# Patient Record
Sex: MALE | Race: WHITE | Employment: FULL TIME | URBAN - NONMETROPOLITAN AREA
[De-identification: names, ages, dates, MRNs, and addresses within clinical notes are randomized per-mention and may not be internally consistent; named-entity substitution may affect disease eponyms.]

---

## 2018-03-07 ENCOUNTER — HOSPITAL ENCOUNTER (EMERGENCY)
Age: 48
Discharge: OTHER INSTITUTION | End: 2018-03-07
Payer: COMMERCIAL

## 2018-03-07 VITALS
RESPIRATION RATE: 16 BRPM | WEIGHT: 160 LBS | SYSTOLIC BLOOD PRESSURE: 143 MMHG | BODY MASS INDEX: 21.67 KG/M2 | DIASTOLIC BLOOD PRESSURE: 92 MMHG | HEIGHT: 72 IN | HEART RATE: 70 BPM | TEMPERATURE: 98.2 F | OXYGEN SATURATION: 97 %

## 2018-03-07 DIAGNOSIS — S60.453A FOREIGN BODY OF LEFT MIDDLE FINGER: Primary | ICD-10-CM

## 2018-03-07 PROCEDURE — 99213 OFFICE O/P EST LOW 20 MIN: CPT | Performed by: NURSE PRACTITIONER

## 2018-03-07 PROCEDURE — 99204 OFFICE O/P NEW MOD 45 MIN: CPT

## 2018-03-07 ASSESSMENT — PAIN DESCRIPTION - PAIN TYPE: TYPE: ACUTE PAIN

## 2018-03-07 ASSESSMENT — PAIN DESCRIPTION - LOCATION: LOCATION: FINGER (COMMENT WHICH ONE)

## 2018-03-07 ASSESSMENT — PAIN SCALES - GENERAL: PAINLEVEL_OUTOF10: 1

## 2018-03-07 ASSESSMENT — PAIN DESCRIPTION - ORIENTATION: ORIENTATION: LEFT

## 2018-03-07 ASSESSMENT — ENCOUNTER SYMPTOMS: ROS SKIN COMMENTS: SPLINTER

## 2021-07-02 ENCOUNTER — TELEPHONE (OUTPATIENT)
Dept: FAMILY MEDICINE CLINIC | Age: 51
End: 2021-07-02

## 2021-07-02 NOTE — TELEPHONE ENCOUNTER
Spoke with patient will come for appointment 7/23/21 at 8am for new patient. Please schedule patient as I am unable to.

## 2021-07-02 NOTE — TELEPHONE ENCOUNTER
----- Message from Mac Essex sent at 2021 11:28 AM EDT -----  Subject: Appointment Request    Reason for Call: New Patient Request Appointment    QUESTIONS  Type of Appointment? New Patient/New to Provider  Reason for appointment request? Requested Provider unavailable - Laith Jain  Additional Information for Provider? New Patient appointment to get   established would like to only see Shaina Rubio, any day or time will   work   ---------------------------------------------------------------------------  --------------  Deannie Sandhoff INFO  What is the best way for the office to contact you? OK to leave message on   voicemail  Preferred Call Back Phone Number? [phone]  ---------------------------------------------------------------------------  --------------  SCRIPT ANSWERS  Relationship to Patient? Self  Appointment reason? Establish Care/Find a provider  Is this the first appointment to establish care for a ? No  Have you been diagnosed with, awaiting test results for, or told that you   are suspected of having COVID-19 (Coronavirus)? (If patient has tested   negative or was tested as a requirement for work, school, or travel and   not based on symptoms, answer no)? Yes  Did your symptoms begin within the past 14 days or was your positive test   result within the past 14 days? No  Do you currently have flu-like symptoms including fever or chills, cough,   shortness of breath, difficulty breathing, or new loss of taste or smell? No  Have you had close contact with someone with COVID-19 in the last 14 days? No  (Service Expert  click yes below to proceed with [employer] As Usual   Scheduling)?  Yes

## 2021-07-23 ENCOUNTER — OFFICE VISIT (OUTPATIENT)
Dept: FAMILY MEDICINE CLINIC | Age: 51
End: 2021-07-23
Payer: COMMERCIAL

## 2021-07-23 VITALS
DIASTOLIC BLOOD PRESSURE: 60 MMHG | RESPIRATION RATE: 16 BRPM | BODY MASS INDEX: 20.32 KG/M2 | WEIGHT: 150 LBS | SYSTOLIC BLOOD PRESSURE: 118 MMHG | HEIGHT: 72 IN | OXYGEN SATURATION: 98 % | HEART RATE: 46 BPM

## 2021-07-23 DIAGNOSIS — I83.93 VARICOSE VEINS OF BOTH LOWER EXTREMITIES, UNSPECIFIED WHETHER COMPLICATED: ICD-10-CM

## 2021-07-23 DIAGNOSIS — Z23 NEED FOR 23-POLYVALENT PNEUMOCOCCAL POLYSACCHARIDE VACCINE: ICD-10-CM

## 2021-07-23 DIAGNOSIS — K62.5 RECTAL BLEEDING: ICD-10-CM

## 2021-07-23 DIAGNOSIS — R00.1 BRADYCARDIA: ICD-10-CM

## 2021-07-23 DIAGNOSIS — R63.4 WEIGHT LOSS: ICD-10-CM

## 2021-07-23 DIAGNOSIS — Z87.891 PERSONAL HISTORY OF TOBACCO USE: ICD-10-CM

## 2021-07-23 DIAGNOSIS — Z12.11 ENCOUNTER FOR SCREENING COLONOSCOPY: Primary | ICD-10-CM

## 2021-07-23 DIAGNOSIS — Z80.9 FAMILY HISTORY OF CANCER: ICD-10-CM

## 2021-07-23 DIAGNOSIS — Z23 NEED FOR TDAP VACCINATION: ICD-10-CM

## 2021-07-23 DIAGNOSIS — Z00.00 VISIT FOR WELL MAN HEALTH CHECK: ICD-10-CM

## 2021-07-23 DIAGNOSIS — F10.21 HISTORY OF ALCOHOLISM (HCC): ICD-10-CM

## 2021-07-23 DIAGNOSIS — R12 HEARTBURN: ICD-10-CM

## 2021-07-23 LAB
APTT: 30.4 SECONDS (ref 22–38)
INR BLD: 0.93 (ref 0.85–1.13)

## 2021-07-23 PROCEDURE — 99204 OFFICE O/P NEW MOD 45 MIN: CPT | Performed by: FAMILY MEDICINE

## 2021-07-23 PROCEDURE — G0296 VISIT TO DETERM LDCT ELIG: HCPCS | Performed by: FAMILY MEDICINE

## 2021-07-23 PROCEDURE — 93000 ELECTROCARDIOGRAM COMPLETE: CPT | Performed by: FAMILY MEDICINE

## 2021-07-23 PROCEDURE — 90472 IMMUNIZATION ADMIN EACH ADD: CPT | Performed by: FAMILY MEDICINE

## 2021-07-23 PROCEDURE — 90732 PPSV23 VACC 2 YRS+ SUBQ/IM: CPT | Performed by: FAMILY MEDICINE

## 2021-07-23 PROCEDURE — 99386 PREV VISIT NEW AGE 40-64: CPT | Performed by: FAMILY MEDICINE

## 2021-07-23 PROCEDURE — 90715 TDAP VACCINE 7 YRS/> IM: CPT | Performed by: FAMILY MEDICINE

## 2021-07-23 PROCEDURE — 90471 IMMUNIZATION ADMIN: CPT | Performed by: FAMILY MEDICINE

## 2021-07-23 PROCEDURE — 36415 COLL VENOUS BLD VENIPUNCTURE: CPT | Performed by: FAMILY MEDICINE

## 2021-07-23 RX ORDER — GINKGO BILOBA 60 MG
TABLET ORAL
COMMUNITY
End: 2021-09-08 | Stop reason: ALTCHOICE

## 2021-07-23 RX ORDER — B-COMPLEX WITH VITAMIN C
TABLET ORAL DAILY
COMMUNITY
End: 2021-09-08 | Stop reason: ALTCHOICE

## 2021-07-23 RX ORDER — VIT C/B6/B5/MAGNESIUM/HERB 173 50-5-6-5MG
CAPSULE ORAL PRN
COMMUNITY
End: 2021-09-08 | Stop reason: ALTCHOICE

## 2021-07-23 ASSESSMENT — PATIENT HEALTH QUESTIONNAIRE - PHQ9
SUM OF ALL RESPONSES TO PHQ QUESTIONS 1-9: 0
SUM OF ALL RESPONSES TO PHQ QUESTIONS 1-9: 0
2. FEELING DOWN, DEPRESSED OR HOPELESS: 0
SUM OF ALL RESPONSES TO PHQ QUESTIONS 1-9: 0
1. LITTLE INTEREST OR PLEASURE IN DOING THINGS: 0
SUM OF ALL RESPONSES TO PHQ9 QUESTIONS 1 & 2: 0

## 2021-07-23 ASSESSMENT — ENCOUNTER SYMPTOMS
SORE THROAT: 0
CONSTIPATION: 0
EYE DISCHARGE: 0
SHORTNESS OF BREATH: 0
VOMITING: 1
DIARRHEA: 0
NAUSEA: 1
EYE REDNESS: 0
BACK PAIN: 0
COUGH: 0
BLOOD IN STOOL: 1
ABDOMINAL PAIN: 0

## 2021-07-23 NOTE — PROGRESS NOTES
[address] [address]  Dept: [phone]  Dept Fax: [phone]  Loc: [phone]      Soren Dove is a 45 y.o. male who presents todayfor his medical conditions/complaints as noted below. Soren Dove is c/o of Establish Care      :     HPI     Used to feel \"invincible\". Has had \"a massive weight loss\" recently. Is an alcoholic in [place] [address] program.  Quit drinking and lost weight after this. Used to be in mid 160's. Used to weigh 3 times a day for training. Trying to get to 170 weight class but could not. Has not been 150 since was a lianet in high school. Very physical job. 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  Struggling to get weight back on. Used to binge drink. Not regular drinker. Drank all drink types; 20 drinks or so a day. Maybe 15-18 days per month; functional alcoholic. Quit Feb 22nd 2020. Never liked money or respected it. Finally lost job and respected money when he missed a training and came in late; drinking got in the way of job so quit. 700-800 meetings since. 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  Also attends meetings in [address] [place] [address]. Also in [place]. Quit smoking 30 days after sobriety date. Did have severe heartburn to the point of vomiting. Better now on herbal preparations. Used to take a lot of these. Starts additional supplements intermittently. Only those on list now. Used to use magnesium also. Sometimes used natural probiotics like yogurt. Last incident was 3 months ago. Thinks spaghetti on empty stomach was precipitant. No consistent exertional symptoms. There is no problem list on file for this patient. Goals    None       The patient has No Known Allergies.     Medical History  Siva Watt has no past medical history on file. Past SurgicalHistory  The patient  has a past surgical history that includes Inguinal hernia repair. Family History  This patient's family history includes COPD in his mother; Cancer in his father, maternal grandfather, paternal grandfather, paternal grandmother, and paternal uncle; Diabetes in his paternal grandmother; Heart Attack in his paternal aunt and paternal grandfather; High Blood Pressure in his maternal grandfather, maternal grandmother, paternal grandfather, and paternal grandmother; Kidney Disease in his paternal grandmother; Other in his mother; Stroke in his father and mother. Social History  Siva Watt  reports that he quit smoking about 17 months ago. His smoking use included cigarettes. He has a 35.00 pack-year smoking history. He has never used smokeless tobacco. He reports previous alcohol use. He reports that he does not use drugs. Medications    Current Outpatient Medications:     Ginkgo 60 MG TABS, Take by mouth, Disp: , Rfl:     B Complex Vitamins (VITAMIN B COMPLEX) TABS, Take by mouth daily, Disp: , Rfl:     Turmeric 500 MG CAPS, Take by mouth, Disp: , Rfl:     Subjective:      Review of Systems     Review of Systems   Constitutional: Positive for unexpected weight change. Negative for chills, fatigue and fever. HENT: Negative for congestion, ear discharge, ear pain and sore throat. Eyes: Positive for visual disturbance. Negative for discharge and redness. Respiratory: Negative for cough and shortness of breath. Cardiovascular: Negative for chest pain and palpitations. Gastrointestinal: Positive for blood in stool (a bit over the past 5 years; maybe 3 times; none recently), nausea (last 3 months ago) and vomiting (last 3 months ago). Negative for abdominal pain, constipation and diarrhea. Thinks hemorrhoids   Genitourinary: Negative for difficulty urinating and dysuria.    Musculoskeletal: Negative for arthralgias, back pain, gait problem and neck pain. Varicocele pain in the last 5-6 years ago. Right leg was fixed. Left leg sometimes painful. Skin: Negative for rash. Allergic/Immunologic: Negative for environmental allergies. Neurological: Negative for headaches. Psychiatric/Behavioral: Negative for dysphoric mood and sleep disturbance. The patient is not nervous/anxious. Objective:     Vitals:    07/23/21 0824 07/23/21 0851   BP: 136/82 118/60   Site: Left Upper Arm    Position: Sitting    Pulse: (!) 46    Resp: 16    SpO2: 98%    Weight: 150 lb (68 kg)    Height: 6' (1.829 m)        Physical Exam  Vitals reviewed. Constitutional:       General: He is not in acute distress. Appearance: He is well-developed and normal weight. He is not ill-appearing, toxic-appearing or diaphoretic. Comments: Slender. HENT:      Head: Normocephalic and atraumatic. Eyes:      Conjunctiva/sclera: Conjunctivae normal.      Pupils: Pupils are equal, round, and reactive to light. Neck:      Thyroid: No thyromegaly. Cardiovascular:      Rate and Rhythm: Regular rhythm. Bradycardia present. Heart sounds: Normal heart sounds. Pulmonary:      Effort: Pulmonary effort is normal. No respiratory distress. Breath sounds: Normal breath sounds. Abdominal:      General: Abdomen is flat. Bowel sounds are normal. There is no distension. Palpations: Abdomen is soft. There is no mass. Tenderness: There is no abdominal tenderness. There is no guarding or rebound. Hernia: No hernia is present. Musculoskeletal:      Cervical back: Neck supple. Comments: A few varicose veins noted left leg without swelling or any evidence of infection. Lymphadenopathy:      Cervical: No cervical adenopathy. Skin:     General: Skin is warm and dry. Findings: No rash. Neurological:      Mental Status: He is alert. Comments: No obvious focal deficit.    Psychiatric:         Mood and Affect: Mood normal.         Behavior: Behavior normal.         Thought Content: Thought content normal.         Judgment: Judgment normal.         No results found for: WBC, HGB, HCT, PLT, CHOL, TRIG, HDL, LDLDIRECT, ALT, AST, NA, K, CL, CREATININE, BUN, CO2, TSH, PSA, INR, GLUF, LABA1C, LABMICR    /Plan:     1. Weight loss  10 -15 pound unintentional. Strong family history of cancer. Trying to eat to re-gain but cannot. Will check labs, screen for colon cancer, screen for lung cancer.    - AFL - Dallas Bob MD, Gastroenterology, [facility] [employer] [place]  - CBC Auto Differential; Future  - Comprehensive Metabolic Panel; Future  - TSH With Reflex Ft4; Future    2. Rectal bleeding  None recently; small amount in the past several years maybe 3 times. Possibly related to hemorrhoid; in any case due for colonoscopy. Indications for prompt return and/or emergent presentation if worsening reviewed in detail.    - Mia Mcdonald MD, Gastroenterology, [facility] [place] [facility]  - CBC Auto Differential; Future  - Comprehensive Metabolic Panel; Future  - TSH With Reflex Ft4; Future    3. Bradycardia  Regular sinus. Suspect normal in athletic man. - EKG 12 Lead; Future    4. Heartburn  Improved with dietary and supplement changes and D/C drinking. Will defer to GI on whether concurrent upper scope is indicated with lower scope given symptoms and weight loss. 5. Varicose veins of both lower extremities, unspecified whether complicated  Intermittently symptomatic but not today; intervention in contralateral leg resolved symptoms in that area. Will consider referral post other work-up. No follow-ups on file. Orders Placed   Orders Placed This Encounter   Procedures    CT Lung Screen (Annual)     Age: Patient is 45 y.o.     Smoking History: Social History    Tobacco Use      Smoking status: Former Smoker        Packs/day: 1.00        Years: 35.00        Pack years: 35        Types: Cigarettes        Quit date: 1/23/2020        Years since quittin.4      Smokeless tobacco: Never Used    Alcohol use: Not Currently    Drug use: No   Pack years: 35    Date of last lung cancer screening: No previous lung cancer screening exam     Standing Status:   Future     Standing Expiration Date:   2023     Order Specific Question:   Is there documentation of shared decision making? Answer:   Yes     Order Specific Question:   Is this a low dose CT or a routine CT? Answer:   Low Dose CT [1]     Order Specific Question:   Is this the first (baseline) CT or an annual exam?     Answer:   Baseline [1]     Order Specific Question:   Does the patient show any signs or symptoms of lung cancer? Answer:   Yes     Comments:   10 pound weight loss     Order Specific Question:   Smoking Status? Answer: Former Smoker [4]     Order Specific Question:   Date quit smoking? (must be within 15 years)     Answer:   2020     Order Specific Question:   Smoking packs per day? Answer:   1     Order Specific Question:   Years smoking? Answer:   28    PNEUMOVAX 23 subcutaneous/IM (Pneumococcal polysaccharide vaccine 23-valent >= 1yo)    Tdap (age 6y and older) IM (BOOSTRIX)    CBC Auto Differential     Standing Status:   Future     Standing Expiration Date:   2022    Comprehensive Metabolic Panel     Standing Status:   Future     Standing Expiration Date:   2022    TSH With Reflex Ft4     Standing Status:   Future     Standing Expiration Date:   2022    Lipid Panel     Standing Status:   Future     Standing Expiration Date:   2022     Order Specific Question:   Is Patient Fasting?/# of Hours     Answer:   no    Vitamin B12 & Folate     Standing Status:   Future     Standing Expiration Date:   2022    Protime-INR     Standing Status:   Future     Standing Expiration Date:   2022     Order Specific Question:   Daily Coumadin Dose?      Answer:   none    APTT     Standing Status:   Future     Standing Expiration Date:   7/23/2022     Order Specific Question:   Daily Heparin Dose? Answer:   Marisa Daniels MD, Gastroenterology, [facility]     Referral Priority:   Routine     Referral Type:   Eval and Treat     Referral Reason:   Specialty Services Required     Referred to Provider:   Jon Humphries MD     Requested Specialty:   Gastroenterology     Number of Visits Requested:   1    EKG 12 Lead     Standing Status:   Future     Standing Expiration Date:   7/23/2022     Order Specific Question:   Reason for Exam?     Answer:   Bradycardia    NH VISIT TO DISCUSS LUNG CA SCREEN W LDCT       Prescriptions given/sent  No orders of the defined types were placed in this encounter. Patient given educational materials - see patient instructions. Discussed use, benefit, and side effects of prescribed medications. All patientquestions answered. Pt voiced understanding. Reviewed health maintenance.           Electronically signed by Amita Alcocer MD on 7/23/2021 at 9:08 AM

## 2021-07-23 NOTE — PROGRESS NOTES
After obtaining consent, and per orders of Dr. Jamar Keita, injection of Tdap LD and Pneumovax 23 RD given by Surekha Doe MA. Patient instructed to remain in clinic for 20 minutes afterwards, and to report any adverse reaction to me immediately. Immunizations Administered     Name Date Dose Route    Pneumococcal Polysaccharide ([id number]) 7/23/2021 0.5 mL Intramuscular    Site: Deltoid- Right    Lot: [id number]    NDC: 8133-2136-28    Tdap (Boostrix, Adacel) 7/23/2021 0.5 mL Intramuscular    Site: Deltoid- Left    Lot: [id number]    NDC: 00208-853-35        VIS sheets given to patient. Vaccine checklist completed.  Patient tolerated the injections well

## 2021-07-23 NOTE — PROGRESS NOTES
[address] [address]  Dept: [phone]  Dept Fax: [phone]  Loc: [phone]      Jing Jain is a 45 y.o. male who presents todayfor his medical conditions/complaints as noted below. Jing Jain is c/o of [facility]      :     HPI     Here for new patient visit to [facility]. Has been without a doctor for 6 years. Used to doctor in [place]. Had regular care there. Significant family history of cancer. Used to get chest x-rays. Usually donates blood every 4 months. There is no problem list on file for this patient. Goals    None       The patient has No Known Allergies. Medical History  David Dahl has no past medical history on file. Past SurgicalHistory  The patient  has a past surgical history that includes Inguinal hernia repair. Family History  This patient's family history includes COPD in his mother; Cancer in his father, maternal grandfather, paternal grandfather, paternal grandmother, and paternal uncle; Diabetes in his paternal grandmother; Heart Attack in his paternal aunt and paternal grandfather; High Blood Pressure in his maternal grandfather, maternal grandmother, paternal grandfather, and paternal grandmother; Kidney Disease in his paternal grandmother; Other in his mother; Stroke in his father and mother. Social History  David Dahl  reports that he quit smoking about 17 months ago. His smoking use included cigarettes. He has a 35.00 pack-year smoking history. He has never used smokeless tobacco. He reports previous alcohol use. He reports that he does not use drugs.     Medications    Current Outpatient Medications:     Ginkgo 60 MG TABS, Take by mouth, Disp: , Rfl:     B Complex Vitamins (VITAMIN B COMPLEX) TABS, Take by mouth daily, Disp: , Rfl:     Turmeric 500 MG CAPS, Take by mouth, Disp: , Rfl:     Subjective:      Review of Systems Constitutional: Positive for unexpected weight change. Negative for chills, fatigue and fever. HENT: Negative for congestion, ear discharge, ear pain and sore throat. Eyes: Positive for visual disturbance. Negative for discharge and redness. Respiratory: Negative for cough and shortness of breath. Cardiovascular: Negative for chest pain and palpitations. Gastrointestinal: Positive for blood in stool (a bit over the past 5 years; maybe 3 times; none recently), nausea (last 3 months ago) and vomiting (last 3 months ago). Negative for abdominal pain, constipation and diarrhea. Thinks hemorrhoids   Genitourinary: Negative for difficulty urinating and dysuria. Musculoskeletal: Negative for arthralgias, back pain, gait problem and neck pain. Varicocele pain in the last 5-6 years ago. Right leg was fixed. Left leg sometimes painful. Skin: Negative for rash. Allergic/Immunologic: Negative for environmental allergies. Neurological: Negative for headaches. Psychiatric/Behavioral: Negative for dysphoric mood and sleep disturbance. The patient is not nervous/anxious. Objective:     Vitals:    07/23/21 0824 07/23/21 0851   BP: 136/82 118/60   Site: Left Upper Arm    Position: Sitting    Pulse: (!) 46    Resp: 16    SpO2: 98%    Weight: 150 lb (68 kg)    Height: 6' (1.829 m)        Physical Exam     Physical Exam  Vitals reviewed. Constitutional:       General: He is not in acute distress. Appearance: He is well-developed and normal weight. He is not ill-appearing, toxic-appearing or diaphoretic. Comments: Slender. HENT:      Head: Normocephalic and atraumatic. Eyes:      Conjunctiva/sclera: Conjunctivae normal.      Pupils: Pupils are equal, round, and reactive to light. Neck:      Thyroid: No thyromegaly. Cardiovascular:      Rate and Rhythm: Regular rhythm. Bradycardia present. Heart sounds: Normal heart sounds.    Pulmonary:      Effort: Pulmonary effort is normal. No respiratory distress. Breath sounds: Normal breath sounds. Abdominal:      General: Abdomen is flat. Bowel sounds are normal. There is no distension. Palpations: Abdomen is soft. There is no mass. Tenderness: There is no abdominal tenderness. There is no guarding or rebound. Hernia: No hernia is present. Musculoskeletal:      Cervical back: Neck supple. Comments: A few varicose veins noted left leg without swelling or any evidence of infection. Lymphadenopathy:      Cervical: No cervical adenopathy. Skin:     General: Skin is warm and dry. Findings: No rash. Neurological:      Mental Status: He is alert. Comments: No obvious focal deficit. Psychiatric:         Mood and Affect: Mood normal.         Behavior: Behavior normal.         Thought Content: Thought content normal.         Judgment: Judgment normal.     No results found for: WBC, HGB, HCT, PLT, CHOL, TRIG, HDL, LDLDIRECT, ALT, AST, NA, K, CL, CREATININE, BUN, CO2, TSH, PSA, INR, GLUF, LABA1C, LABMICR    /Plan:   1. Visit for [facility] health check  Well check today. - Lipid Panel; Future    2. Encounter for screening colonoscopy  Referral placed. - Fco Dye MD, [facility]astroenter[facility], [facility]  - CBC Auto Differential; Future  - Comprehensive Metabolic Panel; Future  - TSH With Reflex Ft4; Future  - Lipid Panel; Future    3. Need for Tdap vaccination  Given.    - Tdap (age 6y and older) IM ([address])    4. Need for 23-polyvalent pneumococcal polysaccharide vaccine  Given.    - PNEUMOVAX 23 subcutaneous/IM (Pneumococcal polysaccharide vaccine 23-valent >= 3yo)    5. Personal history of tobacco use  Screen ordered. - [place] VISIT TO DISCUSS LUNG CA SCREEN W LDCT  - CT Lung Screen (Annual); Future    6. History of alcoholism ([facility] [address]  Checking labs. - Vitamin B12 & Folate; Future  - Protime-INR; Future  - APTT; Future    12. Family history of cancer  Screens placed.               Return in about 3 weeks (around 2021) for Results review; re-check weight loss; other issues. Orders Placed   Orders Placed This Encounter   Procedures    CT Lung Screen (Annual)     Age: Patient is 45 y.o. Smoking History: Social History    Tobacco Use      Smoking status: Former Smoker        Packs/day: 1.00        Years: 35.00        Pack years: 35        Types: Cigarettes        Quit date: 2020        Years since quittin.4      Smokeless tobacco: Never Used    Alcohol use: Not Currently    Drug use: No   Pack years: 28    Date of last lung cancer screening: No previous lung cancer screening exam     Standing Status:   Future     Standing Expiration Date:   2023     Order Specific Question:   Is there documentation of shared decision making? Answer:   Yes     Order Specific Question:   Is this a low dose CT or a routine CT? Answer:   Low Dose CT [1]     Order Specific Question:   Is this the first (baseline) CT or an annual exam?     Answer:   Baseline [1]     Order Specific Question:   Does the patient show any signs or symptoms of lung cancer? Answer:   Yes     Comments:   10 pound weight loss     Order Specific Question:   Smoking Status? Answer: Former Smoker [4]     Order Specific Question:   Date quit smoking? (must be within 15 years)     Answer:   2020     Order Specific Question:   Smoking packs per day? Answer:   1     Order Specific Question:   Years smoking?      Answer:   28    PNEUMOVAX 23 subcutaneous/IM (Pneumococcal polysaccharide vaccine 23-valent >= 1yo)    Tdap (age 6y and older) IM (BOOSTRIX)    CBC Auto Differential     Standing Status:   Future     Standing Expiration Date:   2022    Comprehensive Metabolic Panel     Standing Status:   Future     Standing Expiration Date:   2022    TSH With Reflex Ft4     Standing Status:   Future     Standing Expiration Date:   2022    Lipid Panel     Standing Status:   Future     Standing Expiration Date:   7/23/2022     Order Specific Question:   Is Patient Fasting?/# of Hours     Answer:   no    Vitamin B12 & Folate     Standing Status:   Future     Standing Expiration Date:   7/23/2022    Protime-INR     Standing Status:   Future     Standing Expiration Date:   7/23/2022     Order Specific Question:   Daily Coumadin Dose? Answer:   none    APTT     Standing Status:   Future     Standing Expiration Date:   7/23/2022     Order Specific Question:   Daily Heparin Dose? Answer:   Rosa Maria Torres MD, Gastroenterology, [facility]     Referral Priority:   Routine     Referral Type:   Eval and Treat     Referral Reason:   Specialty Services Required     Referred to Provider:   Debbie Jones MD     Requested Specialty:   Gastroenterology     Number of Visits Requested:   1    EKG 12 Lead     Standing Status:   Future     Number of Occurrences:   1     Standing Expiration Date:   7/23/2022     Order Specific Question:   Reason for Exam?     Answer:   Bradycardia    [place] VISIT TO DISCUSS LUNG CA SCREEN W LDCT       Prescriptions given/sent  No orders of the defined types were placed in this encounter. Patient given educational materials - see patient instructions. Discussed use, benefit, and side effects of prescribed medications. All patientquestions answered. Pt voiced understanding. Reviewed health maintenance. Electronically signed by Nikita Castellanos MD on 7/23/2021 at 9:17 AM               Low Dose CT (LDCT) Lung Screening criteria met   Age 50-69   Pack year smoking >30   Still smoking or less than 15 year since quit   No sign or symptoms of lung cancer   > 11 months since last LDCT     Risks and benefits of lung cancer screening with LDCT scans discussed:    Significance of positive screen - False-positive LDCT results often occur. 95% of all positive results do not lead to a diagnosis of cancer.  Usually further imaging can resolve most false-positive results;

## 2021-07-24 LAB
ALBUMIN SERPL-MCNC: 4.8 G/DL (ref 3.5–5.1)
ALP BLD-CCNC: 78 U/L (ref 38–126)
ALT SERPL-CCNC: 22 U/L (ref 11–66)
ANION GAP SERPL CALCULATED.3IONS-SCNC: 11 MEQ/L (ref 8–16)
AST SERPL-CCNC: 21 U/L (ref 5–40)
BASOPHILS # BLD: 0.9 %
BASOPHILS ABSOLUTE: 0.1 THOU/MM3 (ref 0–0.1)
BILIRUB SERPL-MCNC: 0.5 MG/DL (ref 0.3–1.2)
BUN BLDV-MCNC: 13 MG/DL (ref 7–22)
CALCIUM SERPL-MCNC: 9.7 MG/DL (ref 8.5–10.5)
CHLORIDE BLD-SCNC: 105 MEQ/L (ref 98–111)
CHOLESTEROL, TOTAL: 141 MG/DL (ref 100–199)
CO2: 26 MEQ/L (ref 23–33)
CREAT SERPL-MCNC: 0.9 MG/DL (ref 0.4–1.2)
EOSINOPHIL # BLD: 2.3 %
EOSINOPHILS ABSOLUTE: 0.2 THOU/MM3 (ref 0–0.4)
ERYTHROCYTE [DISTWIDTH] IN BLOOD BY AUTOMATED COUNT: 15.4 % (ref 11.5–14.5)
ERYTHROCYTE [DISTWIDTH] IN BLOOD BY AUTOMATED COUNT: 46.3 FL (ref 35–45)
FOLATE: 15.2 NG/ML (ref 4.8–24.2)
GFR SERPL CREATININE-BSD FRML MDRD: 89 ML/MIN/1.73M2
GLUCOSE BLD-MCNC: 96 MG/DL (ref 70–108)
HCT VFR BLD CALC: 41.1 % (ref 42–52)
HDLC SERPL-MCNC: 75 MG/DL
HEMOGLOBIN: 12.2 GM/DL (ref 14–18)
IMMATURE GRANS (ABS): 0.01 THOU/MM3 (ref 0–0.07)
IMMATURE GRANULOCYTES: 0.1 %
LDL CHOLESTEROL CALCULATED: 57 MG/DL
LYMPHOCYTES # BLD: 22.3 %
LYMPHOCYTES ABSOLUTE: 1.8 THOU/MM3 (ref 1–4.8)
MCH RBC QN AUTO: 24.4 PG (ref 26–33)
MCHC RBC AUTO-ENTMCNC: 29.7 GM/DL (ref 32.2–35.5)
MCV RBC AUTO: 82.4 FL (ref 80–94)
MONOCYTES # BLD: 9.1 %
MONOCYTES ABSOLUTE: 0.7 THOU/MM3 (ref 0.4–1.3)
NUCLEATED RED BLOOD CELLS: 0 /100 WBC
PLATELET # BLD: 223 THOU/MM3 (ref 130–400)
PMV BLD AUTO: 12 FL (ref 9.4–12.4)
POTASSIUM SERPL-SCNC: 4.6 MEQ/L (ref 3.5–5.2)
RBC # BLD: 4.99 MILL/MM3 (ref 4.7–6.1)
SEG NEUTROPHILS: 65.3 %
SEGMENTED NEUTROPHILS ABSOLUTE COUNT: 5.2 THOU/MM3 (ref 1.8–7.7)
SODIUM BLD-SCNC: 142 MEQ/L (ref 135–145)
TOTAL PROTEIN: 6.9 G/DL (ref 6.1–8)
TRIGL SERPL-MCNC: 43 MG/DL (ref 0–199)
TSH SERPL DL<=0.05 MIU/L-ACNC: 2.1 UIU/ML (ref 0.4–4.2)
VITAMIN B-12: 1181 PG/ML (ref 211–911)
WBC # BLD: 7.9 THOU/MM3 (ref 4.8–10.8)

## 2021-08-07 ENCOUNTER — HOSPITAL ENCOUNTER (OUTPATIENT)
Dept: CT IMAGING | Age: 51
Discharge: HOME | End: 2021-08-07
Payer: COMMERCIAL

## 2021-08-07 DIAGNOSIS — Z00.00 VISIT FOR WELL MAN HEALTH CHECK: ICD-10-CM

## 2021-08-07 DIAGNOSIS — Z87.891 PERSONAL HISTORY OF TOBACCO USE: ICD-10-CM

## 2021-08-07 PROCEDURE — 71271 CT THORAX LUNG CANCER SCR C-: CPT

## 2021-08-12 ENCOUNTER — OFFICE VISIT (OUTPATIENT)
Dept: FAMILY MEDICINE CLINIC | Age: 51
End: 2021-08-12
Payer: COMMERCIAL

## 2021-08-12 VITALS
RESPIRATION RATE: 16 BRPM | OXYGEN SATURATION: 98 % | WEIGHT: 150 LBS | SYSTOLIC BLOOD PRESSURE: 118 MMHG | HEART RATE: 60 BPM | HEIGHT: 72 IN | BODY MASS INDEX: 20.32 KG/M2 | DIASTOLIC BLOOD PRESSURE: 80 MMHG

## 2021-08-12 DIAGNOSIS — D64.9 ANEMIA, UNSPECIFIED TYPE: ICD-10-CM

## 2021-08-12 DIAGNOSIS — R12 HEARTBURN: Primary | ICD-10-CM

## 2021-08-12 DIAGNOSIS — R63.4 WEIGHT LOSS: ICD-10-CM

## 2021-08-12 DIAGNOSIS — F10.21 HISTORY OF ALCOHOLISM (HCC): ICD-10-CM

## 2021-08-12 DIAGNOSIS — Z87.891 PERSONAL HISTORY OF TOBACCO USE: ICD-10-CM

## 2021-08-12 PROCEDURE — 99214 OFFICE O/P EST MOD 30 MIN: CPT | Performed by: FAMILY MEDICINE

## 2021-08-12 RX ORDER — OMEPRAZOLE 20 MG/1
20 TABLET, DELAYED RELEASE ORAL DAILY
COMMUNITY
End: 2022-06-01 | Stop reason: ALTCHOICE

## 2021-08-12 ASSESSMENT — ENCOUNTER SYMPTOMS
SHORTNESS OF BREATH: 0
COUGH: 0
CONSTIPATION: 0
NAUSEA: 1
EYE REDNESS: 0
BLOOD IN STOOL: 1
EYE DISCHARGE: 0
VOMITING: 1
SORE THROAT: 0
ABDOMINAL PAIN: 0
DIARRHEA: 0
BACK PAIN: 0

## 2021-08-12 NOTE — PATIENT INSTRUCTIONS
Either get h. Pylori testing or schedule upper endoscopy. Go for colonoscopy. Keep food diary. Re-check in 1 month.

## 2021-08-12 NOTE — PROGRESS NOTES
[address] [address]  Dept: [phone]  Dept Fax: [phone]  Loc: [phone]      Vince Mortimer is a 45 y.o. male who presents todayfor his medical conditions/complaints as noted below. Vince Mortimer is c/o of Follow-up, Results (CT Lung scan ), and Heartburn      :     HPI       Here for follow-up. Colonoscopy scheduled for September 1st.      Discussed labs and anemia. Heartburn is still quite severe. Does not have enough appetite to eat the food that he needs to to gain weight. Weight is stable since last visit. History:    Is an alcoholic in AA program.  Quit drinking and lost weight after this. Used to be in mid 160's. Used to weigh 3 times a day for training. Trying to get to 170 weight class but could not. Has not been 150 since was a lianet in high school. Very physical job. 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  Struggling to get weight back on.       Used to binge drink. Not regular drinker. Drank all drink types; 20 drinks or so a day. Maybe 15-18 days per month; functional alcoholic. Quit Feb 22nd 2020. Never liked money or respected it. Finally lost job and respected money when he missed a training and came in late; drinking got in the way of job so quit. 700-800 meetings since. 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  Also attends meetings in [address] [place] [address]. Also in [place].      Quit smoking 30 days after sobriety date.       Did have severe heartburn to the point of vomiting. Better now on herbal preparations. Used to take a lot of these. Starts additional supplements intermittently. Only those on list now. Used to use magnesium also. Sometimes used natural probiotics like yogurt.   Last incident was 3 months ago. Thinks spaghetti on empty stomach was precipitant.       No consistent exertional symptoms. Patient Active Problem List   Diagnosis    Personal history of tobacco use    History of alcoholism ([address]    Weight loss    Rectal bleeding    Bradycardia    Heartburn    Varicose veins of both lower extremities    Family history of cancer     Goals    None       The patient has No Known Allergies. Medical History  Elaina Dominguez has no past medical history on file. Past SurgicalHistory  The patient  has a past surgical history that includes Inguinal hernia repair. Family History  This patient's family history includes COPD in his mother; Cancer in his father, maternal grandfather, paternal grandfather, paternal grandmother, and paternal uncle; Diabetes in his paternal grandmother; Heart Attack in his paternal aunt and paternal grandfather; High Blood Pressure in his maternal grandfather, maternal grandmother, paternal grandfather, and paternal grandmother; Kidney Disease in his paternal grandmother; Other in his mother; Stroke in his father and mother. Social History  Elaina Dominguez  reports that he quit smoking about 18 months ago. His smoking use included cigarettes. He has a 35.00 pack-year smoking history. He has never used smokeless tobacco. He reports previous alcohol use. He reports that he does not use drugs. Medications    Current Outpatient Medications:     omeprazole (PRILOSEC OTC) 20 MG tablet, Take 20 mg by mouth daily, Disp: , Rfl:     Ginkgo 60 MG TABS, Take by mouth, Disp: , Rfl:     B Complex Vitamins (VITAMIN B COMPLEX) TABS, Take by mouth daily, Disp: , Rfl:     Turmeric 500 MG CAPS, Take by mouth as needed , Disp: , Rfl:     Subjective:      Review of Systems   Constitutional: Positive for unexpected weight change. Negative for chills, fatigue and fever. HENT: Negative for congestion, ear discharge, ear pain and sore throat. Eyes: Positive for visual disturbance.  Negative for discharge and redness. Respiratory: Negative for cough and shortness of breath. Cardiovascular: Negative for chest pain and palpitations. Gastrointestinal: Positive for blood in stool (a bit over the past 5 years; maybe 3 times; none recently), nausea (last 3 months ago) and vomiting (last 3 months ago). Negative for abdominal pain, constipation and diarrhea. Thinks hemorrhoids   Genitourinary: Negative for difficulty urinating and dysuria. Musculoskeletal: Negative for arthralgias, back pain, gait problem and neck pain. Varicocele pain in the last 5-6 years ago. Right leg was fixed. Left leg sometimes painful. Skin: Negative for rash. Allergic/Immunologic: Negative for environmental allergies. Neurological: Negative for headaches. Psychiatric/Behavioral: Negative for dysphoric mood and sleep disturbance. The patient is not nervous/anxious. No change in symptoms reported. Objective:     Vitals:    08/12/21 0900   BP: 118/80   Site: Left Upper Arm   Position: Sitting   Pulse: 60   Resp: 16   SpO2: 98%   Weight: 150 lb (68 kg)   Height: 6' (1.829 m)       Physical Exam  Vitals reviewed. Constitutional:       Appearance: He is well-developed. HENT:      Head: Normocephalic and atraumatic. Eyes:      Conjunctiva/sclera: Conjunctivae normal.      Pupils: Pupils are equal, round, and reactive to light. Neck:      Thyroid: No thyromegaly. Cardiovascular:      Rate and Rhythm: Normal rate and regular rhythm. Heart sounds: Normal heart sounds. Pulmonary:      Effort: Pulmonary effort is normal. No respiratory distress. Breath sounds: Normal breath sounds. Abdominal:      Palpations: Abdomen is soft. Tenderness: There is no abdominal tenderness. Musculoskeletal:      Cervical back: Neck supple. Lymphadenopathy:      Cervical: No cervical adenopathy. Skin:     General: Skin is warm and dry. Findings: No rash.    Neurological:      Mental Status: He is alert. Comments: No obvious focal deficit. Psychiatric:         Behavior: Behavior normal.         Lab Results   Component Value Date    WBC 7.9 07/23/2021    HGB 12.2 (L) 07/23/2021    HCT 41.1 (L) 07/23/2021     07/23/2021    CHOL 141 07/23/2021    TRIG 43 07/23/2021    HDL 75 07/23/2021    ALT 22 07/23/2021    AST 21 07/23/2021     07/23/2021    K 4.6 07/23/2021     07/23/2021    CREATININE 0.9 07/23/2021    BUN 13 07/23/2021    CO2 26 07/23/2021    TSH 2.100 07/23/2021    INR 0.93 07/23/2021       /Plan:   1. Heartburn  Given symptoms at minimum needs h. Pylori testing. Could also consider upper scope when doing lower scope. Patient will decide if he prefers stool test with upper scope only if negative and symptoms persist or to go straight for upper scope.    - H. Pylori Antigen, Stool; Future    2. Weight loss  Work-up for anemia with colonoscopy first. Keep food log. If related to diet and activity may need to increase calories. 3. Anemia, unspecified type  Iron supplement okay. Go for colonoscopy. Work-up post scope if indicated. 4. History of alcoholism ([address]  In remission. Keep up the good work! 5. Personal history of tobacco use  In remission. CT lung screen normal.  Keep up the good work! Return in about 4 weeks (around 9/9/2021) for Weight check post colonoscopy. Orders Placed   Orders Placed This Encounter   Procedures    H. Pylori Antigen, Stool     Standing Status:   Future     Standing Expiration Date:   8/12/2022       Prescriptions given/sent  No orders of the defined types were placed in this encounter. Patient given educational materials - see patient instructions. Discussed use, benefit, and side effects of prescribed medications. All patientquestions answered. Pt voiced understanding. Reviewed health maintenance.           Electronically signed by Sheridan Rosales MD on 8/12/2021 at 9:36 AM      Electronically signed by

## 2021-08-27 ENCOUNTER — TELEPHONE (OUTPATIENT)
Dept: FAMILY MEDICINE CLINIC | Age: 51
End: 2021-08-27

## 2021-08-27 DIAGNOSIS — D64.9 ANEMIA, UNSPECIFIED TYPE: Primary | ICD-10-CM

## 2021-08-27 NOTE — TELEPHONE ENCOUNTER
GI requested patient labs. Okay to forward a copy. Also let patient know that I have ordered a repeat blood count and iron studies a requested by GI.

## 2021-08-27 NOTE — TELEPHONE ENCOUNTER
Lab results sent to [facility] for Geovanny Rob- called patient- no answer- left message to return call

## 2021-08-30 ENCOUNTER — NURSE ONLY (OUTPATIENT)
Dept: FAMILY MEDICINE CLINIC | Age: 51
End: 2021-08-30
Payer: COMMERCIAL

## 2021-08-30 DIAGNOSIS — D64.9 ANEMIA, UNSPECIFIED TYPE: ICD-10-CM

## 2021-08-30 PROCEDURE — 36415 COLL VENOUS BLD VENIPUNCTURE: CPT | Performed by: FAMILY MEDICINE

## 2021-08-30 NOTE — TELEPHONE ENCOUNTER
----- Message from [facility] sent at 8/27/2021  1:35 PM EDT -----  Subject: Message to Provider    QUESTIONS  Information for Provider? pt tried to contact office. Office was closed. ---------------------------------------------------------------------------  --------------  Kate LIRIANO  What is the best way for the office to contact you? OK to leave message on   voicemail  Preferred Call Back Phone Number? [phone]  ---------------------------------------------------------------------------  --------------  SCRIPT ANSWERS  Relationship to Patient?  Self

## 2021-08-31 LAB
ABSOLUTE RETIC #: 43 THOU/MM3 (ref 20–115)
BASOPHILS # BLD: 0.8 %
BASOPHILS ABSOLUTE: 0.1 THOU/MM3 (ref 0–0.1)
EOSINOPHIL # BLD: 0.6 %
EOSINOPHILS ABSOLUTE: 0.1 THOU/MM3 (ref 0–0.4)
ERYTHROCYTE [DISTWIDTH] IN BLOOD BY AUTOMATED COUNT: 16.1 % (ref 11.5–14.5)
ERYTHROCYTE [DISTWIDTH] IN BLOOD BY AUTOMATED COUNT: 48.2 FL (ref 35–45)
FERRITIN: 9 NG/ML (ref 22–322)
HCT VFR BLD CALC: 38.7 % (ref 42–52)
HEMOGLOBIN: 11.7 GM/DL (ref 14–18)
IMMATURE GRANS (ABS): 0.03 THOU/MM3 (ref 0–0.07)
IMMATURE GRANULOCYTES: 0.3 %
IMMATURE RETIC FRACT: 8.9 % (ref 2.3–13.4)
IRON SATURATION: 11 % (ref 20–50)
IRON: 43 UG/DL (ref 65–195)
LYMPHOCYTES # BLD: 16.8 %
LYMPHOCYTES ABSOLUTE: 1.4 THOU/MM3 (ref 1–4.8)
MCH RBC QN AUTO: 24.8 PG (ref 26–33)
MCHC RBC AUTO-ENTMCNC: 30.2 GM/DL (ref 32.2–35.5)
MCV RBC AUTO: 82.2 FL (ref 80–94)
MONOCYTES # BLD: 7.2 %
MONOCYTES ABSOLUTE: 0.6 THOU/MM3 (ref 0.4–1.3)
NUCLEATED RED BLOOD CELLS: 0 /100 WBC
PLATELET # BLD: 235 THOU/MM3 (ref 130–400)
PMV BLD AUTO: 11.5 FL (ref 9.4–12.4)
RBC # BLD: 4.71 MILL/MM3 (ref 4.7–6.1)
RETIC HEMOGLOBIN: 27.9 PG (ref 28.2–35.7)
RETICULOCYTE ABSOLUTE COUNT: 0.9 % (ref 0.5–2)
SEG NEUTROPHILS: 74.3 %
SEGMENTED NEUTROPHILS ABSOLUTE COUNT: 6.4 THOU/MM3 (ref 1.8–7.7)
TOTAL IRON BINDING CAPACITY: 406 UG/DL (ref 171–450)
TRANSFERRIN: 341 MG/DL (ref 200–360)
WBC # BLD: 8.6 THOU/MM3 (ref 4.8–10.8)

## 2021-09-02 ENCOUNTER — TELEPHONE (OUTPATIENT)
Dept: FAMILY MEDICINE CLINIC | Age: 51
End: 2021-09-02

## 2021-09-02 ENCOUNTER — HOSPITAL ENCOUNTER (OUTPATIENT)
Dept: CT IMAGING | Age: 51
Discharge: HOME | End: 2021-09-02
Payer: COMMERCIAL

## 2021-09-02 ENCOUNTER — NURSE ONLY (OUTPATIENT)
Dept: LAB | Age: 51
End: 2021-09-02

## 2021-09-02 DIAGNOSIS — C18.9 MALIGNANT NEOPLASM OF COLON, UNSPECIFIED PART OF COLON (HCC): ICD-10-CM

## 2021-09-02 PROCEDURE — 6360000004 HC RX CONTRAST MEDICATION: Performed by: INTERNAL MEDICINE

## 2021-09-02 PROCEDURE — 71260 CT THORAX DX C+: CPT

## 2021-09-02 PROCEDURE — 74177 CT ABD & PELVIS W/CONTRAST: CPT

## 2021-09-02 RX ADMIN — IOHEXOL 50 ML: 240 INJECTION, SOLUTION INTRATHECAL; INTRAVASCULAR; INTRAVENOUS; ORAL at 15:38

## 2021-09-02 RX ADMIN — IOPAMIDOL 80 ML: 755 INJECTION, SOLUTION INTRAVENOUS at 15:37

## 2021-09-02 NOTE — TELEPHONE ENCOUNTER
spoke with office- they are just wanting number that we possibly used to PA CT Lung screen- number given that we have on file- will call back with any questions

## 2021-09-02 NOTE — TELEPHONE ENCOUNTER
----- Message from Karrie Boogie sent at 9/1/2021  4:59 PM EDT -----  Subject: Message to Provider    QUESTIONS  Information for Provider? Christiano called from [address] [facility], needs info on Pa,   askes for office to call her, found cancer on colon for patient Palmer Shah, sees provider Karina Umanzor, asks for office to call her to help her   she has questions so she can get some CT CHEST AND CT AB DONE, has   questions about PA from a lung screen , please call her at [phone]   extension [phone]  ---------------------------------------------------------------------------  --------------  CALL BACK INFO  What is the best way for the office to contact you? OK to leave message on   voicemail  Preferred Call Back Phone Number? [phone]  ---------------------------------------------------------------------------  --------------  SCRIPT ANSWERS  Relationship to Patient? Third Party  Representative Name?  Siva Blackman

## 2021-09-08 ENCOUNTER — OFFICE VISIT (OUTPATIENT)
Dept: SURGERY | Age: 51
End: 2021-09-08
Payer: COMMERCIAL

## 2021-09-08 VITALS
BODY MASS INDEX: 20.48 KG/M2 | DIASTOLIC BLOOD PRESSURE: 78 MMHG | HEART RATE: 88 BPM | HEIGHT: 72 IN | SYSTOLIC BLOOD PRESSURE: 120 MMHG | OXYGEN SATURATION: 91 % | WEIGHT: 151.2 LBS | TEMPERATURE: 97.8 F | RESPIRATION RATE: 18 BRPM

## 2021-09-08 DIAGNOSIS — C18.4 CANCER OF TRANSVERSE COLON (HCC): Primary | ICD-10-CM

## 2021-09-08 DIAGNOSIS — C18.9 ADENOCARCINOMA, COLON (HCC): ICD-10-CM

## 2021-09-08 DIAGNOSIS — Z01.818 PRE-OP TESTING: ICD-10-CM

## 2021-09-08 DIAGNOSIS — C18.9 ADENOCARCINOMA OF COLON (HCC): ICD-10-CM

## 2021-09-08 PROCEDURE — 99204 OFFICE O/P NEW MOD 45 MIN: CPT | Performed by: SURGERY

## 2021-09-08 RX ORDER — METRONIDAZOLE 500 MG/1
TABLET ORAL
Qty: 3 TABLET | Refills: 0 | Status: SHIPPED | OUTPATIENT
Start: 2021-09-08 | End: 2021-09-09

## 2021-09-09 ENCOUNTER — OFFICE VISIT (OUTPATIENT)
Dept: FAMILY MEDICINE CLINIC | Age: 51
End: 2021-09-09
Payer: COMMERCIAL

## 2021-09-09 VITALS
SYSTOLIC BLOOD PRESSURE: 114 MMHG | TEMPERATURE: 98.1 F | DIASTOLIC BLOOD PRESSURE: 62 MMHG | BODY MASS INDEX: 20.45 KG/M2 | WEIGHT: 151 LBS | OXYGEN SATURATION: 98 % | HEIGHT: 72 IN | HEART RATE: 65 BPM | RESPIRATION RATE: 16 BRPM

## 2021-09-09 DIAGNOSIS — C18.4 CANCER OF TRANSVERSE COLON (HCC): Primary | ICD-10-CM

## 2021-09-09 DIAGNOSIS — R12 HEARTBURN: ICD-10-CM

## 2021-09-09 DIAGNOSIS — Z01.818 PRE-OP TESTING: ICD-10-CM

## 2021-09-09 PROBLEM — C18.9 ADENOCARCINOMA, COLON (HCC): Status: ACTIVE | Noted: 2021-09-09

## 2021-09-09 PROCEDURE — 99214 OFFICE O/P EST MOD 30 MIN: CPT | Performed by: FAMILY MEDICINE

## 2021-09-09 ASSESSMENT — ENCOUNTER SYMPTOMS
CONSTIPATION: 0
RECTAL PAIN: 0
COUGH: 0
CHOKING: 0
ALLERGIC/IMMUNOLOGIC NEGATIVE: 1
ABDOMINAL DISTENTION: 0
RHINORRHEA: 0
EYE DISCHARGE: 0
SORE THROAT: 0
PHOTOPHOBIA: 0
COUGH: 0
BLOOD IN STOOL: 1
NAUSEA: 0
VOMITING: 0
BACK PAIN: 0
ABDOMINAL PAIN: 0
TROUBLE SWALLOWING: 0
SHORTNESS OF BREATH: 0
FACIAL SWELLING: 0
EYE PAIN: 0
SINUS PRESSURE: 0
WHEEZING: 0
STRIDOR: 0
ANAL BLEEDING: 0
DIARRHEA: 0
EYE ITCHING: 0
VOICE CHANGE: 0
COLOR CHANGE: 0
CHEST TIGHTNESS: 0
APNEA: 0
BLOOD IN STOOL: 0
SHORTNESS OF BREATH: 0
EYE REDNESS: 0

## 2021-09-09 NOTE — PROGRESS NOTES
[address] [address]  Dept: [phone]  Dept Fax: [phone]  Loc: [phone]      Miguel Angel Castle is a 45 y.o. male who presents todayfor his medical conditions/complaints as noted below. Miguel Angel Castle is c/o of 1 Month Follow-Up (recent colon cancer dx )      :     HPI     Patient recently diagnosed with colon cancer through colonoscopy, staging is pending. Saw Dr. Roshan Allison on 9/8 for surgery consult, with partial colectomy planned. Will see Dr. Maxwell Sandoval heme/onc on 10/4/21     EGD for gerd and n/v symptoms on 9/30/21. Still has episodes of severe heartburn. Greg Grullon seems to be mentally resilient and with good social support. He reports that he has not lost weight in the past couple of months. History:    Is an alcoholic in AA program.  Quit drinking and lost weight after this. Used to be in mid 160's. Used to weigh 3 times a day for training. Trying to get to 170 weight class but could not. Has not been 150 since was a lianet in high school. Very physical job. 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  Struggling to get weight back on.       Used to binge drink. Not regular drinker. Drank all drink types; 20 drinks or so a day. Maybe 15-18 days per month; functional alcoholic. Quit Feb 22nd 2020. Never liked money or respected it. Finally lost job and respected money when he missed a training and came in late; drinking got in the way of job so quit. 700-800 meetings since. 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 Also attends meetings in [employer]. Also in [employer].      Quit smoking 30 days after sobriety date.          No consistent exertional symptoms.    Patient Active Problem List   Diagnosis    Personal history of tobacco use    History of alcoholism ([address]    Weight loss    Bradycardia    Heartburn    Varicose veins of both lower extremities    Family history of cancer    Adenocarcinoma, colon (HCC)     Goals    None       The patient has No Known Allergies. Medical History  Calli Welch has a past medical history of Anemia and Colon cancer ([address] Past SurgicalHistory  The patient  has a past surgical history that includes Inguinal hernia repair (Left, 2020) and Colonoscopy (09/01/2021). Family History  This patient's family history includes COPD in his mother; Cancer in his father, maternal aunt, maternal grandfather, maternal grandmother, maternal uncle, paternal aunt, paternal grandfather, paternal grandmother, and paternal uncle; Diabetes in his paternal grandmother; Heart Attack in his paternal aunt and paternal grandfather; High Blood Pressure in his maternal grandfather, maternal grandmother, paternal grandfather, and paternal grandmother; Kidney Disease in his paternal grandmother; Other in his mother; Stroke in his father and mother. Social History  Calli Welch  reports that he quit smoking about 19 months ago. His smoking use included cigarettes. He has a 35.00 pack-year smoking history. He has never used smokeless tobacco. He reports previous alcohol use. He reports that he does not use drugs. Medications    Current Outpatient Medications:     omeprazole (PRILOSEC OTC) 20 MG tablet, Take 20 mg by mouth daily, Disp: , Rfl:     Subjective:      Review of Systems   Constitutional: Positive for fatigue. Negative for unexpected weight change. Respiratory: Negative for cough and shortness of breath. Cardiovascular: Negative for chest pain and leg swelling. Gastrointestinal: Positive for blood in stool. Neurological: Negative for dizziness, weakness, light-headedness and headaches. Psychiatric/Behavioral: Negative for dysphoric mood. The patient is not nervous/anxious. No change in symptoms reported. Objective:     Vitals:    09/09/21 1007   BP: 114/62   Site: Left Upper Arm   Position: Sitting   Pulse: 65   Resp: 16   Temp: 98.1 °F (36.7 °C)   TempSrc: Temporal   SpO2: 98%   Weight: 151 lb (68.5 kg)   Height: 6' (1.829 m)       Physical Exam  Vitals reviewed. Constitutional:       Appearance: He is well-developed. HENT:      Head: Normocephalic and atraumatic. Eyes:      Conjunctiva/sclera: Conjunctivae normal.      Pupils: Pupils are equal, round, and reactive to light. Neck:      Thyroid: No thyromegaly. Cardiovascular:      Rate and Rhythm: Normal rate and regular rhythm. Heart sounds: Normal heart sounds. Pulmonary:      Effort: Pulmonary effort is normal. No respiratory distress. Breath sounds: Normal breath sounds. Abdominal:      Palpations: Abdomen is soft. Tenderness: There is no abdominal tenderness. Musculoskeletal:      Cervical back: Neck supple. Lymphadenopathy:      Cervical: No cervical adenopathy. Skin:     General: Skin is warm and dry. Findings: No rash. Neurological:      General: No focal deficit present. Mental Status: He is alert. Psychiatric:         Behavior: Behavior normal.         Lab Results   Component Value Date    WBC 8.6 08/30/2021    HGB 11.7 (L) 08/30/2021    HCT 38.7 (L) 08/30/2021     08/30/2021    CHOL 141 07/23/2021    TRIG 43 07/23/2021    HDL 75 07/23/2021    ALT 22 07/23/2021    AST 21 07/23/2021     07/23/2021    K 4.6 07/23/2021     07/23/2021    CREATININE 0.9 07/23/2021    BUN 13 07/23/2021    CO2 26 07/23/2021    TSH 2.100 07/23/2021    INR 0.93 07/23/2021       /Plan:     Colon Cancer  Invasive moderately differentiated adenocarcinoma on biopsy of proximal transverse colon  - Saw Dr. Sasha Alfaro on 9/8 for surgery consult, with partial colectomy planned. - Will see Dr. Sammy Shields heme/onc on 10/4/21   - CEA today    Heartburn  Still has episodes of severe heartburn.   - EGD for gerd and n/v symptoms on 9/30/21.  - h.pylori stool testing in future  - continue PPI    History of alcoholism ([facility]  In remission. Personal history of tobacco use  In remission. Patient to follow up after post op. Orders Placed   No orders of the defined types were placed in this encounter. Prescriptions given/sent  No orders of the defined types were placed in this encounter. Patient given educational materials - see patient instructions. Discussed use, benefit, and side effects of prescribed medications. All patient questions answered. Pt voiced understanding. Reviewed health maintenance.         Electronically signed by Tanner Youssef MD on 9/9/2021 at 8:00 PM

## 2021-09-09 NOTE — PROGRESS NOTES
Kate Little (:  1970)     ASSESSMENT:  1. Proximal transverse colon adenocarcinoma    PLAN:  1. Schedule [facility] for Robotic possible open extended right colectomy. 2. He will undergo pre-operative clearance per anesthesia guidelines with risk factors listed under the past medical history diagnosis & problem list.  3. The risks, benefits and alternatives were discussed with Yaz Delaney including non-operative management. The pros and cons of robotic, laparoscopic and open techniques were discussed. All questions answered. He understands and wishes to proceed with surgical intervention. 4. Restrictions discussed with Yaz Delaney and he expresses understanding. 5. He is advised to call back directly if there are further questions/concerns, or if his symptoms worsen prior to surgery. 6.  CEA level prior to surgery  7. Obtain colonoscopy report/transcription prior to surgical intervention. Ensure [place] ink placed. SUBJECTIVE/OBJECTIVE:    Chief Complaint   Patient presents with    Surgical Consult     New patient-referred by Dr Adilia Jean-Baptiste moderately differentiated adenocarcinoma of the colon       HPI  Yaz Delaney is a 57-year-old male who presents for initial evaluation secondary to a newly diagnosed adenocarcinoma of the proximal transverse colon. [facility] was [facility] and Dr. Diamante uHtchinson due to some unexplained weight loss and rectal bleeding. Colonoscopy demonstrated multiple polyps but a larger mass at the proximal transverse colon demonstrated the adenocarcinoma. He states he is still having regular diet without difficulty. No signs of obstruction. Normal formation of stool. No abdominal pain. No significant nausea or vomiting. Some previous episodes of nausea, GERD and epigastric discomfort. This seems to be unrelated to the newly diagnosed mass. He has never underwent an upper endoscopy at this point but is planning to do so as well with GI.   Patient states the area was tattooed with [place] ink. Long history of cancer in his family including colorectal disease. Weight loss of about 10 pounds. He is fairly active and eats a healthy diet. Denies any new urinary complaints. No chest pain or shortness of breath. Previous CT chest abdomen pelvis okay. Only major previous surgery per patient was a recent left inguinal hernia repair last year. Review of Systems   Constitutional: Positive for fatigue and unexpected weight change. Negative for activity change, appetite change, chills, diaphoresis and fever. HENT: Negative for congestion, dental problem, drooling, ear discharge, ear pain, facial swelling, hearing loss, mouth sores, nosebleeds, postnasal drip, rhinorrhea, sinus pressure, sneezing, sore throat, tinnitus, trouble swallowing and voice change. Eyes: Negative for photophobia, pain, discharge, redness, itching and visual disturbance. Respiratory: Negative for apnea, cough, choking, chest tightness, shortness of breath, wheezing and stridor. Cardiovascular: Negative for chest pain, palpitations and leg swelling. Gastrointestinal: Negative for abdominal distention, abdominal pain, anal bleeding, blood in stool, constipation, diarrhea, nausea, rectal pain and vomiting. Endocrine: Negative. Genitourinary: Negative for decreased urine volume, difficulty urinating, discharge, dysuria, enuresis, flank pain, frequency, genital sores, hematuria, penile pain, penile swelling, scrotal swelling, testicular pain and urgency. Musculoskeletal: Negative for arthralgias, back pain, gait problem, joint swelling, myalgias, neck pain and neck stiffness. Skin: Negative for color change, pallor, rash and wound. Allergic/Immunologic: Negative. Neurological: Negative for dizziness, tremors, seizures, syncope, facial asymmetry, speech difficulty, weakness, light-headedness, numbness and headaches. Hematological: Negative for adenopathy.  Does not bruise/bleed easily. Psychiatric/Behavioral: Negative for agitation, behavioral problems, confusion, decreased concentration, dysphoric mood, hallucinations, self-injury, sleep disturbance and suicidal ideas. The patient is not nervous/anxious and is not hyperactive. Past Medical History:   Diagnosis Date    Anemia     Colon cancer ([address] 09/2021       Past Surgical History:   Procedure Laterality Date    COLONOSCOPY  09/01/2021    Dr. Fidel Mckeon with mesh inguinal       Current Outpatient Medications   Medication Sig Dispense Refill    metroNIDAZOLE (FLAGYL) 500 MG tablet Take one tablet at 4pm one tablet at 5pm and one tablet at 11pm the day prior to surgery. 3 tablet 0    omeprazole (PRILOSEC OTC) 20 MG tablet Take 20 mg by mouth daily       No current facility-administered medications for this visit.        No Known Allergies    Family History   Problem Relation Age of Onset   Ruel Corbett Other Mother         Brain aneurysm    COPD Mother    Lumpkin Aric Stroke Mother    [place] Aric Cancer Father         lung mets brain and bone    Stroke Father     Cancer Maternal Grandmother         colon    High Blood Pressure Maternal Grandmother     Cancer Maternal Grandfather         colon     High Blood Pressure Maternal Grandfather     Kidney Disease Paternal Grandmother     Cancer Paternal Grandmother         breast     Diabetes Paternal Grandmother     High Blood Pressure Paternal Grandmother     Cancer Paternal Grandfather         lung-52    Heart Attack Paternal Grandfather     High Blood Pressure Paternal Grandfather     Cancer Paternal Aunt         colon    Heart Attack Paternal Aunt     Cancer Paternal Uncle         lung    Cancer Maternal Aunt         colon    Cancer Maternal Uncle         colon       Social History     Socioeconomic History    Marital status: Single     Spouse name: Not on file    Number of children: Not on file    Years of education: Not on file    Highest education level: Not on file   Occupational History    Not on file   Tobacco Use    Smoking status: Former Smoker     Packs/day: 1.00     Years: 35.00     Pack years: 35.00     Types: Cigarettes     Quit date: 2020     Years since quittin.6    Smokeless tobacco: Never Used   Substance and Sexual Activity    Alcohol use: Not Currently    Drug use: No    Sexual activity: Not on file   Other Topics Concern    Not on file   Social History Narrative    Not on file     Social Determinants of Health     Financial Resource Strain:     Difficulty of Paying Living Expenses:    Food Insecurity:     Worried About Running Out of Food in the Last Year:     [address] in the Last Year:    Transportation Needs:     Lack of Transportation (Medical):  Lack of Transportation (Non-Medical):    Physical Activity:     Days of Exercise per Week:     Minutes of Exercise per Session:    Stress:     Feeling of Stress :    Social Connections:     Frequency of Communication with Friends and Family:     Frequency of Social Gatherings with Friends and Family:     Attends Mandae[employer] Services:     Active Member of Clubs or Organizations:     Attends Club or Organization Meetings:     Marital Status:    Intimate Partner Violence:     Fear of Current or Ex-Partner:     Emotionally Abused:     Physically Abused:     Sexually Abused:      Vitals:    21 1221   BP: 120/78   Site: Right Upper Arm   Position: Sitting   Cuff Size: Medium Adult   Pulse: 88   Resp: 18   Temp: 97.8 °F (36.6 °C)   TempSrc: Temporal   SpO2: 91%   Weight: 151 lb 3.2 oz (68.6 kg)   Height: 6' (1.829 m)     Body mass index is 20.51 kg/m². Wt Readings from Last 3 Encounters:   21 151 lb 3.2 oz (68.6 kg)   21 150 lb (68 kg)   21 150 lb (68 kg)     Physical Exam  Vitals reviewed. Constitutional:       General: He is not in acute distress. Appearance: He is well-developed.  He is not diaphoretic. HENT:      Head: Normocephalic and atraumatic. Right Ear: External ear normal.      Left Ear: External ear normal.      Nose: Nose normal.   Eyes:      General: No scleral icterus. Right eye: No discharge. Left eye: No discharge. Conjunctiva/sclera: Conjunctivae normal.   Cardiovascular:      Rate and Rhythm: Normal rate and regular rhythm. Heart sounds: Normal heart sounds. Pulmonary:      Effort: Pulmonary effort is normal. No respiratory distress. Breath sounds: Normal breath sounds. No wheezing or rales. Chest:      Chest wall: No tenderness. Abdominal:      General: Bowel sounds are normal. There is no distension. Palpations: Abdomen is soft. There is no mass. Tenderness: There is no abdominal tenderness. There is no guarding or rebound. Musculoskeletal:         General: No tenderness. Normal range of motion. Cervical back: Normal range of motion and neck supple. Skin:     General: Skin is warm and dry. Coloration: Skin is not pale. Findings: No erythema or rash. Neurological:      Mental Status: He is alert and oriented to person, place, and time. Cranial Nerves: No cranial nerve deficit. Psychiatric:         Behavior: Behavior normal.         Thought Content:  Thought content normal.         Judgment: Judgment normal.       Lab Results   Component Value Date    WBC 8.6 08/30/2021    HGB 11.7 (L) 08/30/2021    HCT 38.7 (L) 08/30/2021    MCV 82.2 08/30/2021     08/30/2021     Lab Results   Component Value Date     07/23/2021    K 4.6 07/23/2021     07/23/2021    CO2 26 07/23/2021     Lab Results   Component Value Date    CREATININE 0.9 07/23/2021     Lab Results   Component Value Date    ALT 22 07/23/2021    AST 21 07/23/2021    ALKPHOS 78 07/23/2021    BILITOT 0.5 07/23/2021     No results found for: LIPASE    No results found for: CEA    Patient Active Problem List   Diagnosis    Personal history of tobacco use    History of alcoholism ([address]    Weight loss    Rectal bleeding    Bradycardia    Heartburn    Varicose veins of both lower extremities    Family history of cancer     Narrative   PROCEDURE: CT CHEST W CONTRAST       CLINICAL INFORMATION: Malignant neoplasm of colon, unspecified part of colon ([address]       COMPARISON: 8/7/2021       TECHNIQUE:    5 mm axial imaging through the chest with IV contrast. Coronal and sagittal reconstruction were performed.        All CT scans at this facility use dose modulation, iterative reconstruction, and/or weight based dosing when appropriate to reduce the radiation dose to as low as reasonably achievable.       CONTRAST: Isovue 370, 76 mL           FINDINGS:   POSTOPERATIVE CHANGES: None.       MECHANICAL/LIFE SUPPORT DEVICES: None.       HEART/MEDIASTINUM:    1. The heart is normal size. 2. There is no pericardial fluid and thickening.       PULMONARY ARTERIES: Normal.       THORACIC AORTA: There is no aneurysm or dissection.       LUNG PATE - INFILTRATE/PLEURAL EFFUSION/PULMONARY VASCULAR CONGESTION: Normal.       LUNGS - MASS/NODULES: None.       LYMPHADENOPATHY:    1. No pathologically enlarged lymphadenopathy.       PNEUMOTHORAX: None.       THYROID GLAND: Unremarkable.       TRACHEA/ESOPHAGUS: Unremarkable.       MUSCULOSKELETAL: Unremarkable.       UPPER ABDOMEN: Unremarkable.               Impression   1. No suspicious pulmonary nodules. 2. No evidence of metastatic disease. 3. No acute infiltrate or pleural effusion. 4. Lung RADS assessment 1: Negative               **This report has been created using voice recognition software.  It may contain minor errors which are inherent in voice recognition technology. **       Final report electronically signed by Dr. Toni Macdonald on 9/2/2021 4:53 PM     Narrative   PROCEDURE: CT ABDOMEN PELVIS W IV CONTRAST       CLINICAL INFORMATION: Malignant neoplasm of colon, unspecified part of colon ([address]     COMPARISON: No prior study.       TECHNIQUE:    5 mm axial imaging through the abdomen and pelvis with IV contrast.  Coronal and sagittal reconstructions were performed.        All CT scans at this facility use dose modulation, iterative reconstruction, and/or weight based dosing when appropriate to reduce the radiation dose to as low as reasonably achievable.       CONTRAST: Isovue 370, 76 mL, oral contrast also given           FINDINGS:    LUNG BASES: Unremarkable.       LIVER/GALLBLADDER/BILIARY TREE: Unremarkable.       PANCREAS/SPLEEN: Unremarkable.       ADRENAL GLANDS/KIDNEYS: Unremarkable.       BOWEL:    1.  Nonobstructive. 2. Oral contrast is not into the large bowel. 3. No asymmetric bowel wall thickening identified.       FREE AIR/FREE FLUID/INFLAMMATION: None.       LYMPHADENOPATHY:    1. No pathologically enlarged lymph nodes.       ABDOMINAL AORTA: Unremarkable.       PELVIS:   1. Unremarkable.       ABDOMINAL WALL: Unremarkable.       MUSCULOSKELETAL: Unremarkable.       OTHER: None.               Impression   1. Unremarkable CT abdomen and pelvis with IV contrast.   2. No obstruction. 3. Evaluation of the large bowel is limited due to lack of oral contrast. No bowel wall thickening is identified.               **This report has been created using voice recognition software.  It may contain minor errors which are inherent in voice recognition technology. **       Final report electronically signed by Dr. Ember Stone on 9/2/2021 5:10 PM     PATHOLOGY REPORT                       ATTN: Gideon Tran MD                       REQ: Gideon Tran MD     Copies To: Melany Felty     Clinical Information: WEIGHT LOSS, ANEMIA, HEMATOCHEZIA     FINAL DIAGNOSIS:   A.  Proximal transverse colon mass, biopsies:            Invasive moderately differentiated adenocarcinoma. B.  Sigmoid colon polyp, biopsy:             Tubular adenoma.      C.  Rectal polyps x2, biopsies:            Tubular adenoma and hyperplastic polyp. D.  Distal rectal polyp, biopsy:             Tubular adenoma. Specimen:   A) BIOPSY OF TRANSVERSE COLON, PROXIMAL, MASS, INK   B) BIOPSY OF SIGMOID COLON, POLYP X 1   C) RECTAL BIOPSY, POLYP X 2   D) RECTAL BIOPSY, DISTAL, POLYP X 1     Gross Examination:   A - The container is labeled Pilar Weinberg, proximal transverse   mass biopsies.  Received in formalin are multiple bits of tan soft   tissue aggregating to 1.5 x 1.2 x 0.2 cm.  1 ns. B - The container is labeled Pilar Weinberg, sigmoid polyp x1. Received in formalin is a single polypoid bit of tan soft tissue and   bits of mucosal debris.  The tissue fragment measures about 0.7 cm in   greatest dimension.  1 ns. C - The container is labeled Pilar Weinberg, rectal polyps x2. Received in formalin are four bits of tan soft tissue varying in size   from less than 1 mm up to about 5 mm.  1 ns. D - The container is labeled Pilar Weinberg, distal rectal polyp x1.    Received in formalin is a single 8 mm polypoid bit of tan soft tissue.    The polyp is bisected.  1 ns.  EKM/DKR:[id number]     Microscopic Examination:   A.  Sections show infiltrate of glands with stromal desmoplasia,   consistent with an invasive adenocarcinoma. B, [place] show tubular adenomas. Mesham[place] Pastrana is no high-grade   dysplasia. [place] show a tubular adenoma and hyperplastic polyp. Mesha Pastrana is   no evidence of high-grade dysplasia.                                            Patt Carrillo M.D., F.C. A. P       An electronic signature was used to authenticate this note.     --Manuel Cisneros MD

## 2021-09-10 ENCOUNTER — NURSE ONLY (OUTPATIENT)
Dept: FAMILY MEDICINE CLINIC | Age: 51
End: 2021-09-10

## 2021-09-10 DIAGNOSIS — R12 HEARTBURN: ICD-10-CM

## 2021-09-10 LAB — CEA: 2.5 NG/ML (ref 0–5)

## 2021-09-11 NOTE — H&P
Tristan Leger (:  1970)      ASSESSMENT:  1. Proximal transverse colon adenocarcinoma     PLAN:  1. Schedule [facility] for Robotic possible open extended right colectomy. 2. He will undergo pre-operative clearance per anesthesia guidelines with risk factors listed under the past medical history diagnosis & problem list.  3. The risks, benefits and alternatives were discussed with Olen Schwab including non-operative management. The pros and cons of robotic, laparoscopic and open techniques were discussed. All questions answered. He understands and wishes to proceed with surgical intervention. 4. Restrictions discussed with Olen Schwab and he expresses understanding. 5. He is advised to call back directly if there are further questions/concerns, or if his symptoms worsen prior to surgery. 6.  CEA level prior to surgery  7. Obtain colonoscopy report/transcription prior to surgical intervention. Ensure [facility] ink placed.        SUBJECTIVE/OBJECTIVE:          Chief Complaint   Patient presents with    Surgical Consult       New patient-referred by Dr Adarsh Macedo moderately differentiated adenocarcinoma of the colon        HPI  Olen Schwab is a 24-year-old male who presents for initial evaluation secondary to a newly diagnosed adenocarcinoma of the proximal transverse colon. [facility] was [facility] and Dr. Karolyn Lacy due to some unexplained weight loss and rectal bleeding. Colonoscopy demonstrated multiple polyps but a larger mass at the proximal transverse colon demonstrated the adenocarcinoma. He states he is still having regular diet without difficulty. No signs of obstruction. Normal formation of stool. No abdominal pain. No significant nausea or vomiting. Some previous episodes of nausea, GERD and epigastric discomfort. This seems to be unrelated to the newly diagnosed mass.   He has never underwent an upper endoscopy at this point but is planning to do so as well with GI. Patient states the area was tattooed with [place] ink. Long history of cancer in his family including colorectal disease. Weight loss of about 10 pounds. He is fairly active and eats a healthy diet. Denies any new urinary complaints. No chest pain or shortness of breath. Previous CT chest abdomen pelvis okay. Only major previous surgery per patient was a recent left inguinal hernia repair last year.     Review of Systems   Constitutional: Positive for fatigue and unexpected weight change. Negative for activity change, appetite change, chills, diaphoresis and fever. HENT: Negative for congestion, dental problem, drooling, ear discharge, ear pain, facial swelling, hearing loss, mouth sores, nosebleeds, postnasal drip, rhinorrhea, sinus pressure, sneezing, sore throat, tinnitus, trouble swallowing and voice change. Eyes: Negative for photophobia, pain, discharge, redness, itching and visual disturbance. Respiratory: Negative for apnea, cough, choking, chest tightness, shortness of breath, wheezing and stridor. Cardiovascular: Negative for chest pain, palpitations and leg swelling. Gastrointestinal: Negative for abdominal distention, abdominal pain, anal bleeding, blood in stool, constipation, diarrhea, nausea, rectal pain and vomiting. Endocrine: Negative. Genitourinary: Negative for decreased urine volume, difficulty urinating, discharge, dysuria, enuresis, flank pain, frequency, genital sores, hematuria, penile pain, penile swelling, scrotal swelling, testicular pain and urgency. Musculoskeletal: Negative for arthralgias, back pain, gait problem, joint swelling, myalgias, neck pain and neck stiffness. Skin: Negative for color change, pallor, rash and wound. Allergic/Immunologic: Negative. Neurological: Negative for dizziness, tremors, seizures, syncope, facial asymmetry, speech difficulty, weakness, light-headedness, numbness and headaches. Hematological: Negative for adenopathy.  Does not bruise/bleed easily. Psychiatric/Behavioral: Negative for agitation, behavioral problems, confusion, decreased concentration, dysphoric mood, hallucinations, self-injury, sleep disturbance and suicidal ideas. The patient is not nervous/anxious and is not hyperactive.          Past Medical History        Past Medical History:   Diagnosis Date    Anemia      Colon cancer ([facility] [address] 09/2021            Past Surgical History   Past Surgical History:   Procedure Laterality Date    COLONOSCOPY   09/01/2021     Dr. Vesna Saldana with mesh inguinal            Current Facility-Administered Medications          Current Outpatient Medications   Medication Sig Dispense Refill    metroNIDAZOLE (FLAGYL) 500 MG tablet Take one tablet at 4pm one tablet at 5pm and one tablet at 11pm the day prior to surgery.  3 tablet 0    omeprazole (PRILOSEC OTC) 20 MG tablet Take 20 mg by mouth daily          No current facility-administered medications for this visit.            No Known Allergies     Family History         Family History   Problem Relation Age of Onset    Other Mother           Brain aneurysm    COPD Mother      Stroke Mother      Cancer Father           lung mets brain and bone    Stroke Father      Cancer Maternal Grandmother           colon    High Blood Pressure Maternal Grandmother      Cancer Maternal Grandfather           colon     High Blood Pressure Maternal Grandfather      Kidney Disease Paternal Grandmother      Cancer Paternal Grandmother           breast     Diabetes Paternal Grandmother      High Blood Pressure Paternal Grandmother      Cancer Paternal Grandfather           lung-52    Heart Attack Paternal Grandfather      High Blood Pressure Paternal Grandfather      Cancer Paternal Aunt           colon    Heart Attack Paternal Aunt      Cancer Paternal Uncle           lung    Cancer Maternal Aunt           colon    Cancer Maternal Uncle           colon            Social History               Socioeconomic History    Marital status: Single       Spouse name: Not on file    Number of children: Not on file    Years of education: Not on file    Highest education level: Not on file   Occupational History    Not on file   Tobacco Use    Smoking status: Former Smoker       Packs/day: 1.00       Years: 35.00       Pack years: 35.00       Types: Cigarettes       Quit date: 2020       Years since quittin.6    Smokeless tobacco: Never Used   Substance and Sexual Activity    Alcohol use: Not Currently    Drug use: No    Sexual activity: Not on file   Other Topics Concern    Not on file   Social History Narrative    Not on file      Social Determinants of Health          Financial Resource Strain:     Difficulty of Paying Living Expenses:    Food Insecurity:     Worried About Running Out of Food in the Last Year:     Ran Out of Food in the Last Year:    Transportation Needs:     Lack of Transportation (Medical):      Lack of Transportation (Non-Medical):    Physical Activity:     Days of Exercise per Week:     Minutes of Exercise per Session:    Stress:     Feeling of Stress :    Social Connections:     Frequency of Communication with Friends and Family:     Frequency of Social Gatherings with Friends and Family:     Attends [employer] Services:     Active Member of Clubs or Organizations:     Attends Club or Organization Meetings:     Marital Status:    Intimate Partner Violence:     Fear of Current or Ex-Partner:     Emotionally Abused:     Physically Abused:     Sexually Abused:          Vitals       Vitals:     21 1221   BP: 120/78   Site: Right Upper Arm   Position: Sitting   Cuff Size: Medium Adult   Pulse: 88   Resp: 18   Temp: 97.8 °F (36.6 °C)   TempSrc: Temporal   SpO2: 91%   Weight: 151 lb 3.2 oz (68.6 kg)   Height: 6' (1.829 m)         Body mass index is 20.51 kg/m².         Wt Readings from Last 3 Encounters:   09/08/21 151 lb 3.2 oz (68.6 kg)   08/12/21 150 lb (68 kg)   07/23/21 150 lb (68 kg)      Physical Exam  Vitals reviewed. Constitutional:       General: He is not in acute distress. Appearance: He is well-developed. He is not diaphoretic. HENT:      Head: Normocephalic and atraumatic. Right Ear: External ear normal.      Left Ear: External ear normal.      Nose: Nose normal.   Eyes:      General: No scleral icterus. Right eye: No discharge. Left eye: No discharge. Conjunctiva/sclera: Conjunctivae normal.   Cardiovascular:      Rate and Rhythm: Normal rate and regular rhythm. Heart sounds: Normal heart sounds. Pulmonary:      Effort: Pulmonary effort is normal. No respiratory distress. Breath sounds: Normal breath sounds. No wheezing or rales. Chest:      Chest wall: No tenderness. Abdominal:      General: Bowel sounds are normal. There is no distension. Palpations: Abdomen is soft. There is no mass. Tenderness: There is no abdominal tenderness. There is no guarding or rebound. Musculoskeletal:         General: No tenderness. Normal range of motion. Cervical back: Normal range of motion and neck supple. Skin:     General: Skin is warm and dry. Coloration: Skin is not pale. Findings: No erythema or rash. Neurological:      Mental Status: He is alert and oriented to person, place, and time. Cranial Nerves: No cranial nerve deficit. Psychiatric:         Behavior: Behavior normal.         Thought Content:  Thought content normal.         Judgment: Judgment normal.               Lab Results   Component Value Date     WBC 8.6 08/30/2021     HGB 11.7 (L) 08/30/2021     HCT 38.7 (L) 08/30/2021     MCV 82.2 08/30/2021      08/30/2021            Lab Results   Component Value Date      07/23/2021     K 4.6 07/23/2021      07/23/2021     CO2 26 07/23/2021            Lab Results   Component Value Date     CREATININE 0.9 07/23/2021            Lab Results   Component Value Date     ALT 22 07/23/2021     AST 21 07/23/2021     ALKPHOS 78 07/23/2021     BILITOT 0.5 07/23/2021      No results found for: LIPASE     No results found for: CEA         Patient Active Problem List   Diagnosis    Personal history of tobacco use    History of [facility]ism ([facility] [address]    Weight loss    Rectal bleeding    Bradycardia    Heartburn    Varicose veins of both lower extremities    Family history of cancer      Narrative   PROCEDURE: CT CHEST W CONTRAST       CLINICAL INFORMATION: Malignant neoplasm of colon, unspecified part of colon ([facility] [address]       COMPARISON: 8/7/2021       TECHNIQUE:    5 mm axial imaging through the chest with IV contrast. Coronal and sagittal reconstruction were performed.        All CT scans at this facility use dose modulation, iterative reconstruction, and/or weight based dosing when appropriate to reduce the radiation dose to as low as reasonably achievable.       CONTRAST: Isovue 370, 76 mL           FINDINGS:   POSTOPERATIVE CHANGES: None.       MECHANICAL/LIFE SUPPORT DEVICES: None.       HEART/MEDIASTINUM:    1. The heart is normal size. 2. There is no pericardial fluid and thickening.       PULMONARY ARTERIES: Normal.       THORACIC AORTA: There is no aneurysm or dissection.       LUNG PATE - INFILTRATE/PLEURAL EFFUSION/PULMONARY VASCULAR CONGESTION: Normal.       LUNGS - MASS/NODULES: None.       LYMPHADENOPATHY:    1. No pathologically enlarged lymphadenopathy.       PNEUMOTHORAX: None.       THYROID GLAND: Unremarkable.       TRACHEA/ESOPHAGUS: Unremarkable.       MUSCULOSKELETAL: Unremarkable.       UPPER ABDOMEN: Unremarkable.               Impression   1. No suspicious pulmonary nodules. 2. No evidence of metastatic disease. 3. No acute infiltrate or pleural effusion. 4. Lung RADS assessment 1: Negative               **This report has been created using voice recognition software.  It may contain minor errors which are inherent in voice recognition technology. **       Final report electronically signed by Dr. Huma Gibbons on 9/2/2021 4:53 PM      Narrative   PROCEDURE: CT ABDOMEN PELVIS W IV CONTRAST       CLINICAL INFORMATION: Malignant neoplasm of colon, unspecified part of colon ([address]       COMPARISON: No prior study.       TECHNIQUE:    5 mm axial imaging through the abdomen and pelvis with IV contrast.  Coronal and sagittal reconstructions were performed.        All CT scans at this facility use dose modulation, iterative reconstruction, and/or weight based dosing when appropriate to reduce the radiation dose to as low as reasonably achievable.       CONTRAST: Isovue 370, 76 mL, oral contrast also given           FINDINGS:    LUNG BASES: Unremarkable.       LIVER/GALLBLADDER/BILIARY TREE: Unremarkable.       PANCREAS/SPLEEN: Unremarkable.       ADRENAL GLANDS/KIDNEYS: Unremarkable.       BOWEL:    1.  Nonobstructive. 2. Oral contrast is not into the large bowel. 3. No asymmetric bowel wall thickening identified.       FREE AIR/FREE FLUID/INFLAMMATION: None.       LYMPHADENOPATHY:    1. No pathologically enlarged lymph nodes.       ABDOMINAL AORTA: Unremarkable.       PELVIS:   1. Unremarkable.       ABDOMINAL WALL: Unremarkable.       MUSCULOSKELETAL: Unremarkable.       OTHER: None.               Impression   1. Unremarkable CT abdomen and pelvis with IV contrast.   2. No obstruction. 3. Evaluation of the large bowel is limited due to lack of oral contrast. No bowel wall thickening is identified.               **This report has been created using voice recognition software.  It may contain minor errors which are inherent in voice recognition technology. **       Final report electronically signed by Dr. Huma Gibbons on 9/2/2021 5:10 PM      PATHOLOGY REPORT                       ATTN: Radha Segura MD                       REQ: Radha Segura MD     Copies To: Carmela Bynum

## 2021-09-12 LAB — HELICOBACTER PYLORI ANTIGEN, STOOL: NORMAL

## 2021-09-16 ENCOUNTER — ANESTHESIA (OUTPATIENT)
Dept: OPERATING ROOM | Age: 51
DRG: 330 | End: 2021-09-16
Payer: COMMERCIAL

## 2021-09-16 ENCOUNTER — HOSPITAL ENCOUNTER (INPATIENT)
Age: 51
LOS: 3 days | Discharge: HOME | DRG: 330 | End: 2021-09-20
Attending: SURGERY | Admitting: SURGERY
Payer: COMMERCIAL

## 2021-09-16 ENCOUNTER — ANESTHESIA EVENT (OUTPATIENT)
Dept: OPERATING ROOM | Age: 51
DRG: 330 | End: 2021-09-16
Payer: COMMERCIAL

## 2021-09-16 VITALS — DIASTOLIC BLOOD PRESSURE: 64 MMHG | SYSTOLIC BLOOD PRESSURE: 117 MMHG | OXYGEN SATURATION: 100 % | TEMPERATURE: 95 F

## 2021-09-16 DIAGNOSIS — Z90.49 S/P RIGHT COLECTOMY: Primary | ICD-10-CM

## 2021-09-16 PROBLEM — C18.4 CANCER OF TRANSVERSE COLON (HCC): Status: ACTIVE | Noted: 2021-09-16

## 2021-09-16 LAB
ABO: NORMAL
ANTIBODY SCREEN: NORMAL
RH FACTOR: NORMAL

## 2021-09-16 PROCEDURE — S2900 ROBOTIC SURGICAL SYSTEM: HCPCS | Performed by: SURGERY

## 2021-09-16 PROCEDURE — 7100000001 HC PACU RECOVERY - ADDTL 15 MIN: Performed by: SURGERY

## 2021-09-16 PROCEDURE — 44204 LAPARO PARTIAL COLECTOMY: CPT | Performed by: SURGERY

## 2021-09-16 PROCEDURE — 6370000000 HC RX 637 (ALT 250 FOR IP)

## 2021-09-16 PROCEDURE — 6370000000 HC RX 637 (ALT 250 FOR IP): Performed by: SURGERY

## 2021-09-16 PROCEDURE — 88341 IMHCHEM/IMCYTCHM EA ADD ANTB: CPT

## 2021-09-16 PROCEDURE — 6360000002 HC RX W HCPCS: Performed by: ANESTHESIOLOGY

## 2021-09-16 PROCEDURE — 2580000003 HC RX 258: Performed by: SURGERY

## 2021-09-16 PROCEDURE — 88342 IMHCHEM/IMCYTCHM 1ST ANTB: CPT

## 2021-09-16 PROCEDURE — 88309 TISSUE EXAM BY PATHOLOGIST: CPT

## 2021-09-16 PROCEDURE — 36415 COLL VENOUS BLD VENIPUNCTURE: CPT

## 2021-09-16 PROCEDURE — 3700000000 HC ANESTHESIA ATTENDED CARE: Performed by: SURGERY

## 2021-09-16 PROCEDURE — 3600000019 HC SURGERY ROBOT ADDTL 15MIN: Performed by: SURGERY

## 2021-09-16 PROCEDURE — 88305 TISSUE EXAM BY PATHOLOGIST: CPT

## 2021-09-16 PROCEDURE — 2500000003 HC RX 250 WO HCPCS: Performed by: SURGERY

## 2021-09-16 PROCEDURE — 86900 BLOOD TYPING SEROLOGIC ABO: CPT

## 2021-09-16 PROCEDURE — 3600000009 HC SURGERY ROBOT BASE: Performed by: SURGERY

## 2021-09-16 PROCEDURE — 3700000001 HC ADD 15 MINUTES (ANESTHESIA): Performed by: SURGERY

## 2021-09-16 PROCEDURE — 2709999900 HC NON-CHARGEABLE SUPPLY: Performed by: SURGERY

## 2021-09-16 PROCEDURE — 2500000003 HC RX 250 WO HCPCS: Performed by: NURSE ANESTHETIST, CERTIFIED REGISTERED

## 2021-09-16 PROCEDURE — 2720000010 HC SURG SUPPLY STERILE: Performed by: SURGERY

## 2021-09-16 PROCEDURE — 86850 RBC ANTIBODY SCREEN: CPT

## 2021-09-16 PROCEDURE — 6360000002 HC RX W HCPCS: Performed by: NURSE PRACTITIONER

## 2021-09-16 PROCEDURE — 6360000002 HC RX W HCPCS: Performed by: NURSE ANESTHETIST, CERTIFIED REGISTERED

## 2021-09-16 PROCEDURE — 6360000002 HC RX W HCPCS

## 2021-09-16 PROCEDURE — 6360000002 HC RX W HCPCS: Performed by: SURGERY

## 2021-09-16 PROCEDURE — 7100000000 HC PACU RECOVERY - FIRST 15 MIN: Performed by: SURGERY

## 2021-09-16 PROCEDURE — 0DBL0ZZ EXCISION OF TRANSVERSE COLON, OPEN APPROACH: ICD-10-PCS | Performed by: SURGERY

## 2021-09-16 PROCEDURE — 2580000003 HC RX 258

## 2021-09-16 PROCEDURE — 86901 BLOOD TYPING SEROLOGIC RH(D): CPT

## 2021-09-16 RX ORDER — BUPIVACAINE HYDROCHLORIDE 5 MG/ML
INJECTION, SOLUTION EPIDURAL; INTRACAUDAL PRN
Status: DISCONTINUED | OUTPATIENT
Start: 2021-09-16 | End: 2021-09-16 | Stop reason: ALTCHOICE

## 2021-09-16 RX ORDER — MORPHINE SULFATE 2 MG/ML
2 INJECTION, SOLUTION INTRAMUSCULAR; INTRAVENOUS
Status: DISCONTINUED | OUTPATIENT
Start: 2021-09-16 | End: 2021-09-16

## 2021-09-16 RX ORDER — ONDANSETRON 2 MG/ML
4 INJECTION INTRAMUSCULAR; INTRAVENOUS EVERY 6 HOURS PRN
Status: DISCONTINUED | OUTPATIENT
Start: 2021-09-16 | End: 2021-09-20 | Stop reason: HOSPADM

## 2021-09-16 RX ORDER — SODIUM CHLORIDE 9 MG/ML
25 INJECTION, SOLUTION INTRAVENOUS PRN
Status: DISCONTINUED | OUTPATIENT
Start: 2021-09-16 | End: 2021-09-20 | Stop reason: HOSPADM

## 2021-09-16 RX ORDER — HYDROCODONE BITARTRATE AND ACETAMINOPHEN 5; 325 MG/1; MG/1
1 TABLET ORAL EVERY 4 HOURS PRN
Status: DISCONTINUED | OUTPATIENT
Start: 2021-09-16 | End: 2021-09-17

## 2021-09-16 RX ORDER — LABETALOL 20 MG/4 ML (5 MG/ML) INTRAVENOUS SYRINGE
10 EVERY 10 MIN PRN
Status: DISCONTINUED | OUTPATIENT
Start: 2021-09-16 | End: 2021-09-16 | Stop reason: HOSPADM

## 2021-09-16 RX ORDER — DEXAMETHASONE SODIUM PHOSPHATE 10 MG/ML
INJECTION, EMULSION INTRAMUSCULAR; INTRAVENOUS PRN
Status: DISCONTINUED | OUTPATIENT
Start: 2021-09-16 | End: 2021-09-16 | Stop reason: SDUPTHER

## 2021-09-16 RX ORDER — FENTANYL CITRATE 50 UG/ML
50 INJECTION, SOLUTION INTRAMUSCULAR; INTRAVENOUS EVERY 5 MIN PRN
Status: COMPLETED | OUTPATIENT
Start: 2021-09-16 | End: 2021-09-16

## 2021-09-16 RX ORDER — EPHEDRINE SULFATE/0.9% NACL/PF 50 MG/5 ML
SYRINGE (ML) INTRAVENOUS PRN
Status: DISCONTINUED | OUTPATIENT
Start: 2021-09-16 | End: 2021-09-16 | Stop reason: SDUPTHER

## 2021-09-16 RX ORDER — FAMOTIDINE 20 MG/1
20 TABLET, FILM COATED ORAL 2 TIMES DAILY
Status: DISCONTINUED | OUTPATIENT
Start: 2021-09-16 | End: 2021-09-17

## 2021-09-16 RX ORDER — HYOSCYAMINE SULFATE 0.125 MG
125 TABLET,DISINTEGRATING ORAL EVERY 4 HOURS PRN
Status: DISCONTINUED | OUTPATIENT
Start: 2021-09-16 | End: 2021-09-20 | Stop reason: HOSPADM

## 2021-09-16 RX ORDER — SODIUM CHLORIDE 0.9 % (FLUSH) 0.9 %
5-40 SYRINGE (ML) INJECTION PRN
Status: DISCONTINUED | OUTPATIENT
Start: 2021-09-16 | End: 2021-09-20 | Stop reason: HOSPADM

## 2021-09-16 RX ORDER — ALVIMOPAN 12 MG/1
12 CAPSULE ORAL ONCE
Status: DISCONTINUED | OUTPATIENT
Start: 2021-09-16 | End: 2021-09-16

## 2021-09-16 RX ORDER — HYDROCODONE BITARTRATE AND ACETAMINOPHEN 5; 325 MG/1; MG/1
2 TABLET ORAL EVERY 4 HOURS PRN
Status: DISCONTINUED | OUTPATIENT
Start: 2021-09-16 | End: 2021-09-17

## 2021-09-16 RX ORDER — SODIUM CHLORIDE 9 MG/ML
INJECTION, SOLUTION INTRAVENOUS CONTINUOUS
Status: DISCONTINUED | OUTPATIENT
Start: 2021-09-16 | End: 2021-09-19

## 2021-09-16 RX ORDER — LIDOCAINE HCL/PF 100 MG/5ML
SYRINGE (ML) INJECTION PRN
Status: DISCONTINUED | OUTPATIENT
Start: 2021-09-16 | End: 2021-09-16 | Stop reason: SDUPTHER

## 2021-09-16 RX ORDER — MORPHINE SULFATE 4 MG/ML
4 INJECTION, SOLUTION INTRAMUSCULAR; INTRAVENOUS
Status: DISCONTINUED | OUTPATIENT
Start: 2021-09-16 | End: 2021-09-16

## 2021-09-16 RX ORDER — HYDROCODONE BITARTRATE AND ACETAMINOPHEN 5; 325 MG/1; MG/1
TABLET ORAL
Status: COMPLETED
Start: 2021-09-16 | End: 2021-09-16

## 2021-09-16 RX ORDER — ROCURONIUM BROMIDE 10 MG/ML
INJECTION, SOLUTION INTRAVENOUS PRN
Status: DISCONTINUED | OUTPATIENT
Start: 2021-09-16 | End: 2021-09-16 | Stop reason: SDUPTHER

## 2021-09-16 RX ORDER — FENTANYL CITRATE 50 UG/ML
INJECTION, SOLUTION INTRAMUSCULAR; INTRAVENOUS PRN
Status: DISCONTINUED | OUTPATIENT
Start: 2021-09-16 | End: 2021-09-16 | Stop reason: SDUPTHER

## 2021-09-16 RX ORDER — SODIUM CHLORIDE 0.9 % (FLUSH) 0.9 %
5-40 SYRINGE (ML) INJECTION EVERY 12 HOURS SCHEDULED
Status: DISCONTINUED | OUTPATIENT
Start: 2021-09-16 | End: 2021-09-20 | Stop reason: HOSPADM

## 2021-09-16 RX ORDER — SODIUM CHLORIDE 9 MG/ML
INJECTION, SOLUTION INTRAVENOUS CONTINUOUS
Status: DISCONTINUED | OUTPATIENT
Start: 2021-09-16 | End: 2021-09-16

## 2021-09-16 RX ORDER — MORPHINE SULFATE 2 MG/ML
2 INJECTION, SOLUTION INTRAMUSCULAR; INTRAVENOUS EVERY 5 MIN PRN
Status: DISCONTINUED | OUTPATIENT
Start: 2021-09-16 | End: 2021-09-16 | Stop reason: HOSPADM

## 2021-09-16 RX ORDER — PROPOFOL 10 MG/ML
INJECTION, EMULSION INTRAVENOUS PRN
Status: DISCONTINUED | OUTPATIENT
Start: 2021-09-16 | End: 2021-09-16 | Stop reason: SDUPTHER

## 2021-09-16 RX ORDER — ONDANSETRON 4 MG/1
4 TABLET, ORALLY DISINTEGRATING ORAL EVERY 8 HOURS PRN
Status: DISCONTINUED | OUTPATIENT
Start: 2021-09-16 | End: 2021-09-20 | Stop reason: HOSPADM

## 2021-09-16 RX ORDER — MIDAZOLAM HYDROCHLORIDE 1 MG/ML
INJECTION INTRAMUSCULAR; INTRAVENOUS PRN
Status: DISCONTINUED | OUTPATIENT
Start: 2021-09-16 | End: 2021-09-16 | Stop reason: SDUPTHER

## 2021-09-16 RX ADMIN — ONDANSETRON 4 MG: 2 INJECTION INTRAMUSCULAR; INTRAVENOUS at 19:41

## 2021-09-16 RX ADMIN — FAMOTIDINE 20 MG: 20 TABLET, FILM COATED ORAL at 21:29

## 2021-09-16 RX ADMIN — NALOXEGOL OXALATE 25 MG: 25 TABLET, FILM COATED ORAL at 11:05

## 2021-09-16 RX ADMIN — FENTANYL CITRATE 100 MCG: 50 INJECTION INTRAMUSCULAR; INTRAVENOUS at 13:55

## 2021-09-16 RX ADMIN — ROCURONIUM BROMIDE 50 MG: 10 INJECTION INTRAVENOUS at 13:57

## 2021-09-16 RX ADMIN — SODIUM CHLORIDE: 9 INJECTION, SOLUTION INTRAVENOUS at 10:54

## 2021-09-16 RX ADMIN — MORPHINE SULFATE 2 MG: 2 INJECTION, SOLUTION INTRAMUSCULAR; INTRAVENOUS at 17:10

## 2021-09-16 RX ADMIN — FENTANYL CITRATE 50 MCG: 50 INJECTION INTRAMUSCULAR; INTRAVENOUS at 16:09

## 2021-09-16 RX ADMIN — FENTANYL CITRATE 50 MCG: 50 INJECTION INTRAMUSCULAR; INTRAVENOUS at 16:14

## 2021-09-16 RX ADMIN — FENTANYL CITRATE 50 MCG: 50 INJECTION INTRAMUSCULAR; INTRAVENOUS at 16:17

## 2021-09-16 RX ADMIN — FENTANYL CITRATE 50 MCG: 0.05 INJECTION, SOLUTION INTRAMUSCULAR; INTRAVENOUS at 16:57

## 2021-09-16 RX ADMIN — Medication 10 MG: at 15:51

## 2021-09-16 RX ADMIN — HYDROMORPHONE HYDROCHLORIDE 1 MG: 1 INJECTION, SOLUTION INTRAMUSCULAR; INTRAVENOUS; SUBCUTANEOUS at 23:44

## 2021-09-16 RX ADMIN — SODIUM CHLORIDE: 9 INJECTION, SOLUTION INTRAVENOUS at 18:36

## 2021-09-16 RX ADMIN — ROCURONIUM BROMIDE 25 MG: 10 INJECTION INTRAVENOUS at 15:30

## 2021-09-16 RX ADMIN — PROPOFOL 200 MG: 10 INJECTION, EMULSION INTRAVENOUS at 13:57

## 2021-09-16 RX ADMIN — DEXAMETHASONE SODIUM PHOSPHATE 10 MG: 10 INJECTION, EMULSION INTRAMUSCULAR; INTRAVENOUS at 14:30

## 2021-09-16 RX ADMIN — HYDROCODONE BITARTRATE AND ACETAMINOPHEN 2 TABLET: 5; 325 TABLET ORAL at 21:29

## 2021-09-16 RX ADMIN — CEFOXITIN 2000 MG: 2 INJECTION, POWDER, FOR SOLUTION INTRAVENOUS at 14:02

## 2021-09-16 RX ADMIN — SODIUM CHLORIDE: 9 INJECTION, SOLUTION INTRAVENOUS at 14:48

## 2021-09-16 RX ADMIN — MIDAZOLAM 2 MG: 1 INJECTION INTRAMUSCULAR; INTRAVENOUS at 13:55

## 2021-09-16 RX ADMIN — HYDROMORPHONE HYDROCHLORIDE 1 MG: 1 INJECTION, SOLUTION INTRAMUSCULAR; INTRAVENOUS; SUBCUTANEOUS at 19:33

## 2021-09-16 RX ADMIN — Medication 100 MG: at 13:57

## 2021-09-16 RX ADMIN — MORPHINE SULFATE 4 MG: 4 INJECTION, SOLUTION INTRAMUSCULAR; INTRAVENOUS at 18:33

## 2021-09-16 RX ADMIN — HYDROCODONE BITARTRATE AND ACETAMINOPHEN 2 TABLET: 5; 325 TABLET ORAL at 17:12

## 2021-09-16 RX ADMIN — FENTANYL CITRATE 50 MCG: 0.05 INJECTION, SOLUTION INTRAMUSCULAR; INTRAVENOUS at 17:23

## 2021-09-16 RX ADMIN — FENTANYL CITRATE 50 MCG: 0.05 INJECTION, SOLUTION INTRAMUSCULAR; INTRAVENOUS at 17:31

## 2021-09-16 RX ADMIN — FENTANYL CITRATE 50 MCG: 0.05 INJECTION, SOLUTION INTRAMUSCULAR; INTRAVENOUS at 16:50

## 2021-09-16 RX ADMIN — Medication 20 MG: at 14:25

## 2021-09-16 RX ADMIN — Medication 20 MG: at 14:28

## 2021-09-16 ASSESSMENT — PULMONARY FUNCTION TESTS
PIF_VALUE: 18
PIF_VALUE: 0
PIF_VALUE: 19
PIF_VALUE: 21
PIF_VALUE: 18
PIF_VALUE: 21
PIF_VALUE: 20
PIF_VALUE: 15
PIF_VALUE: 1
PIF_VALUE: 21
PIF_VALUE: 0
PIF_VALUE: 19
PIF_VALUE: 19
PIF_VALUE: 16
PIF_VALUE: 15
PIF_VALUE: 16
PIF_VALUE: 15
PIF_VALUE: 15
PIF_VALUE: 19
PIF_VALUE: 15
PIF_VALUE: 2
PIF_VALUE: 20
PIF_VALUE: 19
PIF_VALUE: 21
PIF_VALUE: 2
PIF_VALUE: 19
PIF_VALUE: 20
PIF_VALUE: 21
PIF_VALUE: 21
PIF_VALUE: 19
PIF_VALUE: 21
PIF_VALUE: 19
PIF_VALUE: 19
PIF_VALUE: 2
PIF_VALUE: 19
PIF_VALUE: 2
PIF_VALUE: 13
PIF_VALUE: 21
PIF_VALUE: 21
PIF_VALUE: 19
PIF_VALUE: 1
PIF_VALUE: 20
PIF_VALUE: 19
PIF_VALUE: 19
PIF_VALUE: 15
PIF_VALUE: 15
PIF_VALUE: 18
PIF_VALUE: 16
PIF_VALUE: 20
PIF_VALUE: 10
PIF_VALUE: 21
PIF_VALUE: 10
PIF_VALUE: 19
PIF_VALUE: 21
PIF_VALUE: 17
PIF_VALUE: 15
PIF_VALUE: 18
PIF_VALUE: 15
PIF_VALUE: 21
PIF_VALUE: 16
PIF_VALUE: 14
PIF_VALUE: 21
PIF_VALUE: 19
PIF_VALUE: 19
PIF_VALUE: 20
PIF_VALUE: 20
PIF_VALUE: 21
PIF_VALUE: 14
PIF_VALUE: 21
PIF_VALUE: 21
PIF_VALUE: 19
PIF_VALUE: 15
PIF_VALUE: 2
PIF_VALUE: 20
PIF_VALUE: 21
PIF_VALUE: 1
PIF_VALUE: 16
PIF_VALUE: 1
PIF_VALUE: 15
PIF_VALUE: 21
PIF_VALUE: 20
PIF_VALUE: 20
PIF_VALUE: 17
PIF_VALUE: 11
PIF_VALUE: 10
PIF_VALUE: 11
PIF_VALUE: 20
PIF_VALUE: 3
PIF_VALUE: 21
PIF_VALUE: 19
PIF_VALUE: 20
PIF_VALUE: 21
PIF_VALUE: 19
PIF_VALUE: 1
PIF_VALUE: 18
PIF_VALUE: 23
PIF_VALUE: 24
PIF_VALUE: 19
PIF_VALUE: 11
PIF_VALUE: 19
PIF_VALUE: 1
PIF_VALUE: 20
PIF_VALUE: 19
PIF_VALUE: 19
PIF_VALUE: 18
PIF_VALUE: 20
PIF_VALUE: 9
PIF_VALUE: 18
PIF_VALUE: 11
PIF_VALUE: 3
PIF_VALUE: 15
PIF_VALUE: 21
PIF_VALUE: 16
PIF_VALUE: 16
PIF_VALUE: 2
PIF_VALUE: 1
PIF_VALUE: 11
PIF_VALUE: 16
PIF_VALUE: 16
PIF_VALUE: 20
PIF_VALUE: 19
PIF_VALUE: 21
PIF_VALUE: 25
PIF_VALUE: 19
PIF_VALUE: 19
PIF_VALUE: 15
PIF_VALUE: 19
PIF_VALUE: 19
PIF_VALUE: 1
PIF_VALUE: 19
PIF_VALUE: 15
PIF_VALUE: 16
PIF_VALUE: 19
PIF_VALUE: 21
PIF_VALUE: 21
PIF_VALUE: 17
PIF_VALUE: 19
PIF_VALUE: 5
PIF_VALUE: 28
PIF_VALUE: 21
PIF_VALUE: 19
PIF_VALUE: 15

## 2021-09-16 ASSESSMENT — PAIN SCALES - GENERAL
PAINLEVEL_OUTOF10: 9
PAINLEVEL_OUTOF10: 7
PAINLEVEL_OUTOF10: 8
PAINLEVEL_OUTOF10: 7
PAINLEVEL_OUTOF10: 8
PAINLEVEL_OUTOF10: 7
PAINLEVEL_OUTOF10: 8
PAINLEVEL_OUTOF10: 7
PAINLEVEL_OUTOF10: 7
PAINLEVEL_OUTOF10: 10
PAINLEVEL_OUTOF10: 7

## 2021-09-16 ASSESSMENT — PAIN DESCRIPTION - FREQUENCY: FREQUENCY: CONTINUOUS

## 2021-09-16 ASSESSMENT — PAIN DESCRIPTION - PROGRESSION: CLINICAL_PROGRESSION: NOT CHANGED

## 2021-09-16 ASSESSMENT — PAIN DESCRIPTION - LOCATION
LOCATION: ABDOMEN
LOCATION: ABDOMEN

## 2021-09-16 ASSESSMENT — PAIN DESCRIPTION - PAIN TYPE
TYPE: SURGICAL PAIN
TYPE: SURGICAL PAIN

## 2021-09-16 ASSESSMENT — PAIN DESCRIPTION - ONSET: ONSET: ON-GOING

## 2021-09-16 ASSESSMENT — PAIN DESCRIPTION - DESCRIPTORS: DESCRIPTORS: STABBING

## 2021-09-16 ASSESSMENT — PAIN - FUNCTIONAL ASSESSMENT
PAIN_FUNCTIONAL_ASSESSMENT: ACTIVITIES ARE NOT PREVENTED
PAIN_FUNCTIONAL_ASSESSMENT: 0-10

## 2021-09-16 ASSESSMENT — PAIN DESCRIPTION - ORIENTATION: ORIENTATION: PROXIMAL

## 2021-09-16 NOTE — PROGRESS NOTES
Oriented to sds    15         Family updated to stay in room. Informed family to take belonging with them if they leave. Keep nothing of value in room unattended. pt was asked and agreed to first name and last initial being put on white boards. fall risks applied. SCD Applied to patient. Warming blanket applied to patient. Pt denies any abuse or thoughts of suicide.

## 2021-09-16 NOTE — ANESTHESIA POSTPROCEDURE EVALUATION
Department of Anesthesiology  Postprocedure Note    Patient: Vince Mortimer  MRN: [id number]  YOB: 1970  Date of evaluation: 9/16/2021  Time:  5:29 PM     Procedure Summary     Date: 09/16/21 Room / Location: [address] [facility]    Anesthesia Start: 1353 Anesthesia Stop: 1626    Procedure: ROBOT EXTENDED RIGHT COLECTOMY (Right Abdomen) Diagnosis: (TRANVERSE COLON CANCER)    Surgeons: Jyotsna Murrieta MD Responsible Provider: Suzie Alvarenga MD    Anesthesia Type: general ASA Status: 2          Anesthesia Type: general    Maurice Phase I: Maurice Score: 9    Maurice Phase II:      Last vitals: Reviewed and per EMR flowsheets.        Anesthesia Post Evaluation    Patient location during evaluation: PACU  Patient participation: complete - patient participated  Level of consciousness: awake  Airway patency: patent  Nausea & Vomiting: no nausea  Complications: no  Cardiovascular status: hemodynamically stable  Respiratory status: acceptable  Hydration status: stable

## 2021-09-16 NOTE — ACP (ADVANCE CARE PLANNING)
Advance Care Planning     Advance Care Planning Inpatient Note  [facility] Department    Today's Date: 9/16/2021  Unit: [facility] OR    Received request from IDT Member. Upon review of chart and communication with care team, patient's decision making abilities are not in question. . Patient, Child/Children and Friends was/were present in the room during visit. Goals of ACP Conversation:  Discuss advance care planning documents    Health Care Decision Makers:       Primary Decision Maker: Jessenia Tran - [facility] - [phone]    Secondary Decision Maker: Roger Antony - Child - [phone]    Summary:  Verified Documents    Advance Care Planning Documents (Patient Wishes):  Healthcare Power of /Advance Directive Appointment of Health Care Agent  Living Will/Advance Directive     Assessment:  Advanced Directives Consult: It was completed prior to patient's going in for surgery He was encouraged with prayers and I believed he felt better emotionally.        Interventions:  Assisted in the completion of documents according to patient's wishes at this time    Care Preferences Communicated:   No    Outcomes/Plan:  ACP Discussion: Completed    Electronically signed by Shruthi Rangel, [place] Gianni on 9/16/2021 at 12:05 PM

## 2021-09-16 NOTE — BRIEF OP NOTE
Brief Postoperative Note      Patient: Soren Dove  YOB: 1970  MRN: [id number]    Date of Procedure: 9/16/2021    Pre-Op Diagnosis: TRANVERSE COLON CANCER    Post-Op Diagnosis: HEPATIC FLEXURE COLON CA       Procedure(s):  ROBOT EXTENDED RIGHT COLECTOMY, POSS OPEN    Surgeon(s):  Marleny Lebron MD    Assistant:  First Assistant: Robina Aguilar RN    Anesthesia: General    Estimated Blood Loss (mL): 20 ml    Complications: None    Specimens:   ID Type Source Tests Collected by Time Destination   A : RIGHT COLON Tissue Colon SURGICAL PATHOLOGY Marleny Lebron MD 9/16/2021 4290    B : ILEOCOLIC BASE LYMPH NODE Tissue Ileum SURGICAL PATHOLOGY Marleny Lebron MD 9/16/2021 1506        Implants:  * No implants in log *      Drains:   Urethral Catheter Non-latex 16 fr (Active)       Findings: as above - see op note for details    Electronically signed by Marleny Lebron MD on 9/16/2021 at 4:05 PM

## 2021-09-16 NOTE — PROGRESS NOTES
1621- pt received to pacu, thrashing in bed, woke, reoriented. Patient calms easily. 1632-pt laying in bed eyes closed, resp easy, vss. Denies pain when asked. 1640- pt wakes, reports pain, medicated per orders. 1651-pt tolerating ice chips, denies nausea. Falls back to sleep quickly. 1705- pt wakes reporting no relief in pain. Medicated per orders. 1710- pt tolerating sips of water, denies nausea, reports no change in pain level    1716- pt laying in bed, eyes open, resp easy, unlabored. 1722-pt in bed eyes open, reports no change in pain level. Medicated per orders. 1740- pt laying in bed eyes closed, vss, bp appears to be responding to pain control. Woke patient to assess pain level, patient reports he is still having pain 8-9/10, patient falls back to sleep shortly after. resp easy, unlabored, VSS, no signs of acute distress noted. 1745- pt meets criteria for discharge from pacu, transport requested. Patient belongings brought to pacu by [facility] staff, placed on bed for transport to IP unit. .   18- report called to [facility] on [address]     1800- transport arrived to take pt to room.

## 2021-09-16 NOTE — ANESTHESIA PRE PROCEDURE
Department of Anesthesiology  Preprocedure Note       Name:  Rik Gutiérrez   Age:  45 y.o.  :  1970                                          MRN:  [id number]         Date:  2021      Surgeon: Genaro Darling):  Rodolfo Pink MD    Procedure: Procedure(s):  ROBOT EXTENDED RIGHT COLECTOMY, POSS OPEN    Medications prior to admission:   Prior to Admission medications    Medication Sig Start Date End Date Taking?  Authorizing Provider   omeprazole (PRILOSEC OTC) 20 MG tablet Take 20 mg by mouth daily    Historical Provider, MD       Current medications:    Current Facility-Administered Medications   Medication Dose Route Frequency Provider Last Rate Last Admin    0.9 % sodium chloride infusion   IntraVENous Continuous Crystal Camper, LPN        cefOXitin (MEFOXIN) 2000 mg in dextrose 5% 50 mL (mini-bag)  2,000 mg IntraVENous Once [employer], LPN        naloxegol (MOVANTIK) tablet 25 mg  25 mg Oral Once Rodolfo Pink MD           Allergies:  No Known Allergies    Problem List:    Patient Active Problem List   Diagnosis Code    Personal history of tobacco use Z87.891    History of alcoholism ([facility] F10.21    Weight loss R63.4    Bradycardia R00.1    Heartburn R12    Varicose veins of both lower extremities I83.93    Family history of cancer Z80.9    Adenocarcinoma[facility] colon ([facility] C18.9       Past Medical History:        Diagnosis Date    Anemia     Colon cancer ([facility] 2021       Past Surgical History:        Procedure Laterality Date    COLONOSCOPY  2021    Dr. Karley Yost with mesh inguinal       Social History:    Social History     Tobacco Use    Smoking status: Former Smoker     Packs/day: 1.00     Years: 35.00     Pack years: 35.00     Types: Cigarettes     Quit date: 2020     Years since quittin.6    Smokeless tobacco: Never Used   Substance Use Topics    Alcohol use: Not Currently Pulmonary: breath sounds clear to auscultation                             Cardiovascular:            Rhythm: regular                      Neuro/Psych:               GI/Hepatic/Renal:             Endo/Other:                     Abdominal:             Vascular: Other Findings:             Anesthesia Plan      general     ASA 2       Induction: intravenous. MIPS: Postoperative opioids intended and Prophylactic antiemetics administered. Anesthetic plan and risks discussed with patient. Plan discussed with CRNA.                   Shin Chahal MD   9/16/2021

## 2021-09-17 PROBLEM — Z90.49 S/P RIGHT COLECTOMY: Status: ACTIVE | Noted: 2021-09-17

## 2021-09-17 LAB
ANION GAP SERPL CALCULATED.3IONS-SCNC: 10 MEQ/L (ref 8–16)
BUN BLDV-MCNC: 13 MG/DL (ref 7–22)
CALCIUM SERPL-MCNC: 8.7 MG/DL (ref 8.5–10.5)
CHLORIDE BLD-SCNC: 103 MEQ/L (ref 98–111)
CO2: 23 MEQ/L (ref 23–33)
CREAT SERPL-MCNC: 0.8 MG/DL (ref 0.4–1.2)
GFR SERPL CREATININE-BSD FRML MDRD: > 90 ML/MIN/1.73M2
GLUCOSE BLD-MCNC: 134 MG/DL (ref 70–108)
HCT VFR BLD CALC: 37.9 % (ref 42–52)
HEMOGLOBIN: 10.9 GM/DL (ref 14–18)
POTASSIUM REFLEX MAGNESIUM: 4.6 MEQ/L (ref 3.5–5.2)
SODIUM BLD-SCNC: 136 MEQ/L (ref 135–145)

## 2021-09-17 PROCEDURE — APPSS45 APP SPLIT SHARED TIME 31-45 MINUTES: Performed by: NURSE PRACTITIONER

## 2021-09-17 PROCEDURE — 80048 BASIC METABOLIC PNL TOTAL CA: CPT

## 2021-09-17 PROCEDURE — 99024 POSTOP FOLLOW-UP VISIT: CPT | Performed by: NURSE PRACTITIONER

## 2021-09-17 PROCEDURE — 1200000000 HC SEMI PRIVATE

## 2021-09-17 PROCEDURE — 6370000000 HC RX 637 (ALT 250 FOR IP): Performed by: NURSE PRACTITIONER

## 2021-09-17 PROCEDURE — 85014 HEMATOCRIT: CPT

## 2021-09-17 PROCEDURE — 2580000003 HC RX 258: Performed by: SURGERY

## 2021-09-17 PROCEDURE — 36415 COLL VENOUS BLD VENIPUNCTURE: CPT

## 2021-09-17 PROCEDURE — 6360000002 HC RX W HCPCS: Performed by: NURSE PRACTITIONER

## 2021-09-17 PROCEDURE — 6370000000 HC RX 637 (ALT 250 FOR IP): Performed by: SURGERY

## 2021-09-17 PROCEDURE — 85018 HEMOGLOBIN: CPT

## 2021-09-17 PROCEDURE — 6360000002 HC RX W HCPCS: Performed by: SURGERY

## 2021-09-17 RX ORDER — OXYCODONE HYDROCHLORIDE AND ACETAMINOPHEN 5; 325 MG/1; MG/1
2 TABLET ORAL EVERY 4 HOURS PRN
Status: DISCONTINUED | OUTPATIENT
Start: 2021-09-17 | End: 2021-09-20 | Stop reason: HOSPADM

## 2021-09-17 RX ORDER — OMEPRAZOLE 40 MG/1
40 CAPSULE, DELAYED RELEASE ORAL
Status: DISCONTINUED | OUTPATIENT
Start: 2021-09-17 | End: 2021-09-20 | Stop reason: HOSPADM

## 2021-09-17 RX ORDER — OXYCODONE HYDROCHLORIDE AND ACETAMINOPHEN 5; 325 MG/1; MG/1
1 TABLET ORAL EVERY 4 HOURS PRN
Status: DISCONTINUED | OUTPATIENT
Start: 2021-09-17 | End: 2021-09-20 | Stop reason: HOSPADM

## 2021-09-17 RX ORDER — DOCUSATE SODIUM 100 MG/1
100 CAPSULE, LIQUID FILLED ORAL 2 TIMES DAILY PRN
Status: DISCONTINUED | OUTPATIENT
Start: 2021-09-17 | End: 2021-09-20 | Stop reason: HOSPADM

## 2021-09-17 RX ADMIN — ONDANSETRON 4 MG: 2 INJECTION INTRAMUSCULAR; INTRAVENOUS at 18:18

## 2021-09-17 RX ADMIN — OXYCODONE HYDROCHLORIDE AND ACETAMINOPHEN 2 TABLET: 5; 325 TABLET ORAL at 18:19

## 2021-09-17 RX ADMIN — OXYCODONE HYDROCHLORIDE AND ACETAMINOPHEN 1 TABLET: 5; 325 TABLET ORAL at 13:52

## 2021-09-17 RX ADMIN — ENOXAPARIN SODIUM 40 MG: 40 INJECTION SUBCUTANEOUS at 09:36

## 2021-09-17 RX ADMIN — SODIUM CHLORIDE: 9 INJECTION, SOLUTION INTRAVENOUS at 08:12

## 2021-09-17 RX ADMIN — HYDROMORPHONE HYDROCHLORIDE 1 MG: 1 INJECTION, SOLUTION INTRAMUSCULAR; INTRAVENOUS; SUBCUTANEOUS at 15:45

## 2021-09-17 RX ADMIN — HYDROMORPHONE HYDROCHLORIDE 1 MG: 1 INJECTION, SOLUTION INTRAMUSCULAR; INTRAVENOUS; SUBCUTANEOUS at 22:53

## 2021-09-17 RX ADMIN — HYDROCODONE BITARTRATE AND ACETAMINOPHEN 2 TABLET: 5; 325 TABLET ORAL at 09:38

## 2021-09-17 RX ADMIN — HYDROMORPHONE HYDROCHLORIDE 1 MG: 1 INJECTION, SOLUTION INTRAMUSCULAR; INTRAVENOUS; SUBCUTANEOUS at 05:26

## 2021-09-17 RX ADMIN — SODIUM CHLORIDE, PRESERVATIVE FREE 10 ML: 5 INJECTION INTRAVENOUS at 08:14

## 2021-09-17 RX ADMIN — HYDROMORPHONE HYDROCHLORIDE 1 MG: 1 INJECTION, SOLUTION INTRAMUSCULAR; INTRAVENOUS; SUBCUTANEOUS at 11:14

## 2021-09-17 RX ADMIN — HYDROMORPHONE HYDROCHLORIDE 1 MG: 1 INJECTION, SOLUTION INTRAMUSCULAR; INTRAVENOUS; SUBCUTANEOUS at 08:12

## 2021-09-17 RX ADMIN — HYDROMORPHONE HYDROCHLORIDE 1 MG: 1 INJECTION, SOLUTION INTRAMUSCULAR; INTRAVENOUS; SUBCUTANEOUS at 19:45

## 2021-09-17 RX ADMIN — HYDROCODONE BITARTRATE AND ACETAMINOPHEN 2 TABLET: 5; 325 TABLET ORAL at 01:35

## 2021-09-17 RX ADMIN — NALOXEGOL OXALATE 12.5 MG: 12.5 TABLET, FILM COATED ORAL at 11:14

## 2021-09-17 ASSESSMENT — PAIN DESCRIPTION - DESCRIPTORS
DESCRIPTORS: STABBING
DESCRIPTORS: THROBBING

## 2021-09-17 ASSESSMENT — PAIN DESCRIPTION - PAIN TYPE
TYPE: SURGICAL PAIN

## 2021-09-17 ASSESSMENT — PAIN SCALES - GENERAL
PAINLEVEL_OUTOF10: 7
PAINLEVEL_OUTOF10: 6
PAINLEVEL_OUTOF10: 7
PAINLEVEL_OUTOF10: 8
PAINLEVEL_OUTOF10: 7
PAINLEVEL_OUTOF10: 3
PAINLEVEL_OUTOF10: 9
PAINLEVEL_OUTOF10: 7
PAINLEVEL_OUTOF10: 6
PAINLEVEL_OUTOF10: 9
PAINLEVEL_OUTOF10: 8
PAINLEVEL_OUTOF10: 7
PAINLEVEL_OUTOF10: 8
PAINLEVEL_OUTOF10: 7

## 2021-09-17 ASSESSMENT — PAIN - FUNCTIONAL ASSESSMENT
PAIN_FUNCTIONAL_ASSESSMENT: ACTIVITIES ARE NOT PREVENTED
PAIN_FUNCTIONAL_ASSESSMENT: PREVENTS OR INTERFERES SOME ACTIVE ACTIVITIES AND ADLS
PAIN_FUNCTIONAL_ASSESSMENT: ACTIVITIES ARE NOT PREVENTED

## 2021-09-17 ASSESSMENT — PAIN DESCRIPTION - FREQUENCY
FREQUENCY: CONTINUOUS

## 2021-09-17 ASSESSMENT — PAIN DESCRIPTION - ONSET
ONSET: ON-GOING

## 2021-09-17 ASSESSMENT — PAIN DESCRIPTION - PROGRESSION
CLINICAL_PROGRESSION: NOT CHANGED
CLINICAL_PROGRESSION: NOT CHANGED

## 2021-09-17 ASSESSMENT — PAIN DESCRIPTION - LOCATION
LOCATION: ABDOMEN

## 2021-09-17 ASSESSMENT — PAIN DESCRIPTION - ORIENTATION: ORIENTATION: PROXIMAL

## 2021-09-17 NOTE — PROGRESS NOTES
[facility] - Dr. Leonardo Banks  Postoperative Progress Note    Pt Name: Francis Blanchard  Medical Record Number: [id number]  Date of Birth 1970   Today's Date: 9/17/2021    ASSESSMENT   1. POD # 1 Status post robotic extended right colectomy secondary to hepatic flexure/proximal transverse colon adenocarcinoma   has a past medical history of Anemia and Colon cancer ([facility] [address] PLAN   1. On clears. Okay for fulls. 2. Await bowel function. Started on Movantik. 3. Pain & nausea control  4. IV fluids  5. Remove del valle catheter  6. Up as tolerated today  7. Prilosec from home resumed  8. Lovenox for DVT prophylaxis  9. Labs reviewed. Repeat in am.  10. Awaiting surgical pathology  11. Clinically, looks good today. Questions answered. SUBJECTIVE   Patient was stable overnight. Chart reviewed. Updated by nursing staff. VS stable. No fevers. Denies chest discomfort or dyspnea. No N/V; (+) belching, but no flatus or BM. Tolerating ADULT DIET; Clear Liquid diet. Pain somewhat controlled with analgesia. May want to try Percocet but he is going to try the [facility] again this morning. Del Valle in place. Has not been OOB. Incisions are all clean, dry and intact with steri-strips. CURRENT MEDICATIONS   Scheduled Meds:   sodium chloride flush  5-40 mL IntraVENous 2 times per day    famotidine  20 mg Oral BID    enoxaparin  40 mg SubCUTAneous Daily     Continuous Infusions:   sodium chloride      sodium chloride 75 mL/hr at 09/16/21 1836     PRN Meds:.hyoscyamine, HYDROcodone 5 mg - acetaminophen **OR** HYDROcodone 5 mg - acetaminophen, sodium chloride flush, sodium chloride, ondansetron **OR** ondansetron, HYDROmorphone **OR** HYDROmorphone  OBJECTIVE   CURRENT VITALS:  height is 6' (1.829 m) and weight is 143 lb 6.4 oz (65 kg). His oral temperature is 98.2 °F (36.8 °C). His blood pressure is 117/56 (abnormal) and his pulse is 63. His respiration is 16 and oxygen saturation is 96%. Temperature Range (24h):Temp: 98.2 °F (36.8 °C) Temp  Av.2 °F (35.1 °C)  Min: 93.6 °F (34.2 °C)  Max: 98.2 °F (36.8 °C)  BP Range (10Q): Systolic (60DRT), RSR:180 , Min:66 , ULV:864     Diastolic (95ZQO), JBO:44, Min:45, Max:97    Pulse Range (24h): Pulse  Av  Min: 58  Max: 89  Respiration Range (24h): Resp  Av.5  Min: 0  Max: 23  Current Pulse Ox (24h):  SpO2: 96 %  Pulse Ox Range (24h):  SpO2  Av.4 %  Min: 94 %  Max: 100 %  Oxygen Amount and Delivery:    Incentive Spirometry Tx:            GENERAL: alert, no distress  LUNGS: clear to ausculation, without wheezes, rales or rhonci  HEART: normal rate and regular rhythm  ABDOMEN: non-distended, soft, incisional tenderness, bowel sounds hypoactive  INCISION: healing well, no significant drainage, no significant erythema  EXTERMITY: no cyanosis, clubbing or edema  In: 500 [I.V.:500]  Out: 1350 [Urine:1300]    LABS     Recent Labs     21  0502   HGB 10.9*   HCT 37.9*      K 4.6      CO2 23   BUN 13   CREATININE 0.8   CALCIUM 8.7      Surgical pathology pending   RADIOLOGY   No new imaging    Electronically signed by LUCY Mendosa CNP on 2021 at 7:56 AM     Above discussed and I agree with Gal Melendez CNP afternoon 21. See my additional comments below for updated orders and plan. Labs, cultures, and radiographs where available were reviewed. I discussed patient concerns with Livan Costa and instructions were given. Please see our orders for the updated patient care plan.     Electronically signed by Sylvie Martel MD on 21 at 7:00 AM EDT

## 2021-09-17 NOTE — OP NOTE
[address] [place] [address]                                OPERATIVE REPORT    PATIENT NAME: Alexei Goyal                :        1970  MED REC NO:   [id number]                           ROOM:       0023  ACCOUNT NO:   [de-identified]                           ADMIT DATE: 2021  PROVIDER:     Vicky Rodriguez M.D.    Simone Licea OF PROCEDURE:  2021    PREOPERATIVE DIAGNOSIS:  Hepatic flexure/proximal transverse colon  adenocarcinoma. POSTOPERATIVE DIAGNOSIS:  Hepatic flexure/proximal transverse colon  adenocarcinoma. OPERATION PERFORMED:  Robotic extended right colectomy. SURGEON:  Vicky Rodriguez MD    ASSISTANT:  Zachary Alberto. ARTHUR Loya    ANESTHESIA:  General/local.    ESTIMATED BLOOD LOSS:  20 mL. DRAINS:  None. COMPLICATIONS:  None. DISPOSITION:  Stable to the recovery room. INDICATIONS:  The patient is a 50-year-old gentleman who I had seen in  the office secondary to a hepatic flexure/proximal transverse colon  mass. Found to be adenocarcinoma. Was in need of resection. Both  operative and nonoperative intervention plans were discussed. Risks of  surgery were further discussed. Some of the risks included, but were  not limited to bleeding, infection, the need for reoperation, severe  chronic postoperative pain or numbness, major vascular or nerve injury,  cardiopulmonary complications, anesthetic complications, seroma/hematoma  formation, wound breakdown, trocar site herniation, anastomotic leak,  anastomotic bleed, anastomotic stricture, anastomotic breakdown, and  death. After all of the questions were answered in their entirety and  the patient was completely aware of the current situation, he wished to  proceed with surgery.     DESCRIPTION OF PROCEDURE:  After informed consent was signed and placed  on the chart, the patient was taken back to the operating room and  placed supine on the operating room table. General anesthesia was  induced. He tolerated this throughout the case. All pressure points  were padded. He was on preoperative antibiotics. Bilateral lower  extremity sequential compression devices were placed prior to incision. His abdomen and pelvis were then prepped and draped in the usual sterile  standard fashion. A timeout occurred prior to the operation which not  only identified the patient, but also the planned procedure to be  performed. At the end of the timeout, there were no questions or  concerns. I began the operation first by making a small skin nick at Leon's  point. Veress needle inserted. Intraabdominal cavity was insufflated  to a pressure of approximately 15 mmHg with carbon dioxide gas. The  patient tolerated the insufflation well. 8-mm trocar placed in the left  side of the abdomen. Laparoscope inserted. Upon initial evaluation,  there was no hollow viscus, solid organ, or major vascular injury with  the Veress needle insertion or the first trocar placement. Three other  8-mm trocars were placed in their standard location under direct vision. One went through the Leon's point area after it was opened up slightly  more. 12-mm assistant was placed further lateral under direct vision on  the left side there. The patient was placed with the right side  elevated. He was already slightly flexed. Robot brought in and docked. Instruments were placed under direct vision. Once everything was  aligned and in order, I then unscrubbed and went back to the console. I began the operation first by mobilizing the terminal ileum. This took  a fair amount of work as his ileum was tethered pretty far down into the  pelvis. Eventually, this was all completely mobilized with no injury. Care was taken to avoid testicular vessels as well as his ureter which  all were identified and protected because he was very thin. The cecum  was then mobilized.   I then continued to mobilize superior and lateral  there in the avascular plane. Duodenum identified and protected  throughout the rest of the operation. The ileocolic vessel was then  mobilized and then doubly clipped and then taken with a vessel sealer  there at its base. There were actually then two, kind of base of the  ileocolic and periaortic nodes that because he was so thin were somewhat  prominent, so I elected to take these separately and then sent each of  these as lymph nodes. Hepatic flexure was freed up. We then mobilized  the middle colic which was taken again with a vessel sealer. Transverse  colon as well as terminal ileum were then transected with [place] BRIGITTE 60  blue loads. Omentum divided. Some of the more superior attachments by  the stomach were taken and then, at that point, the specimen had been  completely freed. This was left in its natural position. Ileum was  then brought over to the midline as well as the remaining of the  transverse colon. These were able to be brought next to one another  with no undue tension. 4 mL of Firefly was given to ensure good blood  flow which there was at the transverse colon and the ileum. The  antimesenteric border along with the tenia was then reapproximated with  a Vicryl suture. Enterotomy and colotomy made. BRIGITTE [place] 60-mm blue  load was brought through and closed, fired, and the anastomosis formed. The enterotomy was then closed with a running 3-0 V-Loc x2. This was  reinforced with Vicryl suture. Anastomosis appeared to be widely  patent. Good hemostasis. Adequate blood flow which was confirmed with  Firefly. No undue twisting of the mesentery. No other abnormalities  were identified at that time. 5-mm trocar was then placed there between  the pubic symphysis and that of the umbilicus. Terminal ileum was  grasped there at the staple line. Instruments were removed. Robot  undocked and I scrubbed back into the table.     A small vertical incision was then opened up around the 5-mm trocar and  then, a wound protector placed. Specimen removed and sent to Pathology  for permanent. Wound protector twisted upon itself and clamped with  Sunrise and the abdomen reinsufflated to ensure no other abnormalities in  which everything else otherwise looked good. It should be noted that  just prior to undocking the robot, I did place some VISTASEAL around the  anastomosis to ensure hemostasis and also give some added support. Wound protector was removed. The patient tolerated desufflation well. All trocars removed. Hemostasis adequate. Large trocar site was closed at the fascia with Vicryl suture. Wound  protector site was closed at the fascia with multiple interrupted 0  Ethibond sutures. Subcutaneous tissues were all irrigated. Hemostasis  was adequate. Skin reapproximated at all the incisional sites with 4-0  Vicryl in a subcuticular fashion. Closed incisions were then clean,  dry, and Steri-Strips were applied. Dry sterile dressings applied. Sponge, needle, and instrumentation count was correct at the end of the  procedure. The patient tolerated the procedure well with no apparent  complications and less than 20 mL of blood loss. Easily brought out of  general anesthesia and transferred to the postanesthesia care unit in  stable condition.         Arnaud Stoner M.D.    D: 09/16/2021 16:22:30       T: 09/16/2021 18:56:58     [email or web address]  Job#: [id number]     Doc#: [id number]    CC:

## 2021-09-17 NOTE — CARE COORDINATION
9/17/21, 12:39 PM EDT  DISCHARGE PLANNING EVALUATION:    Khalif Alfaro       Admitted: 9/16/2021/ [address] day: 0   Location: 523/023-A Reason for admit: Cancer of transverse colon ([facility] [C18.4]  S/P right colectomy [Z90.49]   PMH:  has a past medical history of Anemia and Colon cancer ([facility] Procedure: 9-16-21 Robotic extended right colectomy. Barriers to Discharge:  POD #1. Currently clear liquids, may advance to fulls. Movantik initiated, await bowel fx return. IVF at 75/hr. Dilaudid and Norco for pain. Lovenox. Abdominal binder. Resp exercise. Ambulate. PCP: Amita Alcocer MD  Readmission Risk Score: 10%    Patient Goals/Plan/Treatment Preferences: Met with pt and fiance today. From home where he is typically independent. No services or DME. He has a PCP, transportation and he denies issues getting meds. He denies need for discharge services. Transportation/Food Security/Housekeeping Addressed:  No issues identified. 9/17/21, 2:36 PM EDT    Patient goals/plan/ treatment preferences discussed by  and . Patient goals/plan/ treatment preferences reviewed with patient/ family. Patient/ family verbalize understanding of discharge plan and are in agreement with goal/plan/treatment preferences. Understanding was demonstrated using the teach back method. AVS provided by RN at time of discharge, which includes all necessary medical information pertaining to the patients current course of illness, treatment, post-discharge goals of care, and treatment preferences. Potential discharge home in next 48 hours. Pt declines [facility] services.

## 2021-09-17 NOTE — PROGRESS NOTES
Patient given incentive spirometer and was educated on importance and how to use properly. Patient was able to demonstrate proper use.

## 2021-09-17 NOTE — FLOWSHEET NOTE
Pt was sitting at the time of the visit. He was with his significant other. He was dealing with colon cancer and had surgery yesterday. We talked about the issues she was having and his jannette. He wanted prayer to cope and heal. He was anointed. 09/17/21 8805   Encounter Summary   Services provided to: Patient and family together   Referral/Consult From: [address] [place] [address] Significant other   Place of [facility]   Continue Visiting Yes  (9/17)   Complexity of Encounter Low   Length of Encounter 15 minutes   Spiritual/Yazdanism   Type Spiritual support   Assessment Approachable;Calm   Intervention Prayer;Nurtured hope; Active listening; Anointing;Empowerment;Sustaining presence/ Ministry of presence   Outcome Connection/belonging;Expressed gratitude;Encouraged; Hopeful;Receptive   Sacraments   Sacrament of Sick-Anointing Anointed

## 2021-09-18 LAB
ANION GAP SERPL CALCULATED.3IONS-SCNC: 9 MEQ/L (ref 8–16)
BUN BLDV-MCNC: 10 MG/DL (ref 7–22)
CALCIUM SERPL-MCNC: 8.4 MG/DL (ref 8.5–10.5)
CHLORIDE BLD-SCNC: 101 MEQ/L (ref 98–111)
CO2: 26 MEQ/L (ref 23–33)
CREAT SERPL-MCNC: 0.9 MG/DL (ref 0.4–1.2)
GFR SERPL CREATININE-BSD FRML MDRD: 89 ML/MIN/1.73M2
GLUCOSE BLD-MCNC: 94 MG/DL (ref 70–108)
HCT VFR BLD CALC: 33.1 % (ref 42–52)
HEMOGLOBIN: 10.1 GM/DL (ref 14–18)
POTASSIUM SERPL-SCNC: 3.8 MEQ/L (ref 3.5–5.2)
SODIUM BLD-SCNC: 136 MEQ/L (ref 135–145)

## 2021-09-18 PROCEDURE — 80048 BASIC METABOLIC PNL TOTAL CA: CPT

## 2021-09-18 PROCEDURE — 6360000002 HC RX W HCPCS: Performed by: SURGERY

## 2021-09-18 PROCEDURE — 2580000003 HC RX 258: Performed by: SURGERY

## 2021-09-18 PROCEDURE — 6360000002 HC RX W HCPCS: Performed by: NURSE PRACTITIONER

## 2021-09-18 PROCEDURE — 85018 HEMOGLOBIN: CPT

## 2021-09-18 PROCEDURE — 99024 POSTOP FOLLOW-UP VISIT: CPT | Performed by: SURGERY

## 2021-09-18 PROCEDURE — 36415 COLL VENOUS BLD VENIPUNCTURE: CPT

## 2021-09-18 PROCEDURE — 6370000000 HC RX 637 (ALT 250 FOR IP): Performed by: NURSE PRACTITIONER

## 2021-09-18 PROCEDURE — 1200000000 HC SEMI PRIVATE

## 2021-09-18 PROCEDURE — 85014 HEMATOCRIT: CPT

## 2021-09-18 RX ADMIN — HYDROMORPHONE HYDROCHLORIDE 1 MG: 1 INJECTION, SOLUTION INTRAMUSCULAR; INTRAVENOUS; SUBCUTANEOUS at 05:07

## 2021-09-18 RX ADMIN — HYDROMORPHONE HYDROCHLORIDE 1 MG: 1 INJECTION, SOLUTION INTRAMUSCULAR; INTRAVENOUS; SUBCUTANEOUS at 09:05

## 2021-09-18 RX ADMIN — HYDROMORPHONE HYDROCHLORIDE 1 MG: 1 INJECTION, SOLUTION INTRAMUSCULAR; INTRAVENOUS; SUBCUTANEOUS at 18:36

## 2021-09-18 RX ADMIN — SODIUM CHLORIDE, PRESERVATIVE FREE 10 ML: 5 INJECTION INTRAVENOUS at 21:23

## 2021-09-18 RX ADMIN — OXYCODONE HYDROCHLORIDE AND ACETAMINOPHEN 1 TABLET: 5; 325 TABLET ORAL at 11:35

## 2021-09-18 RX ADMIN — NALOXEGOL OXALATE 12.5 MG: 12.5 TABLET, FILM COATED ORAL at 09:07

## 2021-09-18 RX ADMIN — OXYCODONE HYDROCHLORIDE AND ACETAMINOPHEN 2 TABLET: 5; 325 TABLET ORAL at 07:06

## 2021-09-18 RX ADMIN — HYDROMORPHONE HYDROCHLORIDE 1 MG: 1 INJECTION, SOLUTION INTRAMUSCULAR; INTRAVENOUS; SUBCUTANEOUS at 01:56

## 2021-09-18 RX ADMIN — HYDROMORPHONE HYDROCHLORIDE 1 MG: 1 INJECTION, SOLUTION INTRAMUSCULAR; INTRAVENOUS; SUBCUTANEOUS at 21:20

## 2021-09-18 RX ADMIN — HYDROMORPHONE HYDROCHLORIDE 1 MG: 1 INJECTION, SOLUTION INTRAMUSCULAR; INTRAVENOUS; SUBCUTANEOUS at 15:49

## 2021-09-18 RX ADMIN — OXYCODONE HYDROCHLORIDE AND ACETAMINOPHEN 2 TABLET: 5; 325 TABLET ORAL at 22:50

## 2021-09-18 RX ADMIN — ONDANSETRON 4 MG: 2 INJECTION INTRAMUSCULAR; INTRAVENOUS at 07:06

## 2021-09-18 RX ADMIN — ENOXAPARIN SODIUM 40 MG: 40 INJECTION SUBCUTANEOUS at 09:07

## 2021-09-18 RX ADMIN — HYDROMORPHONE HYDROCHLORIDE 1 MG: 1 INJECTION, SOLUTION INTRAMUSCULAR; INTRAVENOUS; SUBCUTANEOUS at 13:10

## 2021-09-18 ASSESSMENT — PAIN DESCRIPTION - ORIENTATION
ORIENTATION: MID
ORIENTATION: MID

## 2021-09-18 ASSESSMENT — PAIN DESCRIPTION - PROGRESSION
CLINICAL_PROGRESSION: NOT CHANGED
CLINICAL_PROGRESSION: NOT CHANGED

## 2021-09-18 ASSESSMENT — PAIN DESCRIPTION - DESCRIPTORS
DESCRIPTORS: PRESSURE
DESCRIPTORS: PRESSURE

## 2021-09-18 ASSESSMENT — PAIN DESCRIPTION - PAIN TYPE
TYPE: SURGICAL PAIN
TYPE: SURGICAL PAIN

## 2021-09-18 ASSESSMENT — PAIN DESCRIPTION - LOCATION
LOCATION: ABDOMEN
LOCATION: ABDOMEN

## 2021-09-18 ASSESSMENT — PAIN SCALES - GENERAL
PAINLEVEL_OUTOF10: 8
PAINLEVEL_OUTOF10: 8
PAINLEVEL_OUTOF10: 0
PAINLEVEL_OUTOF10: 5
PAINLEVEL_OUTOF10: 0
PAINLEVEL_OUTOF10: 9
PAINLEVEL_OUTOF10: 8
PAINLEVEL_OUTOF10: 0
PAINLEVEL_OUTOF10: 8
PAINLEVEL_OUTOF10: 8
PAINLEVEL_OUTOF10: 7
PAINLEVEL_OUTOF10: 4
PAINLEVEL_OUTOF10: 5
PAINLEVEL_OUTOF10: 5
PAINLEVEL_OUTOF10: 9
PAINLEVEL_OUTOF10: 0
PAINLEVEL_OUTOF10: 8
PAINLEVEL_OUTOF10: 3
PAINLEVEL_OUTOF10: 0
PAINLEVEL_OUTOF10: 8

## 2021-09-18 ASSESSMENT — PAIN DESCRIPTION - FREQUENCY
FREQUENCY: CONTINUOUS
FREQUENCY: CONTINUOUS

## 2021-09-18 ASSESSMENT — PAIN - FUNCTIONAL ASSESSMENT
PAIN_FUNCTIONAL_ASSESSMENT: PREVENTS OR INTERFERES SOME ACTIVE ACTIVITIES AND ADLS
PAIN_FUNCTIONAL_ASSESSMENT: PREVENTS OR INTERFERES SOME ACTIVE ACTIVITIES AND ADLS

## 2021-09-18 ASSESSMENT — PAIN DESCRIPTION - ONSET
ONSET: ON-GOING
ONSET: AWAKENED FROM SLEEP

## 2021-09-18 NOTE — PROGRESS NOTES
Nini Ramos 442, DO  Covering for Dr. Phan Shows  Postoperative Progress Note    Pt Name: Hadley Peraza Record Number: [id number]  Date of Birth 1970   Today's Date: 2021    ASSESSMENT   1. POD # 2 Status post robotic extended right colectomy secondary to hepatic flexure/proximal transverse colon adenocarcinoma   has a past medical history of Anemia and Colon cancer ([address] PLAN   1. On full liquids. 2. Await bowel function. Started on Movantik. 3. Pain & nausea control  4. IV fluids  5. Up as tolerated today  6. Prilosec from home resumed  7. Lovenox for DVT prophylaxis  8. Labs reviewed. Repeat in am.  9. Awaiting surgical pathology  10. Clinically, looks good today. Questions answered. SUBJECTIVE   Patient was stable overnight. Chart reviewed. Updated by nursing staff. VS stable. No fevers. Denies chest discomfort or dyspnea. No N/V; (+) belching, but no flatus or BM. Tolerating ADULT DIET; Full Liquid diet. Pain somewhat controlled with analgesia. Has been OOB. Incisions are all clean, dry and intact with steri-strips. CURRENT MEDICATIONS   Scheduled Meds:   omeprazole  40 mg Oral QAM AC    naloxegol  12.5 mg Oral QAM    sodium chloride flush  5-40 mL IntraVENous 2 times per day    enoxaparin  40 mg SubCUTAneous Daily     Continuous Infusions:   sodium chloride      sodium chloride 75 mL/hr at 21 0812     PRN Meds:.docusate sodium, oxyCODONE-acetaminophen **OR** oxyCODONE-acetaminophen, hyoscyamine, sodium chloride flush, sodium chloride, ondansetron **OR** ondansetron, HYDROmorphone **OR** HYDROmorphone  OBJECTIVE   CURRENT VITALS:  height is 6' (1.829 m) and weight is 143 lb 6.4 oz (65 kg). His oral temperature is 99.1 °F (37.3 °C). His blood pressure is 141/84 (abnormal) and his pulse is 74. His respiration is 16 and oxygen saturation is 93%.    Temperature Range (24h):Temp: 99.1 °F (37.3 °C) Temp  Av.7 °F (37.1 °C) Min: 98 °F (36.7 °C)  Max: 99.1 °F (37.3 °C)  BP Range (06D): Systolic (17QGQ), CZA:883 , Min:105 , OGX:958     Diastolic (57QWR), OXJ:63, Min:63, Max:84    Pulse Range (24h): Pulse  Av  Min: 56  Max: 74  Respiration Range (24h): Resp  Av  Min: 16  Max: 16  Current Pulse Ox (24h):  SpO2: 93 %  Pulse Ox Range (24h):  SpO2  Av.3 %  Min: 93 %  Max: 99 %  Oxygen Amount and Delivery:    Incentive Spirometry Tx:            GENERAL: alert, no distress  LUNGS: clear to ausculation, without wheezes, rales or rhonci  HEART: normal rate and regular rhythm  ABDOMEN: non-distended, soft, incisional tenderness, bowel sounds hypoactive  INCISION: healing well, no significant drainage, no significant erythema  EXTERMITY: no cyanosis, clubbing or edema  In: 3349 [P.O.:500; I.V.:2849]  Out: -   Date 21 0000 - 21 2359   Shift 9474-9638 8561-5668 9610-8654 24 Hour Total   INTAKE   P.O.(mL/kg/hr) 300(0.6)   300   I. V.(mL/kg) 697(10.7)   697(10.7)   Shift Total(mL/kg) 545(87.2)   170(50.2)   OUTPUT   Shift Total(mL/kg)       Weight (kg) 65 65 65 65     LABS     Recent Labs     21  0502 21  0551   HGB 10.9* 10.1*   HCT 37.9* 33.1*    136   K 4.6 3.8    101   CO2 23 26   BUN 13 10   CREATININE 0.8 0.9   CALCIUM 8.7 8.4*      Surgical pathology pending   RADIOLOGY   No new imaging    Electronically signed by Pradeep Elizabeth DO on 2021 at 12:46 PM

## 2021-09-19 PROCEDURE — 6360000002 HC RX W HCPCS: Performed by: SURGERY

## 2021-09-19 PROCEDURE — 99024 POSTOP FOLLOW-UP VISIT: CPT | Performed by: SURGERY

## 2021-09-19 PROCEDURE — 2580000003 HC RX 258: Performed by: SURGERY

## 2021-09-19 PROCEDURE — 93005 ELECTROCARDIOGRAM TRACING: CPT | Performed by: SURGERY

## 2021-09-19 PROCEDURE — 6360000002 HC RX W HCPCS: Performed by: NURSE PRACTITIONER

## 2021-09-19 PROCEDURE — 1200000000 HC SEMI PRIVATE

## 2021-09-19 PROCEDURE — 6370000000 HC RX 637 (ALT 250 FOR IP): Performed by: NURSE PRACTITIONER

## 2021-09-19 RX ADMIN — OXYCODONE HYDROCHLORIDE AND ACETAMINOPHEN 2 TABLET: 5; 325 TABLET ORAL at 21:28

## 2021-09-19 RX ADMIN — OXYCODONE HYDROCHLORIDE AND ACETAMINOPHEN 2 TABLET: 5; 325 TABLET ORAL at 12:03

## 2021-09-19 RX ADMIN — DOCUSATE SODIUM 100 MG: 100 CAPSULE ORAL at 21:34

## 2021-09-19 RX ADMIN — OXYCODONE HYDROCHLORIDE AND ACETAMINOPHEN 1 TABLET: 5; 325 TABLET ORAL at 16:01

## 2021-09-19 RX ADMIN — NALOXEGOL OXALATE 12.5 MG: 12.5 TABLET, FILM COATED ORAL at 08:11

## 2021-09-19 RX ADMIN — ENOXAPARIN SODIUM 40 MG: 40 INJECTION SUBCUTANEOUS at 08:11

## 2021-09-19 RX ADMIN — OXYCODONE HYDROCHLORIDE AND ACETAMINOPHEN 1 TABLET: 5; 325 TABLET ORAL at 03:15

## 2021-09-19 RX ADMIN — SODIUM CHLORIDE, PRESERVATIVE FREE 10 ML: 5 INJECTION INTRAVENOUS at 21:27

## 2021-09-19 RX ADMIN — HYDROMORPHONE HYDROCHLORIDE 1 MG: 1 INJECTION, SOLUTION INTRAMUSCULAR; INTRAVENOUS; SUBCUTANEOUS at 06:15

## 2021-09-19 ASSESSMENT — PAIN DESCRIPTION - LOCATION
LOCATION: ABDOMEN
LOCATION: ABDOMEN

## 2021-09-19 ASSESSMENT — PAIN SCALES - GENERAL
PAINLEVEL_OUTOF10: 2
PAINLEVEL_OUTOF10: 3
PAINLEVEL_OUTOF10: 6
PAINLEVEL_OUTOF10: 8
PAINLEVEL_OUTOF10: 5
PAINLEVEL_OUTOF10: 10
PAINLEVEL_OUTOF10: 10
PAINLEVEL_OUTOF10: 7

## 2021-09-19 ASSESSMENT — PAIN DESCRIPTION - PROGRESSION
CLINICAL_PROGRESSION: NOT CHANGED

## 2021-09-19 ASSESSMENT — PAIN DESCRIPTION - PAIN TYPE
TYPE: SURGICAL PAIN
TYPE: SURGICAL PAIN

## 2021-09-19 ASSESSMENT — PAIN DESCRIPTION - FREQUENCY: FREQUENCY: CONTINUOUS

## 2021-09-19 ASSESSMENT — PAIN DESCRIPTION - ORIENTATION
ORIENTATION: MID
ORIENTATION: MID

## 2021-09-19 ASSESSMENT — PAIN - FUNCTIONAL ASSESSMENT: PAIN_FUNCTIONAL_ASSESSMENT: PREVENTS OR INTERFERES SOME ACTIVE ACTIVITIES AND ADLS

## 2021-09-19 ASSESSMENT — PAIN DESCRIPTION - ONSET: ONSET: ON-GOING

## 2021-09-19 ASSESSMENT — PAIN DESCRIPTION - DESCRIPTORS: DESCRIPTORS: PRESSURE

## 2021-09-19 NOTE — PROGRESS NOTES
Av , Min:122 , RXL:839     Diastolic (31CSW), UDZ:63, Min:75, Max:90    Pulse Range (24h): Pulse  Av  Min: 5  Max: 75  Respiration Range (24h): Resp  Av.4  Min: 16  Max: 18  Current Pulse Ox (24h):  SpO2: 97 %  Pulse Ox Range (24h):  SpO2  Av %  Min: 96 %  Max: 98 %  Oxygen Amount and Delivery:    Incentive Spirometry Tx:            GENERAL: alert, no distress  LUNGS: clear to ausculation, without wheezes, rales or rhonci  HEART: normal rate and regular rhythm  ABDOMEN: non-distended, soft, incisional tenderness, bowel sounds active  INCISION: healing well, no significant drainage, no significant erythema  EXTERMITY: no cyanosis, clubbing or edema  In: 1600.5 [P.O.:440; I.V.:1160.5]  Out: -   Date 21 0000 - 21 2359   Shift [id number] 8320-9854 1087-7224 24 Hour Total   INTAKE   P.O.(mL/kg/hr) 240(0.5) 200  440   I. V.(mL/kg) 802. 5(12.3) 358(5.5)  1160.5(17.8)   Shift Total(mL/kg) 1042.5(16) 558(8.6)  1600. 5(24.6)   OUTPUT   Shift Total(mL/kg)       Weight (kg) 65 65 65 65     LABS     Recent Labs     21  0502 21  0551   HGB 10.9* 10.1*   HCT 37.9* 33.1*    136   K 4.6 3.8    101   CO2 23 26   BUN 13 10   CREATININE 0.8 0.9   CALCIUM 8.7 8.4*      Surgical pathology pending   RADIOLOGY   No new imaging    Electronically signed by Alessia Denis MD on 2021 at 9:07 AM

## 2021-09-20 VITALS
OXYGEN SATURATION: 96 % | WEIGHT: 143.4 LBS | RESPIRATION RATE: 16 BRPM | BODY MASS INDEX: 19.42 KG/M2 | SYSTOLIC BLOOD PRESSURE: 152 MMHG | HEART RATE: 57 BPM | HEIGHT: 72 IN | DIASTOLIC BLOOD PRESSURE: 81 MMHG | TEMPERATURE: 98 F

## 2021-09-20 PROCEDURE — 6370000000 HC RX 637 (ALT 250 FOR IP): Performed by: NURSE PRACTITIONER

## 2021-09-20 PROCEDURE — 99024 POSTOP FOLLOW-UP VISIT: CPT | Performed by: NURSE PRACTITIONER

## 2021-09-20 PROCEDURE — 6360000002 HC RX W HCPCS: Performed by: SURGERY

## 2021-09-20 PROCEDURE — 2580000003 HC RX 258: Performed by: SURGERY

## 2021-09-20 RX ORDER — BISACODYL 10 MG
10 SUPPOSITORY, RECTAL RECTAL ONCE
Status: COMPLETED | OUTPATIENT
Start: 2021-09-20 | End: 2021-09-20

## 2021-09-20 RX ORDER — ONDANSETRON 4 MG/1
4 TABLET, ORALLY DISINTEGRATING ORAL EVERY 8 HOURS PRN
Qty: 20 TABLET | Refills: 0 | Status: SHIPPED | OUTPATIENT
Start: 2021-09-20 | End: 2021-09-27 | Stop reason: ALTCHOICE

## 2021-09-20 RX ORDER — OXYCODONE HYDROCHLORIDE AND ACETAMINOPHEN 5; 325 MG/1; MG/1
1-2 TABLET ORAL EVERY 4 HOURS PRN
Qty: 30 TABLET | Refills: 0 | Status: SHIPPED | OUTPATIENT
Start: 2021-09-20 | End: 2021-09-27 | Stop reason: ALTCHOICE

## 2021-09-20 RX ADMIN — OXYCODONE HYDROCHLORIDE AND ACETAMINOPHEN 1 TABLET: 5; 325 TABLET ORAL at 02:56

## 2021-09-20 RX ADMIN — BISACODYL 10 MG: 10 SUPPOSITORY RECTAL at 10:35

## 2021-09-20 RX ADMIN — NALOXEGOL OXALATE 12.5 MG: 12.5 TABLET, FILM COATED ORAL at 10:36

## 2021-09-20 RX ADMIN — OXYCODONE HYDROCHLORIDE AND ACETAMINOPHEN 1 TABLET: 5; 325 TABLET ORAL at 13:18

## 2021-09-20 RX ADMIN — OMEPRAZOLE 40 MG: 40 CAPSULE, DELAYED RELEASE ORAL at 05:15

## 2021-09-20 RX ADMIN — SODIUM CHLORIDE, PRESERVATIVE FREE 10 ML: 5 INJECTION INTRAVENOUS at 10:37

## 2021-09-20 RX ADMIN — DOCUSATE SODIUM 100 MG: 100 CAPSULE ORAL at 10:35

## 2021-09-20 RX ADMIN — ENOXAPARIN SODIUM 40 MG: 40 INJECTION SUBCUTANEOUS at 10:35

## 2021-09-20 RX ADMIN — OXYCODONE HYDROCHLORIDE AND ACETAMINOPHEN 1 TABLET: 5; 325 TABLET ORAL at 07:36

## 2021-09-20 ASSESSMENT — PAIN DESCRIPTION - FREQUENCY: FREQUENCY: CONTINUOUS

## 2021-09-20 ASSESSMENT — PAIN SCALES - GENERAL
PAINLEVEL_OUTOF10: 5
PAINLEVEL_OUTOF10: 1
PAINLEVEL_OUTOF10: 7
PAINLEVEL_OUTOF10: 5
PAINLEVEL_OUTOF10: 5

## 2021-09-20 ASSESSMENT — PAIN DESCRIPTION - ORIENTATION: ORIENTATION: MID

## 2021-09-20 ASSESSMENT — PAIN DESCRIPTION - ONSET: ONSET: ON-GOING

## 2021-09-20 ASSESSMENT — PAIN DESCRIPTION - PAIN TYPE: TYPE: SURGICAL PAIN

## 2021-09-20 ASSESSMENT — PAIN DESCRIPTION - PROGRESSION
CLINICAL_PROGRESSION: NOT CHANGED

## 2021-09-20 ASSESSMENT — PAIN DESCRIPTION - LOCATION: LOCATION: ABDOMEN

## 2021-09-20 ASSESSMENT — PAIN DESCRIPTION - DESCRIPTORS: DESCRIPTORS: PRESSURE

## 2021-09-20 ASSESSMENT — PAIN - FUNCTIONAL ASSESSMENT: PAIN_FUNCTIONAL_ASSESSMENT: ACTIVITIES ARE NOT PREVENTED

## 2021-09-20 NOTE — PROGRESS NOTES
[facility]  Dr. Mackenzie Riley  Postoperative Progress Note    Pt Name: Jing Jain  Medical Record Number: [id number]  Date of Birth 1970   Today's Date: 2021    ASSESSMENT   1. POD #4 Status post robotic extended right colectomy secondary to hepatic flexure/proximal transverse colon adenocarcinoma   has a past medical history of Anemia and Colon cancer ([address] PLAN   1. General diet  2. Bowel function returning. Supp this morning. 3. Pain & nausea control  4. IV to INT  5. Up as tolerated   6. Lovenox for DVT prophylaxis  7. Awaiting surgical pathology. Called pathology and won't be back until tomorrow. 8. Looks good. Would like to go home today. Follow up in 7-10 days. Discharge instructions reviewed. SUBJECTIVE   Patient was stable overnight. Chart reviewed. Updated by nursing staff. VS stable. No fevers. Denies chest discomfort or dyspnea. No N/V; (+) belching, + flatus and very small BM. Tolerating regular diet. No bloating. Pain controlled with analgesia as long as he stays on top of pain. Has been OOB. Incisions are all clean, dry and intact with steri-strips. No Urinary complaints. CURRENT MEDICATIONS   Scheduled Meds:   omeprazole  40 mg Oral QAM AC    naloxegol  12.5 mg Oral QAM    sodium chloride flush  5-40 mL IntraVENous 2 times per day    enoxaparin  40 mg SubCUTAneous Daily     Continuous Infusions:   sodium chloride       PRN Meds:.docusate sodium, oxyCODONE-acetaminophen **OR** oxyCODONE-acetaminophen, hyoscyamine, sodium chloride flush, sodium chloride, ondansetron **OR** ondansetron, HYDROmorphone **OR** HYDROmorphone  OBJECTIVE   CURRENT VITALS:  height is 6' (1.829 m) and weight is 143 lb 6.4 oz (65 kg). His oral temperature is 98 °F (36.7 °C). His blood pressure is 146/79 (abnormal) and his pulse is 57. His respiration is 16 and oxygen saturation is 98%.    Temperature Range (24h):Temp: 98 °F (36.7 °C) Temp  Av.1 °F (36.7 °C)  Min: 97.7 °F (36.5 °C)  Max: 98.3 °F (36.8 °C)  BP Range (27U): Systolic (17HAH), XDL:239 , Min:122 , XHQ:505     Diastolic (29DHA), XIP:07, Min:65, Max:91    Pulse Range (24h): Pulse  Av  Min: 62  Max: 75  Respiration Range (24h): Resp  Av  Min: 16  Max: 16  Current Pulse Ox (24h):  SpO2: 98 %  Pulse Ox Range (24h):  SpO2  Av %  Min: 94 %  Max: 99 %  Oxygen Amount and Delivery:    Incentive Spirometry Tx:            GENERAL: alert, no distress  LUNGS: clear to ausculation, without wheezes, rales or rhonci  HEART: normal rate and regular rhythm  ABDOMEN: non-distended, soft, incisional tenderness, bowel sounds active  INCISION: healing well, no significant drainage, no significant erythema  EXTERMITY: no cyanosis, clubbing or edema  In: 10 [I.V.:10]  Out: -     LABS     Recent Labs     21  0551   HGB 10.1*   HCT 33.1*      K 3.8      CO2 26   BUN 10   CREATININE 0.9   CALCIUM 8.4*      Surgical pathology pending   RADIOLOGY   No new imaging    Electronically signed by LUCY Hector CNP on 2021 at 7:47 AM

## 2021-09-20 NOTE — PROGRESS NOTES
Discussed pathology with patient. Patient already reviewed results in [facility] before arrival and had all questions prepared. Discussed staging and that chemotherapy would be recommended. Already has appointment with oncology on the 4th of October. Patient also asking questions regarding restrictions. 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 29th and he is riding it there. Discussed his restrictions of no lifting, pulling or tugging over 20-25 lbs at that time and did not recommend this. Patient states there is no real way around it and he will be going regardless. Follow up in 1 week and we will discuss this further. Discharge ordered placed.

## 2021-09-20 NOTE — DISCHARGE SUMMARY
Silvana Goltz Dr[address] Rachell [facility] [address] [facility]       Pt Name: Tristan Leger  MRN: [id number]  YOB: 1970  Primary Care Physician: Fawad Casiano MD    Admit date:  2021 10:02 AM      Discharge date:  2021  2:12 PM    Admitting Diagnosis:   1. Hepatic flexure/proximal transverse colon adenocarcinoma    Discharge Diagnosis:   1. Status post robotic extended right colectomy  2.  Invasive moderately differentiated colonic adenocarcinoma    Admitting Service: General Surgery, Carol Ann Lua MD.    Consultants:  None    History and Physical:  Kellen Juarez (:  1970)      ASSESSMENT:  1.  Proximal transverse colon adenocarcinoma     PLAN:  1. Schedule Agapito for Robotic possible open extended right colectomy. 2. He will undergo pre-operative clearance per anesthesia guidelines with risk factors listed under the past medical history diagnosis & problem list.  3. The risks, benefits and alternatives were discussed with Agapito including non-operative management.  The pros and cons of robotic, laparoscopic and open techniques were discussed.  All questions answered. He understands and wishes to proceed with surgical intervention. 4. Restrictions discussed with Agapito and he expresses understanding. 5. He is advised to call back directly if there are further questions/concerns, or if his symptoms worsen prior to surgery.   6.  CEA level prior to surgery  7.  Obtain colonoscopy report/transcription prior to surgical intervention.  Ensure [facility] ink placed.        SUBJECTIVE/OBJECTIVE:             Chief Complaint   Patient presents with    Surgical Consult       New patient-referred by Dr Adarsh Macedo moderately differentiated adenocarcinoma of the colon        HPI  Olen Schwab is a 42-year-old male who presents for initial evaluation secondary to a newly diagnosed adenocarcinoma of the proximal transverse colon. Maddy Barr recently was seen [facility] and Dr. Danuta Regalado to some unexplained weight loss and rectal bleeding.  Colonoscopy demonstrated multiple polyps but a larger mass at the proximal transverse colon demonstrated the adenocarcinoma. Ochoa Pozo states he is still having regular diet without difficulty.  No signs of obstruction.  Normal formation of stool.  No abdominal pain.  No significant nausea or vomiting.  Some previous episodes of nausea, GERD and epigastric discomfort.  This seems to be unrelated to the newly diagnosed mass.  He has never underwent an upper endoscopy at this point but is planning to do so as well with GI.  Patient states the area was tattooed with [place] ink.  Long history of cancer in his family including colorectal disease.  Weight loss of about 10 pounds. Ochoa Pozo is fairly active and eats a healthy diet.  Denies any new urinary complaints.  No chest pain or shortness of breath.  Previous CT chest abdomen pelvis okay.  Only major previous surgery per patient was a recent left inguinal hernia repair last year.     Review of Systems   Constitutional: Positive for fatigue and unexpected weight change. Negative for activity change, appetite change, chills, diaphoresis and fever. HENT: Negative for congestion, dental problem, drooling, ear discharge, ear pain, facial swelling, hearing loss, mouth sores, nosebleeds, postnasal drip, rhinorrhea, sinus pressure, sneezing, sore throat, tinnitus, trouble swallowing and voice change.    Eyes: Negative for photophobia, pain, discharge, redness, itching and visual disturbance. Respiratory: Negative for apnea, cough, choking, chest tightness, shortness of breath, wheezing and stridor.    Cardiovascular: Negative for chest pain, palpitations and leg swelling. Gastrointestinal: Negative for abdominal distention, abdominal pain, anal bleeding, blood in stool, constipation, diarrhea, nausea, rectal pain and vomiting.    Endocrine: Negative.    Genitourinary: Negative for decreased urine volume, difficulty urinating, discharge, dysuria, enuresis, flank pain, frequency, genital sores, hematuria, penile pain, penile swelling, scrotal swelling, testicular pain and urgency. Musculoskeletal: Negative for arthralgias, back pain, gait problem, joint swelling, myalgias, neck pain and neck stiffness. Skin: Negative for color change, pallor, rash and wound. Allergic/Immunologic: Negative.    Neurological: Negative for dizziness, tremors, seizures, syncope, facial asymmetry, speech difficulty, weakness, light-headedness, numbness and headaches. Hematological: Negative for adenopathy. Does not bruise/bleed easily. Psychiatric/Behavioral: Negative for agitation, behavioral problems, confusion, decreased concentration, dysphoric mood, hallucinations, self-injury, sleep disturbance and suicidal ideas. The patient is not nervous/anxious and is not hyperactive.          Past Medical History           Past Medical History:   Diagnosis Date    Anemia      Colon cancer ([facility] [address] 09/2021            Past Surgical History         Past Surgical History:   Procedure Laterality Date    COLONOSCOPY   09/01/2021     Dr. Bronwyn Gardner with mesh inguinal            Current Facility-Administered Medications               Current Outpatient Medications   Medication Sig Dispense Refill    metroNIDAZOLE (FLAGYL) 500 MG tablet Take one tablet at 4pm one tablet at 5pm and one tablet at 11pm the day prior to surgery.  3 tablet 0    omeprazole (PRILOSEC OTC) 20 MG tablet Take 20 mg by mouth daily          No current facility-administered medications for this visit.            No Known Allergies     Family History             Family History   Problem Relation Age of Onset    Other Mother           Brain aneurysm    COPD Mother      Stroke Mother      Cancer Father           lung mets brain and bone    Stroke Father      Cancer Maternal Grandmother           colon    High Blood Pressure Maternal Grandmother      Cancer Maternal Grandfather           colon     High Blood Pressure Maternal Grandfather      Kidney Disease Paternal Grandmother      Cancer Paternal Grandmother           breast     Diabetes Paternal Grandmother      High Blood Pressure Paternal Grandmother      Cancer Paternal Grandfather           lung-52    Heart Attack Paternal Grandfather      High Blood Pressure Paternal Grandfather      Cancer Paternal Aunt           colon    Heart Attack Paternal Aunt      Cancer Paternal Uncle           lung    Cancer Maternal Aunt           colon    Cancer Maternal Uncle           colon                Social History                   Socioeconomic History    Marital status: Single       Spouse name: Not on file    Number of children: Not on file    Years of education: Not on file    Highest education level: Not on file   Occupational History    Not on file   Tobacco Use    Smoking status: Former Smoker       Packs/day: 1.00       Years: 35.00       Pack years: 35.00       Types: Cigarettes       Quit date: 2020       Years since quittin.6    Smokeless tobacco: Never Used   Substance and Sexual Activity    Alcohol use: Not Currently    Drug use: No    Sexual activity: Not on file   Other Topics Concern    Not on file   Social History Narrative    Not on file      Social Determinants of Health            Financial Resource Strain:     Difficulty of Paying Living Expenses:    Food Insecurity:     Worried About Running Out of Food in the Last Year:     Ran Out of Food in the Last Year:    Transportation Needs:     Lack of Transportation (Medical):      Lack of Transportation (Non-Medical):    Physical Activity:     Days of Exercise per Week:     Minutes of Exercise per Session:    Stress:     Feeling of Stress :    Social Connections:     Frequency of Communication with Friends and Family:     Frequency of Social Gatherings with Friends and Family:     Attends Quaker Services:     Active Member of Clubs or Organizations:     Attends Club or Organization Meetings:     Marital Status:    Intimate Partner Violence:     Fear of Current or Ex-Partner:     Emotionally Abused:     Physically Abused:     Sexually Abused:           Vitals         Vitals:     09/08/21 1221   BP: 120/78   Site: Right Upper Arm   Position: Sitting   Cuff Size: Medium Adult   Pulse: 88   Resp: 18   Temp: 97.8 °F (36.6 °C)   TempSrc: Temporal   SpO2: 91%   Weight: 151 lb 3.2 oz (68.6 kg)   Height: 6' (1.829 m)         Body mass index is 20.51 kg/m².           Wt Readings from Last 3 Encounters:   09/08/21 151 lb 3.2 oz (68.6 kg)   08/12/21 150 lb (68 kg)   07/23/21 150 lb (68 kg)      Physical Exam  Vitals reviewed. Constitutional:       General: He is not in acute distress.     Appearance: He is well-developed. He is not diaphoretic. HENT:      Head: Normocephalic and atraumatic.      Right Ear: External ear normal.      Left Ear: External ear normal.      Nose: Nose normal.   Eyes:      General: No scleral icterus.        Right eye: No discharge.         Left eye: No discharge.      Conjunctiva/sclera: Conjunctivae normal.   Cardiovascular:      Rate and Rhythm: Normal rate and regular rhythm.      Heart sounds: Normal heart sounds. Pulmonary:      Effort: Pulmonary effort is normal. No respiratory distress.      Breath sounds: Normal breath sounds. No wheezing or rales. Chest:      Chest wall: No tenderness. Abdominal:      General: Bowel sounds are normal. There is no distension.      Palpations: Abdomen is soft. There is no mass.      Tenderness: There is no abdominal tenderness. There is no guarding or rebound. Musculoskeletal:         General: No tenderness. Normal range of motion.      Cervical back: Normal range of motion and neck supple.    Skin:     General: Skin is warm and dry.      Coloration: Skin is not pale.      Findings: No erythema or rash. Neurological:      Mental Status: He is alert and oriented to person, place, and time.      Cranial Nerves: No cranial nerve deficit. Psychiatric:         BehaviorShary Levan     Thought Content: Thought content normal.         Judgment: Judgment normal.                   Lab Results   Component Value Date     WBC 8.6 08/30/2021     HGB 11.7 (L) 08/30/2021     HCT 38.7 (L) 08/30/2021     MCV 82.2 08/30/2021      08/30/2021                Lab Results   Component Value Date      07/23/2021     K 4.6 07/23/2021      07/23/2021     CO2 26 07/23/2021                Lab Results   Component Value Date     CREATININE 0.9 07/23/2021                Lab Results   Component Value Date     ALT 22 07/23/2021     AST 21 07/23/2021     ALKPHOS 78 07/23/2021     BILITOT 0.5 07/23/2021      No results found for: LIPASE     No results found for: CEA           Patient Active Problem List   Diagnosis    Personal history of tobacco use    History of alcoholism ([address]    Weight loss    Rectal bleeding    Bradycardia    Heartburn    Varicose veins of both lower extremities    Family history of cancer      Narrative   PROCEDURE: CT CHEST W CONTRAST       CLINICAL INFORMATION: Malignant neoplasm of colon, unspecified part of colon ([address]       COMPARISON: 8/7/2021       TECHNIQUE:    5 mm axial imaging through the chest with IV contrast. Coronal and sagittal reconstruction were performed.        All CT scans at this facility use dose modulation, iterative reconstruction, and/or weight based dosing when appropriate to reduce the radiation dose to as low as reasonably achievable.       CONTRAST: Isovue 370, 76 mL           FINDINGS:   POSTOPERATIVE CHANGES: None.       MECHANICAL/LIFE SUPPORT DEVICES: None.       HEART/MEDIASTINUM:    1. The heart is normal size.    2. There is no pericardial fluid and thickening.       PULMONARY ARTERIES: Normal.     THORACIC AORTA: There is no aneurysm or dissection.       LUNG PATE - INFILTRATE/PLEURAL EFFUSION/PULMONARY VASCULAR CONGESTION: Normal.       LUNGS - MASS/NODULES: None.       LYMPHADENOPATHY:    1. No pathologically enlarged lymphadenopathy.       PNEUMOTHORAX: None.       THYROID GLAND: Unremarkable.       TRACHEA/ESOPHAGUS: Unremarkable.       MUSCULOSKELETAL: Unremarkable.       UPPER ABDOMEN: Unremarkable.               Impression   1. No suspicious pulmonary nodules. 2. No evidence of metastatic disease. 3. No acute infiltrate or pleural effusion. 4. Lung RADS assessment 1: Negative               **This report has been created using voice recognition software.  It may contain minor errors which are inherent in voice recognition technology. **       Final report electronically signed by Dr. Danni Ramsay on 9/2/2021 4:53 PM      Narrative   PROCEDURE: CT ABDOMEN PELVIS W IV CONTRAST       CLINICAL INFORMATION: Malignant neoplasm of colon, unspecified part of colon ([address]       COMPARISON: No prior study.       TECHNIQUE:    5 mm axial imaging through the abdomen and pelvis with IV contrast.  Coronal and sagittal reconstructions were performed.        All CT scans at this facility use dose modulation, iterative reconstruction, and/or weight based dosing when appropriate to reduce the radiation dose to as low as reasonably achievable.       CONTRAST: Isovue 370, 76 mL, oral contrast also given           FINDINGS:    LUNG BASES: Unremarkable.       LIVER/GALLBLADDER/BILIARY TREE: Unremarkable.       PANCREAS/SPLEEN: Unremarkable.       ADRENAL GLANDS/KIDNEYS: Unremarkable.       BOWEL:    1.  Nonobstructive. 2. Oral contrast is not into the large bowel. 3. No asymmetric bowel wall thickening identified.       FREE AIR/FREE FLUID/INFLAMMATION: None.       LYMPHADENOPATHY:    1. No pathologically enlarged lymph nodes.       ABDOMINAL AORTA: Unremarkable.       PELVIS:   1.  Unremarkable.       ABDOMINAL WALL: Unremarkable.       MUSCULOSKELETAL: Unremarkable.       OTHER: None.               Impression   1. Unremarkable CT abdomen and pelvis with IV contrast.   2. No obstruction. 3. Evaluation of the large bowel is limited due to lack of oral contrast. No bowel wall thickening is identified.               **This report has been created using voice recognition software.  It may contain minor errors which are inherent in voice recognition technology. **       Final report electronically signed by Dr. Nico Henriquez on 9/2/2021 5:10 PM      PATHOLOGY REPORT                       ATTN: David Lino MD                       REQ: David Lino MD     Copies To: Rashmi Galindo     Clinical Information: WEIGHT LOSS, ANEMIA, HEMATOCHEZIA     FINAL DIAGNOSIS:   A.  Proximal transverse colon mass, biopsies:            Invasive moderately differentiated adenocarcinoma. B.  Sigmoid colon polyp, biopsy:             Tubular adenoma. C.  Rectal polyps x2, biopsies:            Tubular adenoma and hyperplastic polyp. D.  Distal rectal polyp, biopsy:             Tubular adenoma. Specimen:   A) BIOPSY OF TRANSVERSE COLON, PROXIMAL, MASS, INK   B) BIOPSY OF SIGMOID COLON, POLYP X 1   C) RECTAL BIOPSY, POLYP X 2   D) RECTAL BIOPSY, DISTAL, POLYP X 1     Gross Examination:   A - The container is labeled Rosaura Lopes, proximal transverse   mass biopsies.  Received in formalin are multiple bits of tan soft   tissue aggregating to 1.5 x 1.2 x 0.2 cm.  1 ns. B - The container is labeled Rosaura Lopes, sigmoid polyp x1. Received in formalin is a single polypoid bit of tan soft tissue and   bits of mucosal debris.  The tissue fragment measures about 0.7 cm in   greatest dimension.  1 ns. C - The container is labeled Rosaura Lopes, rectal polyps x2. Received in formalin are four bits of tan soft tissue varying in size   from less than 1 mm up to about 5 mm.  1 ns.      D - The container is labeled severe  chronic postoperative pain or numbness, major vascular or nerve injury,  cardiopulmonary complications, anesthetic complications, seroma/hematoma  formation, wound breakdown, trocar site herniation, anastomotic leak,  anastomotic bleed, anastomotic stricture, anastomotic breakdown, and  death. After all of the questions were answered in their entirety and  the patient was completely aware of the current situation, he wished to  proceed with surgery.     DESCRIPTION OF PROCEDURE:  After informed consent was signed and placed  on the chart, the patient was taken back to the operating room and  placed supine on the operating room table. General anesthesia was  induced. He tolerated this throughout the case. All pressure points  were padded. He was on preoperative antibiotics. Bilateral lower  extremity sequential compression devices were placed prior to incision. His abdomen and pelvis were then prepped and draped in the usual sterile  standard fashion. A timeout occurred prior to the operation which not  only identified the patient, but also the planned procedure to be  performed. At the end of the timeout, there were no questions or  concerns.     I began the operation first by making a small skin nick at Leon's  point. Veress needle inserted. Intraabdominal cavity was insufflated  to a pressure of approximately 15 mmHg with carbon dioxide gas. The  patient tolerated the insufflation well. 8-mm trocar placed in the left  side of the abdomen. Laparoscope inserted. Upon initial evaluation,  there was no hollow viscus, solid organ, or major vascular injury with  the Veress needle insertion or the first trocar placement. Three other  8-mm trocars were placed in their standard location under direct vision. One went through the Leon's point area after it was opened up slightly  more. 12-mm assistant was placed further lateral under direct vision on  the left side there.   The patient was placed with the right side  elevated. He was already slightly flexed. Robot brought in and docked. Instruments were placed under direct vision. Once everything was  aligned and in order, I then unscrubbed and went back to the console.     I began the operation first by mobilizing the terminal ileum. This took  a fair amount of work as his ileum was tethered pretty far down into the  pelvis. Eventually, this was all completely mobilized with no injury. Care was taken to avoid testicular vessels as well as his ureter which  all were identified and protected because he was very thin. The cecum  was then mobilized. I then continued to mobilize superior and lateral  there in the avascular plane. Duodenum identified and protected  throughout the rest of the operation. The ileocolic vessel was then  mobilized and then doubly clipped and then taken with a vessel sealer  there at its base. There were actually then two, kind of base of the  ileocolic and periaortic nodes that because he was so thin were somewhat  prominent, so I elected to take these separately and then sent each of  these as lymph nodes. Hepatic flexure was freed up. We then mobilized  the middle colic which was taken again with a vessel sealer. Transverse  colon as well as terminal ileum were then transected with Meacham BRIGITTE 60  blue loads. Omentum divided. Some of the more superior attachments by  the stomach were taken and then, at that point, the specimen had been  completely freed. This was left in its natural position. Ileum was  then brought over to the midline as well as the remaining of the  transverse colon. These were able to be brought next to one another  with no undue tension. 4 mL of Firefly was given to ensure good blood  flow which there was at the transverse colon and the ileum. The  antimesenteric border along with the tenia was then reapproximated with  a Vicryl suture. Enterotomy and colotomy made.   BRIGITTE Meacham 60-mm blue  load was brought through and closed, fired, and the anastomosis formed. The enterotomy was then closed with a running 3-0 V-Loc x2. This was  reinforced with Vicryl suture. Anastomosis appeared to be widely  patent. Good hemostasis. Adequate blood flow which was confirmed with  Firefly. No undue twisting of the mesentery. No other abnormalities  were identified at that time. 5-mm trocar was then placed there between  the pubic symphysis and that of the umbilicus. Terminal ileum was  grasped there at the staple line. Instruments were removed. Robot  undocked and I scrubbed back into the table.     A small vertical incision was then opened up around the 5-mm trocar and  then, a wound protector placed. Specimen removed and sent to Pathology  for permanent. Wound protector twisted upon itself and clamped with  America Briones and the abdomen reinsufflated to ensure no other abnormalities in  which everything else otherwise looked good. It should be noted that  just prior to undocking the robot, I did place some VISTASEAL around the  anastomosis to ensure hemostasis and also give some added support. Wound protector was removed. The patient tolerated desufflation well. All trocars removed. Hemostasis adequate.     Large trocar site was closed at the fascia with Vicryl suture. Wound  protector site was closed at the fascia with multiple interrupted 0  Ethibond sutures. Subcutaneous tissues were all irrigated. Hemostasis  was adequate. Skin reapproximated at all the incisional sites with 4-0  Vicryl in a subcuticular fashion. Closed incisions were then clean,  dry, and Steri-Strips were applied. Dry sterile dressings applied. Sponge, needle, and instrumentation count was correct at the end of the  procedure. The patient tolerated the procedure well with no apparent  complications and less than 20 mL of blood loss.   Easily brought out of  general anesthesia and transferred to the postanesthesia care unit in  stable condition.           Carlos Eduardo Varela M.D.  [facility] Course: Parviz Rothman is a 52-year old male patient who was taken to the operative suite per Laura Ambrose MD and the planned procedure was performed as noted above. She was admitted to [facility] for postoperative care and was initially managed with bowel rest, analgesics for pain control, IV fluid hydration, stool softeners, GI and DVT prophylaxis. Over the hospital stay he readily improved in ability to tolerate increasing levels of activity, take po fluids and solid foods with evidence of returning bowel function, and able to void on his own accord. Pain controlled with pain medication. Pathology reviewed with patient before discharge. Already has appt with oncology in 2 weeks. Discharge Condition: stable    Disposition: home    Labs:      PATHOLOGY REPORT                       ATTN: Carlos Eduardo Varela                       REQ: Carlos Eduardo Varela       Copies To:   Rusty Hobbs       Clinical Information: TRANSVERSE COLON CANCER     FINAL DIAGNOSIS:   A.  Right colon mass, hemicolectomy:    Invasive moderately differentiated colonic adenocarcinoma, pT2.    Margins are free of neoplasia.           Pericolonic lymph nodes (17): 2 out of 17 are positive for   metastatic colonic adenocarcinoma.             Appendix-no pathologic abnormality. [place] base lymph nodes (2), resection:             Negative for malignancy (0/2).      pT2 pN1b         Specimen:   A) RIGHT COLON   B) LYMPH NODE(S), ILEOCOLIC BASE       Gross Examination:   A - The container is labeled Kang Morel, right colon.  Received   in formalin is a segmental resection of bowel measuring about 18 cm in   length including 2 cm of terminal ileum.  There is an appendix   measuring 3.5 cm in length x 0.6 cm in diameter.  The appendix is   unremarkable.  The serosal surface is pink-aguilar. Monster Luke is [place] ink   tattooing identified.  The specimen is opened longitudinally revealing   a pink-tan mucosal surface. [place] is a mass measuring 3 x 3 cm.  The   lesion is 6 cm from the distal resection line and about 9 cm from the   radial margin.  No additional lesions are grossly identified.  The   serosal surface is inked blue.  Sections through the appendix reveal no   discrete lesions.  Sections through the lesion reveal extension of   neoplasm into the muscularis propria.  The neoplasm does not grossly   extend into the surrounding soft tissues.  Sections through the   surrounding adipose tissue reveal several small tentatively identified   lymph nodes.  Representative sections are submitted.  Cassette #1 -   proximal and distal resection lines; cassette #2 - appendix; cassette   #3 - mesenteric margin; cassettes #4 and #5 - lesion; cassette #6 -   lesion and adjacent uninvolved mucosa; and cassettes #7 and #8 -   tentatively identified lymph nodes.  ss. B - The container is labeled Laila Bergman, ileocolic base lymph   nodes.  Received fresh are two tentatively identified lymph nodes   measuring 0.7 and 1.3 cm.  The nodes are entirely submitted.  1 ns. PCF/DKR:[id number]     Microscopic Examination:   A.    Procedure   Right hemicolectomy   Macroscopic Evaluation of Mesorectum   Not applicable       TUMOR   Tumor Site    Transverse colon: Proximal (per previous biopsy site, 21- [id number])     Histologic Type    Adenocarcinoma   Histologic Grade    G2, moderately differentiated   Tumor Size   Greatest dimension in Centimeters (cm): 3 cm     Tumor Extent   Invades into muscularis propria     Macroscopic Tumor Perforation   Not identified     Lymphovascular Invasion (select all that apply)   Not identified     Perineural Invasion   Not identified     Treatment Effect    No known presurgical therapy     MARGINS   Margin Status for Invasive Carcinoma   All margins negative for invasive carcinoma     REGIONAL LYMPH NODES     Regional Lymph Node Status   Regional lymph nodes present     Tumor present in regional lymph node(s)      Number of Lymph Nodes with Tumor      Exact number (specify): 2      Number of Lymph Nodes Examined     Exact number (specify): 19 (Part A and Part B)     Tumor Deposits   Not identified     DISTANT METASTASIS   Distant Site(s) Involved, if applicable   Not applicable     PATHOLOGIC STAGE CLASSIFICATION (pTNM, AJCC 8th Edition)   PN1B pT Category    pT2: Tumor invades the muscularis propria   pN Category   pN1b: Two or three regional lymph nodes are positive     Discharge Instructions:  Pt Name: Franklyn Peace  Medical Record Number: [id number]  Today's Date: 9/20/2021    GENERAL ANESTHESIA OR SEDATION  1. Do not drive or operate hazardous machinery for 24 hours. 2. Do not make important business or personal decisions for 24 hours. 3. Do not drink alcoholic beverages or use tobacco for 24 hours. ACTIVITY INSTRUCTIONS:  [] Rest today. Resume light to normal activity tomorrow.   [] You may resume normal activity tomorrow. Do not engage in strenuous activity that may place stress on your incision. [x] Do not drive for 3-5 days or while taking the pain medication. Avoid heavy lifting, tugging, pullings greater than 10 lbs until seen in the office. DIET INSTRUCTIONS:  [x]Begin with clear liquids. If not nauseated, may increase to a low-fat diet when you desire. Greasy and spicy foods are not advised. [x]Regular diet as tolerated. []Other:     MEDICATIONS  [x]Prescription sent with you to be used as directed. []Lortab   []Norco   [x]Percocet   []Tylenol #3   []Oxycontin  Do not drink alcohol or drive while taking these medications. You may experience dizziness or drowsiness with these medications. You may also experience constipation which can be relieved with stool softners or laxatives. **Pain medication at discharge - use only as prescribed- refills may be available to you at your follow up appointments if needed and warranted. Narcotics should be used for only short term and we highly encouraged our patients to wean off appropriately and use other means for pain such as non pharmacologic measures and over the counter tylenol or ibuprofen if no restrictions apply. We do  know that surgical pain is real and will not hesitate to help eliminate some of your discomfort. However we will not be able to completely make you pain free and it is important to determine what pain level is tolerable for you **  [x]You may resume your daily prescription medication schedule unless otherwise specified. [x]Do not take 325mg Aspirin or other blood thinners such as Coumadin or Plavix for 5 days. Take the Zofran exactly as prescribed to control nausea and vomiting. Use Colace and MiraLAX asneeded to prevent constipation. Do not allow yourself to become constipated. Drink at least 64 ounces of liquids per day. WOUND/DRESSING INSTRUCTIONS:  Always ensure you and your care giver clean hands before and after caring for the wound. [] Keep dressing clean and dry for 48 hours. Change when soiled or wet. [x] Allow steri-strips to fall off on their own. [x] Ice operative site for 20 minutes 4 times a day as needed. [x] May wash over incision in shower, but do not soak in a bath.  [] Take sitz bath for 20 minutes twice daily and after bowel movements. [x] Keep the abdominal binder in place during the day. May remove to shower and at night. ABDOMINAL/LAPAROSCOPIC SURGERY  [x]You are encouraged to get up and move around as this helps with circulation, prevents blood clots from forming and speeds up the healing process. Call the office if you develop pain or swelling in your legs. Do not massage sore muscles in the legs. [x]Breath deeply and cough from time to time. This helps to clear your lungs and helps prevent pneumonia. [x]Supporting your incision with a pillow or your hand helps to minimize discomfort and pain.   [x]Laparoscopic lifting more than 10-20 lbs for next two weeks    Wound Care: as directed     Follow-up:  in the next few weeks with John Sanchez MD  Follow up with LUCY Barragan CNP in 1-2 weeks.     LUCY Barragan CNP, CNP   Electronincally signed 9/20/2021 at 2:46 PM    A total of 35 minutes was spent in preparing the patient for discharge with greater than 50% of the time involved with education, counseling and coordinating care

## 2021-09-21 LAB
EKG ATRIAL RATE: 68 BPM
EKG P AXIS: 72 DEGREES
EKG P-R INTERVAL: 154 MS
EKG Q-T INTERVAL: 396 MS
EKG QRS DURATION: 82 MS
EKG QTC CALCULATION (BAZETT): 421 MS
EKG R AXIS: 31 DEGREES
EKG T AXIS: 35 DEGREES
EKG VENTRICULAR RATE: 68 BPM

## 2021-09-22 NOTE — CARE COORDINATION
Patient discharged to home 9/20/2021 with no services added/requested. 9/22/21, 7:10 AM EDT    Patient goals/plan/ treatment preferences discussed by  and . Patient goals/plan/ treatment preferences reviewed with patient/ family. Patient/ family verbalize understanding of discharge plan and are in agreement with goal/plan/treatment preferences. Understanding was demonstrated using the teach back method. AVS provided by RN at time of discharge, which includes all necessary medical information pertaining to the patients current course of illness, treatment, post-discharge goals of care, and treatment preferences.

## 2021-09-23 ENCOUNTER — OFFICE VISIT (OUTPATIENT)
Dept: FAMILY MEDICINE CLINIC | Age: 51
End: 2021-09-23
Payer: COMMERCIAL

## 2021-09-23 ENCOUNTER — TELEPHONE (OUTPATIENT)
Dept: FAMILY MEDICINE CLINIC | Age: 51
End: 2021-09-23

## 2021-09-23 VITALS
BODY MASS INDEX: 19.58 KG/M2 | HEART RATE: 82 BPM | RESPIRATION RATE: 16 BRPM | SYSTOLIC BLOOD PRESSURE: 124 MMHG | OXYGEN SATURATION: 99 % | DIASTOLIC BLOOD PRESSURE: 74 MMHG | TEMPERATURE: 98.1 F | WEIGHT: 144.4 LBS

## 2021-09-23 DIAGNOSIS — D50.9 IRON DEFICIENCY ANEMIA, UNSPECIFIED IRON DEFICIENCY ANEMIA TYPE: Primary | ICD-10-CM

## 2021-09-23 DIAGNOSIS — R43.2 LOSS OF TASTE: ICD-10-CM

## 2021-09-23 DIAGNOSIS — Z90.49 S/P RIGHT COLECTOMY: ICD-10-CM

## 2021-09-23 DIAGNOSIS — Z23 NEED FOR SHINGLES VACCINE: ICD-10-CM

## 2021-09-23 DIAGNOSIS — C18.9 ADENOCARCINOMA, COLON (HCC): ICD-10-CM

## 2021-09-23 DIAGNOSIS — Z23 NEED FOR INFLUENZA VACCINATION: ICD-10-CM

## 2021-09-23 DIAGNOSIS — D22.9 ATYPICAL NEVUS: ICD-10-CM

## 2021-09-23 DIAGNOSIS — Z80.8 FAMILY HISTORY OF MELANOMA: ICD-10-CM

## 2021-09-23 DIAGNOSIS — C18.4 CANCER OF TRANSVERSE COLON (HCC): ICD-10-CM

## 2021-09-23 PROCEDURE — 90471 IMMUNIZATION ADMIN: CPT | Performed by: FAMILY MEDICINE

## 2021-09-23 PROCEDURE — 90750 HZV VACC RECOMBINANT IM: CPT | Performed by: FAMILY MEDICINE

## 2021-09-23 PROCEDURE — 90674 CCIIV4 VAC NO PRSV 0.5 ML IM: CPT | Performed by: FAMILY MEDICINE

## 2021-09-23 PROCEDURE — 90472 IMMUNIZATION ADMIN EACH ADD: CPT | Performed by: FAMILY MEDICINE

## 2021-09-23 PROCEDURE — 99214 OFFICE O/P EST MOD 30 MIN: CPT | Performed by: FAMILY MEDICINE

## 2021-09-23 RX ORDER — FERROUS SULFATE 325(65) MG
325 TABLET ORAL
Qty: 90 TABLET | Refills: 1 | Status: SHIPPED | OUTPATIENT
Start: 2021-09-23 | End: 2022-06-01 | Stop reason: ALTCHOICE

## 2021-09-23 RX ORDER — IBUPROFEN 200 MG
200 TABLET ORAL EVERY 6 HOURS PRN
COMMUNITY
End: 2021-12-06

## 2021-09-23 ASSESSMENT — ENCOUNTER SYMPTOMS
DIARRHEA: 0
NAUSEA: 0
ABDOMINAL PAIN: 0
WHEEZING: 0
VOMITING: 0
COUGH: 0
CONSTIPATION: 0
SHORTNESS OF BREATH: 0
BLOOD IN STOOL: 0
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
## 2021-09-23 NOTE — TELEPHONE ENCOUNTER
Called  office- left message to return call- received short term disability - Barbara Mims believes makes more sense for them to complete- are they willing to complete?      Forms in nurse station

## 2021-09-23 NOTE — TELEPHONE ENCOUNTER
Aware. Can we please check with Dr. Maddi Martinez to confirm recommended activity restrictions? Thanks.

## 2021-09-23 NOTE — PROGRESS NOTES
After obtaining consent, and per orders of Dr. Garrett Rater, injection of Shingrix RD and injection of Flu LD given  by Efrain Ragland MA. Patient instructed to remain in clinic for 20 minutes afterwards, and to report any adverse reaction to me immediately. Immunizations Administered     Name Date Dose Route    Influenza, MDCK Quadv, IM, PF (Flucelvax 2 yrs and older) 9/23/2021 0.5 mL Intramuscular    Site: Deltoid- Left    Lot: [id number]    NDC: 81142-978-00    Zoster Recombinant (Shingrix) 9/23/2021 0.5 mL Intramuscular    Site: Deltoid- Right    Lot: [id number]    NDC: 86230-167-49        VIS sheets given to patient.  Vaccine checklist completed

## 2021-09-25 LAB
SARS-COV-2: NOT DETECTED
SOURCE: NORMAL

## 2021-09-27 ENCOUNTER — OFFICE VISIT (OUTPATIENT)
Dept: SURGERY | Age: 51
End: 2021-09-27

## 2021-09-27 VITALS
BODY MASS INDEX: 19.52 KG/M2 | HEART RATE: 77 BPM | SYSTOLIC BLOOD PRESSURE: 110 MMHG | WEIGHT: 144.1 LBS | TEMPERATURE: 96.8 F | DIASTOLIC BLOOD PRESSURE: 60 MMHG | OXYGEN SATURATION: 94 % | RESPIRATION RATE: 20 BRPM | HEIGHT: 72 IN

## 2021-09-27 DIAGNOSIS — C18.9 ADENOCARCINOMA OF COLON (HCC): Primary | ICD-10-CM

## 2021-09-27 DIAGNOSIS — Z90.49 S/P RIGHT COLECTOMY: ICD-10-CM

## 2021-09-27 PROCEDURE — 99024 POSTOP FOLLOW-UP VISIT: CPT | Performed by: NURSE PRACTITIONER

## 2021-09-27 NOTE — PROGRESS NOTES
Right 2021    ROBOT EXTENDED RIGHT COLECTOMY performed by An Whitfield MD at [address]     [place] with mesh inguinal       Family History   Problem Relation Age of Onset   [facility] Other Mother         Brain aneurysm    COPD Mother     Stroke Mother     Cancer Father         lung mets brain and bone    Stroke Father     Cancer Maternal Grandmother         colon    High Blood Pressure Maternal Grandmother     Cancer Maternal Grandfather         colon     High Blood Pressure Maternal Grandfather     Kidney Disease Paternal Grandmother     Cancer Paternal Grandmother         breast     Diabetes Paternal Grandmother     High Blood Pressure Paternal Grandmother     Cancer Paternal Grandfather         lung-52    Heart Attack Paternal Grandfather     High Blood Pressure Paternal Grandfather     Cancer Paternal Aunt         colon    Heart Attack Paternal Aunt     Cancer Paternal Uncle         lung    Cancer Maternal Aunt         colon    Cancer Maternal Uncle         colon       Social History     Tobacco Use    Smoking status: Former Smoker     Packs/day: 1.00     Years: 35.00     Pack years: 35.00     Types: Cigarettes     Quit date: 2020     Years since quittin.6    Smokeless tobacco: Never Used   Substance Use Topics    Alcohol use: Not Currently      Current Outpatient Medications   Medication Sig Dispense Refill    ibuprofen (ADVIL;MOTRIN) 200 MG tablet Take 200 mg by mouth every 6 hours as needed for Pain      ferrous sulfate (IRON 325) 325 (65 Fe) MG tablet Take 1 tablet by mouth daily (with breakfast) 90 tablet 1    omeprazole (PRILOSEC OTC) 20 MG tablet Take 20 mg by mouth daily       No current facility-administered medications for this visit. No Known Allergies    Subjective:     Review of Systems   Constitutional: Positive for appetite change.  Negative for activity change, chills, diaphoresis, fatigue, fever and unexpected weight change. HENT: Negative for congestion, dental problem, hearing loss, rhinorrhea, sinus pressure and sore throat. Eyes: Negative for photophobia, pain, discharge, itching and visual disturbance. Respiratory: Negative for apnea, cough, choking, chest tightness, shortness of breath and wheezing. Cardiovascular: Negative for chest pain, palpitations and leg swelling. Gastrointestinal: Positive for abdominal pain. Negative for abdominal distention, anal bleeding, blood in stool, constipation, diarrhea, nausea and vomiting. Endocrine: Negative. Genitourinary: Negative for decreased urine volume, difficulty urinating, dysuria, frequency and urgency. Musculoskeletal: Negative for arthralgias, back pain, gait problem, joint swelling, myalgias and neck pain. Skin: Negative for color change, pallor, rash and wound. Allergic/Immunologic: Negative. Neurological: Negative for dizziness, tremors, weakness, numbness and headaches. Hematological: Negative. Psychiatric/Behavioral: Negative. Objective:   /60 (Site: Left Upper Arm, Position: Sitting, Cuff Size: Medium Adult)   Pulse 77   Temp 96.8 °F (36 °C) (Temporal)   Resp 20   Ht 6' (1.829 m)   Wt 144 lb 1.6 oz (65.4 kg)   SpO2 94%   BMI 19.54 kg/m²     Wt Readings from Last 3 Encounters:   09/27/21 144 lb 1.6 oz (65.4 kg)   09/23/21 144 lb 6.4 oz (65.5 kg)   09/16/21 143 lb 6.4 oz (65 kg)     Physical Exam  Vitals reviewed. Constitutional:       General: He is not in acute distress. Appearance: Normal appearance. He is well-developed. He is not ill-appearing or toxic-appearing. HENT:      Head: Normocephalic and atraumatic. Right Ear: Hearing and external ear normal.      Left Ear: Hearing and external ear normal.      Nose: Nose normal.      Mouth/Throat:      Mouth: Mucous membranes are not pale, not dry and not cyanotic.    Eyes:      General: Lids are normal.   Neck:      Trachea: Trachea and phonation normal.   Cardiovascular:      Rate and Rhythm: Normal rate and regular rhythm. Pulses: Normal pulses. Heart sounds: S1 normal and S2 normal.   Pulmonary:      Effort: Pulmonary effort is normal. No tachypnea, bradypnea, accessory muscle usage or respiratory distress. Breath sounds: Normal breath sounds. No decreased breath sounds, wheezing or rales. Chest:      Chest wall: No tenderness. Abdominal:      General: Bowel sounds are normal. There is no distension. Palpations: Abdomen is soft. There is no mass. Tenderness: There is abdominal tenderness. Musculoskeletal:         General: No tenderness. Normal range of motion. Cervical back: Normal range of motion and neck supple. Skin:     General: Skin is warm and dry. Findings: No abrasion, bruising, burn, ecchymosis, erythema, laceration, lesion or rash. Neurological:      Mental Status: He is alert and oriented to person, place, and time. Motor: No tremor, atrophy or abnormal muscle tone. Coordination: Coordination normal.      Gait: Gait normal.      Deep Tendon Reflexes: Reflexes are normal and symmetric. Psychiatric:         Speech: Speech normal.         Behavior: Behavior normal.         Thought Content: Thought content normal.         Lab Results   Component Value Date    WBC 8.6 08/30/2021    HGB 10.1 (L) 09/18/2021    HCT 33.1 (L) 09/18/2021    MCV 82.2 08/30/2021     08/30/2021     Lab Results   Component Value Date     09/18/2021    K 3.8 09/18/2021    K 4.6 09/17/2021     09/18/2021    CO2 26 09/18/2021    BUN 10 09/18/2021    CREATININE 0.9 09/18/2021    GLUCOSE 94 09/18/2021    CALCIUM 8.4 09/18/2021        PATHOLOGY REPORT     Clinical Information: TRANSVERSE COLON CANCER     FINAL DIAGNOSIS:   A.  Right colon mass, hemicolectomy:    Invasive moderately differentiated colonic adenocarcinoma, pT2.    Margins are free of neoplasia.              Pericolonic lymph nodes (17): 2 out of 17 are positive for   metastatic colonic adenocarcinoma.             Appendix-no pathologic abnormality. Neldon Jasmyne base lymph nodes (2), resection:             Negative for malignancy (0/2). pT2 pN1b         Specimen:   A) RIGHT COLON   B) LYMPH NODE(S), ILEOCOLIC BASE       Gross Examination:   A - The container is labeled Yovanny Parikhf, right colon.  Received   in formalin is a segmental resection of bowel measuring about 18 cm in   length including 2 cm of terminal ileum.  There is an appendix   measuring 3.5 cm in length x 0.6 cm in diameter.  The appendix is   unremarkable.  The serosal surface is pink-tan. Vilonia Gentleman is [place] ink   tattooing identified.  The specimen is opened longitudinally revealing   a pink-tan mucosal surface.  There is a mass measuring 3 x 3 cm.  The   lesion is 6 cm from the distal resection line and about 9 cm from the   radial margin.  No additional lesions are grossly identified.  The   serosal surface is inked blue.  Sections through the appendix reveal no   discrete lesions.  Sections through the lesion reveal extension of   neoplasm into the muscularis propria.  The neoplasm does not grossly   extend into the surrounding soft tissues.  Sections through the   surrounding adipose tissue reveal several small tentatively identified   lymph nodes.  Representative sections are submitted.  Cassette #1 -   proximal and distal resection lines; cassette #2 - appendix; cassette   #3 - mesenteric margin; cassettes #4 and #5 - lesion; cassette #6 -   lesion and adjacent uninvolved mucosa; and cassettes #7 and #8 -   tentatively identified lymph nodes.  ss. B - The container is labeled Yovanny Adamsmpf, ileocolic base lymph   nodes.  Received fresh are two tentatively identified lymph nodes   measuring 0.7 and 1.3 cm.  The nodes are entirely submitted.  1 ns. PCF/DKR:[id number]     Microscopic Examination:   A.    Procedure   Right hemicolectomy   Macroscopic Evaluation of Mesorectum   Not applicable       TUMOR   Tumor Site    Transverse colon: Proximal (per previous biopsy site, [id number])     Histologic Type    Adenocarcinoma   Histologic Grade    G2, moderately differentiated   Tumor Size   Greatest dimension in Centimeters (cm): 3 cm     Tumor Extent   Invades into muscularis propria     Macroscopic Tumor Perforation   Not identified     Lymphovascular Invasion (select all that apply)   Not identified     Perineural Invasion   Not identified     Treatment Effect    No known presurgical therapy     MARGINS   Margin Status for Invasive Carcinoma   All margins negative for invasive carcinoma     REGIONAL LYMPH NODES     Regional Lymph Node Status   Regional lymph nodes present     Tumor present in regional lymph node(s)      Number of Lymph Nodes with Tumor      Exact number (specify): 2      Number of Lymph Nodes Examined     Exact number (specify): 19 (Part A and Part B)     Tumor Deposits   Not identified     DISTANT METASTASIS   Distant Site(s) Involved, if applicable   Not applicable     PATHOLOGIC STAGE CLASSIFICATION (pTNM, AJCC 8th Edition)   PN1B pT Category    pT2: Tumor invades the muscularis propria   pN Category   pN1b: Two or three regional lymph nodes are positive     Patient Active Problem List   Diagnosis    Personal history of tobacco use    History of alcoholism ([facility] [address]    Weight loss    Bradycardia    Heartburn    Varicose veins of both lower extremities    Family history of cancer    Adenocarcinoma, colon ([facility]    Cancer of transverse colon ([facility] [address] [facility]    S/P right colectomy     Assessment:     1. Status post robotic extended right colectomy  2. Invasive moderately differentiated colonic adenocarcinoma    Plan:     1. Abdomen benign. Incisions are healing well without signs of infection. Continue wound care as directed. 2. Continue to monitor weight. Encouraged at least one protein supplement a day. 3. Oncology appointment next week. Pathology discussed in the hospital and questions answered. No real questions today. We did discuss mediport placement again in detail. Patient will proceed with port if needed. 4. Appetite fair. Tolerating diet without nausea/vomiting. Continue regular diet as tolerated. 5. Bowel function doing well. Stop Colace for now. 6. Wear abdominal binder for comfort. Off and on as needed throughout the day. 7. Off narcotics. Tylenol and/or Motrin as needed for discomfort. 8. Lifting/activity restrictions discussed with patient. Questions answered. Off work 6-8 weeks pending on chemotherapy. 9. Follow up in 2-3 weeks. Signs and symptoms reviewed with patient that would be concerning and need him to return to office for re-evaluation. Patient states he will call if he has questions or concerns.   10. Follow up with PCP as directed      Electronically signed by LUCY Herrera CNP on 9/28/2021 at 3:20 PM

## 2021-09-27 NOTE — PROGRESS NOTES
CLINICAL PHARMACY NOTE: MEDS TO BEDS    Total # of Prescriptions Filled: 2   The following medications were delivered to the patient:  Oxycodone/Apap 5-325  Ondansetron 4 mg ODT    Additional Documentation:

## 2021-09-28 ASSESSMENT — ENCOUNTER SYMPTOMS
RHINORRHEA: 0
SHORTNESS OF BREATH: 0
CHOKING: 0
BLOOD IN STOOL: 0
SINUS PRESSURE: 0
BACK PAIN: 0
CHEST TIGHTNESS: 0
WHEEZING: 0
EYE PAIN: 0
ABDOMINAL DISTENTION: 0
ALLERGIC/IMMUNOLOGIC NEGATIVE: 1
VOMITING: 0
NAUSEA: 0
ANAL BLEEDING: 0
ABDOMINAL PAIN: 1
COUGH: 0
EYE DISCHARGE: 0
COLOR CHANGE: 0
DIARRHEA: 0
SORE THROAT: 0
CONSTIPATION: 0
EYE ITCHING: 0
PHOTOPHOBIA: 0
APNEA: 0

## 2021-09-30 ENCOUNTER — NURSE ONLY (OUTPATIENT)
Dept: LAB | Age: 51
End: 2021-09-30

## 2021-10-18 ENCOUNTER — OFFICE VISIT (OUTPATIENT)
Dept: SURGERY | Age: 51
End: 2021-10-18

## 2021-10-18 VITALS
DIASTOLIC BLOOD PRESSURE: 77 MMHG | WEIGHT: 146.8 LBS | HEIGHT: 72 IN | BODY MASS INDEX: 19.88 KG/M2 | SYSTOLIC BLOOD PRESSURE: 128 MMHG | OXYGEN SATURATION: 99 % | TEMPERATURE: 97.8 F | HEART RATE: 69 BPM | RESPIRATION RATE: 14 BRPM

## 2021-10-18 DIAGNOSIS — Z90.49 S/P RIGHT COLECTOMY: Primary | ICD-10-CM

## 2021-10-18 PROCEDURE — 99024 POSTOP FOLLOW-UP VISIT: CPT | Performed by: NURSE PRACTITIONER

## 2021-10-18 ASSESSMENT — ENCOUNTER SYMPTOMS
ANAL BLEEDING: 0
ALLERGIC/IMMUNOLOGIC NEGATIVE: 1
SORE THROAT: 0
BLOOD IN STOOL: 0
SINUS PRESSURE: 0
DIARRHEA: 0
EYE PAIN: 0
CHEST TIGHTNESS: 0
COUGH: 0
BACK PAIN: 0
RHINORRHEA: 0
PHOTOPHOBIA: 0
COLOR CHANGE: 0
ABDOMINAL DISTENTION: 0
CHOKING: 0
EYE DISCHARGE: 0
NAUSEA: 0
WHEEZING: 0
EYE ITCHING: 0
CONSTIPATION: 0
APNEA: 0
SHORTNESS OF BREATH: 0
ABDOMINAL PAIN: 0
VOMITING: 0

## 2021-10-18 NOTE — PROGRESS NOTES
[address] [address]  Dept: [phone]  Dept Fax: [phone]  Loc: [phone]    Visit Date: 10/18/2021    Ruth Wiley is a 45 y.o. male who presents today for:  Chief Complaint   Patient presents with    Post-Op Check     3 week f/u--s/p Robotic extended right colectomy 9/16/21-appt with Dr. Madi Ulloa r/s to 11/1       HPI:     HPI    Karen Santos is a 52-year old male patient who presents for follow up status post robotic extended right colectomy 4 weeks ago Dr. Nicho Kiser. Pathology demonstrated invasive moderately differentiated colonic adenocarcinoma, pT2 with 2/17 lymph nodes positive. Was supposed to have an appointment with Dr. Madi Ulloa on 10/4 but got rescheduled until 11/1. Patient states he is going to go over to their office today to see if they have any cancellations. Patient presents alone. He is doing overall pretty well. Off narcotics. Still taking Ibuprofen for pain relief if needed. Abdominal pain is improving. Abdominal incisions are healing. Robotic sites are clean, dry and intact without signs of infection. Appetite is slowly improving. No nausea or vomiting. Bowel function still doing well. Off all stool softeners now. Biggest complaint is acid reflux. States he saw GI and he was given a PPI to take BID. States even with that some days he will have flare ups. He is taking Zofran at times for the acid reflux which does help. No melena or hematochezia. No fevers or chills. No urinary complaints. No SOB or chest pain. No lightheadedness or dizziness. No calf pain or swelling. Fatigue is improving. States he wants to start working out.      Past Medical History:   Diagnosis Date    Anemia     Colon cancer ([facility] [address] 09/2021      Past Surgical History:   Procedure Laterality Date    COLONOSCOPY  09/01/2021    Dr. Missael Rosales Right 9/16/2021    ROBOT EXTENDED RIGHT COLECTOMY performed by Andres Iyer MD at [address]     [address] [facility] with mesh inguinal       Family History   Problem Relation Age of Onset   [facility] Other Mother         Brain aneurysm    COPD Mother     Stroke Mother     Cancer Father         lung mets brain and bone    Stroke Father     Cancer Maternal Grandmother         colon    High Blood Pressure Maternal Grandmother     Cancer Maternal Grandfather         colon     High Blood Pressure Maternal Grandfather     Kidney Disease Paternal Grandmother     Cancer Paternal Grandmother         breast     Diabetes Paternal Grandmother     High Blood Pressure Paternal Grandmother     Cancer Paternal Grandfather         lung-52    Heart Attack Paternal Grandfather     High Blood Pressure Paternal Grandfather     Cancer Paternal Aunt         colon    Heart Attack Paternal Aunt     Cancer Paternal Uncle         lung    Cancer Maternal Aunt         colon    Cancer Maternal Uncle         colon       Social History     Tobacco Use    Smoking status: Former Smoker     Packs/day: 1.00     Years: 35.00     Pack years: 35.00     Types: Cigarettes     Quit date: 2020     Years since quittin.7    Smokeless tobacco: Never Used   Substance Use Topics    Alcohol use: Not Currently      Current Outpatient Medications   Medication Sig Dispense Refill    ibuprofen (ADVIL;MOTRIN) 200 MG tablet Take 200 mg by mouth every 6 hours as needed for Pain      ferrous sulfate (IRON 325) 325 (65 Fe) MG tablet Take 1 tablet by mouth daily (with breakfast) 90 tablet 1    omeprazole (PRILOSEC OTC) 20 MG tablet Take 20 mg by mouth daily       No current facility-administered medications for this visit. No Known Allergies    Subjective:     Review of Systems   Constitutional: Positive for activity change and appetite change. Negative for chills, diaphoresis, fatigue, fever and unexpected weight change.    HENT: Negative for is normal. No tachypnea, bradypnea, accessory muscle usage or respiratory distress. Breath sounds: Normal breath sounds. No decreased breath sounds, wheezing or rales. Chest:      Chest wall: No tenderness. Abdominal:      General: Bowel sounds are normal. There is no distension. Palpations: Abdomen is soft. There is no mass. Tenderness: There is no abdominal tenderness. Musculoskeletal:         General: No tenderness. Normal range of motion. Cervical back: Normal range of motion and neck supple. Skin:     General: Skin is warm and dry. Findings: No abrasion, bruising, burn, ecchymosis, erythema, laceration, lesion or rash. Neurological:      Mental Status: He is alert and oriented to person, place, and time. Motor: No tremor, atrophy or abnormal muscle tone. Coordination: Coordination normal.      Gait: Gait normal.      Deep Tendon Reflexes: Reflexes are normal and symmetric. Psychiatric:         Speech: Speech normal.         Behavior: Behavior normal.         Thought Content: Thought content normal.        PATHOLOGY REPORT     Clinical Information: TRANSVERSE COLON CANCER     FINAL DIAGNOSIS:   A.  Right colon mass, hemicolectomy:    Invasive moderately differentiated colonic adenocarcinoma, pT2.    Margins are free of neoplasia.           Pericolonic lymph nodes (17): 2 out of 17 are positive for   metastatic colonic adenocarcinoma.             Appendix-no pathologic abnormality. Jesse Presto base lymph nodes (2), resection:             Negative for malignancy (0/2).      pT2 pN1b         Specimen:   A) RIGHT COLON   B) LYMPH NODE(S), ILEOCOLIC BASE       Gross Examination:   A - The container is labeled Graciela Au, right colon.  Received   in formalin is a segmental resection of bowel measuring about 18 cm in   length including 2 cm of terminal ileum.  There is an appendix   measuring 3.5 cm in length x 0.6 cm in diameter.  The appendix is All margins negative for invasive carcinoma     REGIONAL LYMPH NODES     Regional Lymph Node Status   Regional lymph nodes present     Tumor present in regional lymph node(s)      Number of Lymph Nodes with Tumor      Exact number (specify): 2      Number of Lymph Nodes Examined     Exact number (specify): 19 (Part A and Part B)     Tumor Deposits   Not identified     DISTANT METASTASIS   Distant Site(s) Involved, if applicable   Not applicable     PATHOLOGIC STAGE CLASSIFICATION (pTNM, AJCC 8th Edition)   PN1B pT Category    pT2: Tumor invades the muscularis propria   pN Category   pN1b: Two or three regional lymph nodes are positive     Patient Active Problem List   Diagnosis    Personal history of tobacco use    History of alcoholism ([facility] [address]    Weight loss    Bradycardia    Heartburn    Varicose veins of both lower extremities    Family history of cancer    Adenocarcinoma, colon ([facility] [address] [facility]    Cancer of transverse colon ([facility] [address]    S/P right colectomy     Assessment:     1. Status post robotic extended right colectomy  2. Invasive moderately differentiated colonic adenocarcinoma  3. GERD    Plan:     1. Abdomen benign. Incisions are healing well without signs of infection. Continue wound care as directed. 2. Keep scheduled oncology appointment for 11/1. Discussed mediport placement again. Patient would like to proceed when chemotherapy as soon as possible. 3. Appetite doing better. Continue diet as tolerated. High protein supplements encouraged. 4. Bowel function doing well. Off stool softeners for now. 5. Off narcotics. Tylenol and/or Motrin as needed for discomfort. 6. Lifting/activity restrictions discussed with patient. Questions answered. Off work for 8-12 weeks due lifting restrictions and pending chemotherapy initiation. 7. Follow up in 2-3 pending oncology appointment. Signs and symptoms reviewed with patient that would be concerning and need him to return to office for re-evaluation.   Patient states he will call if he has questions or concerns. 8. Follow up with GI as directed.  Continue PPI and call office if GERD symptoms continue to persist.      Electronically signed by LUCY Avendano CNP on 10/18/2021 at 3:52 PM

## 2021-10-21 ENCOUNTER — APPOINTMENT (OUTPATIENT)
Dept: CT IMAGING | Age: 51
End: 2021-10-21
Payer: COMMERCIAL

## 2021-10-21 ENCOUNTER — APPOINTMENT (OUTPATIENT)
Dept: GENERAL RADIOLOGY | Age: 51
End: 2021-10-21
Payer: COMMERCIAL

## 2021-10-21 ENCOUNTER — HOSPITAL ENCOUNTER (EMERGENCY)
Age: 51
Discharge: HOME | End: 2021-10-21
Payer: COMMERCIAL

## 2021-10-21 VITALS
HEART RATE: 72 BPM | DIASTOLIC BLOOD PRESSURE: 97 MMHG | WEIGHT: 147 LBS | BODY MASS INDEX: 19.91 KG/M2 | HEIGHT: 72 IN | TEMPERATURE: 98.3 F | OXYGEN SATURATION: 98 % | SYSTOLIC BLOOD PRESSURE: 144 MMHG | RESPIRATION RATE: 13 BRPM

## 2021-10-21 DIAGNOSIS — R10.84 COLICKY ABDOMINAL PAIN: Primary | ICD-10-CM

## 2021-10-21 DIAGNOSIS — K59.00 CONSTIPATION, UNSPECIFIED CONSTIPATION TYPE: ICD-10-CM

## 2021-10-21 LAB
ALBUMIN SERPL-MCNC: 4.2 G/DL (ref 3.5–5.1)
ALP BLD-CCNC: 128 U/L (ref 38–126)
ALT SERPL-CCNC: 59 U/L (ref 11–66)
AMYLASE: 56 U/L (ref 20–104)
ANION GAP SERPL CALCULATED.3IONS-SCNC: 11 MEQ/L (ref 8–16)
AST SERPL-CCNC: 91 U/L (ref 5–40)
BASOPHILS # BLD: 0.7 %
BASOPHILS ABSOLUTE: 0.1 THOU/MM3 (ref 0–0.1)
BILIRUB SERPL-MCNC: 0.4 MG/DL (ref 0.3–1.2)
BILIRUBIN DIRECT: < 0.2 MG/DL (ref 0–0.3)
BUN BLDV-MCNC: 14 MG/DL (ref 7–22)
CALCIUM SERPL-MCNC: 9.6 MG/DL (ref 8.5–10.5)
CHLORIDE BLD-SCNC: 105 MEQ/L (ref 98–111)
CO2: 26 MEQ/L (ref 23–33)
CREAT SERPL-MCNC: 0.9 MG/DL (ref 0.4–1.2)
EKG ATRIAL RATE: 70 BPM
EKG P AXIS: 79 DEGREES
EKG P-R INTERVAL: 154 MS
EKG Q-T INTERVAL: 380 MS
EKG QRS DURATION: 80 MS
EKG QTC CALCULATION (BAZETT): 410 MS
EKG R AXIS: 31 DEGREES
EKG T AXIS: 54 DEGREES
EKG VENTRICULAR RATE: 70 BPM
EOSINOPHIL # BLD: 2.9 %
EOSINOPHILS ABSOLUTE: 0.2 THOU/MM3 (ref 0–0.4)
ERYTHROCYTE [DISTWIDTH] IN BLOOD BY AUTOMATED COUNT: 17.8 % (ref 11.5–14.5)
ERYTHROCYTE [DISTWIDTH] IN BLOOD BY AUTOMATED COUNT: 50.7 FL (ref 35–45)
GFR SERPL CREATININE-BSD FRML MDRD: 89 ML/MIN/1.73M2
GLUCOSE BLD-MCNC: 108 MG/DL (ref 70–108)
HCT VFR BLD CALC: 39.6 % (ref 42–52)
HEMOGLOBIN: 12.6 GM/DL (ref 14–18)
IMMATURE GRANS (ABS): 0.03 THOU/MM3 (ref 0–0.07)
IMMATURE GRANULOCYTES: 0.4 %
LIPASE: 67.2 U/L (ref 5.6–51.3)
LYMPHOCYTES # BLD: 23 %
LYMPHOCYTES ABSOLUTE: 1.7 THOU/MM3 (ref 1–4.8)
MCH RBC QN AUTO: 25.4 PG (ref 26–33)
MCHC RBC AUTO-ENTMCNC: 31.8 GM/DL (ref 32.2–35.5)
MCV RBC AUTO: 79.8 FL (ref 80–94)
MONOCYTES # BLD: 10.7 %
MONOCYTES ABSOLUTE: 0.8 THOU/MM3 (ref 0.4–1.3)
NUCLEATED RED BLOOD CELLS: 0 /100 WBC
OSMOLALITY CALCULATION: 284.1 MOSMOL/KG (ref 275–300)
PLATELET # BLD: 190 THOU/MM3 (ref 130–400)
PMV BLD AUTO: 10 FL (ref 9.4–12.4)
POTASSIUM REFLEX MAGNESIUM: 3.9 MEQ/L (ref 3.5–5.2)
RBC # BLD: 4.96 MILL/MM3 (ref 4.7–6.1)
SEG NEUTROPHILS: 62.3 %
SEGMENTED NEUTROPHILS ABSOLUTE COUNT: 4.5 THOU/MM3 (ref 1.8–7.7)
SODIUM BLD-SCNC: 142 MEQ/L (ref 135–145)
TOTAL PROTEIN: 6.9 G/DL (ref 6.1–8)
TROPONIN T: < 0.01 NG/ML
WBC # BLD: 7.3 THOU/MM3 (ref 4.8–10.8)

## 2021-10-21 PROCEDURE — 93005 ELECTROCARDIOGRAM TRACING: CPT | Performed by: NURSE PRACTITIONER

## 2021-10-21 PROCEDURE — 83690 ASSAY OF LIPASE: CPT

## 2021-10-21 PROCEDURE — 6360000004 HC RX CONTRAST MEDICATION: Performed by: NURSE PRACTITIONER

## 2021-10-21 PROCEDURE — 80053 COMPREHEN METABOLIC PANEL: CPT

## 2021-10-21 PROCEDURE — 99285 EMERGENCY DEPT VISIT HI MDM: CPT

## 2021-10-21 PROCEDURE — 96374 THER/PROPH/DIAG INJ IV PUSH: CPT

## 2021-10-21 PROCEDURE — 84484 ASSAY OF TROPONIN QUANT: CPT

## 2021-10-21 PROCEDURE — 71046 X-RAY EXAM CHEST 2 VIEWS: CPT

## 2021-10-21 PROCEDURE — 85025 COMPLETE CBC W/AUTO DIFF WBC: CPT

## 2021-10-21 PROCEDURE — 93010 ELECTROCARDIOGRAM REPORT: CPT | Performed by: NUCLEAR MEDICINE

## 2021-10-21 PROCEDURE — 74177 CT ABD & PELVIS W/CONTRAST: CPT

## 2021-10-21 PROCEDURE — 82150 ASSAY OF AMYLASE: CPT

## 2021-10-21 PROCEDURE — 36415 COLL VENOUS BLD VENIPUNCTURE: CPT

## 2021-10-21 PROCEDURE — 6360000002 HC RX W HCPCS: Performed by: NURSE PRACTITIONER

## 2021-10-21 RX ORDER — MORPHINE SULFATE 4 MG/ML
4 INJECTION, SOLUTION INTRAMUSCULAR; INTRAVENOUS ONCE
Status: COMPLETED | OUTPATIENT
Start: 2021-10-21 | End: 2021-10-21

## 2021-10-21 RX ADMIN — IOPAMIDOL 80 ML: 755 INJECTION, SOLUTION INTRAVENOUS at 09:10

## 2021-10-21 RX ADMIN — MORPHINE SULFATE 4 MG: 4 INJECTION, SOLUTION INTRAMUSCULAR; INTRAVENOUS at 08:21

## 2021-10-21 ASSESSMENT — PAIN DESCRIPTION - LOCATION
LOCATION: ABDOMEN;CHEST
LOCATION: ABDOMEN;CHEST

## 2021-10-21 ASSESSMENT — ENCOUNTER SYMPTOMS
VOICE CHANGE: 0
RHINORRHEA: 0
WHEEZING: 0
ABDOMINAL DISTENTION: 0
ABDOMINAL PAIN: 1
CONSTIPATION: 0
DIARRHEA: 0
BACK PAIN: 0
SHORTNESS OF BREATH: 0
SINUS PRESSURE: 0
CHEST TIGHTNESS: 1
COLOR CHANGE: 0
NAUSEA: 1
EYE REDNESS: 0
COUGH: 0
VOMITING: 0
PHOTOPHOBIA: 0
SORE THROAT: 0
BLOOD IN STOOL: 0

## 2021-10-21 ASSESSMENT — PAIN SCALES - GENERAL
PAINLEVEL_OUTOF10: 4
PAINLEVEL_OUTOF10: 3
PAINLEVEL_OUTOF10: 3
PAINLEVEL_OUTOF10: 4
PAINLEVEL_OUTOF10: 7

## 2021-10-21 ASSESSMENT — PAIN DESCRIPTION - ORIENTATION: ORIENTATION: RIGHT

## 2021-10-21 ASSESSMENT — PAIN DESCRIPTION - PAIN TYPE: TYPE: ACUTE PAIN

## 2021-10-21 NOTE — ED TRIAGE NOTES
Patient arrives via EMS from home with complaints of right sided chest pain that started this AM and woke him up out of sleep. Patient had partial colectomy last month. Patient states he thinks he still has gallbladder. Admits to being prescribed oxycodones but \"doesn't like taking the things\" so only took ibuprofen. Also took zofran pta.

## 2021-10-21 NOTE — ED PROVIDER NOTES
[facility] Emergency Department    CHIEF COMPLAINT       Chief Complaint   Patient presents with    Chest Pain    Abdominal Pain       Nurses Notes reviewed and I agree except as noted in the HPI. HISTORY OF PRESENT ILLNESS    Shruthi Sands emmanuel 45 y.o. male 5 wk s/p R colectomy for adenocarcinoma who presents to the ED for evaluation of Epigastric pain that radiates to his back. Pt woke up last night with 10/10 pain and nausea. Took 800 mg Ibuprofen at home and pain is now improving. Last BM was last night, denies diarrhea, constipation and hematochezia. Denies fever, vomiting, SOB, cough. Patient has significant history of ascending colon cancer, history of colectomy. He has been eating increased meals to try to gain weight before chemotherapy. He denies nausea or vomiting. Denies fevers. Denies any change in urine output. Pt is 20 wks sober attending AA. HPI was provided by the patient. REVIEW OF SYSTEMS     Review of Systems   Constitutional: Positive for fatigue and unexpected weight change. Negative for appetite change, chills, diaphoresis and fever. HENT: Negative for congestion, hearing loss, postnasal drip, rhinorrhea, sinus pressure, sore throat and voice change. Eyes: Negative for photophobia, redness and visual disturbance. Respiratory: Positive for chest tightness. Negative for cough, shortness of breath and wheezing. Cardiovascular: Negative for chest pain and palpitations. Gastrointestinal: Positive for abdominal pain and nausea. Negative for abdominal distention, blood in stool, constipation, diarrhea and vomiting. Endocrine: Negative for cold intolerance, heat intolerance, polydipsia, polyphagia and polyuria. Genitourinary: Negative for decreased urine volume, difficulty urinating, dysuria, flank pain, frequency and hematuria. Musculoskeletal: Negative for arthralgias, back pain, gait problem, joint swelling, neck pain and neck stiffness.    Skin: Negative for color change and rash. Allergic/Immunologic: Negative for immunocompromised state. Neurological: Negative for dizziness, tremors, weakness, light-headedness, numbness and headaches. Hematological: Does not bruise/bleed easily. Psychiatric/Behavioral: Negative for behavioral problems, confusion, decreased concentration, hallucinations, self-injury and suicidal ideas. The patient is not nervous/anxious. PAST MEDICAL HISTORY     Past Medical History:   Diagnosis Date    Anemia     Colon cancer ([facility] [address] 2021       SURGICALHISTORY      has a past surgical history that includes Inguinal hernia repair (Left, ); Colonoscopy (2021); and hemicolectomy (Right, 2021). CURRENT MEDICATIONS       Discharge Medication List as of 10/21/2021 10:34 AM      CONTINUE these medications which have NOT CHANGED    Details   ibuprofen (ADVIL;MOTRIN) 200 MG tablet Take 200 mg by mouth every 6 hours as needed for PainHistorical Med      ferrous sulfate (IRON 325) 325 (65 Fe) MG tablet Take 1 tablet by mouth daily (with breakfast), Disp-90 tablet, R-1Normal      omeprazole (PRILOSEC OTC) 20 MG tablet Take 20 mg by mouth dailyHistorical Med             ALLERGIES     has No Known Allergies. FAMILY HISTORY     He indicated that his mother is alive. He indicated that his father is . He indicated that his maternal grandmother is . He indicated that his maternal grandfather is . He indicated that his paternal grandmother is . He indicated that his paternal grandfather is . He indicated that his maternal aunt is . He indicated that his maternal uncle is . He indicated that his paternal aunt is . He indicated that his paternal uncle is .    family history includes COPD in his mother; Cancer in his father, maternal aunt, maternal grandfather, maternal grandmother, maternal uncle, paternal aunt, paternal grandfather, paternal grandmother, and paternal uncle; Diabetes in his paternal grandmother; Heart Attack in his paternal aunt and paternal grandfather; High Blood Pressure in his maternal grandfather, maternal grandmother, paternal grandfather, and paternal grandmother; Kidney Disease in his paternal grandmother; Other in his mother; Stroke in his father and mother. SOCIAL HISTORY       Social History     Socioeconomic History    Marital status: Single     Spouse name: Not on file    Number of children: Not on file    Years of education: Not on file    Highest education level: Not on file   Occupational History    Not on file   Tobacco Use    Smoking status: Former Smoker     Packs/day: 1.00     Years: 35.00     Pack years: 35.00     Types: Cigarettes     Quit date: 2020     Years since quittin.7    Smokeless tobacco: Never Used   Vaping Use    Vaping Use: Never used   Substance and Sexual Activity    Alcohol use: Not Currently    Drug use: No    Sexual activity: Not on file   Other Topics Concern    Not on file   Social History Narrative    Not on file     Social Determinants of Health     Financial Resource Strain:     Difficulty of Paying Living Expenses:    Food Insecurity:     Worried About Running Out of Food in the Last Year:     Ran Out of Food in the Last Year:    Transportation Needs:     Lack of Transportation (Medical):      Lack of Transportation (Non-Medical):    Physical Activity:     Days of Exercise per Week:     Minutes of Exercise per Session:    Stress:     Feeling of Stress :    Social Connections:     Frequency of Communication with Friends and Family:     Frequency of Social Gatherings with Friends and Family:     Attends [employer] Services:     Active Member of Clubs or Organizations:     Attends Club or Organization Meetings:     Marital Status:    Intimate Partner Violence:     Fear of Current or Ex-Partner:     Emotionally Abused:     Physically Abused:     Sexually Abused: PHYSICAL EXAM     INITIAL VITALS:  height is 6' (1.829 m) and weight is 147 lb (66.7 kg). His oral temperature is 98.3 °F (36.8 °C). His blood pressure is 144/97 (abnormal) and his pulse is 72. His respiration is 13 and oxygen saturation is 98%. Physical Exam  Vitals and nursing note reviewed. Constitutional:       General: He is not in acute distress. Appearance: He is well-developed. He is not ill-appearing, toxic-appearing or diaphoretic. Eyes:      General: No scleral icterus. Cardiovascular:      Rate and Rhythm: Normal rate and regular rhythm. Heart sounds: Normal heart sounds. No murmur heard. No friction rub. No gallop. Comments: Dorsalis Pedis 2+  Posterior tibialis 2+  Pulmonary:      Effort: Pulmonary effort is normal. No respiratory distress. Breath sounds: Normal breath sounds. No stridor. No wheezing, rhonchi or rales. Chest:      Chest wall: No tenderness. Abdominal:      General: Abdomen is flat. Bowel sounds are normal. There is no distension. There are no signs of injury. Palpations: Abdomen is rigid. There is no shifting dullness, fluid wave, splenomegaly, mass or pulsatile mass. Tenderness: There is abdominal tenderness in the right upper quadrant and epigastric area. There is guarding. There is no right CVA tenderness, left CVA tenderness or rebound. Positive signs include Corea's sign. Negative signs include Rovsing's sign, McBurney's sign, psoas sign and obturator sign. Skin:     General: Skin is warm and dry. Capillary Refill: Capillary refill takes less than 2 seconds. Coloration: Skin is not cyanotic, jaundiced, mottled or pale. Findings: No erythema or rash. Neurological:      Mental Status: He is alert and oriented to person, place, and time. Psychiatric:         Mood and Affect: Mood normal. Mood is not anxious or depressed.          Behavior: Behavior normal.         DIFFERENTIAL DIAGNOSIS:   Cholecystitis, gallstones, bowel obstruction, bowel rupture,    DIAGNOSTIC RESULTS     EKG: All EKG's are interpreted by the Emergency Department Physician who eithersigns or Co-signs this chart in the absence of a cardiologist.    EKG read and interpreted by myself with comparison to 9/16/2021 gives impression of normal sinus rhythm with heart rate of 70; interval 154; QRS 80;QTc 410; axis P79, R31, T54. RADIOLOGY: non-plainfilm images(s) such as CT, Ultrasound and MRI are read by the radiologist.  Plain radiographic images are visualized and preliminarily interpreted by the emergency physician unless otherwise stated below. CT ABDOMEN PELVIS W IV CONTRAST Additional Contrast? None   Final Result   1. Postoperative changes related to right hemicolectomy appear intact. There may be intussusception of the small bowel within the colon at the site of the surgical anastomosis (series 2, image 45) however there is no associated bowel obstruction, free    fluid, fluid collection, or free air identified. Moderate retained fecal material is seen throughout the colon suggesting fecal stasis. Assessment for bowel wall thickening is limited without oral contrast.      2. 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 No radiopaque gallstones or biliary ductal dilatation identified. Correlate with liver function tests. Chronic findings are discussed. **This report has been created using voice recognition software. It may contain minor errors which are inherent in voice recognition technology. **      Final report electronically signed by Dr Luis Armando Solano on 10/21/2021 10:02 AM      XR CHEST (2 VW)   Final Result   1. Normal heart size. Granulomatous calcification in the right hilum. Suggestion of a small granuloma left upper lobe adjacent to the left first rib. 2. Minimal atelectatic/fibrotic stranding in one of the posterior costophrenic angles, seen only on the lateral view   3. Study otherwise unremarkable.  No infiltrates or effusions are seen. **This report has been created using voice recognition software. It may contain minor errors which are inherent in voice recognition technology. **      Final report electronically signed by Dr. Rush Merino on 10/21/2021 8:40 AM            LABS:   Labs Reviewed   CBC WITH AUTO DIFFERENTIAL - Abnormal; Notable for the following components:       Result Value    Hemoglobin 12.6 (*)     Hematocrit 39.6 (*)     MCV 79.8 (*)     MCH 25.4 (*)     MCHC 31.8 (*)     RDW-CV 17.8 (*)     RDW-SD 50.7 (*)     All other components within normal limits   COMPREHENSIVE METABOLIC PANEL W/ REFLEX TO MG FOR LOW K - Abnormal; Notable for the following components:    AST 91 (*)     Alkaline Phosphatase 128 (*)     All other components within normal limits   LIPASE - Abnormal; Notable for the following components:    Lipase 67.2 (*)     All other components within normal limits   GLOMERULAR FILTRATION RATE, ESTIMATED - Abnormal; Notable for the following components:    Est, Glom Filt Rate 89 (*)     All other components within normal limits   TROPONIN   HEPATIC FUNCTION PANEL   AMYLASE   ANION GAP   OSMOLALITY       EMERGENCY DEPARTMENT COURSE:   Vitals:    Vitals:    10/21/21 0734 10/21/21 0807 10/21/21 0839 10/21/21 0938   BP: (!) 162/113 (!) 147/101 (!) 148/89 (!) 144/97   Pulse: 75 70 75 72   Resp: 12 13 14 13   Temp: 98.3 °F (36.8 °C)      TempSrc: Oral      SpO2: 99% 97% 96% 98%   Weight: 147 lb (66.7 kg)      Height: 6' (1.829 m)          MDM  Patient was seen and evaluated in the emergency department, patient appeared to be in no acute distress, vital signs were reviewed, hypertension was noted. Physical exam was completed, right upper quadrant tenderness was noted. Abdomen was soft, bowel sounds are active. Labs were obtained, no significant findings noted. CT scan was obtained, there was postoperative changes to the right hemicolectomy.   Possible intussusception but no small bowel obstruction noted, probable gallbladder adenomyomatosis. No distended gallbladder or biliary duct. Patient was reassessed, symptoms appear to be improved. Discussed my findings my plan of care with patient he is amenable with discharge. He is noted to have a large amount of stool throughout his intestines, he is advised to begin taking MiraLAX again. Advised to follow-up with his surgeon or his gastroenterologist for further testing if needed. Advised to return the emergency department new or worsening signs and symptoms. While here in the emergency department patient maintained stable course and appropriate for discharge. Medications   morphine injection 4 mg (4 mg IntraVENous Given 10/21/21 0821)   iopamidol (ISOVUE-370) 76 % injection 80 mL (80 mLs IntraVENous Given 10/21/21 0910)       Patient was seenindependently by myself. The patient's final impression and disposition and plan was determined by myself. CRITICAL CARE:   None    CONSULTS:  None    PROCEDURES:  None    FINAL IMPRESSION     1. Colicky abdominal pain    2. Constipation, unspecified constipation type          DISPOSITION/PLAN     Patient discharged in stable condition  PATIENT REFERREDTO:  Ritika Rojas MD  [facility] [place] [address]  [phone]    Call   For follow up and evaluation, If symptoms worsen      DISCHARGE MEDICATIONS:  Discharge Medication List as of 10/21/2021 10:34 AM          (Please note that portions of this note were completed with a voice recognition program.  Efforts were made to edit the dictations but occasionally words are mis-transcribed.)    Provider:  I personally performed the services described in the documentation,reviewed and edited the documentation which was dictated to the scribe in my presence, and it accurately records my words and actions.     Dada Buck CNP 10/21/21 11:16 AM    Kateryna Buck APRN - AIDE          [employer], APRHORACIO - CNP  10/21/21 [id number]

## 2021-10-21 NOTE — ED NOTES
Bed: 022A  Expected date: 10/21/21  Expected time: 7:23 AM  Means of arrival: [facility]  Comments:     Greer Brittle, RN  10/21/21 0174

## 2021-10-21 NOTE — ED NOTES
Pain is decreased, refusing Dilaudid at this time. Patient has call light within reach and notified to notify this RN if he has increased pain.      Blanche Oconnell RN  10/21/21 4516

## 2021-11-01 ENCOUNTER — OFFICE VISIT (OUTPATIENT)
Dept: ONCOLOGY | Age: 51
End: 2021-11-01
Payer: COMMERCIAL

## 2021-11-01 ENCOUNTER — HOSPITAL ENCOUNTER (OUTPATIENT)
Dept: INFUSION THERAPY | Age: 51
Discharge: HOME | End: 2021-11-01
Payer: COMMERCIAL

## 2021-11-01 VITALS
DIASTOLIC BLOOD PRESSURE: 76 MMHG | HEART RATE: 56 BPM | OXYGEN SATURATION: 100 % | HEIGHT: 72 IN | TEMPERATURE: 98.5 F | BODY MASS INDEX: 20.51 KG/M2 | RESPIRATION RATE: 18 BRPM | SYSTOLIC BLOOD PRESSURE: 131 MMHG | WEIGHT: 151.4 LBS

## 2021-11-01 DIAGNOSIS — C18.9 ADENOCARCINOMA, COLON (HCC): ICD-10-CM

## 2021-11-01 DIAGNOSIS — C18.9 MALIGNANT NEOPLASM OF COLON, UNSPECIFIED PART OF COLON (HCC): Primary | ICD-10-CM

## 2021-11-01 DIAGNOSIS — Z90.49 S/P RIGHT COLECTOMY: ICD-10-CM

## 2021-11-01 PROCEDURE — 99205 OFFICE O/P NEW HI 60 MIN: CPT | Performed by: INTERNAL MEDICINE

## 2021-11-01 PROCEDURE — 99211 OFF/OP EST MAY X REQ PHY/QHP: CPT

## 2021-11-01 RX ORDER — SUCRALFATE 1 G/1
TABLET ORAL
COMMUNITY
Start: 2021-09-30 | End: 2022-06-01 | Stop reason: ALTCHOICE

## 2021-11-01 RX ORDER — ONDANSETRON 4 MG/1
TABLET, ORALLY DISINTEGRATING ORAL
COMMUNITY
Start: 2021-09-20 | End: 2022-03-23 | Stop reason: SDUPTHER

## 2021-11-01 RX ORDER — CAPECITABINE 500 MG/1
1100 TABLET, FILM COATED ORAL 2 TIMES DAILY
Qty: 112 TABLET | Refills: 2 | Status: SHIPPED | OUTPATIENT
Start: 2021-11-01 | End: 2021-12-29

## 2021-11-01 NOTE — PATIENT INSTRUCTIONS
1.  He will return to see me, for labs: CBC, BMP, LFTs, CEA and to start treatment with XELOX on Tuesday, November 9, 2021  2. Chemo teaching before RTC  3. Port placement by the surgery before RTC    Patient Education        capecitabine  Pronunciation:  INO MOREAU ta been  Brand:  Xeloda  What is the most important information I should know about capecitabine? Taking a blood thinner (warfarin, Coumadin, Jantoven) can increase your risk of severe bleeding during and shortly after treatment with capecitabine. This risk is higher in adults older than 60. What is capecitabine? Capecitabine is used alone or in combination chemotherapy to treat colon cancer, breast cancer, or colorectal cancer. Capecitabine is sometimes used when cancer has spread to other parts of the body (metastatic). Capecitabine may also be used for purposes not listed in this medication guide. What should I discuss with my healthcare provider before taking capecitabine? You should not take this medicine if you are allergic to capecitabine or fluorouracil, or if you have:  · severe kidney disease. Tell your doctor if you have ever had:  · a metabolic disorder called DPD (dihydropyrimidine dehydrogenase) deficiency;  · liver or kidney disease;  · heart problems; or  · if you use a blood thinner and you have routine \"INR\" or prothrombin time tests. Capecitabine can harm an unborn baby if the mother or the father is using this medicine. · If you are a woman, you may need a pregnancy test to make sure you are not pregnant. Use birth control while using this medicine and for at least 6 months after your last dose. · If you are a man, use birth control if your sex partner is able to get pregnant. Keep using birth control for at least 3 months after your last dose. · Tell your doctor right away if a pregnancy occurs. Pregnancy may be less likely to occur while the mother or the father is using this medicine.  Both men and women should still use birth control to prevent pregnancy because the medicine can harm an unborn baby. Do not breastfeed while using this medicine,  and for at least 2 weeks after your last dose. How should I take capecitabine? Follow all directions on your prescription label and read all medication guides or instruction sheets. Use the medicine exactly as directed. Capecitabine is usually taken twice per day, and may be only part of a treatment program that may also include other medications taken on different schedules. Follow your doctor's dosing instructions very carefully. Take with food or within 30 minutes after eating a meal.  Swallow the tablet whole with water and do not crush, chew, or break it. Tell your doctor if you have trouble swallowing the tablet. Capecitabine is given in a 3-week treatment cycle, and you may only need to take the medicine only on certain days of this cycle. You may get dehydrated during prolonged illness. Call your doctor if you are sick with vomiting or diarrhea. You may need frequent medical tests and your cancer treatments may be delayed based on the results. Capecitabine can have long lasting effects on your body. You may also need medical tests for a short time after your last dose. Store at room temperature away from moisture and heat. Keep the bottle tightly closed when not in use. What happens if I miss a dose? Take the medicine as soon as you can, but skip the missed dose if it is almost time for your next dose. Do not take two doses at one time. What happens if I overdose? Seek emergency medical attention or call the Poison Help line at 1-224.493.9565. What should I avoid while taking capecitabine? Follow your doctor's instructions about any restrictions on food, beverages, or activity. What are the possible side effects of capecitabine?   Get emergency medical help if you have signs of an allergic reaction (hives, difficult breathing, swelling in your face or throat) or a severe skin reaction (fever, sore throat, burning eyes, skin pain, red or purple skin rash with blistering and peeling). Diarrhea may occur and could be severe. Stop taking capecitabine and tell your doctor right away if the number of bowel movements you usually have per day increases by 4 or more, or if you have bowel movements at night. Stop using capecitabine and call your doctor at once if you have:  · severe diarrhea;  · bloody diarrhea with severe stomach pain and fever;  · severe nausea or loss of appetite that causes you to eat much less than usual;  · vomiting (more than once in 24 hours);  · fever above 100.5 degrees;  · sores or ulcers in your mouth, redness or swelling of your mouth or tongue, trouble eating or swallowing;  · jaundice (yellowing of the skin or eyes);  · dehydration symptoms --feeling very thirsty or hot, being unable to urinate, heavy sweating, or hot and dry skin;  · \"hand and foot syndrome\" --pain, tenderness, redness, swelling, blistering, or peeling skin on your hands or feet;  · heart problems --chest pain, irregular heartbeats, swelling in your lower legs, rapid weight gain, feeling lightheaded or short of breath; or  · low blood cell counts --fever, chills, tiredness, mouth sores, skin sores, easy bruising, unusual bleeding, pale skin, cold hands and feet, feeling light-headed or short of breath. Your cancer treatments may be delayed or permanently discontinued if you have certain side effects. Common side effects may include:  · nausea, vomiting, diarrhea, stomach pain;  · feeling weak or tired;  · hand and foot syndrome; or  · jaundice. This is not a complete list of side effects and others may occur. Call your doctor for medical advice about side effects. You may report side effects to FDA at 4-000-FDA-8638. What other drugs will affect capecitabine?   Taking a blood thinner (warfarin, Coumadin, Jantoven) can increase your risk of severe bleeding during and shortly after treatment with capecitabine. This risk is higher in adults older than 60. Tell your doctor if you also take allopurinol. Other drugs may affect capecitabine, including prescription and over-the-counter medicines, vitamins, and herbal products. Tell your doctor about all other medicines you use. Where can I get more information? Your doctor or pharmacist can provide more information about capecitabine. Remember, keep this and all other medicines out of the reach of children, never share your medicines with others, and use this medication only for the indication prescribed. Every effort has been made to ensure that the information provided by [facility] is accurate, up-to-date, and complete, but no guarantee is made to that effect. Drug information contained herein may be time sensitive. [facility] information has been compiled for use by healthcare practitioners and consumers in the [place] and therefore [employer] does not warrant that uses outside of the [place] are appropriate, unless specifically indicated otherwise. [facility]'s drug information does not endorse drugs, diagnose patients or recommend therapy. [facility] drug information is an informational resource designed to assist licensed healthcare practitioners in caring for their patients and/or to serve consumers viewing this service as a supplement to, and not a substitute for, the expertise, skill, knowledge and judgment of healthcare practitioners. The absence of a warning for a given drug or drug combination in no way should be construed to indicate that the drug or drug combination is safe, effective or appropriate for any given patient. [facility] does not assume any responsibility for any aspect of healthcare administered with the aid of information [facility] provides.  The information contained herein is not intended to cover all possible uses, directions, precautions, warnings, drug interactions, allergic reactions, or adverse medicine and for at least 9 months after your last dose. · If you are a man, use effective birth control if your sex partner is able to get pregnant. Keep using birth control for at least 6 months after your last dose. · Tell your doctor right away if a pregnancy occurs while either the mother or the father is using oxaliplatin. This medicine may affect fertility (ability to have children) in both men and women. However, it is important to use birth control to prevent pregnancy because oxaliplatin can harm an unborn baby. Do not breastfeed while using this medicine,  and for at least 3 months after your last dose. How is oxaliplatin given? Oxaliplatin is given as an infusion into a vein. A healthcare provider will give you this injection. Oxaliplatin must be given slowly, and the infusion can take at least 2 hours to complete. Oxaliplatin is usually given once every 2 weeks. Your doctor will determine how long to treat you with this medicine. You may be given medication to prevent nausea or vomiting. Receiving oxaliplatin can make you more sensitive to cold, which can cause numbness, tingling, and muscle spasms. This includes exposure to cold temperature and coming into contact with cold objects. To prevent discomfort, follow these steps:  · do not inhale deeply when you are exposed to cold air;  · cover your skin, head, and face when you are outside in cold temperatures;  · wear gloves when handling cold objects or refrigerated foods;  · do not run an air conditioner at very cool temperature in your home or car (even during hot weather);  · do not drink cold drinks or use ice cubes in drinks;  · do not put ice packs on your body. Chemotherapy often causes nausea or mouth sores. Do not eat ice chips to ease these discomforts because you will be more sensitive to cold. Talk to your doctor about other ways to treat nausea or mouth sores.  You may be given other medications to prevent nausea or vomiting while you are receiving oxaliplatin. Oxaliplatin can lower your blood cell counts. Your blood will need to be tested often. Your cancer treatments may be delayed based on the results. Your heart function may need to be checked using an electrocardiograph or ECG (sometimes called an EKG). What happens if I miss a dose? Contact your doctor if you miss an appointment for your oxaliplatin injection. What happens if I overdose? Seek emergency medical attention or call the [employer] Help line at 1-740.251.7930. What should I avoid while receiving oxaliplatin? Avoid cold temperatures and cold objects, including ice, cold drinks, and skin exposure to cold temperatures. Avoid being near people who are sick or have infections. Tell your doctor at once if you develop signs of infection. This medicine may cause blurred vision and may impair your reactions. Avoid driving or hazardous activity until you know how this medicine will affect you. What are the possible side effects of oxaliplatin? Oxaliplatin can cause a severe or life-threatening allergic reaction. Some people receiving a oxaliplatin injection have had a reaction to the infusion within minutes after the medicine is injected into the vein. Tell your caregiver right away if you feel dizzy, short of breath, confused, sweaty, itchy, or have diarrhea, chest pain, warmth or redness in your face, or feel like you might pass out. Get emergency medical help if you have signs of an allergic reaction:  hives; difficult breathing; swelling of your face, lips, tongue, or throat.   Call your doctor at once if you have:  · increased sensitivity to cold temperatures and cold objects;  · numbness, tingling, or burning pain that interferes with daily activities;  · severe or ongoing diarrhea or vomiting;  · confusion, change in mental status, vision problems, seizure (convulsions);  · pain or burning when you urinate;  · sudden chest pain or discomfort, wheezing, dry cough, feeling short of breath;  · pain, redness, swelling, or skin changes where the injection was given;  · dehydration symptoms --feeling very thirsty or hot, being unable to urinate, heavy sweating, or hot and dry skin;  · heart problems --headache with chest pain and severe dizziness, fainting, fast or pounding heartbeats;  · liver problems --nausea, upper stomach pain, itching, tiredness, loss of appetite, dark urine, neno-colored stools, jaundice (yellowing of the skin or eyes);  · muscle problems --unexplained muscle pain, tenderness, or weakness especially if you also have fever, unusual tiredness, and dark colored urine;  · nerve problems --jaw or chest tightness, eye pain, strange feeling in your tongue, problems with speech or swallowing; or  · low blood cell counts --fever, chills, tiredness, mouth sores, skin sores, easy bruising, unusual bleeding, pale skin, cold hands and feet, feeling light-headed or short of breath. Common side effects may include:  · nausea, vomiting, diarrhea;  · numbness, tingling, burning pain;  · low blood cell counts;  · abnormal liver function tests;  · mouth sores; or  · feeling tired. This is not a complete list of side effects and others may occur. Call your doctor for medical advice about side effects. You may report side effects to FDA at 5-266-FDA-6281. What other drugs will affect oxaliplatin? Other drugs may affect oxaliplatin, including prescription and over-the-counter medicines, vitamins, and herbal products. Tell your doctor about all your current medicines and any medicine you start or stop using. Where can I get more information? Your doctor or pharmacist can provide more information about oxaliplatin. Remember, keep this and all other medicines out of the reach of children, never share your medicines with others, and use this medication only for the indication prescribed.    Every effort has been made to ensure that the information provided by [address] [place] [address] Dr [employer] is accurate, up-to-date, and complete, but no guarantee is made to that effect. Drug information contained herein may be time sensitive. [employer] information has been compiled for use by healthcare practitioners and consumers in the [place] and therefore [employer] does not warrant that uses outside of the [place] are appropriate, unless specifically indicated otherwise. [employer]'s drug information does not endorse drugs, diagnose patients or recommend therapy. [facility] drug information is an informational resource designed to assist licensed healthcare practitioners in caring for their patients and/or to serve consumers viewing this service as a supplement to, and not a substitute for, the expertise, skill, knowledge and judgment of healthcare practitioners. The absence of a warning for a given drug or drug combination in no way should be construed to indicate that the drug or drug combination is safe, effective or appropriate for any given patient. [employer] does not assume any responsibility for any aspect of healthcare administered with the aid of information [employer] provides. The information contained herein is not intended to cover all possible uses, directions, precautions, warnings, drug interactions, allergic reactions, or adverse effects. If you have questions about the drugs you are taking, check with your doctor, nurse or pharmacist.  Copyright 8200-6513 [address]. Version: 10.01. Revision date: 7/24/2020. Care instructions adapted under license by [facility] ([facility]). If you have questions about a medical condition or this instruction, always ask your healthcare professional. [place] [address] any warranty or liability for your use of this information. Patient Education        oxaliplatin  Pronunciation:  ox AL i SARAI tin  Brand:  Eloxatin  What is the most important information I should know about oxaliplatin?   Oxaliplatin can cause a severe or life-threatening given as an infusion into a vein. A healthcare provider will give you this injection. Oxaliplatin must be given slowly, and the infusion can take at least 2 hours to complete. Oxaliplatin is usually given once every 2 weeks. Your doctor will determine how long to treat you with this medicine. You may be given medication to prevent nausea or vomiting. Receiving oxaliplatin can make you more sensitive to cold, which can cause numbness, tingling, and muscle spasms. This includes exposure to cold temperature and coming into contact with cold objects. To prevent discomfort, follow these steps:  · do not inhale deeply when you are exposed to cold air;  · cover your skin, head, and face when you are outside in cold temperatures;  · wear gloves when handling cold objects or refrigerated foods;  · do not run an air conditioner at very cool temperature in your home or car (even during hot weather);  · do not drink cold drinks or use ice cubes in drinks;  · do not put ice packs on your body. Chemotherapy often causes nausea or mouth sores. Do not eat ice chips to ease these discomforts because you will be more sensitive to cold. Talk to your doctor about other ways to treat nausea or mouth sores. You may be given other medications to prevent nausea or vomiting while you are receiving oxaliplatin. Oxaliplatin can lower your blood cell counts. Your blood will need to be tested often. Your cancer treatments may be delayed based on the results. Your heart function may need to be checked using an electrocardiograph or ECG (sometimes called an EKG). What happens if I miss a dose? Contact your doctor if you miss an appointment for your oxaliplatin injection. What happens if I overdose? Seek emergency medical attention or call the Poison Help line at 1-730.731.7576. What should I avoid while receiving oxaliplatin?   Avoid cold temperatures and cold objects, including ice, cold drinks, and skin exposure to cold temperatures. Avoid being near people who are sick or have infections. Tell your doctor at once if you develop signs of infection. This medicine may cause blurred vision and may impair your reactions. Avoid driving or hazardous activity until you know how this medicine will affect you. What are the possible side effects of oxaliplatin? Oxaliplatin can cause a severe or life-threatening allergic reaction. Some people receiving a oxaliplatin injection have had a reaction to the infusion within minutes after the medicine is injected into the vein. Tell your caregiver right away if you feel dizzy, short of breath, confused, sweaty, itchy, or have diarrhea, chest pain, warmth or redness in your face, or feel like you might pass out. Get emergency medical help if you have signs of an allergic reaction:  hives; difficult breathing; swelling of your face, lips, tongue, or throat.   Call your doctor at once if you have:  · increased sensitivity to cold temperatures and cold objects;  · numbness, tingling, or burning pain that interferes with daily activities;  · severe or ongoing diarrhea or vomiting;  · confusion, change in mental status, vision problems, seizure (convulsions);  · pain or burning when you urinate;  · sudden chest pain or discomfort, wheezing, dry cough, feeling short of breath;  · pain, redness, swelling, or skin changes where the injection was given;  · dehydration symptoms --feeling very thirsty or hot, being unable to urinate, heavy sweating, or hot and dry skin;  · heart problems --headache with chest pain and severe dizziness, fainting, fast or pounding heartbeats;  · liver problems --nausea, upper stomach pain, itching, tiredness, loss of appetite, dark urine, neno-colored stools, jaundice (yellowing of the skin or eyes);  · muscle problems --unexplained muscle pain, tenderness, or weakness especially if you also have fever, unusual tiredness, and dark colored urine;  · nerve problems --jaw or chest tightness, eye pain, strange feeling in your tongue, problems with speech or swallowing; or  · low blood cell counts --fever, chills, tiredness, mouth sores, skin sores, easy bruising, unusual bleeding, pale skin, cold hands and feet, feeling light-headed or short of breath. Common side effects may include:  · nausea, vomiting, diarrhea;  · numbness, tingling, burning pain;  · low blood cell counts;  · abnormal liver function tests;  · mouth sores; or  · feeling tired. This is not a complete list of side effects and others may occur. Call your doctor for medical advice about side effects. You may report side effects to FDA at 1-076-BCB-9203. What other drugs will affect oxaliplatin? Other drugs may affect oxaliplatin, including prescription and over-the-counter medicines, vitamins, and herbal products. Tell your doctor about all your current medicines and any medicine you start or stop using. Where can I get more information? Your doctor or pharmacist can provide more information about oxaliplatin. Remember, keep this and all other medicines out of the reach of children, never share your medicines with others, and use this medication only for the indication prescribed. Every effort has been made to ensure that the information provided by [address] is accurate, up-to-date, and complete, but no guarantee is made to that effect. Drug information contained herein may be time sensitive. [facility] information has been compiled for use by healthcare practitioners and consumers in the [facility] and therefore [facility] does not warrant that uses outside of the [facility] are appropriate, unless specifically indicated otherwise. [facility]'s drug information does not endorse drugs, diagnose patients or recommend therapy.  [facility] drug information is an informational resource designed to assist licensed healthcare practitioners in caring for their patients and/or to serve consumers viewing this service as a supplement to, and not a substitute for, the expertise, skill, knowledge and judgment of healthcare practitioners. The absence of a warning for a given drug or drug combination in no way should be construed to indicate that the drug or drug combination is safe, effective or appropriate for any given patient. [facility] does not assume any responsibility for any aspect of healthcare administered with the aid of information [facility] provides. The information contained herein is not intended to cover all possible uses, directions, precautions, warnings, drug interactions, allergic reactions, or adverse effects. If you have questions about the drugs you are taking, check with your doctor, nurse or pharmacist.  Copyright 1055-0284 [address]. Version: 10.01. Revision date: 7/24/2020. Care instructions adapted under license by [facility] ([facility]). If you have questions about a medical condition or this instruction, always ask your healthcare professional. [place] [address] any warranty or liability for your use of this information.

## 2021-11-02 ENCOUNTER — CLINICAL DOCUMENTATION (OUTPATIENT)
Dept: CASE MANAGEMENT | Age: 51
End: 2021-11-02

## 2021-11-02 ENCOUNTER — TELEPHONE (OUTPATIENT)
Dept: SURGERY | Age: 51
End: 2021-11-02

## 2021-11-02 ENCOUNTER — PREP FOR PROCEDURE (OUTPATIENT)
Dept: SURGERY | Age: 51
End: 2021-11-02

## 2021-11-02 DIAGNOSIS — C18.9 ADENOCARCINOMA OF COLON (HCC): Primary | ICD-10-CM

## 2021-11-02 RX ORDER — SODIUM CHLORIDE 9 MG/ML
25 INJECTION, SOLUTION INTRAVENOUS PRN
Status: CANCELLED | OUTPATIENT
Start: 2021-11-02

## 2021-11-02 RX ORDER — HEPARIN SODIUM (PORCINE) LOCK FLUSH IV SOLN 100 UNIT/ML 100 UNIT/ML
500 SOLUTION INTRAVENOUS PRN
Status: CANCELLED | OUTPATIENT
Start: 2021-11-02

## 2021-11-02 RX ORDER — SODIUM CHLORIDE 0.9 % (FLUSH) 0.9 %
5-40 SYRINGE (ML) INJECTION PRN
Status: CANCELLED | OUTPATIENT
Start: 2021-11-02

## 2021-11-02 RX ORDER — SODIUM CHLORIDE 9 MG/ML
INJECTION, SOLUTION INTRAVENOUS CONTINUOUS
Status: CANCELLED | OUTPATIENT
Start: 2021-11-02

## 2021-11-02 NOTE — TELEPHONE ENCOUNTER
Received call that pt needs Mediport,   Spoke w/ pt, Mediport scheduled 11/8, arrive at 5:45 am, NPO after midnight, bring a , shower with antibacterial soap morning of surgery, no jewelry or piercings.   Pt voiced understanding

## 2021-11-02 NOTE — PROGRESS NOTES
Name: Cecilia Ragland  : 1970  MRN: [id number]    Oncology Navigation- Initial Note:    Intake-  Contact Type: Medical Oncology    Diagnosis: GI- malignant    Home Disposition: Lives with other who is able to assist    Patient needs and barriers to care: Knowledge deficit and Symptom Management     Referral Source: Outpatient    Receptive to Advanced Care Planning/ Palliative Care:     Interventions-   General Interventions: Navigation Welcome Packet given and program explained       Continuum of Care: Diagnosis/Active Treatment    Notes:   Was unable to meet with Ananth Da Silva during consultation  for his colon cancer. Phoned him to introduce myself and explain Navigation. He wanted to meet with me today, meeting set up at 11:30. Ananth Da Silva is healing well from surgery, Right colectomy , and is anxious to start with chemotherapy. Stage IIIA-[id number]. Chemoteaching will be -Oxalaplatin and Xeloda every 3 weeks. Started asking questions regarding side effects, and explained this would be given in detail with Chemotherapy Teaching. Port placement . Plan to start treatment -[id number] precert completed and has received Xeloda. Very active-works 12 shifts. Job is physical and mental. Hoping to see how he feels after first cycle in order to king how he feels towards being able to work. Explained Financial Navigation. Inquired about taking his supplements-told him to bring list and speak to physician regarding them. Contact information provided and he knows to call with any questions or concerns.     Electronically signed by Rudi Mcdaniels RN on 2021 at 2:05 PM

## 2021-11-02 NOTE — PROGRESS NOTES
Name: Robyn Irby  : 1970  MRN: [id number]    Oncology Navigation Follow-Up Note    Contact Type:  Medical Oncology    Notes:   Spoke with Rciarda Chaidez who is precerting chemotherapy for Krish Apodaca. All paperwork ready to go, waiting for physician consult note.     Electronically signed by Radha Kitchen RN on 2021 at 3:22 PM
Admission

## 2021-11-03 ASSESSMENT — ENCOUNTER SYMPTOMS
EYE DISCHARGE: 0
COLOR CHANGE: 0
ABDOMINAL DISTENTION: 0
SHORTNESS OF BREATH: 0
RECTAL PAIN: 0
BLOOD IN STOOL: 0
CONSTIPATION: 0
DIARRHEA: 0
NAUSEA: 0
SORE THROAT: 0
TROUBLE SWALLOWING: 0
VOMITING: 0
CHEST TIGHTNESS: 0
FACIAL SWELLING: 0
ABDOMINAL PAIN: 0
WHEEZING: 0
COUGH: 0
BACK PAIN: 0

## 2021-11-03 NOTE — PROGRESS NOTES
[facility] [address] [facility] [address] [facility] [address]  [facility]  Dept: [phone]  Dept Fax: [phone]: [phone]    Visit Date:11/1/2021     Deborah Manning is a 45 y.o. male who presents today for:   Chief Complaint   Patient presents with    New Patient     COLON CANCER        HPI:     Mr. Oliva Clay is a 54-year-old patient with newly diagnosed stage III colon cancer. He presents to the medical oncology clinic to discuss postsurgical management. His history of colon cancer goes back to September 2021 when he was seen by  Dr. Collette Hedge due to unexplained weight loss of 10-15 lbs and rectal bleeding. Colonoscopy performed on September 1, 2021 demonstrated multiple polyps but a larger mass at the proximal transverse colon demonstrated the adenocarcinoma. He did not have signs of obstruction, no abdominal pain, no significant nausea or vomiting. Some previous episodes of nausea, GERD and epigastric discomfort. He had CT of the chest abdomen and pelvis on September 2, 2021,no evidence of metastatic disease. The patient met with Dr. Matthew Oden and after discussing his surgical treatment options he proceeded with right hemicolectomy on September 16, 2021. Final pathology report showed:  A.  Right colon mass, hemicolectomy:    Invasive moderately differentiated colonic adenocarcinoma, pT2.    Margins are free of neoplasia.           Pericolonic lymph nodes (17): 2 out of 17 are positive for   metastatic colonic adenocarcinoma.             Appendix-no pathologic abnormality. Wenda Hail base lymph nodes (2), resection:             Negative for malignancy (0/2). pT2 pN1b    He had uneventful post surgical course. Extensive history of colorectal disease paternal side. He is fairly active and eats a healthy diet. Denies any new urinary complaints.     HPI   Past Medical History:   Diagnosis Date    Anemia     Colon cancer ([address] 09/2021      Past Surgical History:   Procedure Laterality Date    COLONOSCOPY  2021    Dr. Meg Samaniego Right 2021    ROBOT EXTENDED RIGHT COLECTOMY [facility] Gonz[facility] [address] [facility]     [facility] with mesh inguinal      Family History   Problem Relation Age of Onset    Other Mother         Brain aneurysm    COPD Mother     Stroke Mother     Cancer Father         lung mets brain and bone    Stroke Father     Cancer Maternal Grandmother         colon    High Blood Pressure Maternal Grandmother     Cancer Maternal Grandfather         colon     High Blood Pressure Maternal Grandfather     Kidney Disease Paternal Grandmother     Cancer Paternal Grandmother         breast     Diabetes Paternal Grandmother     High Blood Pressure Paternal Grandmother     Cancer Paternal Grandfather         lung-52    Heart Attack Paternal Grandfather     High Blood Pressure Paternal Grandfather     Cancer Paternal Aunt         colon    Heart Attack Paternal Aunt     Cancer Paternal Uncle         lung    Cancer Maternal Aunt         colon    Cancer Maternal Uncle         colon      Social History     Tobacco Use    Smoking status: Former Smoker     Packs/day: 1.00     Years: 35.00     Pack years: 35.00     Types: Cigarettes     Quit date: 2020     Years since quittin.7    Smokeless tobacco: Never Used   Substance Use Topics    Alcohol use: Not Currently      Current Outpatient Medications   Medication Sig Dispense Refill    sucralfate (CARAFATE) 1 GM tablet take 1 tablet by mouth twice a day FIVE TO 60 MINS BEFORE LUNCH AND DINNER      ondansetron (ZOFRAN-ODT) 4 MG disintegrating tablet       omeprazole (PRILOSEC OTC) 20 MG tablet Take 20 mg by mouth daily      capecitabine (XELODA) 500 MG chemo tablet Take 4 tablets by mouth 2 times daily 112 tablet 2    ibuprofen (ADVIL;MOTRIN) 200 MG tablet Take 200 mg by mouth every 6 hours as needed for Pain      ferrous sulfate (IRON 325) 325 (65 Fe) MG tablet Take 1 tablet by mouth daily (with breakfast) 90 tablet 1     No current facility-administered medications for this visit. No Known Allergies   Health Maintenance   Topic Date Due    Shingles Vaccine (2 of 2) 11/18/2021    Pneumococcal 0-64 years Vaccine (2 of 4 - PCV13) 07/23/2022    Colon cancer screen colonoscopy  09/01/2022    Low dose CT lung screening  09/02/2022    Lipid screen  07/23/2026    DTaP/Tdap/Td vaccine (2 - Td or Tdap) 07/23/2031    Flu vaccine  Completed    COVID-19 Vaccine  Completed    Hepatitis A vaccine  Aged Out    Hepatitis B vaccine  Aged Out    Hib vaccine  Aged Out    Meningococcal (ACWY) vaccine  Aged Out    Hepatitis C screen  Discontinued    HIV screen  Discontinued        Subjective:   Review of Systems   Constitutional: Negative for activity change, appetite change, fatigue and fever. HENT: Negative for congestion, dental problem, facial swelling, hearing loss, mouth sores, nosebleeds, sore throat, tinnitus and trouble swallowing. Eyes: Negative for discharge and visual disturbance. Respiratory: Negative for cough, chest tightness, shortness of breath and wheezing. Cardiovascular: Negative for chest pain, palpitations and leg swelling. Gastrointestinal: Negative for abdominal distention, abdominal pain, blood in stool, constipation, diarrhea, nausea, rectal pain and vomiting. Endocrine: Negative for cold intolerance, polydipsia and polyuria. Genitourinary: Negative for decreased urine volume, difficulty urinating, dysuria, flank pain, hematuria and urgency. Musculoskeletal: Negative for arthralgias, back pain, gait problem, joint swelling, myalgias and neck stiffness. Skin: Negative for color change, rash and wound. Neurological: Negative for dizziness, tremors, seizures, speech difficulty, weakness, light-headedness, numbness and headaches.    Hematological: Negative for adenopathy. Does not bruise/bleed easily. Psychiatric/Behavioral: Negative for confusion and sleep disturbance. The patient is not nervous/anxious. Objective:   Physical Exam  Vitals reviewed. Constitutional:       General: He is not in acute distress. Appearance: He is well-developed. HENT:      Head: Normocephalic. Mouth/Throat:      Pharynx: No oropharyngeal exudate. Eyes:      General: No scleral icterus. Right eye: No discharge. Left eye: No discharge. Pupils: Pupils are equal, round, and reactive to light. Neck:      Thyroid: No thyromegaly. Vascular: No JVD. Trachea: No tracheal deviation. Cardiovascular:      Rate and Rhythm: Normal rate. Heart sounds: Normal heart sounds. No murmur heard. No friction rub. No gallop. Pulmonary:      Effort: Pulmonary effort is normal. No respiratory distress. Breath sounds: Normal breath sounds. No stridor. No wheezing or rales. Chest:      Chest wall: No tenderness. Abdominal:      General: Bowel sounds are normal. There is no distension. Palpations: Abdomen is soft. There is no mass. Tenderness: There is no abdominal tenderness. There is no rebound. Musculoskeletal:         General: Normal range of motion. Cervical back: Normal range of motion and neck supple. Comments: Good range of motion in all four extremities. Lymphadenopathy:      Cervical: No cervical adenopathy. Skin:     General: Skin is warm. Findings: No erythema or rash. Neurological:      Mental Status: He is alert and oriented to person, place, and time. Cranial Nerves: No cranial nerve deficit. Motor: No abnormal muscle tone. Deep Tendon Reflexes: Reflexes are normal and symmetric. Psychiatric:         Behavior: Behavior normal.         Thought Content:  Thought content normal.         Judgment: Judgment normal.         /76 (Site: Right Upper Arm, Position: Sitting, Cuff Size: Medium Adult)   Pulse 56   Temp 98.5 °F (36.9 °C) (Oral)   Resp 18   Ht 6' 0.01\" (1.829 m)   Wt 151 lb 6.4 oz (68.7 kg)   SpO2 100%   BMI 20.53 kg/m²      ECOG status is 0    Imaging studies and labs:   CT of the abdomen and pelvis on September 2, 2021 showed:   1. Unremarkable CT abdomen and pelvis with IV contrast.    CT of the chest on September 2, 2021 showed:  1. No suspicious pulmonary nodules. 2. No evidence of metastatic disease. 3. No acute infiltrate or pleural effusion       CT ABDOMEN PELVIS W IV CONTRAST Additional Contrast? None    Result Date: 10/21/2021  PROCEDURE: CT ABDOMEN PELVIS W IV CONTRAST CLINICAL INFORMATION: Abdominal pain. COMPARISON: CT abdomen and pelvis 9/2/2021. TECHNIQUE: Axial 5 mm CT images were obtained through the abdomen and pelvis after the administration of 80  cc Isovue 370 intravenous contrast. Coronal and sagittal reconstructions were obtained. All CT scans at this facility use dose modulation, iterative reconstruction, and/or weight-based dosing when appropriate to reduce radiation dose to as low as reasonably achievable. FINDINGS: Lung bases: Atelectasis is noted at the lung bases. The heart size is normal. Liver/gallbladder/bilary tree: Thickening of the wall of the gallbladder near the fundus could represent adenomyomatosis. No radiopaque gallstones or biliary ductal dilatation is observed. No liver lesions are identified. Pancreas/spleen/adrenal glands: Normal size and attenuation. Kidneys/ureters/bladder: No renal calculus, hydronephrosis, or hydroureter is present. The urinary bladder is unremarkable. Gastrointestinal:  Postoperative changes related to right hemicolectomy with a surgical anastomosis in the right lower quadrant appears intact. There is question area of intussusception of small bowel loops within the transverse colon at the site of the anastomosis (series 2, image 45).  No bowel obstruction, free fluid, fluid collection, or free air is observed. Moderate retained fecal material is seen throughout the colon. Retroperitoneum/lymph nodes: The aorta is not dilated. No lymphadenopathy is present. Pelvis: The prostate gland is not enlarged. Musculoskeletal: The visualized skeletal structures appear intact. 1. Postoperative changes related to right hemicolectomy appear intact. There may be intussusception of the small bowel within the colon at the site of the surgical anastomosis (series 2, image 45) however there is no associated bowel obstruction, free fluid, fluid collection, or free air identified. Moderate retained fecal material is seen throughout the colon suggesting fecal stasis. Assessment for bowel wall thickening is limited without oral contrast. 2. 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 No radiopaque gallstones or biliary ductal dilatation identified. Correlate with liver function tests. Chronic findings are discussed. **This report has been created using voice recognition software. It may contain minor errors which are inherent in voice recognition technology. ** Final report electronically signed by Dr Gloria Sullivan on 10/21/2021 10:02 AM      Assessment/Plan:   1. Stage III A colon cancer. This is a 54-year-old patient with newly diagnosed stage IIIa colon cancer. He is status post right hemicolectomy colectomy with lymph node dissection. I had a lengthy discussion with the patient and his wife about post surgical management. Adjuvant chemotherapy is indicated for the patients with lymph node involvement. For patients who have undergone potentially curative resection of a colon cancer, the goal of postoperative chemotherapy is to eradicate micrometastases, thereby reducing the likelihood of disease recurrence and increasing the cure rate.  The benefits of adjuvant chemotherapy for patients with node-positive disease are in the range of 30 % reduction in the risk of disease recurrence and a 20-30% reduction in mortality with modern chemotherapy. Adjuvant therapy for resected stage III colon cancer involves a six-month course of a combination of several chemotherapy drugs, which are given intravenously, in a specific order on specific days. However, based on the [facility] and NSABP C-07 trials and an analysis of the International Duration Evaluation of Adjuvant Chemotherapy (IDEA) collaboration (six separate randomized trials of six versus three months of adjuvant oxaliplatin-based therapy), we continue to suggest six months of therapy for individuals with high-risk cancers (T4N2). However, patients with high-risk cancers ramiro limiting adjuvant therapy to three months in patients with low-risk disease (T1-3N1) is valid option.  Orally active fluoropyrimidine,  Xeloda offers increased convenience. CAPOX is the preferred regimen if a 3-month course of adjuvant therapy is chosen. My recomentation for the patient is proceed with CAPOX. We discussed possible side effects of capecitabine that include: hand foot syndrome, fatigue, nausea, vomiting, oral mucositis, diarrhea, neutropenia and risk of infection. Familial adenomatous polyposis and Martin syndrome are the most common of the familial colon cancer syndromes. The patient does not have strong family history of colonic polyps, colon, endometrial or ovarian cancer, however due to his young age he had genetic testing, results are pending. Diagnosis Orders   1. Malignant neoplasm of colon, unspecified part of colon [facility])  Ambulatory referral to General Surgery   2. Adenocarcinoma, colon ([facility] [address]     3. S/P right colectomy          Plan:   Return in about 8 days (around 11/9/2021).      Orders Placed:   Orders Placed This Encounter   Procedures    Ambulatory referral to General Surgery     Referral Priority:   Routine     Referral Type:   Surgical     Referral Reason:   Specialty Services Required     Referred to Provider:   Yajaira Wild MD     Requested Specialty:   General Surgery     Number of Visits Requested:   1        Medications Prescribed:   No orders of the defined types were placed in this encounter. Discussed use, benefit, and side effectsof prescribed medications. All patient questions answered. Pt voiced understanding. Instructed to continue current medications, diet and exercise. Patient agreed with treatment plan. Follow up as directed.     Electronically signed by Robert Fields MD on 11/3/21 at 8:15 AM EDT

## 2021-11-04 ENCOUNTER — HOSPITAL ENCOUNTER (OUTPATIENT)
Dept: INFUSION THERAPY | Age: 51
End: 2021-11-04

## 2021-11-05 ENCOUNTER — HOSPITAL ENCOUNTER (OUTPATIENT)
Dept: INFUSION THERAPY | Age: 51
Discharge: HOME | End: 2021-11-05
Payer: COMMERCIAL

## 2021-11-05 PROCEDURE — 99212 OFFICE O/P EST SF 10 MIN: CPT

## 2021-11-05 NOTE — PROGRESS NOTES
Patient instructed on Xeloda and Oxaliplatin and Pre-medications. Drug information sheets including side effects, Chemotherapy and you, eating hints,understanding your blood counts, bland diet, importance of hydration, when to call physician sheet discussed and given to patient, reduce risk infection, bleeding precaution and neutropenia precaution sheets also given to patient. Patient instructed to have a  with them for at least the first visit. All questions answered satisfactorily and support given. Patient given a tour of facility. Approximately 60 minutes spent with patient. Patient ambulated off the unit per self, with no further questions and with belongings.

## 2021-11-05 NOTE — PLAN OF CARE
Problem: Musculor/Skeletal Functional Status  Goal: Absence of falls  Outcome: Met This Shift  Note: Patient free from falls while in O.P. Oncology. Intervention: Fall precautions  Note: Patient assessed for fall risk on admission to [facility]. Fall band placed on patient. Discussed the need to use the call light for assistance prior to getting up out of chair/bed. Problem: Intellectual/Education/Knowledge Deficit  Goal: Teaching initiated upon admission  Outcome: Met This Shift  Note: Patient verbalizes understanding to verbal information given on Xeloda and Oxaliplatin ,action and possible side effects. Aware to call MD if develop complications. Intervention: Verbal/written education provided  Note: Patient instructed on Xeloda and Oxaliplatin and Pre-medications. Drug information sheets including side effects, Chemotherapy and you, eating hints,understanding your blood counts, bland diet, importance of hydration, when to call physician sheet discussed and given to patient, reduce risk infection, bleeding precaution and neutropenia precaution sheets also given to patient. Patient instructed to have a  with them for at least the first visit. All questions answered satisfactorily and support given. Patient given a tour of facility. Approximately 60 minutes spent with patient. Problem: Discharge Planning:  Goal: Discharged to appropriate level of care  Description: Discharged to appropriate level of care  Outcome: Met This Shift  Note: Verbalized understanding of discharge instructions, follow-up appointments, and when to call the physician. Intervention: Assess readiness for discharge  Description: Assess readiness for discharge  Note: Discuss understanding of discharge instructions,follow-up appointments, and when to call the physician. Care plan reviewed with patient. Patient verbalize understanding of the plan of care and contribute to goal setting.

## 2021-11-06 NOTE — H&P
[address] [place] [address] Dr [facility] [address]  Dept: [phone]  Dept Fax: [phone]  Loc: [phone]     Visit Date: 10/18/2021     Ruth Wiley is a 45 y.o. male who presents today for:       Chief Complaint   Patient presents with    Post-Op Check       3 week f/u--s/p Robotic extended right colectomy 9/16/21-appt with Dr. Madi Ulloa r/s to 11/1         HPI:      MARYSOL Altman is a 52-year old male patient who presents for follow up status post robotic extended right colectomy 4 weeks ago Dr. Nicho Kiser. Pathology demonstrated invasive moderately differentiated colonic adenocarcinoma, pT2 with 2/17 lymph nodes positive. Was supposed to have an appointment with Dr. Madi Ulloa on 10/4 but got rescheduled until 11/1. Patient states he is going to go over to their office today to see if they have any cancellations. Patient presents alone. He is doing overall pretty well. Off narcotics. Still taking Ibuprofen for pain relief if needed. Abdominal pain is improving. Abdominal incisions are healing. Robotic sites are clean, dry and intact without signs of infection. Appetite is slowly improving. No nausea or vomiting. Bowel function still doing well. Off all stool softeners now. Biggest complaint is acid reflux. States he saw GI and he was given a PPI to take BID. States even with that some days he will have flare ups. He is taking Zofran at times for the acid reflux which does help. No melena or hematochezia. No fevers or chills. No urinary complaints. No SOB or chest pain. No lightheadedness or dizziness. No calf pain or swelling. Fatigue is improving.  States he wants to start working out.      Past Medical History        Past Medical History:   Diagnosis Date    Anemia      Colon cancer ([address] 09/2021         Past Surgical History         Past Surgical History:   Procedure Laterality Date    COLONOSCOPY   09/01/2021      [place]    HEMICOLECTOMY Right 2021     ROBOT EXTENDED RIGHT COLECTOMY performed by Artemio Maciel MD at [address]      [facility] with mesh inguinal            Family History         Family History   Problem Relation Age of Onset    Other Mother           Brain aneurysm    COPD Mother      Stroke Mother      Cancer Father           lung mets brain and bone    Stroke Father      Cancer Maternal Grandmother           colon    High Blood Pressure Maternal Grandmother      Cancer Maternal Grandfather           colon     High Blood Pressure Maternal Grandfather      Kidney Disease Paternal Grandmother      Cancer Paternal Grandmother           breast     Diabetes Paternal Grandmother      High Blood Pressure Paternal Grandmother      Cancer Paternal Grandfather           lung-52    Heart Attack Paternal Grandfather      High Blood Pressure Paternal Grandfather      Cancer Paternal Aunt           colon    Heart Attack Paternal Aunt      Cancer Paternal Uncle           lung    Cancer Maternal Aunt           colon    Cancer Maternal Uncle           colon            Social History            Tobacco Use    Smoking status: Former Smoker       Packs/day: 1.00       Years: 35.00       Pack years: 35.00       Types: Cigarettes       Quit date: 2020       Years since quittin.7    Smokeless tobacco: Never Used   Substance Use Topics    Alcohol use: Not Currently      Current Facility-Administered Medications          Current Outpatient Medications   Medication Sig Dispense Refill    ibuprofen (ADVIL;MOTRIN) 200 MG tablet Take 200 mg by mouth every 6 hours as needed for Pain        ferrous sulfate (IRON 325) 325 (65 Fe) MG tablet Take 1 tablet by mouth daily (with breakfast) 90 tablet 1    omeprazole (PRILOSEC OTC) 20 MG tablet Take 20 mg by mouth daily          No current facility-administered medications for this visit.    No Known Allergies     Subjective:      Review of Systems   Constitutional: Positive for activity change and appetite change. Negative for chills, diaphoresis, fatigue, fever and unexpected weight change. HENT: Negative for congestion, dental problem, hearing loss, rhinorrhea, sinus pressure and sore throat. Eyes: Negative for photophobia, pain, discharge, itching and visual disturbance. Respiratory: Negative for apnea, cough, choking, chest tightness, shortness of breath and wheezing. Cardiovascular: Negative for chest pain, palpitations and leg swelling. Gastrointestinal: Negative for abdominal distention, abdominal pain, anal bleeding, blood in stool, constipation, diarrhea, nausea and vomiting. Endocrine: Negative. Genitourinary: Negative for decreased urine volume, difficulty urinating, dysuria, frequency and urgency. Musculoskeletal: Negative for arthralgias, back pain, gait problem, joint swelling, myalgias and neck pain. Skin: Negative for color change, pallor, rash and wound. Allergic/Immunologic: Negative. Neurological: Negative for dizziness, tremors, weakness, numbness and headaches. Hematological: Negative. Psychiatric/Behavioral: Negative.          Objective:   /77 (Site: Left Upper Arm, Position: Sitting, Cuff Size: Medium Adult)   Pulse 69   Temp 97.8 °F (36.6 °C) (Tympanic)   Resp 14   Ht 6' (1.829 m)   Wt 146 lb 12.8 oz (66.6 kg)   SpO2 99%   BMI 19.91 kg/m²      Physical Exam  Vitals reviewed. Constitutional:       General: He is not in acute distress. Appearance: Normal appearance. He is well-developed. He is not ill-appearing or toxic-appearing. HENT:      Head: Normocephalic and atraumatic. Right Ear: Hearing and external ear normal.      Left Ear: Hearing and external ear normal.      Nose: Nose normal.      Mouth/Throat:      Mouth: Mucous membranes are not pale, not dry and not cyanotic.    Eyes:      General: Lids are normal. Neck:      Trachea: Trachea and phonation normal.   Cardiovascular:      Rate and Rhythm: Normal rate and regular rhythm. Pulses: Normal pulses. Heart sounds: S1 normal and S2 normal.   Pulmonary:      Effort: Pulmonary effort is normal. No tachypnea, bradypnea, accessory muscle usage or respiratory distress. Breath sounds: Normal breath sounds. No decreased breath sounds, wheezing or rales. Chest:      Chest wall: No tenderness. Abdominal:      General: Bowel sounds are normal. There is no distension. Palpations: Abdomen is soft. There is no mass. Tenderness: There is no abdominal tenderness. Musculoskeletal:         General: No tenderness. Normal range of motion. Cervical back: Normal range of motion and neck supple. Skin:     General: Skin is warm and dry. Findings: No abrasion, bruising, burn, ecchymosis, erythema, laceration, lesion or rash. Neurological:      Mental Status: He is alert and oriented to person, place, and time. Motor: No tremor, atrophy or abnormal muscle tone. Coordination: Coordination normal.      Gait: Gait normal.      Deep Tendon Reflexes: Reflexes are normal and symmetric. Psychiatric:         Speech: Speech normal.         Behavior: Behavior normal.         Thought Content: Thought content normal.          PATHOLOGY REPORT     Clinical Information: TRANSVERSE COLON CANCER     FINAL DIAGNOSIS:   A.  Right colon mass, hemicolectomy:    Invasive moderately differentiated colonic adenocarcinoma, pT2.    Margins are free of neoplasia.           Pericolonic lymph nodes (17): 2 out of 17 are positive for   metastatic colonic adenocarcinoma.             Appendix-no pathologic abnormality. Trenda Fly base lymph nodes (2), resection:             Negative for malignancy (0/2).      pT2 pN1b         Specimen:   A) RIGHT COLON   B) LYMPH NODE(S), ILEOCOLIC BASE       Gross Examination:   A - The container is labeled Silvia Solorzano Ann, right colon.  Received   in formalin is a segmental resection of bowel measuring about 18 cm in   length including 2 cm of terminal ileum.  There is an appendix   measuring 3.5 cm in length x 0.6 cm in diameter.  The appendix is   unremarkable.  The serosal surface is pink-tan. Chiquis Lamar is [place] ink   tattooing identified.  The specimen is opened longitudinally revealing   a pink-tan mucosal surface.  There is a mass measuring 3 x 3 cm.  The   lesion is 6 cm from the distal resection line and about 9 cm from the   radial margin.  No additional lesions are grossly identified.  The   serosal surface is inked blue.  Sections through the appendix reveal no   discrete lesions.  Sections through the lesion reveal extension of   neoplasm into the muscularis propria.  The neoplasm does not grossly   extend into the surrounding soft tissues.  Sections through the   surrounding adipose tissue reveal several small tentatively identified   lymph nodes.  Representative sections are submitted.  Cassette #1 -   proximal and distal resection lines; cassette #2 - appendix; cassette   #3 - mesenteric margin; cassettes #4 and #5 - lesion; cassette #6 -   lesion and adjacent uninvolved mucosa; and cassettes #7 and #8 -   tentatively identified lymph nodes.  ss. B - The container is labeled Eugena [place] Martins, ileocolic base lymph   nodes.  Received fresh are two tentatively identified lymph nodes   measuring 0.7 and 1.3 cm.  The nodes are entirely submitted.  1 ns. PCF/DKR:[id number]     Microscopic Examination:   A.    Procedure   Right hemicolectomy   Macroscopic Evaluation of Mesorectum   Not applicable       TUMOR   Tumor Site    Transverse colon: Proximal (per previous biopsy site, [id number])     Histologic Type    Adenocarcinoma   Histologic Grade    G2, moderately differentiated   Tumor Size   Greatest dimension in Centimeters (cm): 3 cm     Tumor Extent   Invades into muscularis propria     Macroscopic Tumor Perforation   Not identified     Lymphovascular Invasion (select all that apply)   Not identified     Perineural Invasion   Not identified     Treatment Effect    No known presurgical therapy     MARGINS   Margin Status for Invasive Carcinoma   All margins negative for invasive carcinoma     REGIONAL LYMPH NODES     Regional Lymph Node Status   Regional lymph nodes present     Tumor present in regional lymph node(s)      Number of Lymph Nodes with Tumor      Exact number (specify): 2      Number of Lymph Nodes Examined     Exact number (specify): 19 (Part A and Part B)     Tumor Deposits   Not identified     DISTANT METASTASIS   Distant Site(s) Involved, if applicable   Not applicable     PATHOLOGIC STAGE CLASSIFICATION (pTNM, AJCC 8th Edition)   PN1B pT Category    pT2: Tumor invades the muscularis propria   pN Category   pN1b: Two or three regional lymph nodes are positive          Patient Active Problem List   Diagnosis    Personal history of tobacco use    History of [facility]ism [facility] [address]    Weight loss    Bradycardia    Heartburn    Varicose veins of both lower extremities    Family history of cancer    Adenocarcinoma, colon ([facility] [address]    Cancer of transverse colon ([facility] [address]    S/P right colectomy      Assessment:      1. Status post robotic extended right colectomy  2. Invasive moderately differentiated colonic adenocarcinoma  3. GERD     Plan:      1. Abdomen benign. Incisions are healing well without signs of infection. Continue wound care as directed. 2. Keep scheduled oncology appointment for 11/1. Discussed mediport placement again. Patient would like to proceed when chemotherapy as soon as possible. 3. Appetite doing better. Continue diet as tolerated. High protein supplements encouraged. 4. Bowel function doing well. Off stool softeners for now. 5. Off narcotics. Tylenol and/or Motrin as needed for discomfort. 6. Lifting/activity restrictions discussed with patient. Questions answered.  Off work for 8-12 weeks due lifting restrictions and pending chemotherapy initiation. 7. Follow up in 2-3 pending oncology appointment. Signs and symptoms reviewed with patient that would be concerning and need him to return to office for re-evaluation. Patient states he will call if he has questions or concerns. 8. Follow up with GI as directed. Continue PPI and call office if GERD symptoms continue to persist.        Electronically signed by LUCY Smith CNP on 10/18/2021 at 3:52 PM    ADDENDUM:  1. Schedule Agapito for insertion tunneled subclavian vein single-lumen port. 2. He will undergo pre-operative clearance per anesthesia guidelines with risk factors listed under the past medical history diagnosis & problem list.  3. The risks, benefits and alternatives were discussed with Mark Ritchie including non-operative management. All questions answered. He understands and wishes to proceed with surgical intervention. 4. Restrictions discussed with Mark Chau and he expresses understanding. 5. He is advised to call back directly if there are further questions/concerns, or if his symptoms worsen prior to surgery.     Electronically signed by Lore Morrissey MD on 11/6/21 at 4:41 PM EDT

## 2021-11-08 ENCOUNTER — ANESTHESIA EVENT (OUTPATIENT)
Dept: OPERATING ROOM | Age: 51
End: 2021-11-08
Payer: COMMERCIAL

## 2021-11-08 ENCOUNTER — HOSPITAL ENCOUNTER (OUTPATIENT)
Age: 51
Setting detail: OUTPATIENT SURGERY
Discharge: HOME HEALTH | End: 2021-11-08
Attending: SURGERY | Admitting: SURGERY
Payer: COMMERCIAL

## 2021-11-08 ENCOUNTER — APPOINTMENT (OUTPATIENT)
Dept: GENERAL RADIOLOGY | Age: 51
End: 2021-11-08
Attending: SURGERY
Payer: COMMERCIAL

## 2021-11-08 ENCOUNTER — ANESTHESIA (OUTPATIENT)
Dept: OPERATING ROOM | Age: 51
End: 2021-11-08
Payer: COMMERCIAL

## 2021-11-08 VITALS
BODY MASS INDEX: 20.59 KG/M2 | TEMPERATURE: 96.9 F | SYSTOLIC BLOOD PRESSURE: 128 MMHG | OXYGEN SATURATION: 95 % | HEIGHT: 72 IN | DIASTOLIC BLOOD PRESSURE: 78 MMHG | RESPIRATION RATE: 16 BRPM | WEIGHT: 152 LBS | HEART RATE: 47 BPM

## 2021-11-08 VITALS — SYSTOLIC BLOOD PRESSURE: 125 MMHG | OXYGEN SATURATION: 100 % | TEMPERATURE: 96.8 F | DIASTOLIC BLOOD PRESSURE: 75 MMHG

## 2021-11-08 DIAGNOSIS — C18.4 CANCER OF TRANSVERSE COLON (HCC): ICD-10-CM

## 2021-11-08 DIAGNOSIS — Z90.49 S/P RIGHT COLECTOMY: Primary | ICD-10-CM

## 2021-11-08 PROCEDURE — 6360000002 HC RX W HCPCS

## 2021-11-08 PROCEDURE — 6360000002 HC RX W HCPCS: Performed by: NURSE ANESTHETIST, CERTIFIED REGISTERED

## 2021-11-08 PROCEDURE — 71045 X-RAY EXAM CHEST 1 VIEW: CPT

## 2021-11-08 PROCEDURE — 2709999900 HC NON-CHARGEABLE SUPPLY: Performed by: SURGERY

## 2021-11-08 PROCEDURE — 3700000001 HC ADD 15 MINUTES (ANESTHESIA): Performed by: SURGERY

## 2021-11-08 PROCEDURE — 77001 FLUOROGUIDE FOR VEIN DEVICE: CPT

## 2021-11-08 PROCEDURE — 2500000003 HC RX 250 WO HCPCS: Performed by: SURGERY

## 2021-11-08 PROCEDURE — 3700000000 HC ANESTHESIA ATTENDED CARE: Performed by: SURGERY

## 2021-11-08 PROCEDURE — 77001 FLUOROGUIDE FOR VEIN DEVICE: CPT | Performed by: SURGERY

## 2021-11-08 PROCEDURE — 3600000012 HC SURGERY LEVEL 2 ADDTL 15MIN: Performed by: SURGERY

## 2021-11-08 PROCEDURE — 36561 INSERT TUNNELED CV CATH: CPT | Performed by: SURGERY

## 2021-11-08 PROCEDURE — 2580000003 HC RX 258

## 2021-11-08 PROCEDURE — 3600000002 HC SURGERY LEVEL 2 BASE: Performed by: SURGERY

## 2021-11-08 PROCEDURE — 7100000010 HC PHASE II RECOVERY - FIRST 15 MIN: Performed by: SURGERY

## 2021-11-08 PROCEDURE — C1788 PORT, INDWELLING, IMP: HCPCS | Performed by: SURGERY

## 2021-11-08 PROCEDURE — 7100000011 HC PHASE II RECOVERY - ADDTL 15 MIN: Performed by: SURGERY

## 2021-11-08 DEVICE — PORT INFUS SGL LUMN ATTCH POLYUR OPN END CATH 8FR POWERPRT: Type: IMPLANTABLE DEVICE | Site: CHEST | Status: FUNCTIONAL

## 2021-11-08 RX ORDER — SODIUM CHLORIDE 9 MG/ML
25 INJECTION, SOLUTION INTRAVENOUS PRN
Status: DISCONTINUED | OUTPATIENT
Start: 2021-11-08 | End: 2021-11-08 | Stop reason: HOSPADM

## 2021-11-08 RX ORDER — ONDANSETRON 4 MG/1
4 TABLET, FILM COATED ORAL EVERY 6 HOURS PRN
Qty: 30 TABLET | Refills: 1 | Status: SHIPPED | OUTPATIENT
Start: 2021-11-08 | End: 2022-01-05 | Stop reason: SDUPTHER

## 2021-11-08 RX ORDER — MIDAZOLAM HYDROCHLORIDE 1 MG/ML
INJECTION INTRAMUSCULAR; INTRAVENOUS PRN
Status: DISCONTINUED | OUTPATIENT
Start: 2021-11-08 | End: 2021-11-08 | Stop reason: SDUPTHER

## 2021-11-08 RX ORDER — SODIUM CHLORIDE 0.9 % (FLUSH) 0.9 %
5-40 SYRINGE (ML) INJECTION EVERY 12 HOURS SCHEDULED
Status: DISCONTINUED | OUTPATIENT
Start: 2021-11-08 | End: 2021-11-08 | Stop reason: HOSPADM

## 2021-11-08 RX ORDER — FENTANYL CITRATE 50 UG/ML
INJECTION, SOLUTION INTRAMUSCULAR; INTRAVENOUS PRN
Status: DISCONTINUED | OUTPATIENT
Start: 2021-11-08 | End: 2021-11-08 | Stop reason: SDUPTHER

## 2021-11-08 RX ORDER — KETOROLAC TROMETHAMINE 10 MG/1
10 TABLET, FILM COATED ORAL EVERY 8 HOURS PRN
Qty: 15 TABLET | Refills: 0 | Status: SHIPPED | OUTPATIENT
Start: 2021-11-08 | End: 2021-12-06

## 2021-11-08 RX ORDER — ONDANSETRON 4 MG/1
4 TABLET, ORALLY DISINTEGRATING ORAL EVERY 8 HOURS PRN
Status: DISCONTINUED | OUTPATIENT
Start: 2021-11-08 | End: 2021-11-08 | Stop reason: HOSPADM

## 2021-11-08 RX ORDER — LIDOCAINE HYDROCHLORIDE 10 MG/ML
INJECTION, SOLUTION EPIDURAL; INFILTRATION; INTRACAUDAL; PERINEURAL PRN
Status: DISCONTINUED | OUTPATIENT
Start: 2021-11-08 | End: 2021-11-08 | Stop reason: ALTCHOICE

## 2021-11-08 RX ORDER — SODIUM CHLORIDE 0.9 % (FLUSH) 0.9 %
5-40 SYRINGE (ML) INJECTION PRN
Status: DISCONTINUED | OUTPATIENT
Start: 2021-11-08 | End: 2021-11-08 | Stop reason: HOSPADM

## 2021-11-08 RX ORDER — OXYCODONE HYDROCHLORIDE 5 MG/1
5 TABLET ORAL EVERY 4 HOURS PRN
Status: DISCONTINUED | OUTPATIENT
Start: 2021-11-08 | End: 2021-11-08 | Stop reason: HOSPADM

## 2021-11-08 RX ORDER — HYDROCODONE BITARTRATE AND ACETAMINOPHEN 5; 325 MG/1; MG/1
1-2 TABLET ORAL EVERY 6 HOURS PRN
Qty: 15 TABLET | Refills: 0 | Status: SHIPPED | OUTPATIENT
Start: 2021-11-08 | End: 2021-11-11

## 2021-11-08 RX ORDER — ONDANSETRON 2 MG/ML
4 INJECTION INTRAMUSCULAR; INTRAVENOUS EVERY 6 HOURS PRN
Status: DISCONTINUED | OUTPATIENT
Start: 2021-11-08 | End: 2021-11-08 | Stop reason: HOSPADM

## 2021-11-08 RX ORDER — SODIUM CHLORIDE 9 MG/ML
INJECTION, SOLUTION INTRAVENOUS CONTINUOUS
Status: DISCONTINUED | OUTPATIENT
Start: 2021-11-08 | End: 2021-11-08 | Stop reason: HOSPADM

## 2021-11-08 RX ORDER — OXYCODONE HYDROCHLORIDE 5 MG/1
10 TABLET ORAL EVERY 4 HOURS PRN
Status: DISCONTINUED | OUTPATIENT
Start: 2021-11-08 | End: 2021-11-08 | Stop reason: HOSPADM

## 2021-11-08 RX ORDER — MORPHINE SULFATE 2 MG/ML
4 INJECTION, SOLUTION INTRAMUSCULAR; INTRAVENOUS
Status: DISCONTINUED | OUTPATIENT
Start: 2021-11-08 | End: 2021-11-08 | Stop reason: HOSPADM

## 2021-11-08 RX ORDER — PROPOFOL 10 MG/ML
INJECTION, EMULSION INTRAVENOUS PRN
Status: DISCONTINUED | OUTPATIENT
Start: 2021-11-08 | End: 2021-11-08 | Stop reason: SDUPTHER

## 2021-11-08 RX ORDER — MORPHINE SULFATE 2 MG/ML
2 INJECTION, SOLUTION INTRAMUSCULAR; INTRAVENOUS
Status: DISCONTINUED | OUTPATIENT
Start: 2021-11-08 | End: 2021-11-08 | Stop reason: HOSPADM

## 2021-11-08 RX ADMIN — PROPOFOL 40 MG: 10 INJECTION, EMULSION INTRAVENOUS at 07:54

## 2021-11-08 RX ADMIN — PROPOFOL 40 MG: 10 INJECTION, EMULSION INTRAVENOUS at 07:51

## 2021-11-08 RX ADMIN — SODIUM CHLORIDE: 9 INJECTION, SOLUTION INTRAVENOUS at 06:39

## 2021-11-08 RX ADMIN — CEFAZOLIN 2000 MG: 10 INJECTION, POWDER, FOR SOLUTION INTRAVENOUS at 07:46

## 2021-11-08 RX ADMIN — FENTANYL CITRATE 100 MCG: 50 INJECTION, SOLUTION INTRAMUSCULAR; INTRAVENOUS at 07:48

## 2021-11-08 RX ADMIN — MIDAZOLAM 2 MG: 1 INJECTION INTRAMUSCULAR; INTRAVENOUS at 07:48

## 2021-11-08 ASSESSMENT — PULMONARY FUNCTION TESTS
PIF_VALUE: 1

## 2021-11-08 ASSESSMENT — ENCOUNTER SYMPTOMS
RECTAL PAIN: 0
EYE DISCHARGE: 0
VOMITING: 0
COUGH: 0
ABDOMINAL PAIN: 0
DIARRHEA: 0
TROUBLE SWALLOWING: 0
SORE THROAT: 0
CHEST TIGHTNESS: 0
NAUSEA: 0
CONSTIPATION: 0
COLOR CHANGE: 0
FACIAL SWELLING: 0
ABDOMINAL DISTENTION: 0
BLOOD IN STOOL: 0
BACK PAIN: 0
WHEEZING: 0
SHORTNESS OF BREATH: 0

## 2021-11-08 ASSESSMENT — PAIN SCALES - GENERAL
PAINLEVEL_OUTOF10: 0
PAINLEVEL_OUTOF10: 0

## 2021-11-08 NOTE — ANESTHESIA POSTPROCEDURE EVALUATION
Department of Anesthesiology  Postprocedure Note    Patient: Sharita Washington  MRN: [id number]  YOB: 1970  Date of evaluation: 11/8/2021  Time:  8:37 AM     Procedure Summary     Date: 11/08/21 Room / Location: [address]    Anesthesia Start: 0746 Anesthesia Stop: 0820    Procedure: SINGLE LUMEN SMART PORT INSERTION (N/A Chest) Diagnosis: (COLON CANCER)    Surgeons: Paras Barrios MD Responsible Provider: Tedi Pallas, MD    Anesthesia Type: MAC ASA Status: 2          Anesthesia Type: MAC    Maurice Phase I: Maurice Score: 10    Maurice Phase II: Maurice Score: 10    Last vitals: Reviewed and per EMR flowsheets.        Anesthesia Post Evaluation    Patient location during evaluation: PACU  Patient participation: complete - patient participated  Level of consciousness: awake and alert  Airway patency: patent  Nausea & Vomiting: no nausea  Complications: no  Cardiovascular status: blood pressure returned to baseline and hemodynamically stable  Respiratory status: acceptable and spontaneous ventilation  Hydration status: euvolemic

## 2021-11-08 NOTE — PROGRESS NOTES
The patient returned from recovery room. Awake. No pain. Right chest dressing dry and intact. Coffee given. Spouse in the room. Call light in reach.

## 2021-11-08 NOTE — PROGRESS NOTES
ADMITTED TO [facility] AND ORIENTED TO UNIT. SCDS ON. FALL  BAND ON. PT VERBALIZED APPROVAL FOR FIRST NAME, LAST INITIAL AND PHYSICIAN NAME ON UNIT WHITEBOARD. Jennie Blas, wife with the patient.

## 2021-11-08 NOTE — BRIEF OP NOTE
Brief Postoperative Note      Patient: Iggy Ayala  YOB: 1970  MRN: [id number]    Date of Procedure: 11/8/2021    Pre-Op Diagnosis: COLON CANCER    Post-Op Diagnosis: Same       Procedure:  1. Intra-op fluoroscopy  2. Insertion tunneled right subclavian vein single lumen port    Surgeon(s):  David Batres MD    Assistant:  First Assistant: Jose David Caraballo RN    Anesthesia: Monitor Anesthesia Care/local    Estimated Blood Loss (mL): 10 ml    Complications: None    Specimens:   * No specimens in log *    Implants:  Implant Name Type Inv.  Item Serial No.  Lot No. LRB No. Used Action   PORT INFUS SGL LUMN ATTCH POLYUR OPN END CATH 8FR POWERPRT  PORT INFUS SGL LUMN ATTCH POLYUR OPN END CATH 8FR POWERPRT  [employer] [id number] N/A 1 Implanted         Drains: * No LDAs found *    Findings: as above - see op note for details    Electronically signed by David Batres MD on 11/8/2021 at 8:13 AM

## 2021-11-08 NOTE — INTERVAL H&P NOTE
Update History & Physical    The patient's History and Physical was reviewed with the patient and I examined the patient. There was no change. The surgical site was confirmed by the patient and me. Plan: The risks, benefits, expected outcome, and alternative to the recommended procedure have been discussed with the patient. Patient understands and wants to proceed with the procedure. The patient was counseled at length about the risks of giancarlo Covid-19 during their perioperative period and any recovery window from their procedure. The patient was made aware that giancarlo Covid-19  may worsen their prognosis for recovering from their procedure  and lend to a higher morbidity and/or mortality risk. All material risks, benefits, and reasonable alternatives including postponing the procedure were discussed. The patient does wish to proceed with the procedure at this time.     Electronically signed by Ritika Rojas MD on 11/8/2021 at 7:17 AM

## 2021-11-08 NOTE — OP NOTE
[address] [place] [address]                                OPERATIVE REPORT    PATIENT NAME: Christina Mehta                :        1970  MED REC NO:   [id number]                           ROOM:  ACCOUNT NO:   [de-identified]                           ADMIT DATE: 2021  PROVIDER:     Elaine Schwab M.D.    DATE OF PROCEDURE:  2021    PREOPERATIVE DIAGNOSIS:  Adenocarcinoma of colon. POSTOPERATIVE DIAGNOSIS:  Adenocarcinoma of colon. PROCEDURES:  1. Intraoperative fluoroscopy. 2.  Insertion of tunneled right subclavian vein, single lumen port. SURGEON:  Elaine Schwab MD    ASSISTANT:  Romeo Garcia. Loya, [facility]    ANESTHESIA:  IV sedation/local.    ESTIMATED BLOOD LOSS:  10 mL. DRAINS:  None. COMPLICATIONS:  None. DISPOSITION:  Stable to the recovery room. INDICATIONS:  The patient is a 76-year-old gentleman who underwent  partial colectomy due to adenocarcinoma of the colon. He was in need of  chemotherapy, which is to start tomorrow. Both operative and  nonoperative intervention plans were discussed. Risks of surgery were  further discussed. Some of the risks included, but were not limited to  bleeding, infection, the need for reoperation, severe chronic  postoperative pain or numbness, major vascular nerve injury,  cardiopulmonary complications, anesthetic complications, seroma,  hematoma formation, wound breakdown, air embolism, pneumothorax, DVT,  port/catheter dislodgement, chronic pain, and death. After all of the  questions were answered in their entirety and the patient was completely  aware of the current situation, he elected to proceed with the  procedure. DESCRIPTION OF THE PROCEDURE:  After informed consent was signed and  placed on the chart, the patient was taken back to the operating room  and placed supine on the operating room table. IV sedation was  administered.   He tolerated this well throughout the case. All pressure  points were padded. He was on preoperative antibiotics. Bilateral  lower extremity sequential compression devices were placed prior to the  incision. His arms were tucked to the side. His neck and chest were  prepped and draped in the usual sterile standard fashion. A timeout  occurred prior to the operation, which not only identified the patient,  but also the planned procedure to be performed. At the end of the  timeout, there were no questions or concerns. I began the operation by  first placing the patient in Trendelenburg. Heparinized, flushed the  catheter and the port. Once everything was aligned and in an order, we  then accessed the right subclavian vein. The patient had a fairly deep  clavicle, but eventually, I was able to get access to the subclavian  vein with good withdrawal.  Wire went without difficulty. No ectopy. Confirmed with intraoperative fluoroscopy. Skin nick at the skin site  by the wire. A transverse incision down further on the right chest wall  with a 15-blade scalpel. Pocket formed in the subcutaneous tissue plane  more inferiorly with the electrocautery. Hemostasis was adequate. Catheter was then tunneled from the skin nick site down to the port  pocket. The patient tolerated this well. Dilator and sheath were then  placed over the wire using intraoperative fluoroscopy. Wire and dilator  were then removed. Catheter placed and then the sheath was removed. The catheter was withdrawn back to the desired depth using  intraoperative fluoroscopy. The patient tolerated all of this well with  no issues. At this point, the catheter was then cut and assembled to  the port. Port was then placed near the pocket and secured with  subcutaneous 3-0 Vicryl suture x2. Deep dermal tissues were then  reapproximated with interrupted Vicryl suture.   Skin was then  reapproximated with a running 4-0 Vicryl in a subcuticular fashion. Closed incision was then cleaned, dried, and Steri-Strips applied. Dry  sterile dressing was applied. Sponge, needle, and instrumentation count  was correct at the end of the procedure. The patient tolerated the  procedure well with no apparent complications and less than 10 mL blood  loss. Easily brought out of IV sedation and then transferred to the  post anesthesia care unit in stable condition. His stat chest x-ray is  currently pending.         Az James M.D.    D: 11/08/2021 8:16:39       T: 11/08/2021 10:09:17     [email or web address]  Job#: [id number]     Doc#: [id number]    CC:

## 2021-11-08 NOTE — ANESTHESIA PRE PROCEDURE
Department of Anesthesiology  Preprocedure Note       Name:  Alison Chavez   Age:  45 y.o.  :  1970                                          MRN:  [id number]         Date:  2021      Surgeon: Gabi Mcdowell):  Anthony Suarez MD    Procedure: Procedure(s):  SINGLE LUMEN SMART PORT INSERTION    Medications prior to admission:   Prior to Admission medications    Medication Sig Start Date End Date Taking? Authorizing Provider   sucralfate (CARAFATE) 1 GM tablet take 1 tablet by mouth twice a day FIVE TO 60 MINS BEFORE LUNCH AND DINNER 21   Historical Provider, MD   ondansetron (ZOFRAN-ODT) 4 MG disintegrating tablet  21   Historical Provider, MD   capecitabine (XELODA) 500 MG chemo tablet Take 4 tablets by mouth 2 times daily 21   Elina Randolph MD   ibuprofen (ADVIL;MOTRIN) 200 MG tablet Take 200 mg by mouth every 6 hours as needed for Pain    Historical Provider, MD   ferrous sulfate (IRON 325) 325 (65 Fe) MG tablet Take 1 tablet by mouth daily (with breakfast) 21   Dayron Cabezas MD   omeprazole (PRILOSEC OTC) 20 MG tablet Take 20 mg by mouth daily    Historical Provider, MD       Current medications:    No current facility-administered medications for this visit. No current outpatient medications on file.      Facility-Administered Medications Ordered in Other Visits   Medication Dose Route Frequency Provider Last Rate Last Admin    0.9 % sodium chloride infusion   IntraVENous Continuous Dolly Gil  mL/hr at 56/98/44 0639 New Bag at 21 8001    ceFAZolin (ANCEF) 2000 mg in dextrose 5 % 50 mL IVPB  2,000 mg IntraVENous 30 Min Pre-Op Dolly Gil LPN           Allergies:  No Known Allergies    Problem List:    Patient Active Problem List   Diagnosis Code    Personal history of tobacco use Z87.891    History of alcoholism ([facility] F10.21    Weight loss R63.4    Bradycardia R00.1    Heartburn R12    Varicose veins of both lower extremities I83.93    Family history of cancer Z80.9    Adenocarcinoma, colon (HCC) C18.9    Cancer of transverse colon ([facility] [address] C18.4    S/P right colectomy Z90.49       Past Medical History:        Diagnosis Date    Anemia     Colon cancer ([facility] 2021       Past Surgical History:        Procedure Laterality Date    COLONOSCOPY  2021    Dr. Arlin Goel Right 2021    ROBOT EXTENDED RIGHT COLECTOMY performed by Alma Palomares MD at [address] [facility] Left     [facility] with mesh inguinal       Social History:    Social History     Tobacco Use    Smoking status: Former Smoker     Packs/day: 1.00     Years: 35.00     Pack years: 35.00     Types: Cigarettes     Quit date: 2020     Years since quittin.7    Smokeless tobacco: Never Used   Substance Use Topics    Alcohol use: Not Currently                                Counseling given: Not Answered      Vital Signs (Current): There were no vitals filed for this visit.                                            BP Readings from Last 3 Encounters:   21 130/87   21 131/76   10/21/21 (!) 144/97       NPO Status:                                                                                 BMI:   Wt Readings from Last 3 Encounters:   21 152 lb (68.9 kg)   21 151 lb 6.4 oz (68.7 kg)   10/21/21 147 lb (66.7 kg)     There is no height or weight on file to calculate BMI.    CBC:   Lab Results   Component Value Date    WBC 7.3 10/21/2021    RBC 4.96 10/21/2021    HGB 12.6 10/21/2021    HCT 39.6 10/21/2021    MCV 79.8 10/21/2021     10/21/2021       CMP:   Lab Results   Component Value Date     10/21/2021    K 3.9 10/21/2021     10/21/2021    CO2 26 10/21/2021    BUN 14 10/21/2021    CREATININE 0.9 10/21/2021    LABGLOM 89 10/21/2021    GLUCOSE 108 10/21/2021    PROT 6.9 10/21/2021    CALCIUM 9.6 10/21/2021    BILITOT 0.4 10/21/2021    ALKPHOS 128 10/21/2021    AST 91 10/21/2021    ALT 59 10/21/2021       POC Tests: No results for input(s): POCGLU, POCNA, POCK, POCCL, POCBUN, POCHEMO, POCHCT in the last 72 hours. Coags:   Lab Results   Component Value Date    INR 0.93 2021    APTT 30.4 2021       HCG (If Applicable): No results found for: PREGTESTUR, PREGSERUM, HCG, HCGQUANT     ABGs: No results found for: PHART, PO2ART, IOT4GAB, MDL6QYC, BEART, G4SVGVHW     Type & Screen (If Applicable):  Lab Results   Component Value Date    LABRH POS 2021       Drug/Infectious Status (If Applicable):  No results found for: HIV, HEPCAB    COVID-19 Screening (If Applicable):   Lab Results   Component Value Date    COVID19 Not Detected 2021           Anesthesia Evaluation   no history of anesthetic complications:   Airway: Mallampati: II  TM distance: >3 FB   Neck ROM: full  Mouth opening: > = 3 FB Dental:          Pulmonary:Negative Pulmonary ROS and normal exam  breath sounds clear to auscultation            Patient did not smoke on day of surgery. Cardiovascular:  Exercise tolerance: good (>4 METS),           Rhythm: regular                      Neuro/Psych:   Negative Neuro/Psych ROS              GI/Hepatic/Renal: Neg GI/Hepatic/Renal ROS            Endo/Other: Negative Endo/Other ROS   (+) malignancy/cancer. Pt had no PAT visit       Abdominal:             Vascular: negative vascular ROS. Other Findings:          Department of Anesthesiology  Preprocedure Note       Name:  Elly Narayanan   Age:  45 y.o.  :  1970                                          MRN:  [id number]         Date:  2021      Surgeon: Efrain Stone):  Eleazar Rubio MD    Procedure: Procedure(s):  SINGLE LUMEN SMART PORT INSERTION    Medications prior to admission:   Prior to Admission medications    Medication Sig Start Date End Date Taking?  Authorizing Provider   sucralfate (CARAFATE) 1 GM tablet take 1 tablet by mouth twice a day FIVE TO 60 MINS BEFORE LUNCH AND DINNER 9/30/21   Historical Provider, MD   ondansetron (ZOFRAN-ODT) 4 MG disintegrating tablet  9/20/21   Historical Provider, MD   capecitabine (XELODA) 500 MG chemo tablet Take 4 tablets by mouth 2 times daily 11/1/21   Faina Iniguez MD   ibuprofen (ADVIL;MOTRIN) 200 MG tablet Take 200 mg by mouth every 6 hours as needed for Pain    Historical Provider, MD   ferrous sulfate (IRON 325) 325 (65 Fe) MG tablet Take 1 tablet by mouth daily (with breakfast) 9/23/21   Aleshia Ruiz MD   omeprazole (PRILOSEC OTC) 20 MG tablet Take 20 mg by mouth daily    Historical Provider, MD       Current medications:    No current facility-administered medications for this visit. No current outpatient medications on file.      Facility-Administered Medications Ordered in Other Visits   Medication Dose Route Frequency Provider Last Rate Last Admin    0.9 % sodium chloride infusion   IntraVENous Continuous Dolly Gil  mL/hr at 96/83/41 0639 New Bag at 11/08/21 1772    ceFAZolin (ANCEF) 2000 mg in dextrose 5 % 50 mL IVPB  2,000 mg IntraVENous 30 Min Pre-Op Dolly Gil LPN           Allergies:  No Known Allergies    Problem List:    Patient Active Problem List   Diagnosis Code    Personal history of tobacco use Z87.891    [facility] of [facility] F10.21    Weight loss R63.4    Bradycardia R00.1    Heartburn R12    Varicose veins of both lower extremities I83.93    Family history of cancer Z80.9    Adenocarcinoma, colon ([facility] [address] C18.9    Cancer of transverse colon ([facility] C18.4    S/P right colectomy Z90.49       Past Medical History:        Diagnosis Date    Anemia     Colon cancer ([facility] [address] 09/2021       Past Surgical History:        Procedure Laterality Date    COLONOSCOPY  09/01/2021    Dr. Jordin Casey Right 9/16/2021    ROBOT EXTENDED RIGHT COLECTOMY performed by Olga Riuz MD at [address] [place] [address] with mesh inguinal       Social History:    Social History     Tobacco Use    Smoking status: Former Smoker     Packs/day: 1.00     Years: 35.00     Pack years: 35.00     Types: Cigarettes     Quit date: 2020     Years since quittin.7    Smokeless tobacco: Never Used   Substance Use Topics    Alcohol use: Not Currently                                Counseling given: Not Answered      Vital Signs (Current): There were no vitals filed for this visit. BP Readings from Last 3 Encounters:   21 130/87   21 131/76   10/21/21 (!) 144/97       NPO Status:                                                                                 BMI:   Wt Readings from Last 3 Encounters:   21 152 lb (68.9 kg)   21 151 lb 6.4 oz (68.7 kg)   10/21/21 147 lb (66.7 kg)     There is no height or weight on file to calculate BMI.    CBC:   Lab Results   Component Value Date    WBC 7.3 10/21/2021    RBC 4.96 10/21/2021    HGB 12.6 10/21/2021    HCT 39.6 10/21/2021    MCV 79.8 10/21/2021     10/21/2021       CMP:   Lab Results   Component Value Date     10/21/2021    K 3.9 10/21/2021     10/21/2021    CO2 26 10/21/2021    BUN 14 10/21/2021    CREATININE 0.9 10/21/2021    LABGLOM 89 10/21/2021    GLUCOSE 108 10/21/2021    PROT 6.9 10/21/2021    CALCIUM 9.6 10/21/2021    BILITOT 0.4 10/21/2021    ALKPHOS 128 10/21/2021    AST 91 10/21/2021    ALT 59 10/21/2021       POC Tests: No results for input(s): POCGLU, POCNA, POCK, POCCL, POCBUN, POCHEMO, POCHCT in the last 72 hours.     Coags:   Lab Results   Component Value Date    INR 0.93 2021    APTT 30.4 2021       HCG (If Applicable): No results found for: PREGTESTUR, PREGSERUM, HCG, HCGQUANT     ABGs: No results found for: PHART, PO2ART, EPB5SXO, KFQ9YRV, BEART, G1XJNFZQ     Type & Screen (If Applicable):  Lab Results   Component Value Date    LABRH POS 2021       Drug/Infectious Status (If Applicable):  No results found for: HIV, HEPCAB    COVID-19 Screening (If Applicable):   Lab Results   Component Value Date    COVID19 Not Detected 09/23/2021           Anesthesia Evaluation    Airway: Mallampati: II  TM distance: >3 FB   Neck ROM: full  Mouth opening: > = 3 FB Dental:          Pulmonary: breath sounds clear to auscultation                             Cardiovascular:            Rhythm: regular                      Neuro/Psych:               GI/Hepatic/Renal:             Endo/Other:                     Abdominal:             Vascular: Other Findings:             Anesthesia Plan      general     ASA 2       Induction: intravenous. MIPS: Postoperative opioids intended and Prophylactic antiemetics administered. Anesthetic plan and risks discussed with patient. Plan discussed with CRNA. Adan Coyle MD   11/8/2021               Anesthesia Plan      MAC     ASA 2       Induction: intravenous. MIPS: Postoperative opioids intended and Prophylactic antiemetics administered. Anesthetic plan and risks discussed with patient. Plan discussed with CRNA.                   Adan Coyle MD   11/8/2021

## 2021-11-09 ENCOUNTER — TELEPHONE (OUTPATIENT)
Dept: SURGERY | Age: 51
End: 2021-11-09

## 2021-11-09 ENCOUNTER — HOSPITAL ENCOUNTER (OUTPATIENT)
Dept: INFUSION THERAPY | Age: 51
Discharge: HOME | End: 2021-11-09
Payer: COMMERCIAL

## 2021-11-09 ENCOUNTER — OFFICE VISIT (OUTPATIENT)
Dept: ONCOLOGY | Age: 51
End: 2021-11-09
Payer: COMMERCIAL

## 2021-11-09 VITALS
DIASTOLIC BLOOD PRESSURE: 83 MMHG | OXYGEN SATURATION: 94 % | WEIGHT: 151 LBS | RESPIRATION RATE: 16 BRPM | SYSTOLIC BLOOD PRESSURE: 133 MMHG | HEART RATE: 63 BPM | TEMPERATURE: 98.5 F | HEIGHT: 72 IN | BODY MASS INDEX: 20.45 KG/M2

## 2021-11-09 VITALS
HEIGHT: 72 IN | OXYGEN SATURATION: 97 % | SYSTOLIC BLOOD PRESSURE: 137 MMHG | RESPIRATION RATE: 16 BRPM | TEMPERATURE: 98.4 F | HEART RATE: 68 BPM | WEIGHT: 151 LBS | BODY MASS INDEX: 20.45 KG/M2 | DIASTOLIC BLOOD PRESSURE: 85 MMHG

## 2021-11-09 DIAGNOSIS — F10.21 HISTORY OF ALCOHOLISM (HCC): ICD-10-CM

## 2021-11-09 DIAGNOSIS — J93.9 PNEUMOTHORAX ON RIGHT: Primary | ICD-10-CM

## 2021-11-09 DIAGNOSIS — Z90.49 S/P RIGHT COLECTOMY: ICD-10-CM

## 2021-11-09 DIAGNOSIS — Z51.11 ENCOUNTER FOR CHEMOTHERAPY MANAGEMENT: ICD-10-CM

## 2021-11-09 DIAGNOSIS — Z80.9 FAMILY HISTORY OF CANCER: ICD-10-CM

## 2021-11-09 DIAGNOSIS — C18.9 ADENOCARCINOMA, COLON (HCC): ICD-10-CM

## 2021-11-09 DIAGNOSIS — C18.4 CANCER OF TRANSVERSE COLON (HCC): ICD-10-CM

## 2021-11-09 DIAGNOSIS — C18.9 MALIGNANT NEOPLASM OF COLON, UNSPECIFIED PART OF COLON (HCC): Primary | ICD-10-CM

## 2021-11-09 DIAGNOSIS — Z87.891 PERSONAL HISTORY OF TOBACCO USE: ICD-10-CM

## 2021-11-09 LAB
ABSOLUTE IMMATURE GRANULOCYTE: 0.01 THOU/MM3 (ref 0–0.07)
ALBUMIN SERPL-MCNC: 4.4 G/DL (ref 3.5–5.1)
ALP BLD-CCNC: 98 U/L (ref 38–126)
ALT SERPL-CCNC: 25 U/L (ref 11–66)
AST SERPL-CCNC: 22 U/L (ref 5–40)
BASINOPHIL, AUTOMATED: 0 % (ref 0–3)
BASOPHILS ABSOLUTE: 0 THOU/MM3 (ref 0–0.1)
BILIRUB SERPL-MCNC: 0.5 MG/DL (ref 0.3–1.2)
BILIRUBIN DIRECT: < 0.2 MG/DL (ref 0–0.3)
BUN, WHOLE BLOOD: 15 MG/DL (ref 8–26)
CHLORIDE, WHOLE BLOOD: 103 MEQ/L (ref 98–109)
CREATININE, WHOLE BLOOD: 0.8 MG/DL (ref 0.5–1.2)
EOSINOPHILS ABSOLUTE: 0.1 THOU/MM3 (ref 0–0.4)
EOSINOPHILS RELATIVE PERCENT: 1 % (ref 0–4)
GFR, ESTIMATED: > 90 ML/MIN/1.73M2
GLUCOSE, WHOLE BLOOD: 93 MG/DL (ref 70–108)
HCT VFR BLD CALC: 40.9 % (ref 42–52)
HEMOGLOBIN: 13.3 GM/DL (ref 14–18)
IMMATURE GRANULOCYTES: 0 %
IONIZED CALCIUM, WHOLE BLOOD: 1.19 MMOL/L (ref 1.12–1.32)
LYMPHOCYTES # BLD: 16 % (ref 15–47)
LYMPHOCYTES ABSOLUTE: 1.4 THOU/MM3 (ref 1–4.8)
MCH RBC QN AUTO: 25.9 PG (ref 26–33)
MCHC RBC AUTO-ENTMCNC: 32.5 GM/DL (ref 32.2–35.5)
MCV RBC AUTO: 80 FL (ref 80–94)
MONOCYTES ABSOLUTE: 0.7 THOU/MM3 (ref 0.4–1.3)
MONOCYTES: 9 % (ref 0–12)
PDW BLD-RTO: 18.8 % (ref 11.5–14.5)
PLATELET # BLD: 254 THOU/MM3 (ref 130–400)
PMV BLD AUTO: 9.9 FL (ref 9.4–12.4)
POTASSIUM, WHOLE BLOOD: 4.1 MEQ/L (ref 3.5–4.9)
RBC # BLD: 5.13 MILL/MM3 (ref 4.7–6.1)
SEG NEUTROPHILS: 73 % (ref 43–75)
SEGMENTED NEUTROPHILS ABSOLUTE COUNT: 6.2 THOU/MM3 (ref 1.8–7.7)
SODIUM, WHOLE BLOOD: 140 MEQ/L (ref 138–146)
TOTAL CO2, WHOLE BLOOD: 29 MEQ/L (ref 23–33)
TOTAL PROTEIN: 7.2 G/DL (ref 6.1–8)
WBC # BLD: 8.4 THOU/MM3 (ref 4.8–10.8)

## 2021-11-09 PROCEDURE — 99215 OFFICE O/P EST HI 40 MIN: CPT | Performed by: INTERNAL MEDICINE

## 2021-11-09 PROCEDURE — 99211 OFF/OP EST MAY X REQ PHY/QHP: CPT

## 2021-11-09 PROCEDURE — 6360000002 HC RX W HCPCS: Performed by: INTERNAL MEDICINE

## 2021-11-09 PROCEDURE — 80047 BASIC METABLC PNL IONIZED CA: CPT

## 2021-11-09 PROCEDURE — 36591 DRAW BLOOD OFF VENOUS DEVICE: CPT

## 2021-11-09 PROCEDURE — 2580000003 HC RX 258: Performed by: INTERNAL MEDICINE

## 2021-11-09 PROCEDURE — 85025 COMPLETE CBC W/AUTO DIFF WBC: CPT

## 2021-11-09 PROCEDURE — 96367 TX/PROPH/DG ADDL SEQ IV INF: CPT

## 2021-11-09 PROCEDURE — 96413 CHEMO IV INFUSION 1 HR: CPT

## 2021-11-09 PROCEDURE — 96415 CHEMO IV INFUSION ADDL HR: CPT

## 2021-11-09 PROCEDURE — 80076 HEPATIC FUNCTION PANEL: CPT

## 2021-11-09 RX ORDER — EPINEPHRINE 1 MG/ML
0.3 INJECTION, SOLUTION, CONCENTRATE INTRAVENOUS PRN
Status: CANCELLED | OUTPATIENT
Start: 2021-11-09

## 2021-11-09 RX ORDER — DEXAMETHASONE 4 MG/1
8 TABLET ORAL
Qty: 6 TABLET | Refills: 3 | Status: SHIPPED | OUTPATIENT
Start: 2021-11-10 | End: 2021-11-13

## 2021-11-09 RX ORDER — SODIUM CHLORIDE 0.9 % (FLUSH) 0.9 %
5-40 SYRINGE (ML) INJECTION PRN
Status: CANCELLED | OUTPATIENT
Start: 2021-11-09

## 2021-11-09 RX ORDER — SODIUM CHLORIDE 9 MG/ML
25 INJECTION, SOLUTION INTRAVENOUS PRN
Status: CANCELLED | OUTPATIENT
Start: 2021-11-09

## 2021-11-09 RX ORDER — PROCHLORPERAZINE MALEATE 5 MG/1
5 TABLET ORAL EVERY 12 HOURS
Qty: 28 TABLET | Refills: 2 | Status: SHIPPED | OUTPATIENT
Start: 2021-11-09 | End: 2021-12-15 | Stop reason: ALTCHOICE

## 2021-11-09 RX ORDER — DIPHENHYDRAMINE HYDROCHLORIDE 50 MG/ML
50 INJECTION INTRAMUSCULAR; INTRAVENOUS ONCE
Status: CANCELLED | OUTPATIENT
Start: 2021-11-09 | End: 2021-11-09

## 2021-11-09 RX ORDER — HEPARIN SODIUM (PORCINE) LOCK FLUSH IV SOLN 100 UNIT/ML 100 UNIT/ML
500 SOLUTION INTRAVENOUS PRN
Status: DISCONTINUED | OUTPATIENT
Start: 2021-11-09 | End: 2021-11-10 | Stop reason: HOSPADM

## 2021-11-09 RX ORDER — HEPARIN SODIUM (PORCINE) LOCK FLUSH IV SOLN 100 UNIT/ML 100 UNIT/ML
500 SOLUTION INTRAVENOUS PRN
Status: CANCELLED | OUTPATIENT
Start: 2021-11-09

## 2021-11-09 RX ORDER — SODIUM CHLORIDE 9 MG/ML
INJECTION, SOLUTION INTRAVENOUS CONTINUOUS
Status: CANCELLED | OUTPATIENT
Start: 2021-11-09

## 2021-11-09 RX ORDER — DEXTROSE MONOHYDRATE 50 MG/ML
INJECTION, SOLUTION INTRAVENOUS ONCE
Status: COMPLETED | OUTPATIENT
Start: 2021-11-09 | End: 2021-11-09

## 2021-11-09 RX ORDER — CAPECITABINE 500 MG/1
1100 TABLET, FILM COATED ORAL 2 TIMES DAILY
Qty: 112 TABLET | Refills: 0
Start: 2021-11-09 | End: 2021-11-23

## 2021-11-09 RX ORDER — METHYLPREDNISOLONE SODIUM SUCCINATE 125 MG/2ML
125 INJECTION, POWDER, LYOPHILIZED, FOR SOLUTION INTRAMUSCULAR; INTRAVENOUS ONCE
Status: CANCELLED | OUTPATIENT
Start: 2021-11-09 | End: 2021-11-09

## 2021-11-09 RX ORDER — DEXTROSE MONOHYDRATE 50 MG/ML
INJECTION, SOLUTION INTRAVENOUS ONCE
Status: CANCELLED | OUTPATIENT
Start: 2021-11-09 | End: 2021-11-09

## 2021-11-09 RX ORDER — SODIUM CHLORIDE 0.9 % (FLUSH) 0.9 %
5-40 SYRINGE (ML) INJECTION PRN
Status: DISCONTINUED | OUTPATIENT
Start: 2021-11-09 | End: 2021-11-10 | Stop reason: HOSPADM

## 2021-11-09 RX ADMIN — OXALIPLATIN 250 MG: 5 INJECTION, SOLUTION INTRAVENOUS at 10:11

## 2021-11-09 RX ADMIN — Medication 500 UNITS: at 12:43

## 2021-11-09 RX ADMIN — SODIUM CHLORIDE, PRESERVATIVE FREE 20 ML: 5 INJECTION INTRAVENOUS at 12:43

## 2021-11-09 RX ADMIN — SODIUM CHLORIDE, PRESERVATIVE FREE 10 ML: 5 INJECTION INTRAVENOUS at 08:10

## 2021-11-09 RX ADMIN — DEXAMETHASONE SODIUM PHOSPHATE 12 MG: 4 INJECTION, SOLUTION INTRA-ARTICULAR; INTRALESIONAL; INTRAMUSCULAR; INTRAVENOUS; SOFT TISSUE at 09:05

## 2021-11-09 RX ADMIN — FOSAPREPITANT 150 MG: 150 INJECTION, POWDER, LYOPHILIZED, FOR SOLUTION INTRAVENOUS at 09:26

## 2021-11-09 RX ADMIN — SODIUM CHLORIDE, PRESERVATIVE FREE 20 ML: 5 INJECTION INTRAVENOUS at 08:11

## 2021-11-09 RX ADMIN — DEXTROSE MONOHYDRATE: 50 INJECTION, SOLUTION INTRAVENOUS at 09:03

## 2021-11-09 NOTE — TELEPHONE ENCOUNTER
Called to check on patient post op-doing well, no chest pain, no shortness of breath. States he feels fine. Instructed him to call us with any issues.

## 2021-11-09 NOTE — PLAN OF CARE
Problem: Musculor/Skeletal Functional Status  Goal: Absence of falls  Outcome: Met This Shift  Note: Patient free from falls while in O.P. Oncology. Intervention: Fall precautions  Note: Patient assessed for fall risk on admission to [facility]. Fall band placed on patient. Discussed the need to use the call light for assistance prior to getting up out of chair/bed. Problem: Intellectual/Education/Knowledge Deficit  Goal: Teaching initiated upon admission  Outcome: Met This Shift  Note: Patient verbalizes understanding to verbal information given on Oxaliplatin ,action and possible side effects. Aware to call MD if develop complications. Intervention: Verbal/written education provided  Note: Chemotherapy Teaching     What is Chemotherapy   Drug action [x]   Method of Administration [x]   Handouts given []     Side Effects  Nausea/vomiting [x]   Diarrhea [x]   Fatigue [x]   Signs / Symptoms of infection [x]   Neutropenia [x]   Thrombocytopenia [x]   Alopecia [x]   neuropathy [x]   [place] diet &  the importance of fluids [x]       Micellaneous  Importance of nutrition [x]   Importance of oral hygiene [x]   When to call the MD [x]   Monitoring labs [x]   Use of supportive services []     Explanation of Drug Regimen / Frequency  Oxaliplatin:  D1C1     Comments  Verbalized understanding to drug,action,side effects and when to call MD         Problem: Discharge Planning:  Goal: Discharged to appropriate level of care  Description: Discharged to appropriate level of care  Outcome: Met This Shift  Note: Verbalized understanding of discharge instructions, follow-up appointments, and when to call the physician. Intervention: Assess readiness for discharge  Description: Assess readiness for discharge  Note: Discuss understanding of discharge instructions,follow-up appointments, and when to call the physician.       Problem: Infection - Central Venous Catheter-Associated Bloodstream Infection:  Goal: Will show no infection signs and symptoms  Description: Will show no infection signs and symptoms  Outcome: Met This Shift  Note: Mediport site with no redness or warmth. Skin over port site intact with no signs of breakdown noted. Patient verbalizes signs/symptoms of port infection and when to notify the physician. Intervention: Infection risk assessment  Description: Infection risk assessment  Note: Discuss port maintenance, infection prevention, signs and when to call Dr Glynn Dowell reviewed with patient. Patient verbalize understanding of the plan of care and contribute to goal setting.

## 2021-11-09 NOTE — PROGRESS NOTES
[facility] [address] [facility], [address] [facility] [address]  [facility]  Dept: [phone]  Dept Fax: [phone]: [phone]    Visit Date:11/9/2021     Deborah Manning is a 45 y.o. male who presents today for:   Chief Complaint   Patient presents with    Follow-up     COLON CANCER        HPI:     Mr. Oliva Clay is a 30-year-old patient with newly diagnosed stage III colon cancer. He presents to the medical oncology clinic to discuss postsurgical management. His history of colon cancer goes back to September 2021 when he was seen by  Dr. Collette Hedge due to unexplained weight loss of 10-15 lbs and rectal bleeding. Colonoscopy performed on September 1, 2021 demonstrated multiple polyps but a larger mass at the proximal transverse colon demonstrated the adenocarcinoma. He did not have signs of obstruction, no abdominal pain, no significant nausea or vomiting. Some previous episodes of nausea, GERD and epigastric discomfort. He had CT of the chest abdomen and pelvis on September 2, 2021,no evidence of metastatic disease. The patient met with Dr. Matthew Oden and after discussing his surgical treatment options he proceeded with right hemicolectomy on September 16, 2021. Final pathology report showed:  A.  Right colon mass, hemicolectomy:    Invasive moderately differentiated colonic adenocarcinoma, pT2.    Margins are free of neoplasia.           Pericolonic lymph nodes (17): 2 out of 17 are positive for   metastatic colonic adenocarcinoma.             Appendix-no pathologic abnormality. Wenda Hail base lymph nodes (2), resection:             Negative for malignancy (0/2). pT2 pN1b    He had uneventful post surgical course. Extensive history of colorectal disease paternal side. He is fairly active and eats a healthy diet. Denies any new urinary complaints.       Current history on November 9, 2021:  The patient presents to the medical oncology clinic to start adjuvant chemotherapy with XELOX today. He received Xeloda and medication. In preparation for chemotherapy he underwent successful and uncomplicated Mediport placement. Today the patient denies having any new complaints since last visit.   His appetite is fine he denies having any abdominal pain  HPI   Past Medical History:   Diagnosis Date    Anemia     Colon cancer ([address] 2021      Past Surgical History:   Procedure Laterality Date    COLONOSCOPY  2021    Dr. Martinez Semen Right 2021    ROBOT EXTENDED RIGHT COLECTOMY performed by Radhika Boggs MD at [address] [place] [address] with mesh inguinal      Family History   Problem Relation Age of Onset    Other Mother         Brain aneurysm    COPD Mother     Stroke Mother     Cancer Father         lung mets brain and bone    Stroke Father     Cancer Maternal Grandmother         colon    High Blood Pressure Maternal Grandmother     Cancer Maternal Grandfather         colon     High Blood Pressure Maternal Grandfather     Kidney Disease Paternal Grandmother     Cancer Paternal Grandmother         breast     Diabetes Paternal Grandmother     High Blood Pressure Paternal Grandmother     Cancer Paternal Grandfather         lung-52    Heart Attack Paternal Grandfather     High Blood Pressure Paternal Grandfather     Cancer Paternal Aunt         colon    Heart Attack Paternal Aunt     Cancer Paternal Uncle         lung    Cancer Maternal Aunt         colon    Cancer Maternal Uncle         colon      Social History     Tobacco Use    Smoking status: Former Smoker     Packs/day: 1.00     Years: 35.00     Pack years: 35.00     Types: Cigarettes     Quit date: 2020     Years since quittin.7    Smokeless tobacco: Never Used   Substance Use Topics    Alcohol use: Not Currently      Current Outpatient Medications   Medication Sig Dispense Refill    prochlorperazine (COMPAZINE) 5 MG tablet Take 1 tablet by mouth every 12 hours for 14 days Take 30 minutes before morning and afternoon dose of Xeloda 28 tablet 2    [START ON 11/10/2021] dexamethasone (DECADRON) 4 MG tablet Take 2 tablets by mouth daily (with breakfast) for 3 doses 6 tablet 3    ondansetron (ZOFRAN) 4 MG tablet Take 1 tablet by mouth every 6 hours as needed for Nausea or Vomiting 30 tablet 1    ketorolac (TORADOL) 10 MG tablet Take 1 tablet by mouth every 8 hours as needed for Pain 15 tablet 0    HYDROcodone-acetaminophen (NORCO) 5-325 MG per tablet Take 1-2 tablets by mouth every 6 hours as needed for Pain for up to 15 doses. 15 tablet 0    sucralfate (CARAFATE) 1 GM tablet take 1 tablet by mouth twice a day FIVE TO 60 MINS BEFORE LUNCH AND DINNER      ondansetron (ZOFRAN-ODT) 4 MG disintegrating tablet       capecitabine (XELODA) 500 MG chemo tablet Take 4 tablets by mouth 2 times daily 112 tablet 2    ibuprofen (ADVIL;MOTRIN) 200 MG tablet Take 200 mg by mouth every 6 hours as needed for Pain      ferrous sulfate (IRON 325) 325 (65 Fe) MG tablet Take 1 tablet by mouth daily (with breakfast) 90 tablet 1    omeprazole (PRILOSEC OTC) 20 MG tablet Take 20 mg by mouth daily      capecitabine (XELODA) 500 MG chemo tablet Take 4 tablets (2,000 mg total) by mouth 2 times daily for 28 doses Beginning in the evening of Day 1 until the morning of Day 15. 112 tablet 0     No current facility-administered medications for this visit.      Facility-Administered Medications Ordered in Other Visits   Medication Dose Route Frequency Provider Last Rate Last Admin    sodium chloride flush 0.9 % injection 5-40 mL  5-40 mL IntraVENous PRN Shi Marshall MD   20 mL at 11/09/21 0811    heparin flush 100 UNIT/ML injection 500 Units  500 Units IntraCATHeter PRN Shi Marshall MD        dextrose 5 % solution   IntraVENous Once Shi Marshall MD 20 mL/hr at 11/09/21 0903 New Bag at 11/09/21 0903    oxaliplatin (ELOXATIN) 250 mg in dextrose 5 % 250 mL chemo IVPB  130 mg/m2 (Order-Specific) IntraVENous Once Wellington Harvey MD          No Known Allergies   Health Maintenance   Topic Date Due    COVID-19 Vaccine (2 - Booster for Hesham series) 07/12/2021    Shingles Vaccine (2 of 2) 11/18/2021    Pneumococcal 0-64 years Vaccine (2 of 4 - PCV13) 07/23/2022    Colon cancer screen colonoscopy  09/01/2022    Low dose CT lung screening  09/02/2022    Lipid screen  07/23/2026    DTaP/Tdap/Td vaccine (2 - Td or Tdap) 07/23/2031    Flu vaccine  Completed    Hepatitis A vaccine  Aged Out    Hepatitis B vaccine  Aged Out    Hib vaccine  Aged Out    Meningococcal (ACWY) vaccine  Aged Out    Hepatitis C screen  Discontinued    HIV screen  Discontinued        Subjective:   Review of Systems   Constitutional: Negative for activity change, appetite change, fatigue and fever. HENT: Negative for congestion, dental problem, facial swelling, hearing loss, mouth sores, nosebleeds, sore throat, tinnitus and trouble swallowing. Eyes: Negative for discharge and visual disturbance. Respiratory: Negative for cough, chest tightness, shortness of breath and wheezing. Cardiovascular: Negative for chest pain, palpitations and leg swelling. Gastrointestinal: Negative for abdominal distention, abdominal pain, blood in stool, constipation, diarrhea, nausea, rectal pain and vomiting. Endocrine: Negative for cold intolerance, polydipsia and polyuria. Genitourinary: Negative for decreased urine volume, difficulty urinating, dysuria, flank pain, hematuria and urgency. Musculoskeletal: Negative for arthralgias, back pain, gait problem, joint swelling, myalgias and neck stiffness. Skin: Negative for color change, rash and wound. Neurological: Negative for dizziness, tremors, seizures, speech difficulty, weakness, light-headedness, numbness and headaches. Hematological: Negative for adenopathy. Does not bruise/bleed easily.    Psychiatric/Behavioral: Negative for confusion and sleep disturbance. The patient is not nervous/anxious. Objective:   Physical Exam  Vitals reviewed. Constitutional:       General: He is not in acute distress. Appearance: He is well-developed. HENT:      Head: Normocephalic. Mouth/Throat:      Pharynx: No oropharyngeal exudate. Eyes:      General: No scleral icterus. Right eye: No discharge. Left eye: No discharge. Pupils: Pupils are equal, round, and reactive to light. Neck:      Thyroid: No thyromegaly. Vascular: No JVD. Trachea: No tracheal deviation. Cardiovascular:      Rate and Rhythm: Normal rate. Heart sounds: Normal heart sounds. No murmur heard. No friction rub. No gallop. Pulmonary:      Effort: Pulmonary effort is normal. No respiratory distress. Breath sounds: Normal breath sounds. No stridor. No wheezing or rales. Chest:      Chest wall: No tenderness. Abdominal:      General: Bowel sounds are normal. There is no distension. Palpations: Abdomen is soft. There is no mass. Tenderness: There is no abdominal tenderness. There is no rebound. Musculoskeletal:         General: Normal range of motion. Cervical back: Normal range of motion and neck supple. Comments: Good range of motion in all four extremities. Lymphadenopathy:      Cervical: No cervical adenopathy. Skin:     General: Skin is warm. Findings: No erythema or rash. Neurological:      Mental Status: He is alert and oriented to person, place, and time. Cranial Nerves: No cranial nerve deficit. Motor: No abnormal muscle tone. Deep Tendon Reflexes: Reflexes are normal and symmetric. Psychiatric:         Behavior: Behavior normal.         Thought Content:  Thought content normal.         Judgment: Judgment normal.         /85 (Site: Left Upper Arm, Position: Sitting, Cuff Size: Medium Adult)   Pulse 68   Temp 98.4 °F (36.9 °C) (Oral)   Resp 16 Ht 6' (1.829 m)   Wt 151 lb (68.5 kg)   SpO2 97%   BMI 20.48 kg/m²      ECOG status is 0    Imaging studies and labs:   CT of the abdomen and pelvis on September 2, 2021 showed:   1. Unremarkable CT abdomen and pelvis with IV contrast.    CT of the chest on September 2, 2021 showed:  1. No suspicious pulmonary nodules. 2. No evidence of metastatic disease. 3. No acute infiltrate or pleural effusion       CT ABDOMEN PELVIS W IV CONTRAST Additional Contrast? None    Result Date: 10/21/2021  PROCEDURE: CT ABDOMEN PELVIS W IV CONTRAST CLINICAL INFORMATION: Abdominal pain. COMPARISON: CT abdomen and pelvis 9/2/2021. TECHNIQUE: Axial 5 mm CT images were obtained through the abdomen and pelvis after the administration of 80  cc Isovue 370 intravenous contrast. Coronal and sagittal reconstructions were obtained. All CT scans at this facility use dose modulation, iterative reconstruction, and/or weight-based dosing when appropriate to reduce radiation dose to as low as reasonably achievable. FINDINGS: Lung bases: Atelectasis is noted at the lung bases. The heart size is normal. Liver/gallbladder/bilary tree: Thickening of the wall of the gallbladder near the fundus could represent adenomyomatosis. No radiopaque gallstones or biliary ductal dilatation is observed. No liver lesions are identified. Pancreas/spleen/adrenal glands: Normal size and attenuation. Kidneys/ureters/bladder: No renal calculus, hydronephrosis, or hydroureter is present. The urinary bladder is unremarkable. Gastrointestinal:  Postoperative changes related to right hemicolectomy with a surgical anastomosis in the right lower quadrant appears intact. There is question area of intussusception of small bowel loops within the transverse colon at the site of the anastomosis (series 2, image 45). No bowel obstruction, free fluid, fluid collection, or free air is observed. Moderate retained fecal material is seen throughout the colon. Retroperitoneum/lymph nodes: The aorta is not dilated. No lymphadenopathy is present. Pelvis: The prostate gland is not enlarged. Musculoskeletal: The visualized skeletal structures appear intact. 1. Postoperative changes related to right hemicolectomy appear intact. There may be intussusception of the small bowel within the colon at the site of the surgical anastomosis (series 2, image 45) however there is no associated bowel obstruction, free fluid, fluid collection, or free air identified. Moderate retained fecal material is seen throughout the colon suggesting fecal stasis. Assessment for bowel wall thickening is limited without oral contrast. 2. 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 No radiopaque gallstones or biliary ductal dilatation identified. Correlate with liver function tests. Chronic findings are discussed. **This report has been created using voice recognition software. It may contain minor errors which are inherent in voice recognition technology. ** Final report electronically signed by Dr Yaya Saavedra on 10/21/2021 10:02 AM      Lab Results   Component Value Date    WBC 8.4 11/09/2021    HGB 13.3 (L) 11/09/2021    HCT 40.9 (L) 11/09/2021    MCV 80 11/09/2021     11/09/2021       Chemistry        Component Value Date/Time     11/09/2021 0816     10/21/2021 0745    K 4.1 11/09/2021 0816    K 3.9 10/21/2021 0745     10/21/2021 0745    CO2 26 10/21/2021 0745    BUN 14 10/21/2021 0745    CREATININE 0.8 11/09/2021 0816    CREATININE 0.9 10/21/2021 0745        Component Value Date/Time    CALCIUM 9.6 10/21/2021 0745    ALKPHOS 128 (H) 10/21/2021 0745    AST 91 (H) 10/21/2021 0745    ALT 59 10/21/2021 0745    BILITOT 0.4 10/21/2021 0745          Assessment/Plan:   1. Stage III A colon cancer. This is a 79-year-old patient with newly diagnosed stage IIIa colon cancer. He is status post right hemicolectomy colectomy with lymph node dissection.   I had a lengthy discussion with the patient and his wife about post surgical management. Adjuvant chemotherapy is indicated for the patients with lymph node involvement. For patients who have undergone potentially curative resection of a colon cancer, the goal of postoperative chemotherapy is to eradicate micrometastases, thereby reducing the likelihood of disease recurrence and increasing the cure rate. The benefits of adjuvant chemotherapy for patients with node-positive disease are in the range of 30 % reduction in the risk of disease recurrence and a 20-30% reduction in mortality with modern chemotherapy. Adjuvant therapy for resected stage III colon cancer involves a six-month course of a combination of several chemotherapy drugs, which are given intravenously, in a specific order on specific days. However, based on the [facility] and NSABP C-07 trials and an analysis of the International Duration Evaluation of Adjuvant Chemotherapy (IDEA) collaboration (six separate randomized trials of six versus three months of adjuvant oxaliplatin-based therapy), we continue to suggest six months of therapy for individuals with high-risk cancers (T4N2). However, patients with high-risk cancers ramiro limiting adjuvant therapy to three months in patients with low-risk disease (T1-3N1) is valid option.  Orally active fluoropyrimidine,  Xeloda offers increased convenience. CAPOX is the preferred regimen if a 3-month course of adjuvant therapy is chosen. 2. Adjuvant chemotherapy with  CAPOX. Presents to the medical oncology clinic to start cycle #1 of CAPOX. He underwent successful and uncomplicated Mediport placement. He received his Xeloda supply. The patient was instructed to start taking Xeloda tonight: 4 tablets. He will continue taking 4 tablets twice a day for 14 days. He was instructed to take Compazine half an hour before the morning and evening dose of Xeloda.    We discussed possible side effects of capecitabine that include: hand foot syndrome, fatigue, nausea, vomiting, oral mucositis, diarrhea, neutropenia and risk of infection. Instructed to call us immediately with oral blisters with redness involving palms and plantar area of his feet. And uncontrolled diarrhea. The patient was instructed to use Biotene mouthwash. We talked about possible side effects from oxaliplatin. They are:  Side effect of FOLFOX include:   Diarrhea, nausea, vomiting, constipation, loss of appetite   Difficulty swallowing   Sores in mouth   Heartburn   Infection, especially when white blood cell count is low   Anemia which may require a blood transfusion   Bruising, bleeding   Headache   Tiredness   Numbness, tingling or pain, \"pins and needles\" of the hands, feet, arms and legs   Tingling or a loss of feeling in your hands, feet, nose, or tightness in throat or jaw, or difficulty swallowing or breathing which may be made worse by exposure to cold   Cough   Fever, pain    Placed on dexamethasone for acute chemotherapy-induced nausea. He was instructed to take 8 mg for 3 days starting tomorrow  Familial adenomatous polyposis and Martin syndrome are the most common of the familial colon cancer syndromes. The patient does have strong family history of colon cancer. Diagnosis Orders   1. Malignant neoplasm of colon, unspecified part of colon ([facility] [address]  CBC Auto Differential    POC PANEL BMP W/IOCA    Hepatic Function Panel   2. Adenocarcinoma, colon ([address]     3. S/P right colectomy     4. Encounter for chemotherapy management          Plan:   Return in about 3 weeks (around 11/30/2021).      Orders Placed:   Orders Placed This Encounter   Procedures    CBC Auto Differential     Standing Status:   Standing     Number of Occurrences:   4     Standing Expiration Date:   11/9/2022    POC PANEL BMP W/IOCA     Standing Status:   Standing     Number of Occurrences:   4     Standing Expiration Date:   11/9/2022    Hepatic Function Panel     Standing Status: Standing     Number of Occurrences:   4     Standing Expiration Date:   11/9/2022        Medications Prescribed:   Orders Placed This Encounter   Medications    prochlorperazine (COMPAZINE) 5 MG tablet     Sig: Take 1 tablet by mouth every 12 hours for 14 days Take 30 minutes before morning and afternoon dose of Xeloda     Dispense:  28 tablet     Refill:  2    dexamethasone (DECADRON) 4 MG tablet     Sig: Take 2 tablets by mouth daily (with breakfast) for 3 doses     Dispense:  6 tablet     Refill:  3            Discussed use, benefit, and side effectsof prescribed medications. All patient questions answered. Pt voiced understanding. Instructed to continue current medications, diet and exercise. Patient agreed with treatment plan. Follow up as directed.     Electronically signed by Collette Pillow, MD on 11/3/21 at 8:15 AM EDT

## 2021-11-09 NOTE — ONCOLOGY
Chemotherapy Administration    Pre-assessment Data: Antineoplastic Agents  See toxicity flow sheet for assessment                                          [x]         Interventions:   Chemotherapy SQ injection given []   Taxol administered-VS per protocol []   Blood pressure meds held 12 hours prior to Rituxan/Ruxience []   Rituxan/Ruxience administered- VS and precautions per guidelines []   Emergency drugs available as appropriate [x]   Anaphylaxis assessment completed [x]   Pre-medications administered as ordered [x]   Blood return noted upon initiation of chemotherapy [x]   Blood return noted each 1-2ml of a vesicant medication if given IV push []   Navelbine, Vincristine and Velban given as a monitored wide open drip, blood return noted before during and after infusion.  []   Blood return noted each 2-3ml of a non-vesicant medication if given IV push []   Patient aware of potential Immunotherapy toxicities []   Monitor for signs / symptoms of hypersensitivity reaction [x]   Chemotherapy orders (drug/dose/rate) verified by 2 Chemo certified RNs [x]   Monitor IV site and blood return throughout the infusion of the medication [x]   Document IV site checks on the IV assessment form [x]   Document chemotherapy teaching on the Patient Education tab [x]   Document patient verbalizes understanding of medications being administered [x]   If IV infiltration, see ONS Guidelines []   Other:     Oxaliplatin []

## 2021-11-12 ENCOUNTER — TELEPHONE (OUTPATIENT)
Dept: INFUSION THERAPY | Age: 51
End: 2021-11-12

## 2021-11-12 ENCOUNTER — HOSPITAL ENCOUNTER (OUTPATIENT)
Age: 51
Discharge: HOME | End: 2021-11-12
Payer: COMMERCIAL

## 2021-11-12 ENCOUNTER — HOSPITAL ENCOUNTER (OUTPATIENT)
Dept: GENERAL RADIOLOGY | Age: 51
Discharge: HOME | End: 2021-11-12
Payer: COMMERCIAL

## 2021-11-12 DIAGNOSIS — J93.9 PNEUMOTHORAX ON RIGHT: ICD-10-CM

## 2021-11-12 PROCEDURE — 71046 X-RAY EXAM CHEST 2 VIEWS: CPT

## 2021-11-12 NOTE — TELEPHONE ENCOUNTER
Patient called for follow up after first treatment. Patient reporting cold sensitivity. No other issues. Patient encouraged to call if problems arise.

## 2021-11-19 ENCOUNTER — HOSPITAL ENCOUNTER (OUTPATIENT)
Dept: INFUSION THERAPY | Age: 51
Discharge: HOME | End: 2021-11-19
Payer: COMMERCIAL

## 2021-11-19 ENCOUNTER — TELEPHONE (OUTPATIENT)
Dept: ONCOLOGY | Age: 51
End: 2021-11-19

## 2021-11-19 VITALS
BODY MASS INDEX: 19.86 KG/M2 | OXYGEN SATURATION: 98 % | WEIGHT: 146.6 LBS | DIASTOLIC BLOOD PRESSURE: 73 MMHG | HEIGHT: 72 IN | RESPIRATION RATE: 16 BRPM | TEMPERATURE: 98.8 F | HEART RATE: 80 BPM | SYSTOLIC BLOOD PRESSURE: 126 MMHG

## 2021-11-19 DIAGNOSIS — Z87.891 PERSONAL HISTORY OF TOBACCO USE: ICD-10-CM

## 2021-11-19 DIAGNOSIS — C18.4 CANCER OF TRANSVERSE COLON (HCC): ICD-10-CM

## 2021-11-19 DIAGNOSIS — C18.9 ADENOCARCINOMA, COLON (HCC): Primary | ICD-10-CM

## 2021-11-19 DIAGNOSIS — Z90.49 S/P RIGHT COLECTOMY: ICD-10-CM

## 2021-11-19 DIAGNOSIS — F10.21 HISTORY OF ALCOHOLISM (HCC): ICD-10-CM

## 2021-11-19 DIAGNOSIS — Z80.9 FAMILY HISTORY OF CANCER: ICD-10-CM

## 2021-11-19 LAB
ABSOLUTE IMMATURE GRANULOCYTE: 0.01 THOU/MM3 (ref 0–0.07)
BASINOPHIL, AUTOMATED: 0 % (ref 0–3)
BASOPHILS ABSOLUTE: 0 THOU/MM3 (ref 0–0.1)
BUN, WHOLE BLOOD: 16 MG/DL (ref 8–26)
CHLORIDE, WHOLE BLOOD: 100 MEQ/L (ref 98–109)
CREATININE, WHOLE BLOOD: 0.8 MG/DL (ref 0.5–1.2)
EOSINOPHILS ABSOLUTE: 0.1 THOU/MM3 (ref 0–0.4)
EOSINOPHILS RELATIVE PERCENT: 1 % (ref 0–4)
GFR, ESTIMATED: > 90 ML/MIN/1.73M2
GLUCOSE, WHOLE BLOOD: 96 MG/DL (ref 70–108)
HCT VFR BLD CALC: 44.7 % (ref 42–52)
HEMOGLOBIN: 14.6 GM/DL (ref 14–18)
IMMATURE GRANULOCYTES: 0 %
IONIZED CALCIUM, WHOLE BLOOD: 1.08 MMOL/L (ref 1.12–1.32)
LYMPHOCYTES # BLD: 15 % (ref 15–47)
LYMPHOCYTES ABSOLUTE: 1.2 THOU/MM3 (ref 1–4.8)
MCH RBC QN AUTO: 25.7 PG (ref 26–33)
MCHC RBC AUTO-ENTMCNC: 32.7 GM/DL (ref 32.2–35.5)
MCV RBC AUTO: 79 FL (ref 80–94)
MONOCYTES ABSOLUTE: 1.5 THOU/MM3 (ref 0.4–1.3)
MONOCYTES: 18 % (ref 0–12)
PDW BLD-RTO: 18.8 % (ref 11.5–14.5)
PLATELET # BLD: 201 THOU/MM3 (ref 130–400)
PMV BLD AUTO: 10 FL (ref 9.4–12.4)
POTASSIUM, WHOLE BLOOD: 3.6 MEQ/L (ref 3.5–4.9)
RBC # BLD: 5.67 MILL/MM3 (ref 4.7–6.1)
SEG NEUTROPHILS: 67 % (ref 43–75)
SEGMENTED NEUTROPHILS ABSOLUTE COUNT: 5.6 THOU/MM3 (ref 1.8–7.7)
SODIUM, WHOLE BLOOD: 137 MEQ/L (ref 138–146)
TOTAL CO2, WHOLE BLOOD: 25 MEQ/L (ref 23–33)
WBC # BLD: 8.4 THOU/MM3 (ref 4.8–10.8)

## 2021-11-19 PROCEDURE — 2580000003 HC RX 258: Performed by: PHYSICIAN ASSISTANT

## 2021-11-19 PROCEDURE — 6360000002 HC RX W HCPCS: Performed by: INTERNAL MEDICINE

## 2021-11-19 PROCEDURE — 96360 HYDRATION IV INFUSION INIT: CPT

## 2021-11-19 PROCEDURE — 96361 HYDRATE IV INFUSION ADD-ON: CPT

## 2021-11-19 PROCEDURE — 80047 BASIC METABLC PNL IONIZED CA: CPT

## 2021-11-19 PROCEDURE — 36591 DRAW BLOOD OFF VENOUS DEVICE: CPT

## 2021-11-19 PROCEDURE — 85025 COMPLETE CBC W/AUTO DIFF WBC: CPT

## 2021-11-19 PROCEDURE — 2580000003 HC RX 258: Performed by: INTERNAL MEDICINE

## 2021-11-19 RX ORDER — LOPERAMIDE HYDROCHLORIDE 2 MG/1
2 CAPSULE ORAL 4 TIMES DAILY PRN
COMMUNITY
End: 2021-12-06 | Stop reason: ALTCHOICE

## 2021-11-19 RX ORDER — HEPARIN SODIUM (PORCINE) LOCK FLUSH IV SOLN 100 UNIT/ML 100 UNIT/ML
500 SOLUTION INTRAVENOUS PRN
Status: CANCELLED | OUTPATIENT
Start: 2021-11-19

## 2021-11-19 RX ORDER — SODIUM CHLORIDE 0.9 % (FLUSH) 0.9 %
5-40 SYRINGE (ML) INJECTION PRN
Status: DISCONTINUED | OUTPATIENT
Start: 2021-11-19 | End: 2021-11-20 | Stop reason: HOSPADM

## 2021-11-19 RX ORDER — HEPARIN SODIUM (PORCINE) LOCK FLUSH IV SOLN 100 UNIT/ML 100 UNIT/ML
500 SOLUTION INTRAVENOUS PRN
Status: DISCONTINUED | OUTPATIENT
Start: 2021-11-19 | End: 2021-11-20 | Stop reason: HOSPADM

## 2021-11-19 RX ORDER — SODIUM CHLORIDE 9 MG/ML
25 INJECTION, SOLUTION INTRAVENOUS PRN
Status: CANCELLED | OUTPATIENT
Start: 2021-11-19

## 2021-11-19 RX ORDER — 0.9 % SODIUM CHLORIDE 0.9 %
1000 INTRAVENOUS SOLUTION INTRAVENOUS ONCE
Status: COMPLETED | OUTPATIENT
Start: 2021-11-19 | End: 2021-11-19

## 2021-11-19 RX ORDER — SODIUM CHLORIDE 0.9 % (FLUSH) 0.9 %
5-40 SYRINGE (ML) INJECTION PRN
Status: CANCELLED | OUTPATIENT
Start: 2021-11-19

## 2021-11-19 RX ADMIN — SODIUM CHLORIDE, PRESERVATIVE FREE 10 ML: 5 INJECTION INTRAVENOUS at 14:22

## 2021-11-19 RX ADMIN — SODIUM CHLORIDE, PRESERVATIVE FREE 20 ML: 5 INJECTION INTRAVENOUS at 12:25

## 2021-11-19 RX ADMIN — SODIUM CHLORIDE 1000 ML: 9 INJECTION, SOLUTION INTRAVENOUS at 12:25

## 2021-11-19 RX ADMIN — SODIUM CHLORIDE, PRESERVATIVE FREE 10 ML: 5 INJECTION INTRAVENOUS at 12:24

## 2021-11-19 RX ADMIN — Medication 500 UNITS: at 14:22

## 2021-11-19 ASSESSMENT — PAIN SCALES - GENERAL: PAINLEVEL_OUTOF10: 5

## 2021-11-19 ASSESSMENT — PAIN DESCRIPTION - LOCATION: LOCATION: ABDOMEN

## 2021-11-19 ASSESSMENT — PAIN DESCRIPTION - PAIN TYPE: TYPE: ACUTE PAIN

## 2021-11-19 NOTE — PROGRESS NOTES
Patient assessed for the following post IV hydration:    Dizziness   No  Lightheadedness  No      Acute nausea/vomiting No  Headache   No  Chest pain/pressure  No  Rash/itching   No  Shortness of breath  No    Patient tolerated IV hydration without any complications. Last vital signs:   /73   Pulse 80   Temp 98.8 °F (37.1 °C) (Oral)   Resp 16   Ht 6' (1.829 m)   Wt 146 lb 9.6 oz (66.5 kg)   SpO2 98%   BMI 19.88 kg/m²     Attending physician notified of nausea and cold sensitivity , orders received: No and patient instructed by Grazyna GARDUNO about immodium dosing and diet options. Patient instructed if experience any of the above symptoms following today's infusion, he is to notify MD immediately or go to the emergency department. Discharge instructions given to patient. Verbalizes understanding. Ambulated off unit per self, with belongings.

## 2021-11-19 NOTE — PLAN OF CARE
Problem: Pain:  Description: Pain management should include both nonpharmacologic and pharmacologic interventions. Goal: Pain level will decrease  Description: Pain level will decrease  Outcome: Met This Shift  Goal: Control of acute pain  Description: Control of acute pain  Outcome: Met This Shift  Note: Patient rating pain a 2 out of 10 using SOURAV scale, Pain located in abdomen. Goal: Control of chronic pain  Description: Control of chronic pain  Outcome: Met This Shift  Intervention: Assess barriers to pain control  Description: Assess barriers to pain control - REMINDER(s):  Addiction concerns. Healthcare practices, cultural.  Pain medication concerns. Past pain experiences. Note: Patient encouraged to take prescribed pain medications and call Physician if pain is not controlled. Problem: Musculor/Skeletal Functional Status  Goal: Absence of falls  Outcome: Met This Shift  Note: Patient free from falls while in O.P. Oncology. Intervention: Fall precautions  Note: [facility] Fall band placed on patient. Discussed the need to use the call light for assistance prior to getting up out of chair/bed. Problem: Intellectual/Education/Knowledge Deficit  Goal: Teaching initiated upon admission  Outcome: Met This Shift  Note: Verbalizes understanding to signs and symptoms of dehydration and when to call the Physician. Intervention: Verbal/written education provided  Note: Patient received 1000 ml NS IV infusion over 2 hours. Patient drank 250 ml of water while in OP oncology. Encouraged patient to drink 48 to 64 ounces of fluids everyday. Problem: Discharge Planning:  Goal: Discharged to appropriate level of care  Description: Discharged to appropriate level of care  Outcome: Met This Shift  Note: Verbalized understanding of discharge instructions, follow-up appointments, and when to call the physician.    Intervention: Assess readiness for discharge  Description: Assess readiness for discharge  Note: Discuss understanding of discharge instructions,follow-up appointments, and when to call the physician. Problem: Infection - Central Venous Catheter-Associated Bloodstream Infection:  Goal: Will show no infection signs and symptoms  Description: Will show no infection signs and symptoms  Outcome: Met This Shift  Note: Mediport site with no redness or warmth. Skin over port site intact with no signs of breakdown noted. Patient verbalizes signs/symptoms of port infection and when to notify the physician. Intervention: Infection risk assessment  Description: Infection risk assessment  Note: Discuss port maintenance, infection prevention, signs and when to call Dr Prem Powell reviewed with patient. Patient verbalize understanding of the plan of care and contribute to goal setting.

## 2021-11-19 NOTE — TELEPHONE ENCOUNTER
Patient called and stated that he is nauseated that is not uncontrolled with medication,having diarrhea and Imodium is not helping. Patient stated that he is not eating,and has prickly sensation in his fingers. Please advise and thanks.

## 2021-11-22 ENCOUNTER — TELEPHONE (OUTPATIENT)
Dept: ONCOLOGY | Age: 51
End: 2021-11-22

## 2021-11-22 ENCOUNTER — HOSPITAL ENCOUNTER (OUTPATIENT)
Dept: INFUSION THERAPY | Age: 51
Discharge: HOME | DRG: 394 | End: 2021-11-22
Payer: COMMERCIAL

## 2021-11-22 VITALS
HEART RATE: 67 BPM | OXYGEN SATURATION: 98 % | BODY MASS INDEX: 19.69 KG/M2 | DIASTOLIC BLOOD PRESSURE: 67 MMHG | SYSTOLIC BLOOD PRESSURE: 122 MMHG | WEIGHT: 145.4 LBS | TEMPERATURE: 98.3 F | HEIGHT: 72 IN | RESPIRATION RATE: 16 BRPM

## 2021-11-22 DIAGNOSIS — C18.9 MALIGNANT NEOPLASM OF COLON, UNSPECIFIED PART OF COLON (HCC): Primary | ICD-10-CM

## 2021-11-22 DIAGNOSIS — F10.21 HISTORY OF ALCOHOLISM (HCC): ICD-10-CM

## 2021-11-22 DIAGNOSIS — K52.1 DIARRHEA DUE TO DRUG: ICD-10-CM

## 2021-11-22 DIAGNOSIS — E87.6 HYPOKALEMIA: ICD-10-CM

## 2021-11-22 DIAGNOSIS — C18.9 ADENOCARCINOMA, COLON (HCC): ICD-10-CM

## 2021-11-22 DIAGNOSIS — Z90.49 S/P RIGHT COLECTOMY: ICD-10-CM

## 2021-11-22 DIAGNOSIS — K52.1 DIARRHEA DUE TO DRUG: Primary | ICD-10-CM

## 2021-11-22 DIAGNOSIS — Z80.9 FAMILY HISTORY OF CANCER: ICD-10-CM

## 2021-11-22 DIAGNOSIS — Z87.891 PERSONAL HISTORY OF TOBACCO USE: ICD-10-CM

## 2021-11-22 DIAGNOSIS — C18.4 CANCER OF TRANSVERSE COLON (HCC): ICD-10-CM

## 2021-11-22 LAB
BUN, WHOLE BLOOD: 8 MG/DL (ref 8–26)
CHLORIDE, WHOLE BLOOD: 98 MEQ/L (ref 98–109)
CREATININE, WHOLE BLOOD: 0.6 MG/DL (ref 0.5–1.2)
GFR, ESTIMATED: > 90 ML/MIN/1.73M2
GLUCOSE, WHOLE BLOOD: 105 MG/DL (ref 70–108)
IONIZED CALCIUM, WHOLE BLOOD: 1.11 MMOL/L (ref 1.12–1.32)
POTASSIUM, WHOLE BLOOD: 2.8 MEQ/L (ref 3.5–4.9)
SODIUM, WHOLE BLOOD: 137 MEQ/L (ref 138–146)
TOTAL CO2, WHOLE BLOOD: 28 MEQ/L (ref 23–33)

## 2021-11-22 PROCEDURE — 96365 THER/PROPH/DIAG IV INF INIT: CPT

## 2021-11-22 PROCEDURE — 2580000003 HC RX 258: Performed by: INTERNAL MEDICINE

## 2021-11-22 PROCEDURE — 6360000002 HC RX W HCPCS: Performed by: INTERNAL MEDICINE

## 2021-11-22 PROCEDURE — 96366 THER/PROPH/DIAG IV INF ADDON: CPT

## 2021-11-22 PROCEDURE — 80047 BASIC METABLC PNL IONIZED CA: CPT

## 2021-11-22 PROCEDURE — 36591 DRAW BLOOD OFF VENOUS DEVICE: CPT

## 2021-11-22 RX ORDER — SODIUM CHLORIDE 9 MG/ML
25 INJECTION, SOLUTION INTRAVENOUS PRN
Status: CANCELLED | OUTPATIENT
Start: 2021-11-22

## 2021-11-22 RX ORDER — HEPARIN SODIUM (PORCINE) LOCK FLUSH IV SOLN 100 UNIT/ML 100 UNIT/ML
500 SOLUTION INTRAVENOUS PRN
Status: DISCONTINUED | OUTPATIENT
Start: 2021-11-22 | End: 2021-11-23 | Stop reason: HOSPADM

## 2021-11-22 RX ORDER — SODIUM CHLORIDE 0.9 % (FLUSH) 0.9 %
5-40 SYRINGE (ML) INJECTION PRN
Status: CANCELLED | OUTPATIENT
Start: 2021-11-22

## 2021-11-22 RX ORDER — HEPARIN SODIUM (PORCINE) LOCK FLUSH IV SOLN 100 UNIT/ML 100 UNIT/ML
500 SOLUTION INTRAVENOUS PRN
Status: CANCELLED | OUTPATIENT
Start: 2021-11-22

## 2021-11-22 RX ORDER — SODIUM CHLORIDE 9 MG/ML
25 INJECTION, SOLUTION INTRAVENOUS PRN
OUTPATIENT
Start: 2021-11-22

## 2021-11-22 RX ORDER — DIPHENOXYLATE HYDROCHLORIDE AND ATROPINE SULFATE 2.5; .025 MG/1; MG/1
1 TABLET ORAL 4 TIMES DAILY
Qty: 40 TABLET | Refills: 0 | Status: ON HOLD | OUTPATIENT
Start: 2021-11-22 | End: 2021-12-04 | Stop reason: HOSPADM

## 2021-11-22 RX ORDER — SODIUM CHLORIDE 0.9 % (FLUSH) 0.9 %
5-40 SYRINGE (ML) INJECTION PRN
Status: DISCONTINUED | OUTPATIENT
Start: 2021-11-22 | End: 2021-11-23 | Stop reason: HOSPADM

## 2021-11-22 RX ORDER — SODIUM CHLORIDE 0.9 % (FLUSH) 0.9 %
5-40 SYRINGE (ML) INJECTION PRN
OUTPATIENT
Start: 2021-11-22

## 2021-11-22 RX ORDER — HEPARIN SODIUM (PORCINE) LOCK FLUSH IV SOLN 100 UNIT/ML 100 UNIT/ML
500 SOLUTION INTRAVENOUS PRN
OUTPATIENT
Start: 2021-11-22

## 2021-11-22 RX ADMIN — SODIUM CHLORIDE, PRESERVATIVE FREE 20 ML: 5 INJECTION INTRAVENOUS at 12:31

## 2021-11-22 RX ADMIN — Medication 500 UNITS: at 15:33

## 2021-11-22 RX ADMIN — SODIUM CHLORIDE, PRESERVATIVE FREE 10 ML: 5 INJECTION INTRAVENOUS at 12:30

## 2021-11-22 RX ADMIN — SODIUM CHLORIDE, PRESERVATIVE FREE 10 ML: 5 INJECTION INTRAVENOUS at 15:33

## 2021-11-22 RX ADMIN — POTASSIUM CHLORIDE: 2 INJECTION, SOLUTION, CONCENTRATE INTRAVENOUS at 13:18

## 2021-11-22 NOTE — PROGRESS NOTES
Patient tolerated 500ml 0.9 NS with 40meq KCL over 2 hours without any complications. Denies dizziness, lightheadedness, acute nausea or vomiting, headache, heart palpitations, rash/itching or increased SOB. Last vital signs  /67   Pulse 67   Temp 98.3 °F (36.8 °C) (Oral)   Resp 16   Ht 6' (1.829 m)   Wt 145 lb 6.4 oz (66 kg)   SpO2 98%   BMI 19.72 kg/m²     Patient instructed if they experience any of the above symptoms following today's visit, he/she is to notify the Physician or go to the Emergency Dept. Discharge instructions given to patient, Verbalizes understanding. Ambulated off unit per self in stable condition with all belongings.

## 2021-11-22 NOTE — PLAN OF CARE
Problem: Musculor/Skeletal Functional Status  Goal: Absence of falls  Outcome: Met This Shift  Note: Free from falls while in O.P. Oncology. Intervention: Fall precautions  Note: Discussed the need to use the call light for assistance when getting up to ambulate. Problem: Intellectual/Education/Knowledge Deficit  Goal: Teaching initiated upon admission  Outcome: Met This Shift  Note: Patient verbalizes understanding to verbal information given on 500ml 0.9 NS with 40meq KCL over 2 hours,action and possible side effects. Aware to call MD if develop complications. Intervention: Verbal/written education provided  Note: Patient received 500ml 0.9 NS with 40meq KCL IV infusion over 2 hours. Patient drank some gator aid while in OP oncology. Encouraged patient to drink 48 to 64 ounces of fluids everyday. Problem: Discharge Planning:  Goal: Discharged to appropriate level of care  Description: Discharged to appropriate level of care  Outcome: Met This Shift  Note: Verbalize understanding of discharge instructions, follow up appointments, and when to call Physician. Intervention: Assess readiness for discharge  Note: Discuss understanding of discharge instructions, follow up appointments and when to call Physician. Problem: Infection - Central Venous Catheter-Associated Bloodstream Infection:  Goal: Will show no infection signs and symptoms  Description: Will show no infection signs and symptoms  Outcome: Met This Shift  Note: Discuss port maintenance,infection prevention, sign of infection and when to call MD.    Intervention: Infection risk assessment  Note: Mediport site with no redness or warmth. Skin over port site intact with no signs of breakdown noted. Patient verbalizes signs/symptoms of port infection and when to notify the physician. Care plan reviewed with patient. Patient verbalize understanding of the plan of care and contribute to goal setting.

## 2021-11-22 NOTE — TELEPHONE ENCOUNTER
I called him back, he did not  the phone. I asked him to give us call back.   When he calls back please grab me

## 2021-11-23 ENCOUNTER — HOSPITAL ENCOUNTER (OUTPATIENT)
Dept: INFUSION THERAPY | Age: 51
Discharge: HOME | DRG: 394 | End: 2021-11-23
Payer: COMMERCIAL

## 2021-11-23 VITALS
OXYGEN SATURATION: 98 % | RESPIRATION RATE: 16 BRPM | HEIGHT: 72 IN | WEIGHT: 144.6 LBS | SYSTOLIC BLOOD PRESSURE: 110 MMHG | BODY MASS INDEX: 19.59 KG/M2 | DIASTOLIC BLOOD PRESSURE: 55 MMHG | TEMPERATURE: 97.8 F | HEART RATE: 70 BPM

## 2021-11-23 DIAGNOSIS — E87.6 HYPOKALEMIA: ICD-10-CM

## 2021-11-23 DIAGNOSIS — C18.4 CANCER OF TRANSVERSE COLON (HCC): ICD-10-CM

## 2021-11-23 DIAGNOSIS — C18.9 ADENOCARCINOMA, COLON (HCC): ICD-10-CM

## 2021-11-23 DIAGNOSIS — K52.1 DIARRHEA DUE TO DRUG: ICD-10-CM

## 2021-11-23 DIAGNOSIS — Z87.891 PERSONAL HISTORY OF TOBACCO USE: ICD-10-CM

## 2021-11-23 DIAGNOSIS — Z80.9 FAMILY HISTORY OF CANCER: ICD-10-CM

## 2021-11-23 DIAGNOSIS — Z90.49 S/P RIGHT COLECTOMY: Primary | ICD-10-CM

## 2021-11-23 DIAGNOSIS — F10.21 HISTORY OF ALCOHOLISM (HCC): ICD-10-CM

## 2021-11-23 DIAGNOSIS — C18.9 MALIGNANT NEOPLASM OF COLON, UNSPECIFIED PART OF COLON (HCC): ICD-10-CM

## 2021-11-23 LAB
BUN, WHOLE BLOOD: 10 MG/DL (ref 8–26)
CHLORIDE, WHOLE BLOOD: 97 MEQ/L (ref 98–109)
CREATININE, WHOLE BLOOD: 0.9 MG/DL (ref 0.5–1.2)
GFR, ESTIMATED: > 90 ML/MIN/1.73M2
GLUCOSE, WHOLE BLOOD: 120 MG/DL (ref 70–108)
IONIZED CALCIUM, WHOLE BLOOD: 1.11 MMOL/L (ref 1.12–1.32)
POTASSIUM, WHOLE BLOOD: 3.2 MEQ/L (ref 3.5–4.9)
SODIUM, WHOLE BLOOD: 131 MEQ/L (ref 138–146)
TOTAL CO2, WHOLE BLOOD: 24 MEQ/L (ref 23–33)

## 2021-11-23 PROCEDURE — 36591 DRAW BLOOD OFF VENOUS DEVICE: CPT

## 2021-11-23 PROCEDURE — 96366 THER/PROPH/DIAG IV INF ADDON: CPT

## 2021-11-23 PROCEDURE — 96365 THER/PROPH/DIAG IV INF INIT: CPT

## 2021-11-23 PROCEDURE — 6360000002 HC RX W HCPCS: Performed by: PHYSICIAN ASSISTANT

## 2021-11-23 PROCEDURE — 2580000003 HC RX 258: Performed by: INTERNAL MEDICINE

## 2021-11-23 PROCEDURE — 2580000003 HC RX 258: Performed by: PHYSICIAN ASSISTANT

## 2021-11-23 PROCEDURE — 6360000002 HC RX W HCPCS: Performed by: INTERNAL MEDICINE

## 2021-11-23 PROCEDURE — 80047 BASIC METABLC PNL IONIZED CA: CPT

## 2021-11-23 RX ORDER — HEPARIN SODIUM (PORCINE) LOCK FLUSH IV SOLN 100 UNIT/ML 100 UNIT/ML
500 SOLUTION INTRAVENOUS PRN
Status: DISCONTINUED | OUTPATIENT
Start: 2021-11-23 | End: 2021-11-24 | Stop reason: HOSPADM

## 2021-11-23 RX ORDER — SODIUM CHLORIDE 9 MG/ML
25 INJECTION, SOLUTION INTRAVENOUS PRN
Status: CANCELLED | OUTPATIENT
Start: 2021-11-23

## 2021-11-23 RX ORDER — SODIUM CHLORIDE 0.9 % (FLUSH) 0.9 %
5-40 SYRINGE (ML) INJECTION PRN
Status: DISCONTINUED | OUTPATIENT
Start: 2021-11-23 | End: 2021-11-24 | Stop reason: HOSPADM

## 2021-11-23 RX ORDER — HEPARIN SODIUM (PORCINE) LOCK FLUSH IV SOLN 100 UNIT/ML 100 UNIT/ML
500 SOLUTION INTRAVENOUS PRN
Status: CANCELLED | OUTPATIENT
Start: 2021-11-23

## 2021-11-23 RX ORDER — SODIUM CHLORIDE 0.9 % (FLUSH) 0.9 %
5-40 SYRINGE (ML) INJECTION PRN
Status: CANCELLED | OUTPATIENT
Start: 2021-11-23

## 2021-11-23 RX ADMIN — SODIUM CHLORIDE, PRESERVATIVE FREE 20 ML: 5 INJECTION INTRAVENOUS at 12:07

## 2021-11-23 RX ADMIN — SODIUM CHLORIDE, PRESERVATIVE FREE 10 ML: 5 INJECTION INTRAVENOUS at 12:06

## 2021-11-23 RX ADMIN — POTASSIUM CHLORIDE: 2 INJECTION, SOLUTION, CONCENTRATE INTRAVENOUS at 12:44

## 2021-11-23 RX ADMIN — SODIUM CHLORIDE, PRESERVATIVE FREE 10 ML: 5 INJECTION INTRAVENOUS at 15:03

## 2021-11-23 RX ADMIN — Medication 500 UNITS: at 15:03

## 2021-11-23 ASSESSMENT — PAIN DESCRIPTION - LOCATION: LOCATION: ABDOMEN

## 2021-11-23 ASSESSMENT — PAIN SCALES - GENERAL: PAINLEVEL_OUTOF10: 5

## 2021-11-23 ASSESSMENT — PAIN DESCRIPTION - PAIN TYPE: TYPE: ACUTE PAIN

## 2021-11-23 NOTE — PLAN OF CARE
Problem: Pain:  Description: Pain management should include both nonpharmacologic and pharmacologic interventions. Goal: Control of chronic pain  Description: Control of chronic pain  Outcome: Met This Shift  Note: Patient rating pain a 5 out of 10 using SOURAV scale, Pain located in Abdomen. Intervention: Promote participation in pain management plan  Note: Patient encouraged to take prescribed pain medications and call Physician if pain is not controlled. Problem: Musculor/Skeletal Functional Status  Goal: Absence of falls  Outcome: Met This Shift  Note: Free from falls while in O.P. Oncology. Intervention: Fall precautions  Note: Discussed the need to use the call light for assistance when getting up to ambulate. Problem: Intellectual/Education/Knowledge Deficit  Goal: Teaching initiated upon admission  Outcome: Met This Shift  Note: Patient verbalizes understanding to verbal information given on 500ml 0.9 NS bolus over 2 hours,action and possible side effects. Aware to call MD if develop complications. Intervention: Verbal/written education provided  Note: Patient received  500ml 0.9 NS bolus over 2 hours. Patient drank some gator aid and water while in OP oncology. Encouraged patient to drink 48 to 64 ounces of fluids everyday. Problem: Discharge Planning:  Goal: Discharged to appropriate level of care  Description: Discharged to appropriate level of care  Outcome: Met This Shift  Note: Verbalize understanding of discharge instructions, follow up appointments, and when to call Physician. Intervention: Assess readiness for discharge  Note: Discuss understanding of discharge instructions, follow up appointments and when to call Physician. Care plan reviewed with patient. Patient verbalize understanding of the plan of care and contribute to goal setting.

## 2021-11-23 NOTE — PROGRESS NOTES
Patient tolerated 500 mL bolus hydration with potassium without any complications. Denies dizziness, lightheadedness, acute nausea or vomiting, headache, heart palpitations, rash/itching or increased SOB. Last vital signs  BP (!) 110/55   Pulse 70   Temp 97.8 °F (36.6 °C) (Oral)   Resp 16   Ht 6' (1.829 m)   Wt 144 lb 9.6 oz (65.6 kg)   SpO2 98%   BMI 19.61 kg/m²     Patient instructed if they experience any of the above symptoms following today's visit, he/she is to notify the Physician or go to the Emergency Dept. Discharge instructions given to patient, Verbalizes understanding. Ambulated off unit per self in stable condition with all belongings.

## 2021-11-24 ENCOUNTER — HOSPITAL ENCOUNTER (OUTPATIENT)
Dept: INFUSION THERAPY | Age: 51
Discharge: HOME | DRG: 394 | End: 2021-11-24
Payer: COMMERCIAL

## 2021-11-24 ENCOUNTER — APPOINTMENT (OUTPATIENT)
Dept: GENERAL RADIOLOGY | Age: 51
DRG: 394 | End: 2021-11-24
Payer: COMMERCIAL

## 2021-11-24 ENCOUNTER — HOSPITAL ENCOUNTER (INPATIENT)
Age: 51
LOS: 9 days | Discharge: HOME | DRG: 394 | End: 2021-12-04
Attending: EMERGENCY MEDICINE | Admitting: INTERNAL MEDICINE
Payer: COMMERCIAL

## 2021-11-24 ENCOUNTER — APPOINTMENT (OUTPATIENT)
Dept: CT IMAGING | Age: 51
DRG: 394 | End: 2021-11-24
Payer: COMMERCIAL

## 2021-11-24 VITALS
OXYGEN SATURATION: 99 % | WEIGHT: 142.4 LBS | BODY MASS INDEX: 19.29 KG/M2 | HEART RATE: 80 BPM | DIASTOLIC BLOOD PRESSURE: 67 MMHG | HEIGHT: 72 IN | RESPIRATION RATE: 16 BRPM | TEMPERATURE: 98.7 F | SYSTOLIC BLOOD PRESSURE: 115 MMHG

## 2021-11-24 DIAGNOSIS — K52.9 ENTEROCOLITIS: Primary | ICD-10-CM

## 2021-11-24 DIAGNOSIS — C18.9 ADENOCARCINOMA, COLON (HCC): ICD-10-CM

## 2021-11-24 DIAGNOSIS — F10.21 HISTORY OF ALCOHOLISM (HCC): ICD-10-CM

## 2021-11-24 DIAGNOSIS — Z80.9 FAMILY HISTORY OF CANCER: ICD-10-CM

## 2021-11-24 DIAGNOSIS — C18.4 CANCER OF TRANSVERSE COLON (HCC): ICD-10-CM

## 2021-11-24 DIAGNOSIS — C18.9 MALIGNANT NEOPLASM OF COLON, UNSPECIFIED PART OF COLON (HCC): Primary | ICD-10-CM

## 2021-11-24 DIAGNOSIS — Z90.49 S/P RIGHT COLECTOMY: ICD-10-CM

## 2021-11-24 DIAGNOSIS — Z87.891 PERSONAL HISTORY OF TOBACCO USE: ICD-10-CM

## 2021-11-24 DIAGNOSIS — C18.9 MALIGNANT NEOPLASM OF COLON, UNSPECIFIED PART OF COLON (HCC): ICD-10-CM

## 2021-11-24 LAB
ALBUMIN SERPL-MCNC: 3.1 G/DL (ref 3.5–5.1)
ALP BLD-CCNC: 71 U/L (ref 38–126)
ALT SERPL-CCNC: 38 U/L (ref 11–66)
ANION GAP SERPL CALCULATED.3IONS-SCNC: 15 MEQ/L (ref 8–16)
AST SERPL-CCNC: 12 U/L (ref 5–40)
BACTERIA: ABNORMAL /HPF
BILIRUB SERPL-MCNC: 0.6 MG/DL (ref 0.3–1.2)
BILIRUBIN URINE: ABNORMAL
BLOOD, URINE: ABNORMAL
BUN BLDV-MCNC: 17 MG/DL (ref 7–22)
BUN, WHOLE BLOOD: 15 MG/DL (ref 8–26)
CALCIUM SERPL-MCNC: 8.6 MG/DL (ref 8.5–10.5)
CASTS 2: ABNORMAL /LPF
CASTS UA: ABNORMAL /LPF
CHARACTER, URINE: CLEAR
CHLORIDE BLD-SCNC: 97 MEQ/L (ref 98–111)
CHLORIDE, WHOLE BLOOD: 100 MEQ/L (ref 98–109)
CO2: 19 MEQ/L (ref 23–33)
COLOR: ABNORMAL
CREAT SERPL-MCNC: 1 MG/DL (ref 0.4–1.2)
CREATININE, WHOLE BLOOD: 1 MG/DL (ref 0.5–1.2)
CRYSTALS, UA: ABNORMAL
EPITHELIAL CELLS, UA: ABNORMAL /HPF
FLU A ANTIGEN: NEGATIVE
FLU B ANTIGEN: NEGATIVE
GFR SERPL CREATININE-BSD FRML MDRD: 78 ML/MIN/1.73M2
GFR, ESTIMATED: 83 ML/MIN/1.73M2
GLUCOSE BLD-MCNC: 138 MG/DL (ref 70–108)
GLUCOSE URINE: NEGATIVE MG/DL
GLUCOSE, WHOLE BLOOD: 127 MG/DL (ref 70–108)
ICTOTEST: NEGATIVE
IONIZED CALCIUM, WHOLE BLOOD: 1.22 MMOL/L (ref 1.12–1.32)
KETONES, URINE: 15
LACTIC ACID, SEPSIS: 1.1 MMOL/L (ref 0.5–1.9)
LEUKOCYTE ESTERASE, URINE: NEGATIVE
LIPASE: 14.2 U/L (ref 5.6–51.3)
MAGNESIUM: 1.8 MG/DL (ref 1.6–2.4)
MISCELLANEOUS 2: ABNORMAL
NITRITE, URINE: NEGATIVE
OSMOLALITY CALCULATION: 266.4 MOSMOL/KG (ref 275–300)
PH UA: 6 (ref 5–9)
POTASSIUM SERPL-SCNC: 3.5 MEQ/L (ref 3.5–5.2)
POTASSIUM, WHOLE BLOOD: 3.5 MEQ/L (ref 3.5–4.9)
PROCALCITONIN: 3.97 NG/ML (ref 0.01–0.09)
PROTEIN UA: 100
RBC URINE: ABNORMAL /HPF
RENAL EPITHELIAL, UA: ABNORMAL
SARS-COV-2, NAAT: NOT  DETECTED
SODIUM BLD-SCNC: 131 MEQ/L (ref 135–145)
SODIUM, WHOLE BLOOD: 132 MEQ/L (ref 138–146)
SPECIFIC GRAVITY, URINE: > 1.03 (ref 1–1.03)
TOTAL CO2, WHOLE BLOOD: 23 MEQ/L (ref 23–33)
TOTAL PROTEIN: 6 G/DL (ref 6.1–8)
TROPONIN T: < 0.01 NG/ML
UROBILINOGEN, URINE: 0.2 EU/DL (ref 0–1)
WBC UA: ABNORMAL /HPF
YEAST: ABNORMAL

## 2021-11-24 PROCEDURE — 82728 ASSAY OF FERRITIN: CPT

## 2021-11-24 PROCEDURE — 6370000000 HC RX 637 (ALT 250 FOR IP): Performed by: EMERGENCY MEDICINE

## 2021-11-24 PROCEDURE — 96365 THER/PROPH/DIAG IV INF INIT: CPT

## 2021-11-24 PROCEDURE — 99283 EMERGENCY DEPT VISIT LOW MDM: CPT

## 2021-11-24 PROCEDURE — 84100 ASSAY OF PHOSPHORUS: CPT

## 2021-11-24 PROCEDURE — 6360000002 HC RX W HCPCS: Performed by: NURSE PRACTITIONER

## 2021-11-24 PROCEDURE — 83690 ASSAY OF LIPASE: CPT

## 2021-11-24 PROCEDURE — 36415 COLL VENOUS BLD VENIPUNCTURE: CPT

## 2021-11-24 PROCEDURE — 80047 BASIC METABLC PNL IONIZED CA: CPT

## 2021-11-24 PROCEDURE — 36591 DRAW BLOOD OFF VENOUS DEVICE: CPT

## 2021-11-24 PROCEDURE — 83735 ASSAY OF MAGNESIUM: CPT

## 2021-11-24 PROCEDURE — 96374 THER/PROPH/DIAG INJ IV PUSH: CPT

## 2021-11-24 PROCEDURE — 85025 COMPLETE CBC W/AUTO DIFF WBC: CPT

## 2021-11-24 PROCEDURE — 71045 X-RAY EXAM CHEST 1 VIEW: CPT

## 2021-11-24 PROCEDURE — 96361 HYDRATE IV INFUSION ADD-ON: CPT

## 2021-11-24 PROCEDURE — 2580000003 HC RX 258: Performed by: EMERGENCY MEDICINE

## 2021-11-24 PROCEDURE — 6360000002 HC RX W HCPCS: Performed by: INTERNAL MEDICINE

## 2021-11-24 PROCEDURE — 81001 URINALYSIS AUTO W/SCOPE: CPT

## 2021-11-24 PROCEDURE — 82746 ASSAY OF FOLIC ACID SERUM: CPT

## 2021-11-24 PROCEDURE — 2580000003 HC RX 258: Performed by: INTERNAL MEDICINE

## 2021-11-24 PROCEDURE — 82607 VITAMIN B-12: CPT

## 2021-11-24 PROCEDURE — 87804 INFLUENZA ASSAY W/OPTIC: CPT

## 2021-11-24 PROCEDURE — 83540 ASSAY OF IRON: CPT

## 2021-11-24 PROCEDURE — 83550 IRON BINDING TEST: CPT

## 2021-11-24 PROCEDURE — 6360000002 HC RX W HCPCS: Performed by: EMERGENCY MEDICINE

## 2021-11-24 PROCEDURE — 74177 CT ABD & PELVIS W/CONTRAST: CPT

## 2021-11-24 PROCEDURE — 83605 ASSAY OF LACTIC ACID: CPT

## 2021-11-24 PROCEDURE — 87040 BLOOD CULTURE FOR BACTERIA: CPT

## 2021-11-24 PROCEDURE — 84145 PROCALCITONIN (PCT): CPT

## 2021-11-24 PROCEDURE — 80053 COMPREHEN METABOLIC PANEL: CPT

## 2021-11-24 PROCEDURE — 2580000003 HC RX 258: Performed by: NURSE PRACTITIONER

## 2021-11-24 PROCEDURE — 0097U HC GI PTHGN MULT REV TRANS & AMP PRB TECH 22 TRGT: CPT

## 2021-11-24 PROCEDURE — 6360000004 HC RX CONTRAST MEDICATION: Performed by: EMERGENCY MEDICINE

## 2021-11-24 PROCEDURE — 87635 SARS-COV-2 COVID-19 AMP PRB: CPT

## 2021-11-24 PROCEDURE — 84484 ASSAY OF TROPONIN QUANT: CPT

## 2021-11-24 PROCEDURE — 96375 TX/PRO/DX INJ NEW DRUG ADDON: CPT

## 2021-11-24 RX ORDER — HEPARIN SODIUM (PORCINE) LOCK FLUSH IV SOLN 100 UNIT/ML 100 UNIT/ML
500 SOLUTION INTRAVENOUS PRN
Status: CANCELLED | OUTPATIENT
Start: 2021-11-24

## 2021-11-24 RX ORDER — SODIUM CHLORIDE 9 MG/ML
INJECTION, SOLUTION INTRAVENOUS CONTINUOUS
Status: DISCONTINUED | OUTPATIENT
Start: 2021-11-24 | End: 2021-11-25

## 2021-11-24 RX ORDER — SODIUM CHLORIDE 0.9 % (FLUSH) 0.9 %
5-40 SYRINGE (ML) INJECTION PRN
Status: CANCELLED | OUTPATIENT
Start: 2021-11-24

## 2021-11-24 RX ORDER — ONDANSETRON 2 MG/ML
4 INJECTION INTRAMUSCULAR; INTRAVENOUS ONCE
Status: COMPLETED | OUTPATIENT
Start: 2021-11-24 | End: 2021-11-24

## 2021-11-24 RX ORDER — 0.9 % SODIUM CHLORIDE 0.9 %
1000 INTRAVENOUS SOLUTION INTRAVENOUS ONCE
Status: COMPLETED | OUTPATIENT
Start: 2021-11-24 | End: 2021-11-25

## 2021-11-24 RX ORDER — SODIUM CHLORIDE 0.9 % (FLUSH) 0.9 %
5-40 SYRINGE (ML) INJECTION PRN
Status: DISCONTINUED | OUTPATIENT
Start: 2021-11-24 | End: 2021-11-25 | Stop reason: HOSPADM

## 2021-11-24 RX ORDER — 0.9 % SODIUM CHLORIDE 0.9 %
1000 INTRAVENOUS SOLUTION INTRAVENOUS ONCE
Status: COMPLETED | OUTPATIENT
Start: 2021-11-24 | End: 2021-11-24

## 2021-11-24 RX ORDER — HEPARIN SODIUM (PORCINE) LOCK FLUSH IV SOLN 100 UNIT/ML 100 UNIT/ML
500 SOLUTION INTRAVENOUS PRN
Status: DISCONTINUED | OUTPATIENT
Start: 2021-11-24 | End: 2021-11-25 | Stop reason: HOSPADM

## 2021-11-24 RX ORDER — SODIUM CHLORIDE 9 MG/ML
25 INJECTION, SOLUTION INTRAVENOUS PRN
Status: CANCELLED | OUTPATIENT
Start: 2021-11-24

## 2021-11-24 RX ORDER — ACETAMINOPHEN 325 MG/1
650 TABLET ORAL ONCE
Status: COMPLETED | OUTPATIENT
Start: 2021-11-24 | End: 2021-11-24

## 2021-11-24 RX ADMIN — HYDROMORPHONE HYDROCHLORIDE 1 MG: 1 INJECTION, SOLUTION INTRAMUSCULAR; INTRAVENOUS; SUBCUTANEOUS at 22:28

## 2021-11-24 RX ADMIN — SODIUM CHLORIDE 1000 ML: 9 INJECTION, SOLUTION INTRAVENOUS at 11:09

## 2021-11-24 RX ADMIN — SODIUM CHLORIDE, PRESERVATIVE FREE 10 ML: 5 INJECTION INTRAVENOUS at 10:43

## 2021-11-24 RX ADMIN — ONDANSETRON 4 MG: 2 INJECTION INTRAMUSCULAR; INTRAVENOUS at 11:10

## 2021-11-24 RX ADMIN — ACETAMINOPHEN 650 MG: 325 TABLET ORAL at 22:27

## 2021-11-24 RX ADMIN — SODIUM CHLORIDE, PRESERVATIVE FREE 10 ML: 5 INJECTION INTRAVENOUS at 13:28

## 2021-11-24 RX ADMIN — IOPAMIDOL 80 ML: 755 INJECTION, SOLUTION INTRAVENOUS at 23:21

## 2021-11-24 RX ADMIN — SODIUM CHLORIDE 1000 ML: 9 INJECTION, SOLUTION INTRAVENOUS at 22:31

## 2021-11-24 RX ADMIN — Medication 500 UNITS: at 13:28

## 2021-11-24 RX ADMIN — SODIUM CHLORIDE, PRESERVATIVE FREE 20 ML: 5 INJECTION INTRAVENOUS at 10:44

## 2021-11-24 ASSESSMENT — PAIN DESCRIPTION - LOCATION: LOCATION: ABDOMEN

## 2021-11-24 ASSESSMENT — PAIN SCALES - GENERAL
PAINLEVEL_OUTOF10: 6
PAINLEVEL_OUTOF10: 5
PAINLEVEL_OUTOF10: 6

## 2021-11-24 ASSESSMENT — PAIN DESCRIPTION - PAIN TYPE: TYPE: ACUTE PAIN

## 2021-11-24 NOTE — PROGRESS NOTES
Patient assessed for the following post chemotherapy:    Dizziness   No  Lightheadedness  No      Acute nausea/vomiting Improved  Headache   No  Chest pain/pressure  No  Rash/itching   No  Shortness of breath  No    Patient tolerated IV fluids and Zofran without any complications. Last vital signs:   /67   Pulse 80   Temp 98.7 °F (37.1 °C) (Oral)   Resp 16   Ht 6' (1.829 m)   Wt 142 lb 6.4 oz (64.6 kg)   SpO2 99%   BMI 19.31 kg/m²     Patient instructed if experience any of the above symptoms following today's infusion, he is to notify MD immediately or go to the emergency department. Discharge instructions given to patient. Verbalizes understanding. Ambulated off unit per self, with belongings.

## 2021-11-24 NOTE — PLAN OF CARE
Problem: Pain:  Description: Pain management should include both nonpharmacologic and pharmacologic interventions. Goal: Pain level will decrease  Description: Pain level will decrease  Outcome: Met This Shift  Goal: Control of acute pain  Description: Control of acute pain  Outcome: Met This Shift  Note: Patient rating pain a 5 out of 10 using SOURAV scale, Pain located in abdominal cramping. Goal: Control of chronic pain  Description: Control of chronic pain  Outcome: Met This Shift  Intervention: Assess barriers to pain control  Description: Assess barriers to pain control - REMINDER(s):  Addiction concerns. Healthcare practices, cultural.  Pain medication concerns. Past pain experiences. Note: Patient encouraged to take prescribed pain medications and call Physician if pain is not controlled. Problem: Musculor/Skeletal Functional Status  Goal: Absence of falls  Outcome: Met This Shift  Note: Patient free from falls while in O.P. Oncology. Intervention: Fall precautions  Note: Patient assessed for fall risk on admission to [address] [place] [address]. Fall band placed on patient. Discussed the need to use the call light for assistance prior to getting up out of chair/bed. Problem: Intellectual/Education/Knowledge Deficit  Goal: Teaching initiated upon admission  Outcome: Met This Shift  Note: Patient verbalizes understanding to verbal information given on zofran ,action and possible side effects. Aware to call MD if develop complications. Verbalizes understanding to signs and symptoms of dehydration and when to call the Physician. Intervention: Verbal/written education provided  Note: Zofran reviewed, patient verbalizes understanding of medication being administered and potential side effects. Patient received 1000 ml of NS IV infusion over 2 hours. Patient drank 250 ml of water while in OP oncology. Encouraged patient to drink 48 to 64 ounces of fluids everyday.       Problem: Discharge Planning:  Goal: Discharged to appropriate level of care  Description: Discharged to appropriate level of care  Outcome: Met This Shift  Note: Verbalized understanding of discharge instructions, follow-up appointments, and when to call the physician. Intervention: Assess readiness for discharge  Description: Assess readiness for discharge  Note: Discuss understanding of discharge instructions,follow-up appointments, and when to call the physician. Problem: Infection - Central Venous Catheter-Associated Bloodstream Infection:  Goal: Will show no infection signs and symptoms  Description: Will show no infection signs and symptoms  Outcome: Met This Shift  Note: Mediport site with no redness or warmth. Skin over port site intact with no signs of breakdown noted. Patient verbalizes signs/symptoms of port infection and when to notify the physician. Intervention: Infection risk assessment  Description: Infection risk assessment  Note: Discuss port maintenance, infection prevention, signs and when to call Dr Leopold Som reviewed with patient. Patient verbalize understanding of the plan of care and contribute to goal setting.

## 2021-11-25 PROBLEM — E86.0 DEHYDRATION: Status: ACTIVE | Noted: 2021-11-25

## 2021-11-25 PROBLEM — E43 SEVERE PROTEIN-CALORIE MALNUTRITION (HCC): Status: ACTIVE | Noted: 2021-11-25

## 2021-11-25 PROBLEM — K52.9 ENTEROCOLITIS: Status: ACTIVE | Noted: 2021-11-25

## 2021-11-25 LAB
ADENOVIRUS F 40 41 PCR: NOT DETECTED
ASTROVIRUS PCR: NOT DETECTED
BASOPHILS # BLD: 0.3 %
BASOPHILS ABSOLUTE: 0 THOU/MM3 (ref 0–0.1)
C DIFF TOXIN/ANTIGEN: NEGATIVE
CAMPYLOBACTER PCR: NOT DETECTED
CLOSTRIDIUM DIFFICILE, PCR: NORMAL
CRYPTOSPORIDIUM PCR: NOT DETECTED
CYCLOSPORA CAYETANENSIS PCR: NOT DETECTED
DIFFERENTIAL TYPE: ABNORMAL
E COLI 0157 PCR: NORMAL
E COLI ENTEROAGGREGATIVE PCR: NOT DETECTED
E COLI ENTEROPATHOGENIC PCR: NOT DETECTED
E COLI ENTEROTOXIGENIC PCR: NOT DETECTED
E COLI SHIGA LIKE TOXIN PCR: NOT DETECTED
E COLI SHIGELLA/ENTEROINVASIVE PCR: NOT DETECTED
E HISTOLYTICA GI FILM ARRAY: NOT DETECTED
EOSINOPHIL # BLD: 0.1 %
EOSINOPHILS ABSOLUTE: 0 THOU/MM3 (ref 0–0.4)
ERYTHROCYTE [DISTWIDTH] IN BLOOD BY AUTOMATED COUNT: 19.5 % (ref 11.5–14.5)
ERYTHROCYTE [DISTWIDTH] IN BLOOD BY AUTOMATED COUNT: 49.3 FL (ref 35–45)
FERRITIN: 260 NG/ML (ref 22–322)
FOLATE: > 20 NG/ML (ref 4.8–24.2)
GIARDIA LAMBLIA PCR: NOT DETECTED
HCT VFR BLD CALC: 47.8 % (ref 42–52)
HEMOGLOBIN: 15.8 GM/DL (ref 14–18)
IMMATURE GRANS (ABS): 0.05 THOU/MM3 (ref 0–0.07)
IMMATURE GRANULOCYTES: 0.7 %
IRON SATURATION: 13 % (ref 20–50)
IRON: 22 UG/DL (ref 65–195)
LYMPHOCYTES # BLD: 14.2 %
LYMPHOCYTES ABSOLUTE: 1 THOU/MM3 (ref 1–4.8)
MCH RBC QN AUTO: 26.1 PG (ref 26–33)
MCHC RBC AUTO-ENTMCNC: 33.1 GM/DL (ref 32.2–35.5)
MCV RBC AUTO: 78.9 FL (ref 80–94)
MONOCYTES # BLD: 14.5 %
MONOCYTES ABSOLUTE: 1 THOU/MM3 (ref 0.4–1.3)
NOROVIRUS GI GII PCR: NOT DETECTED
NUCLEATED RED BLOOD CELLS: 0 /100 WBC
PATHOLOGIST REVIEW: ABNORMAL
PHOSPHORUS: 3.2 MG/DL (ref 2.4–4.7)
PLATELET # BLD: 267 THOU/MM3 (ref 130–400)
PLATELET ESTIMATE: ADEQUATE
PLESIOMONAS SHIGELLOIDES PCR: NOT DETECTED
PMV BLD AUTO: 9.8 FL (ref 9.4–12.4)
RBC # BLD: 6.06 MILL/MM3 (ref 4.7–6.1)
ROTAVIRUS A PCR: NOT DETECTED
SALMONELLA PCR: NOT DETECTED
SAPOVIRUS PCR: NOT DETECTED
SCAN OF BLOOD SMEAR: NORMAL
SEG NEUTROPHILS: 70.2 %
SEGMENTED NEUTROPHILS ABSOLUTE COUNT: 5.1 THOU/MM3 (ref 1.8–7.7)
TOTAL IRON BINDING CAPACITY: 175 UG/DL (ref 171–450)
VIBRIO CHOLERAE PCR: NOT DETECTED
VIBRIO PCR: NOT DETECTED
VITAMIN B-12: > 2000 PG/ML (ref 211–911)
WBC # BLD: 7.2 THOU/MM3 (ref 4.8–10.8)
YERSINIA ENTEROCOLITICA PCR: NOT DETECTED

## 2021-11-25 PROCEDURE — 51798 US URINE CAPACITY MEASURE: CPT

## 2021-11-25 PROCEDURE — 6360000002 HC RX W HCPCS: Performed by: INTERNAL MEDICINE

## 2021-11-25 PROCEDURE — 6370000000 HC RX 637 (ALT 250 FOR IP): Performed by: FAMILY MEDICINE

## 2021-11-25 PROCEDURE — 6360000002 HC RX W HCPCS: Performed by: FAMILY MEDICINE

## 2021-11-25 PROCEDURE — 6370000000 HC RX 637 (ALT 250 FOR IP): Performed by: INTERNAL MEDICINE

## 2021-11-25 PROCEDURE — 2580000003 HC RX 258: Performed by: INTERNAL MEDICINE

## 2021-11-25 PROCEDURE — 6370000000 HC RX 637 (ALT 250 FOR IP)

## 2021-11-25 PROCEDURE — 6360000002 HC RX W HCPCS

## 2021-11-25 PROCEDURE — 2500000003 HC RX 250 WO HCPCS: Performed by: INTERNAL MEDICINE

## 2021-11-25 PROCEDURE — 2580000003 HC RX 258: Performed by: EMERGENCY MEDICINE

## 2021-11-25 PROCEDURE — 87449 NOS EACH ORGANISM AG IA: CPT

## 2021-11-25 PROCEDURE — 99223 1ST HOSP IP/OBS HIGH 75: CPT | Performed by: INTERNAL MEDICINE

## 2021-11-25 PROCEDURE — 1200000000 HC SEMI PRIVATE

## 2021-11-25 PROCEDURE — 6360000002 HC RX W HCPCS: Performed by: EMERGENCY MEDICINE

## 2021-11-25 RX ORDER — CIPROFLOXACIN 2 MG/ML
400 INJECTION, SOLUTION INTRAVENOUS EVERY 12 HOURS
Status: DISCONTINUED | OUTPATIENT
Start: 2021-11-25 | End: 2021-11-29

## 2021-11-25 RX ORDER — ONDANSETRON 4 MG/1
4 TABLET, ORALLY DISINTEGRATING ORAL EVERY 8 HOURS PRN
Status: DISCONTINUED | OUTPATIENT
Start: 2021-11-25 | End: 2021-12-04 | Stop reason: HOSPADM

## 2021-11-25 RX ORDER — SODIUM CHLORIDE 0.9 % (FLUSH) 0.9 %
10 SYRINGE (ML) INJECTION EVERY 12 HOURS SCHEDULED
Status: DISCONTINUED | OUTPATIENT
Start: 2021-11-25 | End: 2021-12-04 | Stop reason: HOSPADM

## 2021-11-25 RX ORDER — ONDANSETRON 4 MG/1
TABLET, FILM COATED ORAL
Status: COMPLETED
Start: 2021-11-25 | End: 2021-11-25

## 2021-11-25 RX ORDER — ONDANSETRON 2 MG/ML
4 INJECTION INTRAMUSCULAR; INTRAVENOUS EVERY 6 HOURS PRN
Status: DISCONTINUED | OUTPATIENT
Start: 2021-11-25 | End: 2021-12-04 | Stop reason: HOSPADM

## 2021-11-25 RX ORDER — PROMETHAZINE HYDROCHLORIDE 25 MG/ML
6.25 INJECTION, SOLUTION INTRAMUSCULAR; INTRAVENOUS EVERY 6 HOURS PRN
Status: DISCONTINUED | OUTPATIENT
Start: 2021-11-25 | End: 2021-12-04 | Stop reason: HOSPADM

## 2021-11-25 RX ORDER — SODIUM CHLORIDE 0.9 % (FLUSH) 0.9 %
10 SYRINGE (ML) INJECTION PRN
Status: DISCONTINUED | OUTPATIENT
Start: 2021-11-25 | End: 2021-12-04 | Stop reason: HOSPADM

## 2021-11-25 RX ORDER — ONDANSETRON 4 MG/1
4 TABLET, ORALLY DISINTEGRATING ORAL ONCE
Status: DISCONTINUED | OUTPATIENT
Start: 2021-11-25 | End: 2021-12-04 | Stop reason: HOSPADM

## 2021-11-25 RX ORDER — IBUPROFEN 600 MG/1
600 TABLET ORAL EVERY 6 HOURS PRN
Status: DISCONTINUED | OUTPATIENT
Start: 2021-11-25 | End: 2021-11-29

## 2021-11-25 RX ORDER — DICYCLOMINE HYDROCHLORIDE 10 MG/1
20 CAPSULE ORAL 3 TIMES DAILY
Status: DISCONTINUED | OUTPATIENT
Start: 2021-11-25 | End: 2021-12-04 | Stop reason: HOSPADM

## 2021-11-25 RX ORDER — SODIUM CHLORIDE 9 MG/ML
25 INJECTION, SOLUTION INTRAVENOUS PRN
Status: DISCONTINUED | OUTPATIENT
Start: 2021-11-25 | End: 2021-12-04 | Stop reason: HOSPADM

## 2021-11-25 RX ORDER — POLYETHYLENE GLYCOL 3350 17 G/17G
17 POWDER, FOR SOLUTION ORAL DAILY PRN
Status: DISCONTINUED | OUTPATIENT
Start: 2021-11-25 | End: 2021-12-04 | Stop reason: HOSPADM

## 2021-11-25 RX ORDER — DEXTROSE, SODIUM CHLORIDE, SODIUM LACTATE, POTASSIUM CHLORIDE, AND CALCIUM CHLORIDE 5; .6; .31; .03; .02 G/100ML; G/100ML; G/100ML; G/100ML; G/100ML
INJECTION, SOLUTION INTRAVENOUS CONTINUOUS
Status: DISCONTINUED | OUTPATIENT
Start: 2021-11-25 | End: 2021-11-30

## 2021-11-25 RX ORDER — HYDROCODONE BITARTRATE AND ACETAMINOPHEN 5; 325 MG/1; MG/1
1 TABLET ORAL EVERY 6 HOURS PRN
Status: DISCONTINUED | OUTPATIENT
Start: 2021-11-25 | End: 2021-11-25

## 2021-11-25 RX ORDER — ACETAMINOPHEN 325 MG/1
650 TABLET ORAL EVERY 4 HOURS PRN
Status: DISCONTINUED | OUTPATIENT
Start: 2021-11-25 | End: 2021-12-04 | Stop reason: HOSPADM

## 2021-11-25 RX ORDER — PANTOPRAZOLE SODIUM 40 MG/1
40 TABLET, DELAYED RELEASE ORAL
Status: DISCONTINUED | OUTPATIENT
Start: 2021-11-25 | End: 2021-11-25

## 2021-11-25 RX ORDER — LOPERAMIDE HYDROCHLORIDE 2 MG/1
2 CAPSULE ORAL 4 TIMES DAILY PRN
Status: DISCONTINUED | OUTPATIENT
Start: 2021-11-25 | End: 2021-12-04 | Stop reason: HOSPADM

## 2021-11-25 RX ORDER — OMEPRAZOLE 20 MG/1
20 CAPSULE, DELAYED RELEASE ORAL EVERY MORNING
Status: DISCONTINUED | OUTPATIENT
Start: 2021-11-25 | End: 2021-12-04 | Stop reason: HOSPADM

## 2021-11-25 RX ORDER — SUCRALFATE 1 G/1
1 TABLET ORAL
Status: DISCONTINUED | OUTPATIENT
Start: 2021-11-25 | End: 2021-12-04 | Stop reason: HOSPADM

## 2021-11-25 RX ORDER — ACETAMINOPHEN 325 MG/1
650 TABLET ORAL EVERY 6 HOURS PRN
Status: DISCONTINUED | OUTPATIENT
Start: 2021-11-25 | End: 2021-11-25

## 2021-11-25 RX ORDER — ACETAMINOPHEN 650 MG/1
650 SUPPOSITORY RECTAL EVERY 6 HOURS PRN
Status: DISCONTINUED | OUTPATIENT
Start: 2021-11-25 | End: 2021-11-25

## 2021-11-25 RX ORDER — OXYCODONE HYDROCHLORIDE 5 MG/1
5 TABLET ORAL 3 TIMES DAILY
Status: DISCONTINUED | OUTPATIENT
Start: 2021-11-25 | End: 2021-11-26

## 2021-11-25 RX ORDER — OMEPRAZOLE 20 MG/1
20 CAPSULE, DELAYED RELEASE ORAL
Status: DISCONTINUED | OUTPATIENT
Start: 2021-11-25 | End: 2021-11-25

## 2021-11-25 RX ORDER — DIPHENOXYLATE HYDROCHLORIDE AND ATROPINE SULFATE 2.5; .025 MG/1; MG/1
1 TABLET ORAL 4 TIMES DAILY
Status: DISCONTINUED | OUTPATIENT
Start: 2021-11-25 | End: 2021-12-04 | Stop reason: HOSPADM

## 2021-11-25 RX ORDER — 0.9 % SODIUM CHLORIDE 0.9 %
30 INTRAVENOUS SOLUTION INTRAVENOUS ONCE
Status: COMPLETED | OUTPATIENT
Start: 2021-11-25 | End: 2021-11-25

## 2021-11-25 RX ORDER — HYDROCODONE BITARTRATE AND ACETAMINOPHEN 5; 325 MG/1; MG/1
1 TABLET ORAL EVERY 4 HOURS PRN
Status: DISCONTINUED | OUTPATIENT
Start: 2021-11-25 | End: 2021-11-25

## 2021-11-25 RX ADMIN — DICYCLOMINE HYDROCHLORIDE 20 MG: 10 CAPSULE ORAL at 17:00

## 2021-11-25 RX ADMIN — HYDROMORPHONE HYDROCHLORIDE 0.5 MG: 1 INJECTION, SOLUTION INTRAMUSCULAR; INTRAVENOUS; SUBCUTANEOUS at 13:09

## 2021-11-25 RX ADMIN — PROMETHAZINE HYDROCHLORIDE 6.25 MG: 25 INJECTION INTRAMUSCULAR; INTRAVENOUS at 10:59

## 2021-11-25 RX ADMIN — CIPROFLOXACIN 400 MG: 2 INJECTION, SOLUTION INTRAVENOUS at 03:20

## 2021-11-25 RX ADMIN — CIPROFLOXACIN 400 MG: 2 INJECTION, SOLUTION INTRAVENOUS at 15:11

## 2021-11-25 RX ADMIN — METRONIDAZOLE 500 MG: 500 INJECTION, SOLUTION INTRAVENOUS at 20:39

## 2021-11-25 RX ADMIN — DIPHENOXYLATE HYDROCHLORIDE AND ATROPINE SULFATE 1 TABLET: 2.5; .025 TABLET ORAL at 22:30

## 2021-11-25 RX ADMIN — IRON SUCROSE 200 MG: 20 INJECTION, SOLUTION INTRAVENOUS at 09:57

## 2021-11-25 RX ADMIN — HYDROMORPHONE HYDROCHLORIDE 0.5 MG: 1 INJECTION, SOLUTION INTRAMUSCULAR; INTRAVENOUS; SUBCUTANEOUS at 07:44

## 2021-11-25 RX ADMIN — METRONIDAZOLE 500 MG: 500 INJECTION, SOLUTION INTRAVENOUS at 06:00

## 2021-11-25 RX ADMIN — ENOXAPARIN SODIUM 40 MG: 100 INJECTION SUBCUTANEOUS at 07:50

## 2021-11-25 RX ADMIN — OXYCODONE HYDROCHLORIDE 5 MG: 5 TABLET ORAL at 17:01

## 2021-11-25 RX ADMIN — ONDANSETRON HYDROCHLORIDE 4 MG: 4 TABLET, FILM COATED ORAL at 02:38

## 2021-11-25 RX ADMIN — OMEPRAZOLE 20 MG: 20 CAPSULE, DELAYED RELEASE ORAL at 09:53

## 2021-11-25 RX ADMIN — SODIUM CHLORIDE, SODIUM LACTATE, POTASSIUM CHLORIDE, CALCIUM CHLORIDE AND DEXTROSE MONOHYDRATE: 5; 600; 310; 30; 20 INJECTION, SOLUTION INTRAVENOUS at 14:26

## 2021-11-25 RX ADMIN — IBUPROFEN 600 MG: 600 TABLET, FILM COATED ORAL at 20:40

## 2021-11-25 RX ADMIN — SODIUM CHLORIDE, SODIUM LACTATE, POTASSIUM CHLORIDE, CALCIUM CHLORIDE AND DEXTROSE MONOHYDRATE: 5; 600; 310; 30; 20 INJECTION, SOLUTION INTRAVENOUS at 05:05

## 2021-11-25 RX ADMIN — HYDROCODONE BITARTRATE AND ACETAMINOPHEN 1 TABLET: 5; 325 TABLET ORAL at 15:02

## 2021-11-25 RX ADMIN — SUCRALFATE 1 G: 1 TABLET ORAL at 17:00

## 2021-11-25 RX ADMIN — PIPERACILLIN AND TAZOBACTAM 3375 MG: 3; .375 INJECTION, POWDER, LYOPHILIZED, FOR SOLUTION INTRAVENOUS at 02:06

## 2021-11-25 RX ADMIN — HYDROCODONE BITARTRATE AND ACETAMINOPHEN 1 TABLET: 5; 325 TABLET ORAL at 09:54

## 2021-11-25 RX ADMIN — SODIUM CHLORIDE 25 ML: 9 INJECTION, SOLUTION INTRAVENOUS at 05:58

## 2021-11-25 RX ADMIN — HYDROMORPHONE HYDROCHLORIDE 0.5 MG: 1 INJECTION, SOLUTION INTRAMUSCULAR; INTRAVENOUS; SUBCUTANEOUS at 18:22

## 2021-11-25 RX ADMIN — DIPHENOXYLATE HYDROCHLORIDE AND ATROPINE SULFATE 1 TABLET: 2.5; .025 TABLET ORAL at 07:49

## 2021-11-25 RX ADMIN — SUCRALFATE 1 G: 1 TABLET ORAL at 09:54

## 2021-11-25 RX ADMIN — SODIUM CHLORIDE, SODIUM LACTATE, POTASSIUM CHLORIDE, CALCIUM CHLORIDE AND DEXTROSE MONOHYDRATE: 5; 600; 310; 30; 20 INJECTION, SOLUTION INTRAVENOUS at 22:12

## 2021-11-25 RX ADMIN — HYDROMORPHONE HYDROCHLORIDE 1 MG: 1 INJECTION, SOLUTION INTRAMUSCULAR; INTRAVENOUS; SUBCUTANEOUS at 01:57

## 2021-11-25 RX ADMIN — Medication 1 MG: at 01:57

## 2021-11-25 RX ADMIN — SODIUM CHLORIDE: 9 INJECTION, SOLUTION INTRAVENOUS at 00:43

## 2021-11-25 RX ADMIN — DIPHENOXYLATE HYDROCHLORIDE AND ATROPINE SULFATE 1 TABLET: 2.5; .025 TABLET ORAL at 12:41

## 2021-11-25 RX ADMIN — HYDROMORPHONE HYDROCHLORIDE 0.5 MG: 1 INJECTION, SOLUTION INTRAMUSCULAR; INTRAVENOUS; SUBCUTANEOUS at 22:30

## 2021-11-25 RX ADMIN — ONDANSETRON 4 MG: 2 INJECTION INTRAMUSCULAR; INTRAVENOUS at 07:50

## 2021-11-25 RX ADMIN — METRONIDAZOLE 500 MG: 500 INJECTION, SOLUTION INTRAVENOUS at 12:47

## 2021-11-25 RX ADMIN — DIPHENOXYLATE HYDROCHLORIDE AND ATROPINE SULFATE 1 TABLET: 2.5; .025 TABLET ORAL at 17:00

## 2021-11-25 RX ADMIN — SODIUM CHLORIDE 1000 ML: 9 INJECTION, SOLUTION INTRAVENOUS at 02:55

## 2021-11-25 ASSESSMENT — PAIN DESCRIPTION - PROGRESSION
CLINICAL_PROGRESSION: GRADUALLY WORSENING
CLINICAL_PROGRESSION: GRADUALLY WORSENING

## 2021-11-25 ASSESSMENT — ENCOUNTER SYMPTOMS
SHORTNESS OF BREATH: 0
WHEEZING: 0
SINUS PRESSURE: 0
STRIDOR: 0
PHOTOPHOBIA: 0
ABDOMINAL PAIN: 1
VOICE CHANGE: 0
EYE DISCHARGE: 0
ANAL BLEEDING: 0
BLOOD IN STOOL: 0
VOMITING: 1
DIARRHEA: 1
FACIAL SWELLING: 0
COUGH: 0
SINUS PAIN: 0
EYE ITCHING: 0
EYE REDNESS: 0
CHEST TIGHTNESS: 0
APNEA: 0
TROUBLE SWALLOWING: 0
NAUSEA: 1
SORE THROAT: 0
RHINORRHEA: 0
CHOKING: 0
RECTAL PAIN: 0
CONSTIPATION: 0
BACK PAIN: 0
EYE PAIN: 0
COLOR CHANGE: 0
ABDOMINAL DISTENTION: 0

## 2021-11-25 ASSESSMENT — PAIN DESCRIPTION - ORIENTATION
ORIENTATION: LEFT;RIGHT;MID
ORIENTATION: LEFT;RIGHT;MID

## 2021-11-25 ASSESSMENT — PAIN SCALES - GENERAL
PAINLEVEL_OUTOF10: 5
PAINLEVEL_OUTOF10: 9
PAINLEVEL_OUTOF10: 4
PAINLEVEL_OUTOF10: 9
PAINLEVEL_OUTOF10: 10
PAINLEVEL_OUTOF10: 5
PAINLEVEL_OUTOF10: 7
PAINLEVEL_OUTOF10: 5
PAINLEVEL_OUTOF10: 7
PAINLEVEL_OUTOF10: 6
PAINLEVEL_OUTOF10: 7
PAINLEVEL_OUTOF10: 6
PAINLEVEL_OUTOF10: 7

## 2021-11-25 ASSESSMENT — PAIN DESCRIPTION - LOCATION
LOCATION: ABDOMEN
LOCATION: ABDOMEN

## 2021-11-25 ASSESSMENT — PAIN DESCRIPTION - PAIN TYPE
TYPE: ACUTE PAIN
TYPE: ACUTE PAIN

## 2021-11-25 ASSESSMENT — PAIN DESCRIPTION - FREQUENCY
FREQUENCY: INTERMITTENT
FREQUENCY: INTERMITTENT

## 2021-11-25 ASSESSMENT — PAIN DESCRIPTION - ONSET
ONSET: ON-GOING
ONSET: ON-GOING

## 2021-11-25 ASSESSMENT — PAIN DESCRIPTION - DESCRIPTORS
DESCRIPTORS: ACHING;STABBING
DESCRIPTORS: ACHING;STABBING

## 2021-11-25 NOTE — ED PROVIDER NOTES
[address] ENCOUNTER      PATIENT NAME: Eagle Howard  MRN: [id number]  : 1970  [place]: 2021  PROVIDER: Ricarda Carvalho MD      [address] [place] [address]       Chief Complaint   Patient presents with    Fever    Nausea    Diarrhea       Patient is seen and evaluated in a timely fashion. Nurses Notes are reviewed and I agree except as noted in the HPI. HISTORY OF PRESENT ILLNESS    Eagle Howard is a 45 y.o. male who presents to Emergency Department with Fever, Nausea, and Diarrhea     This patient is a 59-year-old male with past medical history of colon cancer status post partial colon resection now on chemotherapy presents to ED for evaluation of fever, diarrhea, nausea, weight loss and abdominal pain. Patient states that he has been having diarrhea over last 6 days, he has been to outpatient infusion center receiving IV hydration over last 3 days. He developed fever since yesterday, highest temperature 101.8 tonight. He has been taking over-the-counter ibuprofen. He states he feels chills all the time. He lost 10 lbs in last one month    He has no shortness of breath. He has no active vomiting. He has no urine symptoms. His abdominal pain is diffuse, at severity of 7/10. Oncologist is Dr. Maia Michel. This HPI was provided by patient.      REVIEW OF SYSTEMS   Ten-point review of systems is negative except those documented in above HPI including constitutional, HEENT, respiratory, cardiovascular, gastrointestinal, genitourinary, musculoskeletal, skin, neurological, hematological and behavioral.      PAST MEDICAL HISTORY     Past Medical History:   Diagnosis Date    Anemia     Colon cancer ([facility] [address] 2021       SURGICAL HISTORY       Past Surgical History:   Procedure Laterality Date    COLONOSCOPY  2021    Dr. Oralia Alvarado Right 2021    ROBOT EXTENDED RIGHT COLECTOMY performed by Jayden Alves MD at [address] [place] [address] INGUINAL HERNIA REPAIR Left     [facility] with mesh inguinal    PORT SURGERY N/A 2021    SINGLE LUMEN SMART PORT INSERTION performed by Ritika Rojas MD at [facility] 21       Current Discharge Medication List      CONTINUE these medications which have NOT CHANGED    Details   loperamide (IMODIUM) 2 MG capsule Take 2 mg by mouth 4 times daily as needed for Diarrhea      ondansetron (ZOFRAN) 4 MG tablet Take 1 tablet by mouth every 6 hours as needed for Nausea or Vomiting  Qty: 30 tablet, Refills: 1      ketorolac (TORADOL) 10 MG tablet Take 1 tablet by mouth every 8 hours as needed for Pain  Qty: 15 tablet, Refills: 0      sucralfate (CARAFATE) 1 GM tablet take 1 tablet by mouth twice a day FIVE TO 60 MINS BEFORE LUNCH AND DINNER      ondansetron (ZOFRAN-ODT) 4 MG disintegrating tablet       capecitabine (XELODA) 500 MG chemo tablet Take 4 tablets by mouth 2 times daily  Qty: 112 tablet, Refills: 2      omeprazole (PRILOSEC OTC) 20 MG tablet Take 20 mg by mouth daily      diphenoxylate-atropine (DIPHENATOL) 2.5-0.025 MG per tablet Take 1 tablet by mouth 4 times daily for 10 days. Qty: 40 tablet, Refills: 0    Associated Diagnoses: Diarrhea due to drug      prochlorperazine (COMPAZINE) 5 MG tablet Take 1 tablet by mouth every 12 hours for 14 days Take 30 minutes before morning and afternoon dose of Xeloda  Qty: 28 tablet, Refills: 2      ibuprofen (ADVIL;MOTRIN) 200 MG tablet Take 200 mg by mouth every 6 hours as needed for Pain      ferrous sulfate (IRON 325) 325 (65 Fe) MG tablet Take 1 tablet by mouth daily (with breakfast)  Qty: 90 tablet, Refills: 1    Associated Diagnoses: Iron deficiency anemia, unspecified iron deficiency anemia type             ALLERGIES     Patient has no known allergies. FAMILY HISTORY     He indicated that his mother is alive. He indicated that his father is . He indicated that his maternal grandmother is .  He indicated that his maternal grandfather is . He indicated that his paternal grandmother is . He indicated that his paternal grandfather is . He indicated that his maternal aunt is . He indicated that his maternal uncle is . He indicated that his paternal aunt is . He indicated that his paternal uncle is . family history includes COPD in his mother; Cancer in his father, maternal aunt, maternal grandfather, maternal grandmother, maternal uncle, paternal aunt, paternal grandfather, paternal grandmother, and paternal uncle; Diabetes in his paternal grandmother; Heart Attack in his paternal aunt and paternal grandfather; High Blood Pressure in his maternal grandfather, maternal grandmother, paternal grandfather, and paternal grandmother; Kidney Disease in his paternal grandmother; Other in his mother; Stroke in his father and mother. SOCIAL HISTORY      reports that he quit smoking about 22 months ago. His smoking use included cigarettes. He has a 35.00 pack-year smoking history. He has never used smokeless tobacco. He reports previous alcohol use. He reports that he does not use drugs. PHYSICAL EXAM      height is 6' (1.829 m) and weight is 145 lb 1.6 oz (65.8 kg). His oral temperature is 98 °F (36.7 °C). His blood pressure is 104/74 and his pulse is 78. His respiration is 18 and oxygen saturation is 100%. Physical Exam  Vitals and nursing note reviewed. Constitutional:       Appearance: He is well-developed. He is not diaphoretic. HENT:      Head: Normocephalic and atraumatic. Nose: Nose normal.   Eyes:      General: No scleral icterus. Right eye: No discharge. Left eye: No discharge. Conjunctiva/sclera: Conjunctivae normal.      Pupils: Pupils are equal, round, and reactive to light. Neck:      Vascular: No JVD. Trachea: No tracheal deviation. Cardiovascular:      Rate and Rhythm: Normal rate and regular rhythm. Heart sounds: Normal heart sounds. No murmur heard. No friction rub. No gallop. Pulmonary:      Effort: Pulmonary effort is normal. No respiratory distress. Breath sounds: Normal breath sounds. No stridor. No wheezing or rales. Chest:      Chest wall: No tenderness. Abdominal:      General: There is distension. Palpations: Abdomen is soft. There is no mass. Tenderness: There is no abdominal tenderness. There is no guarding or rebound. Hernia: No hernia is present. Comments: Diffuse abdominal tenderness, hyper active bowel sounds, no guarding no rebound pain. Musculoskeletal:         General: No tenderness or deformity. Cervical back: Normal range of motion and neck supple. Lymphadenopathy:      Cervical: No cervical adenopathy. Skin:     General: Skin is warm and dry. Capillary Refill: Capillary refill takes less than 2 seconds. Coloration: Skin is not pale. Findings: No erythema or rash. Neurological:      Mental Status: He is alert and oriented to person, place, and time. Cranial Nerves: No cranial nerve deficit. Sensory: No sensory deficit. Motor: No abnormal muscle tone. Coordination: Coordination normal.      Deep Tendon Reflexes: Reflexes normal.   Psychiatric:         Behavior: Behavior normal.         Thought Content: Thought content normal.         Judgment: Judgment normal.       ANCILLARY TEST RESULTS   EKG:    Interpreted by me  Not indicated    LAB RESULTS:  Results for orders placed or performed during the hospital encounter of 11/24/21   COVID-19, Rapid    Specimen: Nasopharyngeal Swab   Result Value Ref Range    SARS-CoV-2, NAAT NOT  DETECTED NOT DETECTED   Rapid influenza A/B antigens    Specimen: Nasopharyngeal   Result Value Ref Range    Flu A Antigen Negative NEGATIVE    Flu B Antigen Negative NEGATIVE   Gastrointestinal Panel, Molecular    Specimen: Stool   Result Value Ref Range    Campylobacter PCR Not Detected Not Detected    Clostridium difficile, PCR NA Not Detected    Plesiomonas Shigelloides PCR Not Detected Not Detected    Salmonella PCR Not Detected Not Detected    Vibrio PCR Not Detected Not Detected    Vibrio Cholerae PCR Not Detected Not Detected    Yersinia Enterocolitica PCR Not Detected Not Detected    E Coli Enteroaggregative PCR Not Detected Not Detected    E Coli Enteropathogenic PCR Not Detected Not Detected    E Coli Enterotoxigenic PCR Not Detected Not Detected    E Coli Shiga Like Toxin PCR Not Detected Not Detected    E Coli O157 PCR NA Not Detected    E Coli Shigella/Enteroinvasive PCR Not Detected Not Detected    Cryptosporidium PCR Not Detected Not Detected    Cyclospora Cayetanensis PCR Not Detected Not Detected    E HISTOLYTICA GI FILM ARRAY Not Detected Not Detected    Giardia Lamblia PCR Not Detected Not Detected    Adenovirus F 40 39 PCR Not Detected Not Detected    Astrovirus PCR Not Detected Not Detected    Norovirus GI GII PCR Not Detected Not Detected    Rotavirus A PCR Not Detected Not Detected    Sapovirus PCR Not Detected Not Detected   Clostridium Difficile Toxin/Antigen   Result Value Ref Range    C.diff Toxin/Antigen NEGATIVE    CBC auto differential   Result Value Ref Range    WBC 7.2 4.8 - 10.8 thou/mm3    RBC 6.06 4.70 - 6.10 mill/mm3    Hemoglobin 15.8 14.0 - 18.0 gm/dl    Hematocrit 47.8 42.0 - 52.0 %    MCV 78.9 (L) 80.0 - 94.0 fL    MCH 26.1 26.0 - 33.0 pg    MCHC 33.1 32.2 - 35.5 gm/dl    RDW-CV 19.5 (H) 11.5 - 14.5 %    RDW-SD 49.3 (H) 35.0 - 45.0 fL    Platelets 317 285 - 464 thou/mm3    MPV 9.8 9.4 - 12.4 fL    Differential Type see below     Seg Neutrophils 70.2 %    Lymphocytes 14.2 %    Monocytes 14.5 %    Eosinophils 0.1 %    Basophils 0.3 %    Immature Granulocytes 0.7 %    Platelet Estimate ADEQUATE Adequate    nRBC 0 /100 wbc   Magnesium   Result Value Ref Range    Magnesium 1.8 1.6 - 2.4 mg/dL   Troponin   Result Value Ref Range    Troponin T < 0.010 ng/ml Comprehensive Metabolic Panel   Result Value Ref Range    Glucose 138 (H) 70 - 108 mg/dL    CREATININE 1.0 0.4 - 1.2 mg/dL    BUN 17 7 - 22 mg/dL    Sodium 131 (L) 135 - 145 meq/L    Potassium 3.5 3.5 - 5.2 meq/L    Chloride 97 (L) 98 - 111 meq/L    CO2 19 (L) 23 - 33 meq/L    Calcium 8.6 8.5 - 10.5 mg/dL    AST 12 5 - 40 U/L    Alkaline Phosphatase 71 38 - 126 U/L    Total Protein 6.0 (L) 6.1 - 8.0 g/dL    Albumin 3.1 (L) 3.5 - 5.1 g/dL    Total Bilirubin 0.6 0.3 - 1.2 mg/dL    ALT 38 11 - 66 U/L   Procalcitonin   Result Value Ref Range    Procalcitonin 3.97 (H) 0.01 - 0.09 ng/mL   Lipase   Result Value Ref Range    Lipase 14.2 5.6 - 51.3 U/L   Lactate, Sepsis   Result Value Ref Range    Lactic Acid, Sepsis 1.1 0.5 - 1.9 mmol/L   Anion Gap   Result Value Ref Range    Anion Gap 15.0 8.0 - 16.0 meq/L   Glomerular Filtration Rate, Estimated   Result Value Ref Range    Est, Glom Filt Rate 78 (A) ml/min/1.73m2   Osmolality   Result Value Ref Range    Osmolality Calc 266.4 (L) 275.0 - 300.0 mOsmol/kg   Urine with Reflexed Micro   Result Value Ref Range    Glucose, Ur NEGATIVE NEGATIVE mg/dl    Bilirubin Urine SMALL (A) NEGATIVE    Ketones, Urine 15 (A) NEGATIVE    Specific Gravity, Urine > 1.030 (A) 1.002 - 1.030    Blood, Urine MODERATE (A) NEGATIVE    pH, UA 6.0 5.0 - 9.0    Protein,  (A) NEGATIVE    Urobilinogen, Urine 0.2 0.0 - 1.0 eu/dl    Nitrite, Urine NEGATIVE NEGATIVE    Leukocyte Esterase, Urine NEGATIVE NEGATIVE    Color, UA DK YELLOW (A) STRAW-YELLOW    Character, Urine CLEAR CLEAR-SL CLOUD    RBC, UA 25-50 0-2/hpf /hpf    WBC, UA 0-2 0-4/hpf /hpf    Epithelial Cells, UA 0-2 3-5/hpf /hpf    Bacteria, UA NONE SEEN FEW/NONE SEEN /hpf    Casts UA 8-15 HYALINE NONE SEEN /lpf    Crystals, UA NONE SEEN NONE SEEN    Renal Epithelial, UA NONE SEEN NONE SEEN    Yeast, UA NONE SEEN NONE SEEN    CASTS 2 NONE SEEN NONE SEEN /lpf    MISCELLANEOUS 2 NONE SEEN    Bile Acids, Total   Result Value Ref Range Ictotest NEGATIVE NEGATIVE   Scan of Blood Smear   Result Value Ref Range    SCAN OF BLOOD SMEAR see below    Iron   Result Value Ref Range    Iron 22 (L) 65 - 195 ug/dL   IRON SATURATION   Result Value Ref Range    Iron Saturation 13 (L) 20 - 50 %   Ferritin   Result Value Ref Range    Ferritin 260 22 - 322 ng/mL   Iron Binding Capacity   Result Value Ref Range    TIBC 175 171 - 450 ug/dL       RADIOLOGY REPORTS  CT ABDOMEN PELVIS W IV CONTRAST Additional Contrast? None   Final Result      1. Diffuse wall thickening throughout segments of small and large bowel, findings consistent with diffuse enterocolitis. 2. Somewhat thickened appearance of the gallbladder wall which is unchanged since prior exam.               **This report has been created using voice recognition software. It may contain minor errors which are inherent in voice recognition technology. **      Final report electronically signed by Dr Chacha Fernandez on 11/25/2021 12:01 AM      XR CHEST PORTABLE   Final Result   There is no acute intrathoracic process. **This report has been created using voice recognition software. It may contain minor errors which are inherent in voice recognition technology. **      Final report electronically signed by Dr Chacha Fernandez on 11/24/2021 9:51 PM          ED COURSE AND MEDICAL [facility] (MDM)     INITIAL ASSESSMENT AND PLAN (9:25 PM EST)  Differential Diagnosis: Neutropenic fever, viral illness, community-acquired pneumonia, dehydration, sepsis, UTI, neutropenic colitis  Plan: Large-bore IV, normal saline 1 L bolus followed by infusion, labs, chest x-ray, CT abdomen pelvis, IV Dilaudid for pain, Tylenol for fever    VITAL SIGNS  Vitals:    11/24/21 2132 11/24/21 2301 11/25/21 0257 11/25/21 0336   BP: (!) 147/98 102/73 127/73 104/74   Pulse: 89 72 68 78   Resp: 18 17 18 18   Temp: 98.5 °F (36.9 °C)   98 °F (36.7 °C)   TempSrc: Oral   Oral   SpO2: 98% 98% 99% 100%   Weight: 142 lb (64.4 kg) 145 lb 1.6 oz (65.8 kg)   Height: 6' (1.829 m)   6' (1.829 m)       MDM    Medications  HYDROmorphone (DILAUDID) injection 1 mg (1 mg IntraVENous Given 11/24/21 2228)   0.9 % sodium chloride bolus (0 mLs IntraVENous Stopped 11/25/21 0035)   acetaminophen (TYLENOL) tablet 650 mg (650 mg Oral Given 11/24/21 2227)   iopamidol (ISOVUE-370) 76 % injection 80 mL (80 mLs IntraVENous Given 11/24/21 2321)   piperacillin-tazobactam (ZOSYN) 3,375 mg in dextrose 5 % 50 mL IVPB (mini-bag) (0 mg IntraVENous Stopped 11/25/21 0242)   0.9 % sodium chloride bolus (0 mLs IntraVENous Stopped 11/25/21 0403)   HYDROmorphone (DILAUDID) injection 1 mg (1 mg IntraVENous Given 11/25/21 0157)   ondansetron (ZOFRAN) 4 MG tablet (4 mg  Given 11/25/21 0238)     ED work-ups reveal he has diffuse enterocolitis, this could be viral versus bacterial versus chemo related. Empirical IV Zosyn is given. Admission is warranted. Consulted and admitted to hospitalist service. CRITICAL CARE   None    CONSULTS   Hospitalist    PROCEDURES   None    FINAL IMPRESSION AND DISPOSITION      1. Enterocolitis    2. Malignant neoplasm of colon, unspecified part of colon ([facility] [address]        DISPOSITION Admitted 11/25/2021 02:27:10 AM        PATIENT REFERRED TO:  No follow-up provider specified.     DISCHARGE MEDICATIONS:  Current Discharge Medication List          (Please note that portions of this note were completed with a voice recognition program.  Efforts were made to edit the dictations but occasionally words aremis-transcribed.)    MD Tin Grant MD  11/25/21 3469

## 2021-11-25 NOTE — ED TRIAGE NOTES
Patient presents to the ED with complaints of fever, nausea/vomiting, and diarrhea for the last week. Patient has a history of colon cancer. His last chemo treatment was on 11/8/21. Patient's last fever was recorded a few hours before coming into the ED at 101.8. Patient was given tylenol for this at home. Patient's temp here in the ED is 98.7. VSS. Patient has been receiving IV fluids the last few days as well. Labs and urine specimen collected at bedside. Will continue to monitor.

## 2021-11-25 NOTE — ED NOTES
ED to inpatient nurses report    Chief Complaint   Patient presents with    Fever    Nausea    Diarrhea      Present to ED from home  LOC: alert and orientated to name, place, date  Vital signs   Vitals:    11/24/21 2132 11/24/21 2301 11/25/21 0257   BP: (!) 147/98 102/73 127/73   Pulse: 89 72 68   Resp: 18 17 18   Temp: 98.5 °F (36.9 °C)     TempSrc: Oral     SpO2: 98% 98% 99%   Weight: 142 lb (64.4 kg)     Height: 6' (1.829 m)        Oxygen Baseline RA    Current needs required RA   LDAs:    Mobility: Requires assistance * 1  Pending ED orders: Complete  Present condition: Stable      Electronically signed by Katie Montano RN on 11/25/2021 at 3:02 AM       Katie Montano RN  11/25/21 0302

## 2021-11-25 NOTE — PROCEDURES
A Bladder scan was performed at 1520 . The patient's last void was at 1445 . The residual amount was measured to be 55 ML. Report of results was given to [employer].

## 2021-11-25 NOTE — H&P
Patient: Valeriy Warner    Unit/Bed: 04/004A    YOB: 1970    MRN: [id number]    Acct: [de-identified]     Admitting Diagnosis: Enterocolitis [K52.9]    Admit Date:  11/24/2021    CC: Abdominal pain, nausea and vomiting and fever x1 week    HPI :  18-year-old male patient recently diagnosed with colon cancer September 2021 status post surgery IV chemotherapy and Xeloda. Presented to the ED today with a 1 week history of intractable periumbilical abdominal pain of 9/10 intensity. Associated nausea vomiting diarrhea and fever last few days. Patient now has poor oral intake and generalized body weakness    Vital signs on admission temperature 36.9 °C, respiratory 18, heart rate 89, blood pressure 147/98, oxygen saturation 98% room air. Labs on admission sodium 131, potassium 3.5, CO2 19, , creatinine 1.0, blood sugar 138, magnesium 1.8, calcium 8.6, total protein 6.0, procalcitonin 397, troponin first set less than 0.010, albumin 3.1, alkaline phosphatase 71, ALT 38, AST is 12, total bilirubin 0.6, lipase 14.2, total protein 6.0. WBC 7.2, hemoglobin 15.8, MCV 78.9, platelets 718. Influenza A, B and COVID-19 negative. Stool studies for GI PCR panel was negative. ED treatment included IV Zosyn for suspected problem infection and IV fluids. Review of Systems   Constitutional: Positive for activity change, appetite change, fatigue and fever. Negative for chills, diaphoresis and unexpected weight change. HENT: Negative for congestion, dental problem, drooling, ear discharge, ear pain, facial swelling, hearing loss, mouth sores, nosebleeds, postnasal drip, rhinorrhea, sinus pressure, sinus pain, sneezing, sore throat, tinnitus, trouble swallowing and voice change. Eyes: Negative for photophobia, pain, discharge, redness, itching and visual disturbance. Respiratory: Negative for apnea, cough, choking, chest tightness, shortness of breath, wheezing and stridor. Cardiovascular: Negative for chest pain, palpitations and leg swelling. Gastrointestinal: Positive for abdominal pain, diarrhea, nausea and vomiting. Negative for abdominal distention, anal bleeding, blood in stool, constipation and rectal pain. Endocrine: Negative for cold intolerance, heat intolerance, polydipsia, polyphagia and polyuria. Genitourinary: Negative for decreased urine volume, difficulty urinating, dysuria, enuresis, flank pain, frequency, genital sores, hematuria, penile discharge, penile pain, penile swelling, scrotal swelling, testicular pain and urgency. Musculoskeletal: Negative for arthralgias, back pain, gait problem, joint swelling, myalgias, neck pain and neck stiffness. Skin: Negative for color change, pallor, rash and wound. Allergic/Immunologic: Negative for environmental allergies, food allergies and immunocompromised state. Neurological: Negative for dizziness, tremors, seizures, syncope, facial asymmetry, speech difficulty, weakness, light-headedness, numbness and headaches. Hematological: Negative for adenopathy. Does not bruise/bleed easily. Psychiatric/Behavioral: Negative for agitation, behavioral problems, confusion, decreased concentration, dysphoric mood, hallucinations, self-injury, sleep disturbance and suicidal ideas. The patient is not nervous/anxious and is not hyperactive.         Past Medical History:        Diagnosis Date    Anemia     Colon cancer ([facility] [address] 09/2021       Past Surgical History:        Procedure Laterality Date    COLONOSCOPY  09/01/2021    Dr. Patricia Bynum Right 9/16/2021    ROBOT EXTENDED RIGHT COLECTOMY performed by Vianca Finch MD at [address] [place] [address] Left 2020    [address] [place] [address] with mesh inguinal    PORT SURGERY N/A 11/8/2021    SINGLE LUMEN SMART PORT INSERTION performed by Vianca Finch MD at [place] [facility]       Medications Prior to Admission:    Prior to Admission medications Medication Sig Start Date End Date Taking? Authorizing Provider   diphenoxylate-atropine (DIPHENATOL) 2.5-0.025 MG per tablet Take 1 tablet by mouth 4 times daily for 10 days. 11/22/21 12/2/21  Tapan Wright, MD   loperamide (IMODIUM) 2 MG capsule Take 2 mg by mouth 4 times daily as needed for Diarrhea    Historical Provider, MD   prochlorperazine (COMPAZINE) 5 MG tablet Take 1 tablet by mouth every 12 hours for 14 days Take 30 minutes before morning and afternoon dose of Xeloda 11/9/21 11/23/21  Tapan Given, MD   ondansetron (ZOFRAN) 4 MG tablet Take 1 tablet by mouth every 6 hours as needed for Nausea or Vomiting 11/8/21   Autumn Wylie MD   ketorolac (TORADOL) 10 MG tablet Take 1 tablet by mouth every 8 hours as needed for Pain 11/8/21   Autumn Wylie MD   sucralfate (CARAFATE) 1 GM tablet take 1 tablet by mouth twice a day FIVE TO 60 MINS BEFORE LUNCH AND DINNER 9/30/21   Historical Provider, MD   ondansetron (ZOFRAN-ODT) 4 MG disintegrating tablet  9/20/21   Historical Provider, MD   capecitabine (XELODA) 500 MG chemo tablet Take 4 tablets by mouth 2 times daily 11/1/21   Tapan Given, MD   ibuprofen (ADVIL;MOTRIN) 200 MG tablet Take 200 mg by mouth every 6 hours as needed for Pain    Historical Provider, MD   ferrous sulfate (IRON 325) 325 (65 Fe) MG tablet Take 1 tablet by mouth daily (with breakfast) 9/23/21   Yasir Esparza MD   omeprazole (PRILOSEC OTC) 20 MG tablet Take 20 mg by mouth daily    Historical Provider, MD       Allergies:    Patient has no known allergies. Social History:    TOBACCO:   reports that he quit smoking about 22 months ago. His smoking use included cigarettes. He has a 35.00 pack-year smoking history. He has never used smokeless tobacco.    ETOH:   reports previous alcohol use.     Family History:        Problem Relation Age of Onset    Other Mother         Brain aneurysm    COPD Mother     Stroke Mother     Cancer Father         lung mets brain and bone    Stroke Father     Cancer Maternal Grandmother         colon    High Blood Pressure Maternal Grandmother     Cancer Maternal Grandfather         colon     High Blood Pressure Maternal Grandfather     Kidney Disease Paternal Grandmother     Cancer Paternal Grandmother         breast     Diabetes Paternal Grandmother     High Blood Pressure Paternal Grandmother     Cancer Paternal Grandfather         lung-52    Heart Attack Paternal Grandfather     High Blood Pressure Paternal Grandfather     Cancer Paternal Aunt         colon    Heart Attack Paternal Aunt     Cancer Paternal Uncle         lung    Cancer Maternal Aunt         colon    Cancer Maternal Uncle         colon       Objective:   /73   Pulse 72   Temp 98.5 °F (36.9 °C) (Oral)   Resp 17   Ht 6' (1.829 m)   Wt 142 lb (64.4 kg)   SpO2 98%   BMI 19.26 kg/m²   No intake or output data in the 24 hours ending 11/25/21 0230    Physical Exam  Vitals and nursing note reviewed. Constitutional:       General: He is not in acute distress. Appearance: Normal appearance. He is normal weight. He is ill-appearing. He is not toxic-appearing or diaphoretic. HENT:      Head: Normocephalic and atraumatic. Left Ear: External ear normal.      Nose: Nose normal. No congestion or rhinorrhea. Mouth/Throat:      Mouth: Mucous membranes are moist.      Pharynx: Oropharynx is clear. No oropharyngeal exudate or posterior oropharyngeal erythema. Eyes:      General: No scleral icterus. Right eye: No discharge. Left eye: No discharge. Extraocular Movements: Extraocular movements intact. Conjunctiva/sclera: Conjunctivae normal.      Pupils: Pupils are equal, round, and reactive to light. Neck:      Vascular: No carotid bruit. Cardiovascular:      Rate and Rhythm: Normal rate and regular rhythm. Pulses: Normal pulses. Heart sounds: Normal heart sounds. No murmur heard. No friction rub. No gallop.     Pulmonary: Effort: Pulmonary effort is normal. No respiratory distress. Breath sounds: Normal breath sounds. No stridor. No wheezing, rhonchi or rales. Chest:      Chest wall: No tenderness. Abdominal:      General: Bowel sounds are normal. There is no distension. Palpations: Abdomen is soft. There is no mass. Tenderness: There is abdominal tenderness. There is no right CVA tenderness, left CVA tenderness, guarding or rebound. Hernia: No hernia is present. Comments: Generalized abdominal pain tenderness mostly periumbilical area   Musculoskeletal:         General: No swelling, tenderness, deformity or signs of injury. Normal range of motion. Cervical back: Normal range of motion. No rigidity or tenderness. Right lower leg: No edema. Left lower leg: No edema. Lymphadenopathy:      Cervical: No cervical adenopathy. Skin:     General: Skin is warm and dry. Coloration: Skin is not jaundiced or pale. Findings: No bruising, erythema, lesion or rash. Neurological:      General: No focal deficit present. Mental Status: He is alert and oriented to person, place, and time. Mental status is at baseline. Cranial Nerves: No cranial nerve deficit. Sensory: No sensory deficit. Motor: No weakness. Coordination: Coordination normal.      Gait: Gait normal.      Deep Tendon Reflexes: Reflexes normal.   Psychiatric:         Mood and Affect: Mood normal.         Behavior: Behavior normal.         Thought Content:  Thought content normal.         Judgment: Judgment normal.     :    Medications:    piperacillin-tazobactam  3,375 mg IntraVENous Once    sodium chloride  30 mL/kg IntraVENous Once    ciprofloxacin  400 mg IntraVENous Q12H    metroNIDAZOLE  500 mg IntraVENous Q8H    ondansetron  4 mg Oral Once       Continuous Infusions:   dextrose 5% in lactated ringers         PRN Meds:HYDROmorphone    Data:    CBC:   Recent Labs     11/24/21  2135   WBC 7.2 RBC 6.06   HGB 15.8   HCT 47.8   MCV 78.9*          BMP:   Recent Labs     11/23/21  1209 11/24/21  1054 11/24/21 2135   * 132* 131*   K 3.2* 3.5 3.5   CL  --   --  97*   CO2  --   --  19*   BUN  --   --  17   CREATININE 0.9 1.0 1.0       PT/INR: No results for input(s): PROTIME, INR in the last 72 hours. LIVER PROFILE:   Recent Labs     11/24/21 2135   AST 12   ALT 38   BILITOT 0.6   ALKPHOS 71      Results for Shayna Cerda \"OSMAR\" (MRN [id number]) as of 11/25/2021 04:44   Ref. Range 11/24/2021 21:35   Ferritin Latest Ref Range: 22 - 322 ng/mL 260   Iron Latest Ref Range: 65 - 195 ug/dL 22 (L)   Iron Saturation Latest Ref Range: 20 - 50 % 13 (L)   TIBC Latest Ref Range: 171 - 450 ug/dL 175     ABG. None. URINALYSIS. None. SEROLOGY  None. TUMOR MARKERS. Results for [place] Lava" (MRN [id number]) as of 11/25/2021 02:34   Ref. Range 9/9/2021 10:52   CEA Latest Ref Range: 0.0 - 5.0 ng/ml 2.5       MICROBIOLOGY   Results for Shayna Cerda \"OSMAR\" (MRN [id number]) as of 11/25/2021 04:44   Ref. Range 11/25/2021 22:40   C.diff Toxin/Antigen Unknown NEGATIVE       Results for Shayna Shells \"OSMAR\" (MRN [id number]) as of 11/25/2021 01:43    Ref.  Range 11/24/2021 22:40   Campylobacter PCR Latest Ref Range: Not Detected  Not Detected   Yersinia Enterocolitica PCR Latest Ref Range: Not Detected  Not Detected   Cryptosporidium PCR Latest Ref Range: Not Detected  Not Detected   Clostridium difficile, PCR Latest Ref Range: Not Detected  NA   Giardia Lamblia PCR Latest Ref Range: Not Detected  Not Detected   Adenovirus F 40 41 PCR Latest Ref Range: Not Detected  Not Detected   E Coli O157 PCR Latest Ref Range: Not Detected  NA   E Coli Enteroaggregative PCR Latest Ref Range: Not Detected  Not Detected   E Coli Enteropathogenic PCR Latest Ref Range: Not Detected  Not Detected   E Coli Enterotoxigenic PCR Latest Ref Range: Not Detected  Not Detected   E Coli Shiga Like Toxin PCR Latest Ref Range: Not Detected  Not Detected   E Coli Shigella/Enteroinvasive PCR Latest Ref Range: Not Detected  Not Detected   Norovirus GI GII PCR Latest Ref Range: Not Detected  Not Detected   Plesiomonas Shigelloides PCR Latest Ref Range: Not Detected  Not Detected   Rotavirus A PCR Latest Ref Range: Not Detected  Not Detected   Salmonella PCR Latest Ref Range: Not Detected  Not Detected   Sapovirus PCR Latest Ref Range: Not Detected  Not Detected   Vibrio Cholerae PCR Latest Ref Range: Not Detected  Not Detected   Vibrio PCR Latest Ref Range: Not Detected  Not Detected   Cyclospora Cayetanensis PCR Latest Ref Range: Not Detected  Not Detected   E HISTOLYTICA GI FILM ARRAY Latest Ref Range: Not Detected  Not Detected   Astrovirus PCR Latest Ref Range: Not Detected  Not Detected      Results for Sachi Breeze \"OSMAR\" (MRN [id number]) as of 11/25/2021 01:43    Ref. Range 11/24/2021 22:34   Flu A Antigen Latest Ref Range: NEGATIVE  Negative   Flu B Antigen Latest Ref Range: NEGATIVE  Negative   RAPID INFLUENZA A/B ANTIGENS Unknown Rpt   SARS-CoV-2, NAAT Latest Ref Range: NOT DETECTED  NOT  DETECTED        HISTOPATHOLOGY. None. TOXICOLOGY. None. ENDOSCOPE STUDIES. None. PROCEDURES. None. IR PROCEDURES. None. RADIOLOGY. CT abdomen pelvis with IV contrast11/12/2021.   FINDINGS:  The lung bases are clear.      There is no pleural effusion or pneumothorax.     The base of the heart is within acceptable limits.     The gallbladder is slightly thickened, similar to the prior exam.     There is mild intrahepatic biliary ductal dilatation.     The spleen, pancreas and adrenal glands are within normal limits.     The kidneys are symmetric in size, shape and degree of enhancement.      There is no hydronephrosis.     Postsurgical changes are seen involving the intestine in the mid abdomen.      There is extensive wall thickening throughout the small and large bowel loops.     There is no ascites. There is no free intraperitoneal air.     The urinary bladder appears normal.     The aorta and the IVC are normal in caliber.     The bones are intact.     IMPRESSION:  1. Diffuse wall thickening throughout segments of small and large bowel,       findings consistent with diffuse enterocolitis.     2. Somewhat thickened appearance of the gallbladder wall which is unchanged       since prior exam.    ASSESSMENT AND  PLAN. 1.GI. Acute enterocolitisstool studies, GI PCR is negative. Suspected chemotherapy induced enterocolitis. Transverse colon adenocarcinoma-s/p colectomy. IV fluids, IV Cipro and Flagyl, IV Dilaudid for pain control. 2.RENAL AND ELECTROLYTES. Dehydration IVF w/ Ringer's lactate and 5% dextrose. 3.ID. Urinalysis rule out UTI. 4.ENDO.     TSH and A1c check. 5.MUSCULOSKELETAL. General body weakness due to diarrhea and dehydration. 6. LIFE STYLE.     H/o chronic alcohol abuse and tobacco use disorder. 7.HEM-ONC. Colon cancer -s/p resection, on chemotherapy. Micro cytosis due to iron deficiencyserum iron 22-iron sucrose infusion. 8.NUTRITION. Moderate to severe protein energy malnutrition. 9.DISPO. Planned d/c to home vs rehab soon.       Electronically signed by Bg Tavera MD on 11/25/2021 at 2:30 AM

## 2021-11-25 NOTE — PROGRESS NOTES
Pt admitted to  [address] per Hospitalist from ED. Complains of enterocolitis. IV of 0.9 infusing into right upper chest port with 50 mls to count. Port in upper right chest. IV site free of s/s of infection or infiltration. Instructed in use of call light, tv controls, bed controls and 5 minute rule scripted to pt with understanding verbalized. Fall and safety brochure discussed with pt.

## 2021-11-25 NOTE — PROGRESS NOTES
Patient awakened from sleep by touch and calling name to administer antibiotic. Patient is reporting pain \"comfortable\" as a 5/10. Patient drowsy. Offered tylenol. Patient refused. I educated patient that when his pain level starts to increase I can give him his prn dilaudid. Wife very upset about this. Wife states that I should be giving him his pain medication on time regardless if he is sleeping or not. I remind them that I am happy to come back when he is ready for it. Wife asking for a new nurse. Dr. Paul Rogers notified of situation. After some talking with wife she is OK with me continuing care at this time.

## 2021-11-26 ENCOUNTER — HOSPITAL ENCOUNTER (OUTPATIENT)
Dept: INFUSION THERAPY | Age: 51
End: 2021-11-26

## 2021-11-26 LAB
ANION GAP SERPL CALCULATED.3IONS-SCNC: 12 MEQ/L (ref 8–16)
ANION GAP SERPL CALCULATED.3IONS-SCNC: 8 MEQ/L (ref 8–16)
BACTERIA: ABNORMAL /HPF
BILIRUBIN URINE: ABNORMAL
BLOOD, URINE: ABNORMAL
BUN BLDV-MCNC: 11 MG/DL (ref 7–22)
BUN BLDV-MCNC: 9 MG/DL (ref 7–22)
C DIFF TOXIN/ANTIGEN: NEGATIVE
CALCIUM SERPL-MCNC: 7.7 MG/DL (ref 8.5–10.5)
CALCIUM SERPL-MCNC: 7.8 MG/DL (ref 8.5–10.5)
CASTS 2: ABNORMAL /LPF
CASTS UA: ABNORMAL /LPF
CHARACTER, URINE: CLEAR
CHLORIDE BLD-SCNC: 101 MEQ/L (ref 98–111)
CHLORIDE BLD-SCNC: 99 MEQ/L (ref 98–111)
CO2: 19 MEQ/L (ref 23–33)
CO2: 20 MEQ/L (ref 23–33)
COLOR: ABNORMAL
CREAT SERPL-MCNC: 0.6 MG/DL (ref 0.4–1.2)
CREAT SERPL-MCNC: 0.7 MG/DL (ref 0.4–1.2)
CRYSTALS, UA: ABNORMAL
EPITHELIAL CELLS, UA: ABNORMAL /HPF
ERYTHROCYTE [DISTWIDTH] IN BLOOD BY AUTOMATED COUNT: 18.9 % (ref 11.5–14.5)
ERYTHROCYTE [DISTWIDTH] IN BLOOD BY AUTOMATED COUNT: 50.8 FL (ref 35–45)
GFR SERPL CREATININE-BSD FRML MDRD: > 90 ML/MIN/1.73M2
GFR SERPL CREATININE-BSD FRML MDRD: > 90 ML/MIN/1.73M2
GLUCOSE BLD-MCNC: 129 MG/DL (ref 70–108)
GLUCOSE BLD-MCNC: 193 MG/DL (ref 70–108)
GLUCOSE URINE: NEGATIVE MG/DL
HCT VFR BLD CALC: 39 % (ref 42–52)
HEMOGLOBIN: 13.1 GM/DL (ref 14–18)
ICTOTEST: NEGATIVE
KETONES, URINE: NEGATIVE
LACTIC ACID: 1.9 MMOL/L (ref 0.5–2)
LEUKOCYTE ESTERASE, URINE: NEGATIVE
MAGNESIUM: 1.8 MG/DL (ref 1.6–2.4)
MAGNESIUM: 1.8 MG/DL (ref 1.6–2.4)
MCH RBC QN AUTO: 26.7 PG (ref 26–33)
MCHC RBC AUTO-ENTMCNC: 33.6 GM/DL (ref 32.2–35.5)
MCV RBC AUTO: 79.6 FL (ref 80–94)
MISCELLANEOUS 2: ABNORMAL
MUCUS: ABNORMAL
NITRITE, URINE: POSITIVE
PH UA: 6.5 (ref 5–9)
PLATELET # BLD: 244 THOU/MM3 (ref 130–400)
PMV BLD AUTO: 9.6 FL (ref 9.4–12.4)
POTASSIUM REFLEX MAGNESIUM: 3.1 MEQ/L (ref 3.5–5.2)
POTASSIUM REFLEX MAGNESIUM: 3.2 MEQ/L (ref 3.5–5.2)
PROTEIN UA: 100
RBC # BLD: 4.9 MILL/MM3 (ref 4.7–6.1)
RBC URINE: ABNORMAL /HPF
RENAL EPITHELIAL, UA: ABNORMAL
SODIUM BLD-SCNC: 129 MEQ/L (ref 135–145)
SODIUM BLD-SCNC: 130 MEQ/L (ref 135–145)
SPECIFIC GRAVITY, URINE: > 1.03 (ref 1–1.03)
UROBILINOGEN, URINE: 0.2 EU/DL (ref 0–1)
WBC # BLD: 4.9 THOU/MM3 (ref 4.8–10.8)
WBC UA: ABNORMAL /HPF
YEAST: ABNORMAL

## 2021-11-26 PROCEDURE — 85027 COMPLETE CBC AUTOMATED: CPT

## 2021-11-26 PROCEDURE — 6370000000 HC RX 637 (ALT 250 FOR IP): Performed by: FAMILY MEDICINE

## 2021-11-26 PROCEDURE — 83735 ASSAY OF MAGNESIUM: CPT

## 2021-11-26 PROCEDURE — 6370000000 HC RX 637 (ALT 250 FOR IP): Performed by: PHYSICIAN ASSISTANT

## 2021-11-26 PROCEDURE — 80048 BASIC METABOLIC PNL TOTAL CA: CPT

## 2021-11-26 PROCEDURE — 2500000003 HC RX 250 WO HCPCS: Performed by: INTERNAL MEDICINE

## 2021-11-26 PROCEDURE — 36591 DRAW BLOOD OFF VENOUS DEVICE: CPT

## 2021-11-26 PROCEDURE — 1200000000 HC SEMI PRIVATE

## 2021-11-26 PROCEDURE — 6360000002 HC RX W HCPCS: Performed by: INTERNAL MEDICINE

## 2021-11-26 PROCEDURE — 6370000000 HC RX 637 (ALT 250 FOR IP): Performed by: INTERNAL MEDICINE

## 2021-11-26 PROCEDURE — 83605 ASSAY OF LACTIC ACID: CPT

## 2021-11-26 PROCEDURE — 2580000003 HC RX 258: Performed by: INTERNAL MEDICINE

## 2021-11-26 PROCEDURE — 99232 SBSQ HOSP IP/OBS MODERATE 35: CPT | Performed by: PHYSICIAN ASSISTANT

## 2021-11-26 PROCEDURE — 81001 URINALYSIS AUTO W/SCOPE: CPT

## 2021-11-26 RX ORDER — POTASSIUM CHLORIDE 20 MEQ/1
40 TABLET, EXTENDED RELEASE ORAL PRN
Status: DISCONTINUED | OUTPATIENT
Start: 2021-11-26 | End: 2021-12-04 | Stop reason: HOSPADM

## 2021-11-26 RX ORDER — MEGESTROL ACETATE 40 MG/1
40 TABLET ORAL DAILY
Status: DISCONTINUED | OUTPATIENT
Start: 2021-11-26 | End: 2021-12-03

## 2021-11-26 RX ORDER — POTASSIUM CHLORIDE 7.45 MG/ML
10 INJECTION INTRAVENOUS PRN
Status: DISCONTINUED | OUTPATIENT
Start: 2021-11-26 | End: 2021-12-04 | Stop reason: HOSPADM

## 2021-11-26 RX ORDER — OXYCODONE HYDROCHLORIDE 5 MG/1
5 TABLET ORAL EVERY 4 HOURS PRN
Status: DISCONTINUED | OUTPATIENT
Start: 2021-11-26 | End: 2021-12-04 | Stop reason: HOSPADM

## 2021-11-26 RX ADMIN — ENOXAPARIN SODIUM 40 MG: 100 INJECTION SUBCUTANEOUS at 09:05

## 2021-11-26 RX ADMIN — OXYCODONE HYDROCHLORIDE 5 MG: 5 TABLET ORAL at 23:08

## 2021-11-26 RX ADMIN — HYDROMORPHONE HYDROCHLORIDE 0.5 MG: 1 INJECTION, SOLUTION INTRAMUSCULAR; INTRAVENOUS; SUBCUTANEOUS at 02:42

## 2021-11-26 RX ADMIN — METRONIDAZOLE 500 MG: 500 INJECTION, SOLUTION INTRAVENOUS at 11:37

## 2021-11-26 RX ADMIN — SUCRALFATE 1 G: 1 TABLET ORAL at 16:05

## 2021-11-26 RX ADMIN — METRONIDAZOLE 500 MG: 500 INJECTION, SOLUTION INTRAVENOUS at 20:52

## 2021-11-26 RX ADMIN — SODIUM CHLORIDE 25 ML: 9 INJECTION, SOLUTION INTRAVENOUS at 20:48

## 2021-11-26 RX ADMIN — IRON SUCROSE 200 MG: 20 INJECTION, SOLUTION INTRAVENOUS at 08:29

## 2021-11-26 RX ADMIN — OXYCODONE HYDROCHLORIDE 5 MG: 5 TABLET ORAL at 01:00

## 2021-11-26 RX ADMIN — SODIUM CHLORIDE, SODIUM LACTATE, POTASSIUM CHLORIDE, CALCIUM CHLORIDE AND DEXTROSE MONOHYDRATE: 5; 600; 310; 30; 20 INJECTION, SOLUTION INTRAVENOUS at 16:36

## 2021-11-26 RX ADMIN — POTASSIUM CHLORIDE 40 MEQ: 1500 TABLET, EXTENDED RELEASE ORAL at 15:12

## 2021-11-26 RX ADMIN — IBUPROFEN 600 MG: 600 TABLET, FILM COATED ORAL at 07:04

## 2021-11-26 RX ADMIN — SODIUM CHLORIDE, SODIUM LACTATE, POTASSIUM CHLORIDE, CALCIUM CHLORIDE AND DEXTROSE MONOHYDRATE: 5; 600; 310; 30; 20 INJECTION, SOLUTION INTRAVENOUS at 08:29

## 2021-11-26 RX ADMIN — DIPHENOXYLATE HYDROCHLORIDE AND ATROPINE SULFATE 1 TABLET: 2.5; .025 TABLET ORAL at 20:53

## 2021-11-26 RX ADMIN — DICYCLOMINE HYDROCHLORIDE 20 MG: 10 CAPSULE ORAL at 09:04

## 2021-11-26 RX ADMIN — DIPHENOXYLATE HYDROCHLORIDE AND ATROPINE SULFATE 1 TABLET: 2.5; .025 TABLET ORAL at 14:00

## 2021-11-26 RX ADMIN — SODIUM CHLORIDE, PRESERVATIVE FREE 10 ML: 5 INJECTION INTRAVENOUS at 20:53

## 2021-11-26 RX ADMIN — DICYCLOMINE HYDROCHLORIDE 20 MG: 10 CAPSULE ORAL at 20:52

## 2021-11-26 RX ADMIN — SUCRALFATE 1 G: 1 TABLET ORAL at 07:06

## 2021-11-26 RX ADMIN — METRONIDAZOLE 500 MG: 500 INJECTION, SOLUTION INTRAVENOUS at 03:43

## 2021-11-26 RX ADMIN — SUCRALFATE 1 G: 1 TABLET ORAL at 11:08

## 2021-11-26 RX ADMIN — DICYCLOMINE HYDROCHLORIDE 20 MG: 10 CAPSULE ORAL at 01:00

## 2021-11-26 RX ADMIN — OXYCODONE HYDROCHLORIDE 5 MG: 5 TABLET ORAL at 09:03

## 2021-11-26 RX ADMIN — CIPROFLOXACIN 400 MG: 2 INJECTION, SOLUTION INTRAVENOUS at 15:12

## 2021-11-26 RX ADMIN — MEGESTROL ACETATE 40 MG: 40 TABLET ORAL at 11:35

## 2021-11-26 RX ADMIN — CIPROFLOXACIN 400 MG: 2 INJECTION, SOLUTION INTRAVENOUS at 02:41

## 2021-11-26 RX ADMIN — OXYCODONE HYDROCHLORIDE 5 MG: 5 TABLET ORAL at 14:00

## 2021-11-26 RX ADMIN — IBUPROFEN 600 MG: 600 TABLET, FILM COATED ORAL at 21:46

## 2021-11-26 RX ADMIN — OMEPRAZOLE 20 MG: 20 CAPSULE, DELAYED RELEASE ORAL at 07:07

## 2021-11-26 RX ADMIN — DIPHENOXYLATE HYDROCHLORIDE AND ATROPINE SULFATE 1 TABLET: 2.5; .025 TABLET ORAL at 16:05

## 2021-11-26 RX ADMIN — ONDANSETRON 4 MG: 2 INJECTION INTRAMUSCULAR; INTRAVENOUS at 08:22

## 2021-11-26 RX ADMIN — DICYCLOMINE HYDROCHLORIDE 20 MG: 10 CAPSULE ORAL at 14:00

## 2021-11-26 RX ADMIN — DIPHENOXYLATE HYDROCHLORIDE AND ATROPINE SULFATE 1 TABLET: 2.5; .025 TABLET ORAL at 09:03

## 2021-11-26 RX ADMIN — POTASSIUM CHLORIDE 40 MEQ: 1500 TABLET, EXTENDED RELEASE ORAL at 22:21

## 2021-11-26 RX ADMIN — OXYCODONE HYDROCHLORIDE 5 MG: 5 TABLET ORAL at 19:11

## 2021-11-26 ASSESSMENT — PAIN DESCRIPTION - PAIN TYPE: TYPE: ACUTE PAIN

## 2021-11-26 ASSESSMENT — PAIN SCALES - GENERAL
PAINLEVEL_OUTOF10: 4
PAINLEVEL_OUTOF10: 5
PAINLEVEL_OUTOF10: 8
PAINLEVEL_OUTOF10: 7
PAINLEVEL_OUTOF10: 4
PAINLEVEL_OUTOF10: 8
PAINLEVEL_OUTOF10: 5
PAINLEVEL_OUTOF10: 7
PAINLEVEL_OUTOF10: 5
PAINLEVEL_OUTOF10: 4
PAINLEVEL_OUTOF10: 5
PAINLEVEL_OUTOF10: 8
PAINLEVEL_OUTOF10: 7
PAINLEVEL_OUTOF10: 7

## 2021-11-26 ASSESSMENT — PAIN DESCRIPTION - LOCATION: LOCATION: ABDOMEN

## 2021-11-26 NOTE — PROGRESS NOTES
Hospitalist Progress Note      Patient:  Alicia Morales    Unit/Bed:6E-55/055-A  YOB: 1970  MRN: [id number]   Acct: [de-identified]   PCP: Jeyson Solomon MD  Date of Admission: 11/24/2021    Assessment/Plan:    1. Enterocolitis, acute: Possible chemotherapy induced. IV Cipro & Flagyl started. 2. Hyponatremia: Sodium worsening 129. IVF started. Will recheck BMP at 1800.   3. Hypokalemia: Potassium 3.1. Potassium Replacement Protocol. 4. Anemia, chronic: Hgb 13.1, looks to be about baseline. 5. Transverse colon adenocarcinoma: s/p colectomy. Pt follows with Dr. Estuardo De Jesus. Pt is currently receiving infusions at the [facility]. 6. Malnutrition: Dietician consulted. Megace also started. Chief Complaint: Abdominal Pain     Initial H and P:-    Initial H&P \"47year-old male patient recently diagnosed with colon cancer September 2021 status post surgery IV chemotherapy and Xeloda.     Presented to the ED today with a 1 week history of intractable periumbilical abdominal pain of 9/10 intensity. Associated nausea vomiting diarrhea and fever last few days.     Patient now has poor oral intake and generalized body weakness     Vital signs on admission temperature 36.9 °C, respiratory 18, heart rate 89, blood pressure 147/98, oxygen saturation 98% room air.     Labs on admission sodium 131, potassium 3.5, CO2 19, , creatinine 1.0, blood sugar 138, magnesium 1.8, calcium 8.6, total protein 6.0, procalcitonin 397, troponin first set less than 0.010, albumin 3.1, alkaline phosphatase 71, ALT 38, AST is 12, total bilirubin 0.6, lipase 14.2, total protein 6.0.     WBC 7.2, hemoglobin 15.8, MCV 78.9, platelets 647.     Influenza A, B and COVID-19 negative.     Stool studies for GI PCR panel was negative.     ED treatment included IV Zosyn for suspected problem infection and IV fluids. \"     Subjective (past 24 hours):   Pt is frustrated with insurance company today and was on the phone during evaluation and stated that he could not stop the call. Pt states that the pain is manageable at this time. Pt is also concerned about his lack of appetite. Past medical history, family history, social history and allergies reviewed again and is unchanged since admission. ROS (All review of systems completed. Pertinent positives noted. Otherwise All other systems reviewed and negative.)     Medications:  Reviewed    Infusion Medications    dextrose 5% in lactated ringers 125 mL/hr at 11/26/21 1340    sodium chloride 20 mL/hr at 11/25/21 0700     Scheduled Medications    megestrol  40 mg Oral Daily    ciprofloxacin  400 mg IntraVENous Q12H    metroNIDAZOLE  500 mg IntraVENous Q8H    ondansetron  4 mg Oral Once    diphenoxylate-atropine  1 tablet Oral 4x Daily    sodium chloride flush  10 mL IntraVENous 2 times per day    enoxaparin  40 mg SubCUTAneous Daily    iron sucrose  200 mg IntraVENous Q24H    sucralfate  1 g Oral TID AC    omeprazole  20 mg Oral QAM    dicyclomine  20 mg Oral TID    oxyCODONE  5 mg Oral TID     PRN Meds: HYDROmorphone, loperamide, sodium chloride flush, sodium chloride, ondansetron **OR** ondansetron, polyethylene glycol, promethazine, ibuprofen, acetaminophen      Intake/Output Summary (Last 24 hours) at 11/26/2021 1433  Last data filed at 11/26/2021 1414  Gross per 24 hour   Intake 5504.98 ml   Output 345 ml   Net 5159.98 ml       Diet:  ADULT DIET; Regular  ADULT ORAL NUTRITION SUPPLEMENT; Breakfast, Lunch, Dinner; Standard High Calorie/High Protein Oral Supplement    Exam:  BP (!) 116/59   Pulse 59   Temp 98.6 °F (37 °C) (Oral)   Resp 18   Ht 6' (1.829 m)   Wt 145 lb 1.6 oz (65.8 kg)   SpO2 96%   BMI 19.68 kg/m²   General appearance: No apparent distress, appears stated age and cooperative. HEENT: Pupils equal, round, and reactive to light. Conjunctivae/corneas clear.   Neck: Supple, with full range of motion. No jugular venous distention. Trachea midline. Respiratory:  Normal respiratory effort on RA. Clear to auscultation, bilaterally without Rales/Wheezes/Rhonchi. Cardiovascular: Regular rate and rhythm with normal S1/S2 without murmurs, rubs or gallops. Abdomen: Soft, non-tender, non-distended with normal bowel sounds. Musculoskeletal: passive and active ROM x 4 extremities. Skin: Skin color, texture, turgor normal.  No rashes or lesions. Mediport. Neurologic:  Neurovascularly intact without any focal sensory/motor deficits. Cranial nerves: II-XII intact, grossly non-focal.  Psychiatric: Alert and oriented, thought content appropriate, normal insight  Capillary Refill: Brisk,< 3 seconds   Peripheral Pulses: +2 palpable, equal bilaterally     Labs:   Recent Labs     11/24/21 2135 11/26/21  0756   WBC 7.2 4.9   HGB 15.8 13.1*   HCT 47.8 39.0*    244     Recent Labs     11/24/21  1054 11/24/21 2135 11/26/21  1110   * 131* 129*   K 3.5 3.5 3.1*   CL  --  97* 101   CO2  --  19* 20*   BUN  --  17 11   CREATININE 1.0 1.0 0.6   CALCIUM  --  8.6 7.7*   PHOS  --  3.2  --      Recent Labs     11/24/21 2135   AST 12   ALT 38   BILITOT 0.6   ALKPHOS 71     No results for input(s): INR in the last 72 hours. No results for input(s): Drissdc Mcdonaldalondra in the last 72 hours.     Microbiology:    Blood culture #1:   Lab Results   Component Value Date    BC No growth-preliminary  11/24/2021       Blood culture #2:No results found for: Annika Maciel    Organism:No results found for: ORG    No results found for: LABGRAM    MRSA culture only:No results found for: [facility]    Urine culture: No results found for: LABURIN    Respiratory culture: No results found for: CULTRESP    Aerobic and Anaerobic :  No results found for: LABAERO  No results found for: LABANAE    Urinalysis:      Lab Results   Component Value Date    NITRU POSITIVE 11/26/2021    WBCUA 2-4 11/26/2021    BACTERIA NONE SEEN 11/26/2021    RBCUA 5-10 11/26/2021    BLOODU TRACE 11/26/2021    GLUCOSEU NEGATIVE 11/26/2021       Radiology:  CT ABDOMEN PELVIS W IV CONTRAST Additional Contrast? None   Final Result      1. Diffuse wall thickening throughout segments of small and large bowel, findings consistent with diffuse enterocolitis. 2. Somewhat thickened appearance of the gallbladder wall which is unchanged since prior exam.               **This report has been created using voice recognition software. It may contain minor errors which are inherent in voice recognition technology. **      Final report electronically signed by Dr Jet Cadena on 11/25/2021 12:01 AM      XR CHEST PORTABLE   Final Result   There is no acute intrathoracic process. **This report has been created using voice recognition software. It may contain minor errors which are inherent in voice recognition technology. **      Final report electronically signed by Dr Jet Cadena on 11/24/2021 9:51 PM        Electronically signed by LAUREEN Lee on 11/26/2021 at 2:33 PM

## 2021-11-26 NOTE — CARE COORDINATION
11/26/21, 1:26 PM EST  DISCHARGE PLANNING EVALUATION:    Razia Richards       Admitted: 11/24/2021/ 2121   Hospital day: 1   Location: -/5-A Reason for admit: Enterocolitis [K52.9]  Malignant neoplasm of colon, unspecified part of colon ([facility] [C18.9]   PMH:  has a past medical history of Anemia and Colon cancer ([facility] Procedure: No.  Barriers to Discharge:  Presents to ER with complaints of fever, nausea and diarrhea. Hx of colon ca. And presently taking chemo. Has also been havng some IV hydration past 3 days. Pain meds. Reg diet with supplements. IV Cipro. IV Flagyl. Oxycodone. PCP: Michelle Damon MD  Readmission Risk Score: 17 ( )%    Patient Goals/Plan/Treatment Preferences: Met with pt today. He is from home with spouse. He state his needs are met. No current services or DME. He has a PCP, transportation and no issues getting meds. He denies having discharge needs at this time. Transportation/Food Security/Housekeeping Addressed:  No issues identified. 11/26/21, 2:42 PM EST    Patient goals/plan/ treatment preferences discussed by  and . Patient goals/plan/ treatment preferences reviewed with patient/ family. Patient/ family verbalize understanding of discharge plan and are in agreement with goal/plan/treatment preferences. Understanding was demonstrated using the teach back method. AVS provided by RN at time of discharge, which includes all necessary medical information pertaining to the patients current course of illness, treatment, post-discharge goals of care, and treatment preferences. Plans return home with spouse. No needs voiced.

## 2021-11-27 ENCOUNTER — APPOINTMENT (OUTPATIENT)
Dept: GENERAL RADIOLOGY | Age: 51
DRG: 394 | End: 2021-11-27
Payer: COMMERCIAL

## 2021-11-27 LAB
ANION GAP SERPL CALCULATED.3IONS-SCNC: 8 MEQ/L (ref 8–16)
BUN BLDV-MCNC: 10 MG/DL (ref 7–22)
CALCIUM IONIZED: 1.2 MMOL/L (ref 1.12–1.32)
CALCIUM SERPL-MCNC: 8.2 MG/DL (ref 8.5–10.5)
CHLORIDE BLD-SCNC: 101 MEQ/L (ref 98–111)
CO2: 19 MEQ/L (ref 23–33)
CREAT SERPL-MCNC: 0.6 MG/DL (ref 0.4–1.2)
ERYTHROCYTE [DISTWIDTH] IN BLOOD BY AUTOMATED COUNT: 19.4 % (ref 11.5–14.5)
ERYTHROCYTE [DISTWIDTH] IN BLOOD BY AUTOMATED COUNT: 50.2 FL (ref 35–45)
GFR SERPL CREATININE-BSD FRML MDRD: > 90 ML/MIN/1.73M2
GLUCOSE BLD-MCNC: 103 MG/DL (ref 70–108)
HCT VFR BLD CALC: 40 % (ref 42–52)
HEMOGLOBIN: 13.6 GM/DL (ref 14–18)
MCH RBC QN AUTO: 26.6 PG (ref 26–33)
MCHC RBC AUTO-ENTMCNC: 34 GM/DL (ref 32.2–35.5)
MCV RBC AUTO: 78.3 FL (ref 80–94)
PLATELET # BLD: 276 THOU/MM3 (ref 130–400)
PMV BLD AUTO: 9.1 FL (ref 9.4–12.4)
POTASSIUM REFLEX MAGNESIUM: 3.7 MEQ/L (ref 3.5–5.2)
POTASSIUM SERPL-SCNC: 3.5 MEQ/L (ref 3.5–5.2)
RBC # BLD: 5.11 MILL/MM3 (ref 4.7–6.1)
SODIUM BLD-SCNC: 128 MEQ/L (ref 135–145)
WBC # BLD: 8.2 THOU/MM3 (ref 4.8–10.8)

## 2021-11-27 PROCEDURE — 6360000002 HC RX W HCPCS: Performed by: INTERNAL MEDICINE

## 2021-11-27 PROCEDURE — 84132 ASSAY OF SERUM POTASSIUM: CPT

## 2021-11-27 PROCEDURE — 80048 BASIC METABOLIC PNL TOTAL CA: CPT

## 2021-11-27 PROCEDURE — 85027 COMPLETE CBC AUTOMATED: CPT

## 2021-11-27 PROCEDURE — 6370000000 HC RX 637 (ALT 250 FOR IP): Performed by: FAMILY MEDICINE

## 2021-11-27 PROCEDURE — 2500000003 HC RX 250 WO HCPCS: Performed by: INTERNAL MEDICINE

## 2021-11-27 PROCEDURE — 82330 ASSAY OF CALCIUM: CPT

## 2021-11-27 PROCEDURE — 2580000003 HC RX 258: Performed by: INTERNAL MEDICINE

## 2021-11-27 PROCEDURE — 99232 SBSQ HOSP IP/OBS MODERATE 35: CPT | Performed by: PHYSICIAN ASSISTANT

## 2021-11-27 PROCEDURE — 74018 RADEX ABDOMEN 1 VIEW: CPT

## 2021-11-27 PROCEDURE — 6370000000 HC RX 637 (ALT 250 FOR IP): Performed by: PHYSICIAN ASSISTANT

## 2021-11-27 PROCEDURE — 6370000000 HC RX 637 (ALT 250 FOR IP): Performed by: INTERNAL MEDICINE

## 2021-11-27 PROCEDURE — 1200000000 HC SEMI PRIVATE

## 2021-11-27 PROCEDURE — APPSS180 APP SPLIT SHARED TIME > 60 MINUTES: Performed by: NURSE PRACTITIONER

## 2021-11-27 RX ORDER — METRONIDAZOLE 500 MG/1
500 TABLET ORAL EVERY 8 HOURS SCHEDULED
Status: DISCONTINUED | OUTPATIENT
Start: 2021-11-27 | End: 2021-11-29

## 2021-11-27 RX ADMIN — OXYCODONE HYDROCHLORIDE 5 MG: 5 TABLET ORAL at 11:39

## 2021-11-27 RX ADMIN — DIPHENOXYLATE HYDROCHLORIDE AND ATROPINE SULFATE 1 TABLET: 2.5; .025 TABLET ORAL at 17:07

## 2021-11-27 RX ADMIN — DICYCLOMINE HYDROCHLORIDE 20 MG: 10 CAPSULE ORAL at 08:49

## 2021-11-27 RX ADMIN — SODIUM CHLORIDE, SODIUM LACTATE, POTASSIUM CHLORIDE, CALCIUM CHLORIDE AND DEXTROSE MONOHYDRATE: 5; 600; 310; 30; 20 INJECTION, SOLUTION INTRAVENOUS at 17:08

## 2021-11-27 RX ADMIN — DICYCLOMINE HYDROCHLORIDE 20 MG: 10 CAPSULE ORAL at 21:27

## 2021-11-27 RX ADMIN — SUCRALFATE 1 G: 1 TABLET ORAL at 15:52

## 2021-11-27 RX ADMIN — OXYCODONE HYDROCHLORIDE 5 MG: 5 TABLET ORAL at 15:52

## 2021-11-27 RX ADMIN — OXYCODONE HYDROCHLORIDE 5 MG: 5 TABLET ORAL at 21:27

## 2021-11-27 RX ADMIN — IRON SUCROSE 200 MG: 20 INJECTION, SOLUTION INTRAVENOUS at 07:40

## 2021-11-27 RX ADMIN — DIPHENOXYLATE HYDROCHLORIDE AND ATROPINE SULFATE 1 TABLET: 2.5; .025 TABLET ORAL at 21:27

## 2021-11-27 RX ADMIN — SODIUM CHLORIDE 25 ML: 9 INJECTION, SOLUTION INTRAVENOUS at 03:33

## 2021-11-27 RX ADMIN — DICYCLOMINE HYDROCHLORIDE 20 MG: 10 CAPSULE ORAL at 14:02

## 2021-11-27 RX ADMIN — SODIUM CHLORIDE, SODIUM LACTATE, POTASSIUM CHLORIDE, CALCIUM CHLORIDE AND DEXTROSE MONOHYDRATE: 5; 600; 310; 30; 20 INJECTION, SOLUTION INTRAVENOUS at 01:46

## 2021-11-27 RX ADMIN — SODIUM CHLORIDE, SODIUM LACTATE, POTASSIUM CHLORIDE, CALCIUM CHLORIDE AND DEXTROSE MONOHYDRATE: 5; 600; 310; 30; 20 INJECTION, SOLUTION INTRAVENOUS at 06:21

## 2021-11-27 RX ADMIN — OXYCODONE HYDROCHLORIDE 5 MG: 5 TABLET ORAL at 07:39

## 2021-11-27 RX ADMIN — ENOXAPARIN SODIUM 40 MG: 100 INJECTION SUBCUTANEOUS at 08:48

## 2021-11-27 RX ADMIN — DIPHENOXYLATE HYDROCHLORIDE AND ATROPINE SULFATE 1 TABLET: 2.5; .025 TABLET ORAL at 13:00

## 2021-11-27 RX ADMIN — POTASSIUM CHLORIDE 40 MEQ: 1500 TABLET, EXTENDED RELEASE ORAL at 06:22

## 2021-11-27 RX ADMIN — DIPHENOXYLATE HYDROCHLORIDE AND ATROPINE SULFATE 1 TABLET: 2.5; .025 TABLET ORAL at 08:49

## 2021-11-27 RX ADMIN — MEGESTROL ACETATE 40 MG: 40 TABLET ORAL at 08:49

## 2021-11-27 RX ADMIN — METRONIDAZOLE 500 MG: 500 INJECTION, SOLUTION INTRAVENOUS at 04:59

## 2021-11-27 RX ADMIN — CIPROFLOXACIN 400 MG: 2 INJECTION, SOLUTION INTRAVENOUS at 15:08

## 2021-11-27 RX ADMIN — IBUPROFEN 600 MG: 600 TABLET, FILM COATED ORAL at 18:19

## 2021-11-27 RX ADMIN — CIPROFLOXACIN 400 MG: 2 INJECTION, SOLUTION INTRAVENOUS at 03:35

## 2021-11-27 RX ADMIN — SUCRALFATE 1 G: 1 TABLET ORAL at 11:40

## 2021-11-27 RX ADMIN — OMEPRAZOLE 20 MG: 20 CAPSULE, DELAYED RELEASE ORAL at 06:24

## 2021-11-27 RX ADMIN — OXYCODONE HYDROCHLORIDE 5 MG: 5 TABLET ORAL at 03:31

## 2021-11-27 RX ADMIN — METRONIDAZOLE 500 MG: 500 TABLET ORAL at 17:07

## 2021-11-27 RX ADMIN — SUCRALFATE 1 G: 1 TABLET ORAL at 06:23

## 2021-11-27 RX ADMIN — METRONIDAZOLE 500 MG: 500 TABLET ORAL at 22:08

## 2021-11-27 ASSESSMENT — PAIN SCALES - GENERAL
PAINLEVEL_OUTOF10: 9
PAINLEVEL_OUTOF10: 10
PAINLEVEL_OUTOF10: 7
PAINLEVEL_OUTOF10: 7
PAINLEVEL_OUTOF10: 4
PAINLEVEL_OUTOF10: 10
PAINLEVEL_OUTOF10: 10
PAINLEVEL_OUTOF10: 9
PAINLEVEL_OUTOF10: 10
PAINLEVEL_OUTOF10: 8
PAINLEVEL_OUTOF10: 9

## 2021-11-27 ASSESSMENT — PAIN DESCRIPTION - LOCATION: LOCATION: ABDOMEN

## 2021-11-27 ASSESSMENT — PAIN DESCRIPTION - ONSET: ONSET: ON-GOING

## 2021-11-27 ASSESSMENT — PAIN DESCRIPTION - DESCRIPTORS: DESCRIPTORS: ACHING

## 2021-11-27 ASSESSMENT — PAIN DESCRIPTION - ORIENTATION: ORIENTATION: MID

## 2021-11-27 ASSESSMENT — PAIN DESCRIPTION - PROGRESSION: CLINICAL_PROGRESSION: NOT CHANGED

## 2021-11-27 ASSESSMENT — PAIN DESCRIPTION - PAIN TYPE: TYPE: ACUTE PAIN

## 2021-11-27 ASSESSMENT — PAIN DESCRIPTION - FREQUENCY: FREQUENCY: INTERMITTENT

## 2021-11-27 ASSESSMENT — PAIN - FUNCTIONAL ASSESSMENT: PAIN_FUNCTIONAL_ASSESSMENT: ACTIVITIES ARE NOT PREVENTED

## 2021-11-27 NOTE — PROGRESS NOTES
Hospitalist Progress Note      Patient:  Yang Larkin    Unit/Bed:6E-55/055-A  YOB: 1970  MRN: [id number]   Acct: [de-identified]   PCP: Josh Swift MD  Date of Admission: 11/24/2021    Assessment/Plan:    1. Enterocolitis, acute: Possible chemotherapy induced. IV Cipro & Flagyl started, will treat for a total of 5 days. 2. Hyponatremia: Sodium low but stable- 129, 130, 128. IVF started. BMP in the AM.   3. Hypokalemia: Potassium 3.1, 3.7. Potassium Replacement Protocol. 4. Anemia, chronic: Hgb 13.6, looks to be about baseline. 5. Transverse colon adenocarcinoma: s/p colectomy. Pt follows with Dr. Justin George. Pt is currently receiving infusions at the [facility] 1822. 6. Malnutrition: Dietician consulted. Megace also started. Dispo: Hopeful for DC tomorrow. Chief Complaint: Abdominal Pain     Initial H and P:-    Initial H&P \"47year-old male patient recently diagnosed with colon cancer September 2021 status post surgery IV chemotherapy and Xeloda.     Presented to the ED today with a 1 week history of intractable periumbilical abdominal pain of 9/10 intensity.   Associated nausea vomiting diarrhea and fever last few days.     Patient now has poor oral intake and generalized body weakness     Vital signs on admission temperature 36.9 °C, respiratory 18, heart rate 89, blood pressure 147/98, oxygen saturation 98% room air.     Labs on admission sodium 131, potassium 3.5, CO2 19, , creatinine 1.0, blood sugar 138, magnesium 1.8, calcium 8.6, total protein 6.0, procalcitonin 397, troponin first set less than 0.010, albumin 3.1, alkaline phosphatase 71, ALT 38, AST is 12, total bilirubin 0.6, lipase 14.2, total protein 6.0.     WBC 7.2, hemoglobin 15.8, MCV 78.9, platelets 582.     Influenza A, B and COVID-19 negative.     Stool studies for GI PCR panel was negative.     ED treatment included IV Zosyn for suspected problem infection and IV fluids. \"     11/26: Pt is frustrated with insurance company today and was on the phone during evaluation and stated that he could not stop the call. Pt states that the pain is manageable at this time. Pt is also concerned about his lack of appetite. Subjective (past 24 hours):   No acute events overnight. Pt states that he believes he is bloating. Pt states that the pain is more controlled. No other issues or concerns at this time. Past medical history, family history, social history and allergies reviewed again and is unchanged since admission. ROS (All review of systems completed. Pertinent positives noted. Otherwise All other systems reviewed and negative.)     Medications:  Reviewed    Infusion Medications    dextrose 5% in lactated ringers 125 mL/hr at 11/27/21 9254    sodium chloride 25 mL (11/27/21 0333)     Scheduled Medications    metroNIDAZOLE  500 mg Oral 3 times per day    megestrol  40 mg Oral Daily    ciprofloxacin  400 mg IntraVENous Q12H    ondansetron  4 mg Oral Once    diphenoxylate-atropine  1 tablet Oral 4x Daily    sodium chloride flush  10 mL IntraVENous 2 times per day    enoxaparin  40 mg SubCUTAneous Daily    sucralfate  1 g Oral TID AC    omeprazole  20 mg Oral QAM    dicyclomine  20 mg Oral TID     PRN Meds: potassium chloride **OR** potassium alternative oral replacement **OR** potassium chloride, oxyCODONE, HYDROmorphone, loperamide, sodium chloride flush, sodium chloride, ondansetron **OR** ondansetron, polyethylene glycol, promethazine, ibuprofen, acetaminophen      Intake/Output Summary (Last 24 hours) at 11/27/2021 1354  Last data filed at 11/27/2021 1219  Gross per 24 hour   Intake 2594.28 ml   Output 500 ml   Net 2094.28 ml       Diet:  ADULT DIET;  Regular  ADULT ORAL NUTRITION SUPPLEMENT; Breakfast, Lunch, Dinner; Standard High Calorie/High Protein Oral Supplement    Exam:  /69   Pulse 74   Temp 98 °F (36.7 °C) (Oral)   Resp 16 Date    BC No growth-preliminary  11/24/2021       Blood culture #2:No results found for: Marinus Risk    Organism:No results found for: ORG    No results found for: LABGRAM    MRSA culture only:No results found for: [facility]    Urine culture: No results found for: LABURIN    Respiratory culture: No results found for: CULTRESP    Aerobic and Anaerobic :  No results found for: LABAERO  No results found for: LABANAE    Urinalysis:      Lab Results   Component Value Date    NITRU POSITIVE 11/26/2021    WBCUA 2-4 11/26/2021    BACTERIA NONE SEEN 11/26/2021    RBCUA 5-10 11/26/2021    BLOODU TRACE 11/26/2021    GLUCOSEU NEGATIVE 11/26/2021       Radiology:  CT ABDOMEN PELVIS W IV CONTRAST Additional Contrast? None   Final Result      1. Diffuse wall thickening throughout segments of small and large bowel, findings consistent with diffuse enterocolitis. 2. Somewhat thickened appearance of the gallbladder wall which is unchanged since prior exam.               **This report has been created using voice recognition software. It may contain minor errors which are inherent in voice recognition technology. **      Final report electronically signed by Dr Chacha Fernandez on 11/25/2021 12:01 AM      XR CHEST PORTABLE   Final Result   There is no acute intrathoracic process. **This report has been created using voice recognition software. It may contain minor errors which are inherent in voice recognition technology. **      Final report electronically signed by Dr Chacha Fernandez on 11/24/2021 9:51 PM        Electronically signed by LAUREEN Syed on 11/27/2021 at 1:54 PM

## 2021-11-27 NOTE — PROGRESS NOTES
Pharmacy Intravenous to Oral Protocol  Medication changed per P&T protocol: metronidazole    Patient meets criteria based on the followin. IV therapy > 24 hours - yes  2. Nausea/vomiting - no  3. Regular diet - yes  4. Tolerating other oral medications: yes  5. Afebrile for 24 hours: yes  6.  WBC trending downward: yes    Rayshawn Fuentes PharmD, BCPS  2021  1:36 PM

## 2021-11-27 NOTE — PROGRESS NOTES
Comprehensive Nutrition Assessment    Type and Reason for Visit:  Initial, Consult (poor appetite/ po 5 or more days)    Nutrition Recommendations/Plan:   Continue current diet as tolerated  ONS: Ensure Enlive TID  Agree with appetite stimulant  Consider Probiotic and MVI when able to tolerate    Nutrition Assessment:    Pt. moderately malnourished AEB criteria as listed below. At risk for further nutrition compromise r/t increased nutrient needs due to catabolic illness: colon cancer and chemotherapy, admit with enterocolitis and underlying medical condition (colon cancer diagnosed Sept 2021, s/p colectomy). Nutrition recommendations/interventions as per above. Malnutrition Assessment:  Malnutrition Status: Moderate malnutrition    Context:  Acute Illness     Findings of the 6 clinical characteristics of malnutrition:  Energy Intake:  1 - 75% or less of estimated energy requirements for 7 or more days  Weight Loss:   (13.2% loss over 9 months)     Body Fat Loss:  No significant body fat loss     Muscle Mass Loss:  1 - Mild muscle mass loss Clavicles (pectoralis & deltoids)  Fluid Accumulation:  No significant fluid accumulation     Strength:  Not Performed    Estimated Daily Nutrient Needs:  Energy (kcal):  5538-8870 kcals (30-35); Weight Used for Energy Requirements:   (66kgm on 11/25)     Protein (g):   grams (1.5-2);  Weight Used for Protein Requirements:   (66kgm on 11/25)          Nutrition Related Findings:     Patient reports very poor appetite/ intake for 6.5 days PTA due to abdominal pain, nausea, vomiting, diarrhea, reports continues to have nausea and abdomen bloating, reports use of protein supplement PTA, reports not fond of but taking small amounts of Ensure and agreeable to continue to receive with meals during admission, intake: 1-25, 51-75% per meal graphics, BM x 4 on 11/26, reports had tolerated yogurt in the past but currently does not tolerate yogurt products; medication includes cipro, bentyl, lomotil, megace 40mg daily, flagyl, carafate, dextrose 5% in lactated ringers, prn phenergan; Sodium 128, Potassium 3.7, BUN 10, Creatinine 0.6, Glucose 103    Wounds:  None       Current Nutrition Therapies:    ADULT DIET; Regular  ADULT ORAL NUTRITION SUPPLEMENT; Breakfast, Lunch, Dinner; Standard High Calorie/High Protein Oral Supplement    Anthropometric Measures:  · Height: 6' (182.9 cm)  · Current Body Weight: 145 lb 1.6 oz (65.8 kg)   · Admission Body Weight: 145 lb 1.6 oz (65.8 kg) (11/25; no edema)    · Usual Body Weight:  (Feb 2021: 167# per patient with loss of 27# to 140#; per EMR: 9/16/21 143# 6.4oz standing scale, 11/1/21 151# 6.4oz, 11/22/21 145# 6.4oz)     · Ideal Body Weight: 178 lbs;   · BMI: 19.7  · BMI Categories: Normal Weight (BMI 18.5-24. 9)       Nutrition Diagnosis:   · Moderate malnutrition, In context of acute illness or injury related to catabolic illness, inadequate protein-energy intake as evidenced by poor intake prior to admission, weight loss, mild muscle loss      Nutrition Interventions:   Food and/or Nutrient Delivery:  Continue Current Diet, Continue Oral Nutrition Supplement  Nutrition Education/Counseling:  Education initiated   Coordination of Nutrition Care:  Continue to monitor while inpatient    Goals:  Patient will consume 75% or more of meals during LOS       Nutrition Monitoring and Evaluation:   Behavioral-Environmental Outcomes:  None Identified   Food/Nutrient Intake Outcomes:  Diet Advancement/Tolerance, Food and Nutrient Intake, Supplement Intake  Physical Signs/Symptoms Outcomes:  Biochemical Data, Fluid Status or Edema, Meal Time Behavior, Nutrition Focused Physical Findings, Skin, Weight, GI Status     Discharge Planning:     Too soon to determine     Electronically signed by Lelo Sotelo RD, LD on 11/27/21 at 1:46 PM EST    Contact: [phone]

## 2021-11-28 ENCOUNTER — APPOINTMENT (OUTPATIENT)
Dept: CT IMAGING | Age: 51
DRG: 394 | End: 2021-11-28
Payer: COMMERCIAL

## 2021-11-28 LAB
BACTERIA: ABNORMAL
BILIRUBIN URINE: NEGATIVE
BLOOD, URINE: NEGATIVE
CASTS: ABNORMAL /LPF
CASTS: ABNORMAL /LPF
CHARACTER, URINE: CLEAR
COLOR: ABNORMAL
CRYSTALS: ABNORMAL
EPITHELIAL CELLS, UA: ABNORMAL /HPF
GLUCOSE, URINE: NEGATIVE MG/DL
KETONES, URINE: NEGATIVE
LEUKOCYTE ESTERASE, URINE: NEGATIVE
MISCELLANEOUS LAB TEST RESULT: ABNORMAL
NITRITE, URINE: NEGATIVE
PH UA: 6.5 (ref 5–9)
PROTEIN UA: 30 MG/DL
RBC URINE: ABNORMAL /HPF
RENAL EPITHELIAL, UA: ABNORMAL
SPECIFIC GRAVITY UA: > 1.03 (ref 1–1.03)
UROBILINOGEN, URINE: 0.2 EU/DL (ref 0–1)
WBC UA: ABNORMAL /HPF
YEAST: ABNORMAL

## 2021-11-28 PROCEDURE — 6360000002 HC RX W HCPCS: Performed by: INTERNAL MEDICINE

## 2021-11-28 PROCEDURE — 6370000000 HC RX 637 (ALT 250 FOR IP): Performed by: INTERNAL MEDICINE

## 2021-11-28 PROCEDURE — 6370000000 HC RX 637 (ALT 250 FOR IP): Performed by: PHYSICIAN ASSISTANT

## 2021-11-28 PROCEDURE — 74177 CT ABD & PELVIS W/CONTRAST: CPT

## 2021-11-28 PROCEDURE — 2580000003 HC RX 258: Performed by: INTERNAL MEDICINE

## 2021-11-28 PROCEDURE — 99232 SBSQ HOSP IP/OBS MODERATE 35: CPT | Performed by: PHYSICIAN ASSISTANT

## 2021-11-28 PROCEDURE — 6370000000 HC RX 637 (ALT 250 FOR IP): Performed by: FAMILY MEDICINE

## 2021-11-28 PROCEDURE — 81001 URINALYSIS AUTO W/SCOPE: CPT

## 2021-11-28 PROCEDURE — 1200000000 HC SEMI PRIVATE

## 2021-11-28 PROCEDURE — 6360000004 HC RX CONTRAST MEDICATION: Performed by: SURGERY

## 2021-11-28 RX ADMIN — IBUPROFEN 600 MG: 600 TABLET, FILM COATED ORAL at 12:43

## 2021-11-28 RX ADMIN — SODIUM CHLORIDE, SODIUM LACTATE, POTASSIUM CHLORIDE, CALCIUM CHLORIDE AND DEXTROSE MONOHYDRATE: 5; 600; 310; 30; 20 INJECTION, SOLUTION INTRAVENOUS at 01:33

## 2021-11-28 RX ADMIN — HYDROMORPHONE HYDROCHLORIDE 0.5 MG: 1 INJECTION, SOLUTION INTRAMUSCULAR; INTRAVENOUS; SUBCUTANEOUS at 22:45

## 2021-11-28 RX ADMIN — OXYCODONE HYDROCHLORIDE 5 MG: 5 TABLET ORAL at 20:45

## 2021-11-28 RX ADMIN — CIPROFLOXACIN 400 MG: 2 INJECTION, SOLUTION INTRAVENOUS at 03:17

## 2021-11-28 RX ADMIN — OXYCODONE HYDROCHLORIDE 5 MG: 5 TABLET ORAL at 09:43

## 2021-11-28 RX ADMIN — OXYCODONE HYDROCHLORIDE 5 MG: 5 TABLET ORAL at 01:33

## 2021-11-28 RX ADMIN — SODIUM CHLORIDE, SODIUM LACTATE, POTASSIUM CHLORIDE, CALCIUM CHLORIDE AND DEXTROSE MONOHYDRATE: 5; 600; 310; 30; 20 INJECTION, SOLUTION INTRAVENOUS at 14:51

## 2021-11-28 RX ADMIN — METRONIDAZOLE 500 MG: 500 TABLET ORAL at 13:43

## 2021-11-28 RX ADMIN — DICYCLOMINE HYDROCHLORIDE 20 MG: 10 CAPSULE ORAL at 20:53

## 2021-11-28 RX ADMIN — SODIUM CHLORIDE, SODIUM LACTATE, POTASSIUM CHLORIDE, CALCIUM CHLORIDE AND DEXTROSE MONOHYDRATE: 5; 600; 310; 30; 20 INJECTION, SOLUTION INTRAVENOUS at 07:20

## 2021-11-28 RX ADMIN — IOPAMIDOL 80 ML: 755 INJECTION, SOLUTION INTRAVENOUS at 11:12

## 2021-11-28 RX ADMIN — ENOXAPARIN SODIUM 40 MG: 100 INJECTION SUBCUTANEOUS at 09:43

## 2021-11-28 RX ADMIN — OXYCODONE HYDROCHLORIDE 5 MG: 5 TABLET ORAL at 05:50

## 2021-11-28 RX ADMIN — OXYCODONE HYDROCHLORIDE 5 MG: 5 TABLET ORAL at 13:43

## 2021-11-28 RX ADMIN — OMEPRAZOLE 20 MG: 20 CAPSULE, DELAYED RELEASE ORAL at 06:03

## 2021-11-28 RX ADMIN — METRONIDAZOLE 500 MG: 500 TABLET ORAL at 06:03

## 2021-11-28 RX ADMIN — OXYCODONE HYDROCHLORIDE 5 MG: 5 TABLET ORAL at 17:31

## 2021-11-28 RX ADMIN — SUCRALFATE 1 G: 1 TABLET ORAL at 15:29

## 2021-11-28 RX ADMIN — METRONIDAZOLE 500 MG: 500 TABLET ORAL at 20:53

## 2021-11-28 RX ADMIN — SUCRALFATE 1 G: 1 TABLET ORAL at 06:03

## 2021-11-28 RX ADMIN — SODIUM CHLORIDE, SODIUM LACTATE, POTASSIUM CHLORIDE, CALCIUM CHLORIDE AND DEXTROSE MONOHYDRATE: 5; 600; 310; 30; 20 INJECTION, SOLUTION INTRAVENOUS at 10:48

## 2021-11-28 RX ADMIN — CIPROFLOXACIN 400 MG: 2 INJECTION, SOLUTION INTRAVENOUS at 15:26

## 2021-11-28 ASSESSMENT — PAIN SCALES - GENERAL
PAINLEVEL_OUTOF10: 10
PAINLEVEL_OUTOF10: 4
PAINLEVEL_OUTOF10: 5
PAINLEVEL_OUTOF10: 9
PAINLEVEL_OUTOF10: 10
PAINLEVEL_OUTOF10: 5
PAINLEVEL_OUTOF10: 10
PAINLEVEL_OUTOF10: 5
PAINLEVEL_OUTOF10: 10
PAINLEVEL_OUTOF10: 4

## 2021-11-28 NOTE — PROGRESS NOTES
Hospitalist Progress Note      Patient:  Valeriy Warner    Unit/Bed:6E-55/055-A  YOB: 1970  MRN: [id number]   Acct: [de-identified]   PCP: Adriana Viveros MD  Date of Admission: 11/24/2021    Assessment/Plan:    1. Enterocolitis, acute: Possible chemotherapy induced. IV Cipro & Flagyl started, will treat for a total of 5 days. 2. SBO vs ileus: General Surgery consulted. KUB showed possible SBO vs ileus. CT A/P this AM showed worsening since previous. CLD. Continue to monitor. 3. Hyponatremia: Sodium low but stable- 129, 130, 128. IVF started. BMP in the AM.   4. Hypokalemia: Potassium 3.1, 3.7. Potassium Replacement Protocol. 5. Anemia, chronic: Hgb 13.6, looks to be about baseline. 6. Transverse colon adenocarcinoma: s/p colectomy. Pt follows with Dr. Matthew Luna. Pt is currently receiving infusions at the [facility] 1822. Oncology consulted and will see tomorrow. 7. Malnutrition: Dietician consulted. Megace also started. Chief Complaint: Abdominal Pain     Initial H and P:-    Initial H&P \"47year-old male patient recently diagnosed with colon cancer September 2021 status post surgery IV chemotherapy and Xeloda.     Presented to the ED today with a 1 week history of intractable periumbilical abdominal pain of 9/10 intensity.   Associated nausea vomiting diarrhea and fever last few days.     Patient now has poor oral intake and generalized body weakness     Vital signs on admission temperature 36.9 °C, respiratory 18, heart rate 89, blood pressure 147/98, oxygen saturation 98% room air.     Labs on admission sodium 131, potassium 3.5, CO2 19, , creatinine 1.0, blood sugar 138, magnesium 1.8, calcium 8.6, total protein 6.0, procalcitonin 397, troponin first set less than 0.010, albumin 3.1, alkaline phosphatase 71, ALT 38, AST is 12, total bilirubin 0.6, lipase 14.2, total protein 6.0.     WBC 7.2, hemoglobin 15.8, MCV 78.9, platelets 267.     Influenza A, B and COVID-19 negative.     Stool studies for GI PCR panel was negative.     ED treatment included IV Zosyn for suspected problem infection and IV fluids. \"     11/26: Pt is frustrated with insurance company today and was on the phone during evaluation and stated that he could not stop the call. Pt states that the pain is manageable at this time. Pt is also concerned about his lack of appetite. 11/27: No acute events overnight. Pt states that he believes he is bloating. Pt states that the pain is more controlled. No other issues or concerns at this time. Subjective (past 24 hours):   No acute events overnight, Pt has not had any vomiting episodes. Pt has walked around the unit multiple times. Pt is very concerned about dark colored urine. Diet advanced to CLD. Past medical history, family history, social history and allergies reviewed again and is unchanged since admission. ROS (All review of systems completed. Pertinent positives noted.  Otherwise All other systems reviewed and negative.)     Medications:  Reviewed    Infusion Medications    dextrose 5% in lactated ringers 125 mL/hr at 11/28/21 1451    sodium chloride Stopped (11/27/21 0934)     Scheduled Medications    metroNIDAZOLE  500 mg Oral 3 times per day    megestrol  40 mg Oral Daily    ciprofloxacin  400 mg IntraVENous Q12H    ondansetron  4 mg Oral Once    [Held by provider] diphenoxylate-atropine  1 tablet Oral 4x Daily    sodium chloride flush  10 mL IntraVENous 2 times per day    enoxaparin  40 mg SubCUTAneous Daily    sucralfate  1 g Oral TID AC    omeprazole  20 mg Oral QAM    dicyclomine  20 mg Oral TID     PRN Meds: potassium chloride **OR** potassium alternative oral replacement **OR** potassium chloride, oxyCODONE, HYDROmorphone, [Held by provider] loperamide, sodium chloride flush, sodium chloride, ondansetron **OR** ondansetron, polyethylene glycol, promethazine, ibuprofen, acetaminophen      Intake/Output Summary (Last 24 hours) at 11/28/2021 1602  Last data filed at 11/28/2021 1455  Gross per 24 hour   Intake 5526.57 ml   Output 550 ml   Net 4976.57 ml       Diet:  ADULT DIET; Clear Liquid    Exam:  /66   Pulse 75   Temp 97.6 °F (36.4 °C) (Oral)   Resp 18   Ht 6' (1.829 m)   Wt 145 lb 1.6 oz (65.8 kg)   SpO2 95%   BMI 19.68 kg/m²   General appearance: No apparent distress, appears stated age and cooperative. HEENT: Pupils equal, round, and reactive to light. Conjunctivae/corneas clear. Neck: Supple, with full range of motion. No jugular venous distention. Trachea midline. Respiratory:  Normal respiratory effort on RA. Clear to auscultation, bilaterally without Rales/Wheezes/Rhonchi. Cardiovascular: Regular rate and rhythm with normal S1/S2 without murmurs, rubs or gallops. Abdomen: Soft, non-tender, mildly distended with hypoactive bowel sounds. Musculoskeletal: passive and active ROM x 4 extremities. Skin: Skin color, texture, turgor normal.  No rashes or lesions. Mediport. Neurologic:  Neurovascularly intact without any focal sensory/motor deficits. Cranial nerves: II-XII intact, grossly non-focal.  Psychiatric: Alert and oriented, thought content appropriate, normal insight  Capillary Refill: Brisk,< 3 seconds   Peripheral Pulses: +2 palpable, equal bilaterally     Labs:   Recent Labs     11/26/21  0756 11/27/21  0953   WBC 4.9 8.2   HGB 13.1* 13.6*   HCT 39.0* 40.0*    276     Recent Labs     11/26/21  1110 11/26/21  1110 11/26/21  2048 11/27/21  0457 11/27/21  0953   *  --  130*  --  128*   K 3.1*   < > 3.2* 3.5 3.7     --  99  --  101   CO2 20*  --  19*  --  19*   BUN 11  --  9  --  10   CREATININE 0.6  --  0.7  --  0.6   CALCIUM 7.7*  --  7.8*  --  8.2*    < > = values in this interval not displayed. No results for input(s): AST, ALT, BILIDIR, BILITOT, ALKPHOS in the last 72 hours.   No results for input(s): INR in the last 72 hours. No results for input(s): Zeke Alonso in the last 72 hours. Microbiology:    Blood culture #1:   Lab Results   Component Value Date    BC No growth-preliminary  11/24/2021       Blood culture #2:No results found for: Indigo Alvarez    Organism:No results found for: ORG    No results found for: LABGRAM    MRSA culture only:No results found for: [address]    Urine culture: No results found for: LABURIN    Respiratory culture: No results found for: CULTRESP    Aerobic and Anaerobic :  No results found for: LABAERO  No results found for: LABANAE    Urinalysis:      Lab Results   Component Value Date    NITRU NEGATIVE 11/28/2021    WBCUA 0-2 11/28/2021    BACTERIA NONE SEEN 11/28/2021    RBCUA 0-2 11/28/2021    BLOODU NEGATIVE 11/28/2021    SPECGRAV >1.030 11/28/2021    GLUCOSEU NEGATIVE 11/26/2021       Radiology:  CT ABDOMEN PELVIS W IV CONTRAST Additional Contrast? Radiologist Recommendation   Final Result       1. Status post right-sided colectomy. 2. Dilated small and large bowel loops, worse than on previous study dated 24th of November 2021. There is mucosal thickening. These findings could represents an inflammatory process versus obstruction. 3. Small amount of free fluid within the pelvis, new since previous study. 4. There is gallbladder wall thickening. There is dilatation of the common hepatic and common bile ducts more prominent than on previous study dated 24th of November 2021    5. Small left adrenal nodule, unchanged. 6. Hiatal hernia. 7. Areas of atelectasis or infiltrate in the right and left lower lobes. .               **This report has been created using voice recognition software. It may contain minor errors which are inherent in voice recognition technology. **      Final report electronically signed by DR Sukhdev Matos on 11/28/2021 1:28 PM      XR ABDOMEN (KUB) (SINGLE AP VIEW)   Final Result   1.  Distended gas-filled small bowel loops are seen overlying the mid abdomen which are concerning for a possible partial small bowel obstruction versus ileus. **This report has been created using voice recognition software. It may contain minor errors which are inherent in voice recognition technology. **      Final report electronically signed by Dr. Александр Duarte on 11/27/2021 4:38 PM      CT ABDOMEN PELVIS W IV CONTRAST Additional Contrast? None   Final Result      1. Diffuse wall thickening throughout segments of small and large bowel, findings consistent with diffuse enterocolitis. 2. Somewhat thickened appearance of the gallbladder wall which is unchanged since prior exam.               **This report has been created using voice recognition software. It may contain minor errors which are inherent in voice recognition technology. **      Final report electronically signed by Dr Janet Gant on 11/25/2021 12:01 AM      XR CHEST PORTABLE   Final Result   There is no acute intrathoracic process. **This report has been created using voice recognition software. It may contain minor errors which are inherent in voice recognition technology. **      Final report electronically signed by Dr Janet Gant on 11/24/2021 9:51 PM        Electronically signed by LAUREEN Joseph on 11/28/2021 at 4:02 PM

## 2021-11-29 ENCOUNTER — APPOINTMENT (OUTPATIENT)
Dept: MRI IMAGING | Age: 51
DRG: 394 | End: 2021-11-29
Payer: COMMERCIAL

## 2021-11-29 ENCOUNTER — APPOINTMENT (OUTPATIENT)
Dept: ULTRASOUND IMAGING | Age: 51
DRG: 394 | End: 2021-11-29
Payer: COMMERCIAL

## 2021-11-29 LAB
ALBUMIN SERPL-MCNC: 2.3 G/DL (ref 3.5–5.1)
ALP BLD-CCNC: 173 U/L (ref 38–126)
ALT SERPL-CCNC: 17 U/L (ref 11–66)
ANION GAP SERPL CALCULATED.3IONS-SCNC: 7 MEQ/L (ref 8–16)
AST SERPL-CCNC: 18 U/L (ref 5–40)
BILIRUB SERPL-MCNC: 0.4 MG/DL (ref 0.3–1.2)
BILIRUBIN DIRECT: < 0.2 MG/DL (ref 0–0.3)
BUN BLDV-MCNC: 12 MG/DL (ref 7–22)
C-REACTIVE PROTEIN: 11.55 MG/DL (ref 0–1)
CALCIUM SERPL-MCNC: 7.6 MG/DL (ref 8.5–10.5)
CHLORIDE BLD-SCNC: 100 MEQ/L (ref 98–111)
CO2: 24 MEQ/L (ref 23–33)
CREAT SERPL-MCNC: 0.6 MG/DL (ref 0.4–1.2)
ERYTHROCYTE [DISTWIDTH] IN BLOOD BY AUTOMATED COUNT: 19.5 % (ref 11.5–14.5)
ERYTHROCYTE [DISTWIDTH] IN BLOOD BY AUTOMATED COUNT: 51.9 FL (ref 35–45)
GFR SERPL CREATININE-BSD FRML MDRD: > 90 ML/MIN/1.73M2
GLUCOSE BLD-MCNC: 144 MG/DL (ref 70–108)
HCT VFR BLD CALC: 34.2 % (ref 42–52)
HEMOGLOBIN: 11.4 GM/DL (ref 14–18)
LIPASE: 23.3 U/L (ref 5.6–51.3)
MAGNESIUM: 1.7 MG/DL (ref 1.6–2.4)
MCH RBC QN AUTO: 26.5 PG (ref 26–33)
MCHC RBC AUTO-ENTMCNC: 33.3 GM/DL (ref 32.2–35.5)
MCV RBC AUTO: 79.5 FL (ref 80–94)
PLATELET # BLD: 304 THOU/MM3 (ref 130–400)
PMV BLD AUTO: 8.9 FL (ref 9.4–12.4)
POTASSIUM REFLEX MAGNESIUM: 3.2 MEQ/L (ref 3.5–5.2)
RBC # BLD: 4.3 MILL/MM3 (ref 4.7–6.1)
SODIUM BLD-SCNC: 131 MEQ/L (ref 135–145)
TOTAL PROTEIN: 4.5 G/DL (ref 6.1–8)
WBC # BLD: 8.6 THOU/MM3 (ref 4.8–10.8)

## 2021-11-29 PROCEDURE — 6370000000 HC RX 637 (ALT 250 FOR IP): Performed by: INTERNAL MEDICINE

## 2021-11-29 PROCEDURE — 83735 ASSAY OF MAGNESIUM: CPT

## 2021-11-29 PROCEDURE — 99232 SBSQ HOSP IP/OBS MODERATE 35: CPT | Performed by: PHYSICIAN ASSISTANT

## 2021-11-29 PROCEDURE — 6370000000 HC RX 637 (ALT 250 FOR IP): Performed by: FAMILY MEDICINE

## 2021-11-29 PROCEDURE — A9579 GAD-BASE MR CONTRAST NOS,1ML: HCPCS | Performed by: FAMILY MEDICINE

## 2021-11-29 PROCEDURE — 6360000002 HC RX W HCPCS: Performed by: INTERNAL MEDICINE

## 2021-11-29 PROCEDURE — 99222 1ST HOSP IP/OBS MODERATE 55: CPT | Performed by: PHYSICIAN ASSISTANT

## 2021-11-29 PROCEDURE — 83690 ASSAY OF LIPASE: CPT

## 2021-11-29 PROCEDURE — 6360000004 HC RX CONTRAST MEDICATION: Performed by: FAMILY MEDICINE

## 2021-11-29 PROCEDURE — APPSS60 APP SPLIT SHARED TIME 46-60 MINUTES: Performed by: NURSE PRACTITIONER

## 2021-11-29 PROCEDURE — 6370000000 HC RX 637 (ALT 250 FOR IP): Performed by: PHYSICIAN ASSISTANT

## 2021-11-29 PROCEDURE — 80076 HEPATIC FUNCTION PANEL: CPT

## 2021-11-29 PROCEDURE — 76705 ECHO EXAM OF ABDOMEN: CPT

## 2021-11-29 PROCEDURE — 80048 BASIC METABOLIC PNL TOTAL CA: CPT

## 2021-11-29 PROCEDURE — 6360000002 HC RX W HCPCS: Performed by: NURSE PRACTITIONER

## 2021-11-29 PROCEDURE — 85027 COMPLETE CBC AUTOMATED: CPT

## 2021-11-29 PROCEDURE — 86140 C-REACTIVE PROTEIN: CPT

## 2021-11-29 PROCEDURE — 2580000003 HC RX 258: Performed by: INTERNAL MEDICINE

## 2021-11-29 PROCEDURE — 74183 MRI ABD W/O CNTR FLWD CNTR: CPT

## 2021-11-29 PROCEDURE — 1200000000 HC SEMI PRIVATE

## 2021-11-29 RX ORDER — KETOROLAC TROMETHAMINE 30 MG/ML
30 INJECTION, SOLUTION INTRAMUSCULAR; INTRAVENOUS EVERY 6 HOURS PRN
Status: DISPENSED | OUTPATIENT
Start: 2021-11-29 | End: 2021-12-04

## 2021-11-29 RX ADMIN — METRONIDAZOLE 500 MG: 500 TABLET ORAL at 05:52

## 2021-11-29 RX ADMIN — POTASSIUM CHLORIDE 40 MEQ: 1500 TABLET, EXTENDED RELEASE ORAL at 07:23

## 2021-11-29 RX ADMIN — DICYCLOMINE HYDROCHLORIDE 20 MG: 10 CAPSULE ORAL at 09:34

## 2021-11-29 RX ADMIN — SUCRALFATE 1 G: 1 TABLET ORAL at 05:52

## 2021-11-29 RX ADMIN — HYDROMORPHONE HYDROCHLORIDE 0.5 MG: 1 INJECTION, SOLUTION INTRAMUSCULAR; INTRAVENOUS; SUBCUTANEOUS at 13:31

## 2021-11-29 RX ADMIN — OXYCODONE HYDROCHLORIDE 5 MG: 5 TABLET ORAL at 19:45

## 2021-11-29 RX ADMIN — SODIUM CHLORIDE, SODIUM LACTATE, POTASSIUM CHLORIDE, CALCIUM CHLORIDE AND DEXTROSE MONOHYDRATE: 5; 600; 310; 30; 20 INJECTION, SOLUTION INTRAVENOUS at 00:57

## 2021-11-29 RX ADMIN — KETOROLAC TROMETHAMINE 30 MG: 30 INJECTION, SOLUTION INTRAMUSCULAR; INTRAVENOUS at 21:39

## 2021-11-29 RX ADMIN — SODIUM CHLORIDE, PRESERVATIVE FREE 10 ML: 5 INJECTION INTRAVENOUS at 19:47

## 2021-11-29 RX ADMIN — OXYCODONE HYDROCHLORIDE 5 MG: 5 TABLET ORAL at 14:44

## 2021-11-29 RX ADMIN — SODIUM CHLORIDE, SODIUM LACTATE, POTASSIUM CHLORIDE, CALCIUM CHLORIDE AND DEXTROSE MONOHYDRATE: 5; 600; 310; 30; 20 INJECTION, SOLUTION INTRAVENOUS at 10:56

## 2021-11-29 RX ADMIN — CIPROFLOXACIN 400 MG: 2 INJECTION, SOLUTION INTRAVENOUS at 03:38

## 2021-11-29 RX ADMIN — OMEPRAZOLE 20 MG: 20 CAPSULE, DELAYED RELEASE ORAL at 05:52

## 2021-11-29 RX ADMIN — SODIUM CHLORIDE 25 ML: 9 INJECTION, SOLUTION INTRAVENOUS at 03:37

## 2021-11-29 RX ADMIN — MEGESTROL ACETATE 40 MG: 40 TABLET ORAL at 09:35

## 2021-11-29 RX ADMIN — DICYCLOMINE HYDROCHLORIDE 20 MG: 10 CAPSULE ORAL at 19:45

## 2021-11-29 RX ADMIN — SODIUM CHLORIDE, SODIUM LACTATE, POTASSIUM CHLORIDE, CALCIUM CHLORIDE AND DEXTROSE MONOHYDRATE: 5; 600; 310; 30; 20 INJECTION, SOLUTION INTRAVENOUS at 21:39

## 2021-11-29 RX ADMIN — DICYCLOMINE HYDROCHLORIDE 20 MG: 10 CAPSULE ORAL at 14:42

## 2021-11-29 RX ADMIN — ENOXAPARIN SODIUM 40 MG: 100 INJECTION SUBCUTANEOUS at 09:35

## 2021-11-29 RX ADMIN — HYDROMORPHONE HYDROCHLORIDE 0.5 MG: 1 INJECTION, SOLUTION INTRAMUSCULAR; INTRAVENOUS; SUBCUTANEOUS at 07:23

## 2021-11-29 RX ADMIN — GADOTERIDOL 15 ML: 279.3 INJECTION, SOLUTION INTRAVENOUS at 19:33

## 2021-11-29 RX ADMIN — OXYCODONE HYDROCHLORIDE 5 MG: 5 TABLET ORAL at 05:00

## 2021-11-29 RX ADMIN — OXYCODONE HYDROCHLORIDE 5 MG: 5 TABLET ORAL at 00:54

## 2021-11-29 RX ADMIN — OXYCODONE HYDROCHLORIDE 5 MG: 5 TABLET ORAL at 09:34

## 2021-11-29 ASSESSMENT — PAIN SCALES - GENERAL
PAINLEVEL_OUTOF10: 7
PAINLEVEL_OUTOF10: 7
PAINLEVEL_OUTOF10: 9
PAINLEVEL_OUTOF10: 7
PAINLEVEL_OUTOF10: 7
PAINLEVEL_OUTOF10: 5
PAINLEVEL_OUTOF10: 4
PAINLEVEL_OUTOF10: 7
PAINLEVEL_OUTOF10: 7
PAINLEVEL_OUTOF10: 5
PAINLEVEL_OUTOF10: 6
PAINLEVEL_OUTOF10: 6

## 2021-11-29 ASSESSMENT — PAIN DESCRIPTION - PROGRESSION: CLINICAL_PROGRESSION: NOT CHANGED

## 2021-11-29 ASSESSMENT — PAIN - FUNCTIONAL ASSESSMENT: PAIN_FUNCTIONAL_ASSESSMENT: PREVENTS OR INTERFERES SOME ACTIVE ACTIVITIES AND ADLS

## 2021-11-29 ASSESSMENT — PAIN DESCRIPTION - ORIENTATION
ORIENTATION: MID
ORIENTATION: MID

## 2021-11-29 ASSESSMENT — PAIN DESCRIPTION - DESCRIPTORS
DESCRIPTORS: ACHING
DESCRIPTORS: SHARP

## 2021-11-29 ASSESSMENT — PAIN DESCRIPTION - FREQUENCY: FREQUENCY: INTERMITTENT

## 2021-11-29 ASSESSMENT — PAIN DESCRIPTION - LOCATION
LOCATION: BACK
LOCATION: ABDOMEN

## 2021-11-29 ASSESSMENT — PAIN DESCRIPTION - PAIN TYPE
TYPE: ACUTE PAIN
TYPE: ACUTE PAIN

## 2021-11-29 ASSESSMENT — PAIN DESCRIPTION - ONSET: ONSET: ON-GOING

## 2021-11-29 NOTE — PROGRESS NOTES
[facility]  Daily Progress Note    Pt Name: Zoraida Morgan  Medical Record Number: [id number]  Date of Birth 1970   Today's Date: 11/29/2021    Hospital day # 4     ASSESSMENT   1. Enterocolitis  2. pSBO vs ileus  3. Stage III colon adenocarcinoma: currently undergoing chemotherapy  4. Dehydration  5. Hypokalemia   6. Gallbladder thickening per imaging   has a past medical history of Anemia and Colon cancer ([facility] [address] PLAN   1. Patient agreeable for NG tube placement now. NPO with ice chips and sips. 2. RUQ US later today if patient is agreeable to evaluate gallbladder thickening per patient/family request  3. IV fluids  4. DVT prophylaxis with Lovenox and SCDs  5. Pain control - limit narcotic use  6. Up as tolerated  7. Oncology to see patient today. Chemo on hold. 8. Incentive spirometry   9. Medical management   10. Labs reviewed. Replace K+ per protocol. Check LFTs, lipase, CRP this morning. 11. Get E[facility] records from [facility]  12. CT imaging reviewed with patient and significant other to the best of my ability. Lots of questions. Concerned more about the gallbladder thickening and possible hiatal hernia on imaging than the bowel loops and possible obstruction. Discussed that this was likely all secondary to chemotherapy and no acute surgical intervention would take place at this time. Discussed if patient wanting further workup with gallbladder and hiatal hernia we could, but may not be the best timing with a possible obstruction. Patient agreeable to the NG tube to see if that helps. Significant other concerned about antibiotic coverage since he is afebrile and no further leukocytosis. Okay with stopping these antibiotics if they wish. Also offered GI consultation but we will await and see how he feels with the NG tube decompression.  Patient and significant other expressed concerns with care and that they did not feel like anyone was doing anything to help him. SUBJECTIVE   Chief complaint: Abdominal pain & nausea    Patient sitting up in bed with street clothes on. States he still has nausea and abdominal distention. Has not no flatus or BM for almost 2 days now. Only thing that makes him belch is juan mist which did not encourage him to have. Feels like he could puke but hasn't been able to. No further diarrhea. Abdomen is softly distended. Urine is still dark and they are concerned with this. Urinalysis looks okay and creat is normal. Denies chest pain, although he states it is hard to take a deep breath because he feels so full. Taking in sips of clears. Pain not controlled with the Oxy. Up ad star. CURRENT MEDICATIONS   Scheduled Meds:   metroNIDAZOLE  500 mg Oral 3 times per day    megestrol  40 mg Oral Daily    ciprofloxacin  400 mg IntraVENous Q12H    ondansetron  4 mg Oral Once    [Held by provider] diphenoxylate-atropine  1 tablet Oral 4x Daily    sodium chloride flush  10 mL IntraVENous 2 times per day    enoxaparin  40 mg SubCUTAneous Daily    sucralfate  1 g Oral TID AC    omeprazole  20 mg Oral QAM    dicyclomine  20 mg Oral TID     Continuous Infusions:   dextrose 5% in lactated ringers 125 mL/hr at 21 0508    sodium chloride 25 mL (21 0337)     PRN Meds:.potassium chloride **OR** potassium alternative oral replacement **OR** potassium chloride, oxyCODONE, HYDROmorphone, [Held by provider] loperamide, sodium chloride flush, sodium chloride, ondansetron **OR** ondansetron, polyethylene glycol, promethazine, ibuprofen, acetaminophen  OBJECTIVE   CURRENT VITALS:  height is 6' (1.829 m) and weight is 145 lb 1.6 oz (65.8 kg). His oral temperature is 98.2 °F (36.8 °C). His blood pressure is 104/64 and his pulse is 80. His respiration is 18 and oxygen saturation is 97%.    Temperature Range (24h):Temp: 98.2 °F (36.8 °C) Temp  Av.1 °F (36.7 °C)  Min: 97.6 °F (36.4 °C)  Max: 98.5 °F (36.9 °C)  BP Range (24h): Systolic (80JWU), NFT:093 , Min:100 , OOA:541     Diastolic (97OAP), GCB:43, Min:64, Max:71    Pulse Range (24h): Pulse  Av.5  Min: 75  Max: 80  Respiration Range (24h): Resp  Av.3  Min: 16  Max: 18  Current Pulse Ox (24h):  SpO2: 97 %  Pulse Ox Range (24h):  SpO2  Av.3 %  Min: 95 %  Max: 98 %  Oxygen Amount and Delivery:    Incentive Spirometry Tx:            GENERAL: alert, cooperative, no distress  SKIN: Skin color, texture, turgor normal. No rashes or lesions. HEENT: Head is normocephalic, atraumatic. NECK: Supple, symmetrical, trachea midline  LUNGS: clear to ausculation, without wheezes, rales or rhonci  HEART: normal rate and regular rhythm  ABDOMEN: soft, distended, epigastric/generalized tenderness, bowel sounds present but hypoactive, no peritoneal signs   NEUROLOGIC: There are no focalizing motor or sensory deficits. CN II-XII are grossly intact. EXTREMITIES: no cyanosis, no clubbing and no edema. No intake/output data recorded. LABS     Recent Labs     21  0756 21  1110 21  1110 21  2048 21  0457 21  0953 21  0515   WBC 4.9  --   --   --   --  8.2 8.6   HGB 13.1*  --   --   --   --  13.6* 11.4*   HCT 39.0*  --   --   --   --  40.0* 34.2*     --   --   --   --  276 304   NA  --  129*   < > 130*  --  128* 131*   K  --  3.1*   < > 3.2* 3.5 3.7 3.2*   CL  --  101   < > 99  --  101 100   CO2  --  20*   < > 19*  --  19* 24   BUN  --  11   < > 9  --  10 12   CREATININE  --  0.6   < > 0.7  --  0.6 0.6   MG  --  1.8  --  1.8  --   --  1.7   CALCIUM  --  7.7*   < > 7.8*  --  8.2* 7.6*    < > = values in this interval not displayed.       Recent Labs     21  0756   LACTA 1.9     RADIOLOGY     PROCEDURE: CT ABDOMEN PELVIS W IV CONTRAST       CLINICAL INFORMATION: Follow up possible partial SBO vs ileus, rule out typhlitis .       COMPARISON: CT scan of abdomen and pelvis dated .       TECHNIQUE: Axial 5 mm CT images were obtained through the abdomen and pelvis after the administration of intravenous contrast. Coronal and sagittal reconstructions were obtained.       All CT scans at this facility use dose modulation, iterative reconstruction, and/or weight-based dosing when appropriate to reduce radiation dose to as low as reasonably achievable.       FINDINGS:           There are areas of atelectasis or infiltrate in the right and left lower lobes. .   The base of the heart is within appropriate limits.       There is mild diffuse fatty replacement in the liver. The spleen is normal. There is a 12 mm a left adrenal nodule. The right adrenal gland and pancreas are normal. There is gallbladder wall thickening. There is dilatation of the common bile duct which    measures 11 mm in diameter. There is dilatation of the common hepatic duct which measures 12 mm in diameter.    There is no hydronephrosis or stones of either kidney. No renal masses are noted.           There is a hiatal hernia.       There are dilated fluid-filled small bowel loops, these appear more prominent than on previous study dated 24th of November 2021.  The IVC and aorta are of normal caliber. There is no adenopathy.       The urinary bladder is normal. There is a small amount of  pelvic free fluid is no since previous study.  The patient is status post right hemicolectomy. There are fluid-filled large bowel loops with mucosal thickening suspicious for inflammatory    process. This appears worse than on previous study. There is no definite evidence of typhilitis. There is no adenopathy. No suspicious osseous lesions are identified.                   Impression       1. Status post right-sided colectomy. 2. Dilated small and large bowel loops, worse than on previous study dated 24th of November 2021. There is mucosal thickening. These findings could represents an inflammatory process versus obstruction.    3. Small amount of free fluid within the pelvis, new since previous study. 4. There is gallbladder wall thickening. There is dilatation of the common hepatic and common bile ducts more prominent than on previous study dated 24th of November 2021    5. Small left adrenal nodule, unchanged. 6. Hiatal hernia. 7. Areas of atelectasis or infiltrate in the right and left lower lobes. .                   **This report has been created using voice recognition software. It may contain minor errors which are inherent in voice recognition technology. **       Final report electronically signed by DR Elli Miller on 11/28/2021 1:28 PM     Electronically signed by LUCY Rizzo - CNP on 11/29/2021 at 7:12 AM     Above discussed and I agree with Sebastian Miller CNP. See my additional comments below for updated orders and plan. Labs, cultures, and radiographs where available were reviewed. I discussed patient concerns with [facility] and instructions were given. Please see our orders for the updated patient care plan. -Patient seems to really not have made much improvement. I still think most of the GI issues are secondary to recent chemotherapy. However, CT imaging recently done demonstrating gallbladder wall thickening as well as common and hepatic duct dilatation. Recommend gallbladder ultrasound and MRCP to further evaluate hepatobiliary system. Hiatal hernia very small. Not the source of his issues. We will obtain the outpatient EGD he had done recently to compare. Recommend GI consultation as he is known to them already for further input and opinion/recommendations. Could obtain upper GI/small bowel follow-through to further evaluate intraluminal status and rule out small bowel obstruction. Colonic wall is thickened and doubt true mechanical obstruction. Recent GI panel had been negative. NG tube decompression as needed. IV fluid hydration. No acute surgical intervention at this point.     Electronically signed by Gonzalo López MD on 11/29/21 at 11:07 AM EST

## 2021-11-29 NOTE — CONSULTS
[facility]    Patient Krystal Donohue   MRN -  [id number]   [facility] # - [de-identified]   - 1970      Date of Admission -  2021  9:21 PM  Date of evaluation -  2021  [facility] Day - 4  Referred by- LAUREEN Joseph Primary Care Physician - Dayron Cabezas MD       Reason for Consult    Followed by Dr. Carlos Ulrich  On active chemotherapy for colon cancer   [address] Problems    Diagnosis Date Noted    Enterocolitis [K52.9] 2021    Severe protein-calorie malnutrition ([facility] [address] [E43] 2021    Dehydration [E86.0] 2021    Diarrhea due to drug [K52.1] 2021    S/P right colectomy [Z90.49] 2021    Cancer of transverse colon ([facility] [address] [C18.4] 2021    Adenocarcinoma, colon ([facility] [address] [C18.9] 2021    Personal history of tobacco use [Z87.891] 2021    History of alcoholism ([facility] [address] [F10.21] 2021     HPI   Krystal Donohue is a 45 y.o. male admitted for fever, abdominal pain. The patient recently started adjuvant chemotherapy for colon cancer with XELOX on 21 with oxaliplatin and oral Xeloda. He presented to the ED on 21 with fever, nausea and diarrhea. He had been receiving supportive IV fluids as an outpatient due to nausea, vomiting, diarrhea. He had been alternating Lomotil and Imodium due to diarrhea. Prior to arriving to the ED he had fever with tmax of 101.8F. in the ED, covid-19 negative, Flu A/B negative, GI pathogens by PCR stool panel negative. CT of the abdomen/pelvis was obtained showing diffuse wall thickening throughout segments of the small and large bowel, findings consistent with diffuse enterocolitis. The patient was started on IV Zosyn and admitted under the Hospitalist Service.  General Surgery consulted and recommended NG tube to LIWS, NPO. KUB on 21 showed distended gas-filled small bowel loops are seen overlying the mid abdomen which are concerning for possible partial SBO vs ileus. A  Repeat CT of ab/pelvis on 11/28/21 showed no evidence of typhlitis, no adenopathy. Dilated small and large bowel loops, worse than previous study on 11/24/21 with mucosal thickening - could represent inflammatory process vs obstruction. Small amount of free fluid in pelvic, new since prevous study. GB wall thickening, dilatation of the common hepatic and CBD more prominent than previous study, small left adrenal nodule unchanged. MRCP and ultrasound of the GB have been ordered per General Surgery. Oncology consult was requested to follow up on above. The patient is currently resting in bed. NG tube in place. He reports following placement of NG tube he has had improvement in abdominal pressure. He states his abdominal pain is currently \"4-5 out of 10\" and \"9 out of 10\" with movement. Patient affirms mouth sores and painful tongue. He denies passing flatus. He denies fever, chills, chest pain, shortness of breath, cough, or urinary changes. Oncology History    Per Dr. Keenan Narvaez note on 11/9/21: Mr. Yue Bautista is a 54-year-old patient with newly diagnosed stage III colon cancer. He presents to the medical oncology clinic to discuss postsurgical management. His history of colon cancer goes back to September 2021 when he was seen by  Dr. Jai Barone to unexplained weight loss of 10-15 lbs and rectal bleeding.  Colonoscopy performed on September 1, 2021 demonstrated multiple polyps but a larger mass at the proximal transverse colon demonstrated the adenocarcinoma.  He did not have signs of obstruction, no abdominal pain, no significant nausea or vomiting.  Some previous episodes of nausea, GERD and epigastric discomfort. He had CT of the chest abdomen and pelvis on September 2, 2021,no evidence of metastatic disease.   The patient met with Dr. Rae Denis and after discussing his surgical treatment options he proceeded with right hemicolectomy on September 16, 2021.  Final pathology report showed:  A.  Right colon mass, hemicolectomy:    Invasive moderately differentiated colonic adenocarcinoma, pT2.    Margins are free of neoplasia.           Pericolonic lymph nodes (17): 2 out of 17 are positive for   metastatic colonic adenocarcinoma.             Appendix-no pathologic abnormality. Kristel Nim base lymph nodes (2), resection:             Negative for malignancy (0/2). pT2 pN1b     He had uneventful post surgical course.     Extensive history of colorectal disease paternal side.  Zadie Bernheim is fairly active and eats a healthy diet.  Denies any new urinary complaints.    Meds    Current Medications    megestrol  40 mg Oral Daily    ondansetron  4 mg Oral Once    [Held by provider] diphenoxylate-atropine  1 tablet Oral 4x Daily    sodium chloride flush  10 mL IntraVENous 2 times per day    enoxaparin  40 mg SubCUTAneous Daily    sucralfate  1 g Oral TID AC    omeprazole  20 mg Oral QAM    dicyclomine  20 mg Oral TID     ketorolac, potassium chloride **OR** potassium alternative oral replacement **OR** potassium chloride, oxyCODONE, HYDROmorphone, [Held by provider] loperamide, sodium chloride flush, sodium chloride, ondansetron **OR** ondansetron, polyethylene glycol, promethazine, acetaminophen  IV Drips/Infusions   dextrose 5% in lactated ringers 125 mL/hr at 11/29/21 1056    sodium chloride 25 mL (11/29/21 0337)     Past Medical History         Diagnosis Date    Anemia     Colon cancer ([address] 09/2021      Past Surgical History           Procedure Laterality Date    COLONOSCOPY  09/01/2021    Dr. Arlin Goel Right 9/16/2021    ROBOT EXTENDED RIGHT COLECTOMY performed by Alma Palomares MD at [address] Left 2020    [facility] with mesh inguinal    PORT SURGERY N/A 11/8/2021    SINGLE LUMEN SMART PORT INSERTION performed by Alma Palomares MD at [facility] NP Exceptions are: Housing in the Last Year: Not on file    Number of Places Lived in the Last Year: Not on file    Unstable Housing in the Last Year: Not on file     Family History          Problem Relation Age of Onset    Other Mother         Brain aneurysm    COPD Mother     Stroke Mother     Cancer Father         lung mets brain and bone    Stroke Father     Cancer Maternal Grandmother         colon    High Blood Pressure Maternal Grandmother     Cancer Maternal Grandfather         colon     High Blood Pressure Maternal Grandfather     Kidney Disease Paternal Grandmother     Cancer Paternal Grandmother         breast     Diabetes Paternal Grandmother     High Blood Pressure Paternal Grandmother     Cancer Paternal Grandfather         lung-52    Heart Attack Paternal Grandfather     High Blood Pressure Paternal Grandfather     Cancer Paternal Aunt         colon    Heart Attack Paternal Aunt     Cancer Paternal Uncle         lung    Cancer Maternal Aunt         colon    Cancer Maternal Uncle         colon     ROS     Review of Systems   Pertinent review of systems noted in HPI, all other ROS negative. Vitals     height is 6' (1.829 m) and weight is 158 lb 12.8 oz (72 kg). His oral temperature is 98.5 °F (36.9 °C). His blood pressure is 122/79 and his pulse is 80. His respiration is 18 and oxygen saturation is 99%. Exam   Physical Exam   General appearance: No apparent distress, chronically ill appearing, and cooperative. HEENT: Pupils equal, round, and reactive to light. Conjunctivae/corneas clear. Oral mucosa moist with oral mucositis involving buccal surfaces and tongue borders. NG tube in place with dark brown liquid in tubing. Neck: Supple, with full range of motion. Trachea midline. Respiratory:  Normal respiratory effort. Clear to auscultation, bilaterally without Rales/Wheezes/Rhonchi.   Cardiovascular: Regular rate and rhythm with normal S1/S2   Abdomen: mildly distended, hypoactive bowel sounds. Musculoskeletal: No clubbing, cyanosis or edema bilaterally. Skin: Skin color, texture, turgor normal.  No rashes or lesions. Neurologic:  Neurovascularly intact without any focal sensory/motor deficits. Psychiatric: Alert and oriented    Labs   CBC  Recent Labs     11/27/21  0953 11/29/21  0515   WBC 8.2 8.6   RBC 5.11 4.30*   HGB 13.6* 11.4*   HCT 40.0* 34.2*   MCV 78.3* 79.5*   MCH 26.6 26.5   MCHC 34.0 33.3    304   MPV 9.1* 8.9*      BMP  Recent Labs     11/26/21 2048 11/27/21  0457 11/27/21  0953 11/29/21  0515   *  --  128* 131*   K 3.2* 3.5 3.7 3.2*   CL 99  --  101 100   CO2 19*  --  19* 24   BUN 9  --  10 12   CREATININE 0.7  --  0.6 0.6   GLUCOSE 129*  --  103 144*   MG 1.8  --   --  1.7   CALCIUM 7.8*  --  8.2* 7.6*     LFT  Recent Labs     11/29/21  1250   AST 18   ALT 17   BILITOT 0.4   ALKPHOS 173*   LIPASE 23.3       Radiology        XR ABDOMEN (KUB) (SINGLE AP VIEW)    Result Date: 11/27/2021  PROCEDURE: XR ABDOMEN (KUB) (SINGLE AP VIEW) CLINICAL INFORMATION: constipation COMPARISON: CT dated 11/24/2021 TECHNIQUE:  AP supine abdomen 2 views  FINDINGS: Distended gas-filled small bowel loops are seen overlying the abdomen which are concerning for a possible partial small bowel obstruction versus ileus. Gas is also seen within loops of large bowel overlying the left upper quadrant. No acute osseous findings are seen. Calcified phleboliths are seen overlying the pelvis. 1. Distended gas-filled small bowel loops are seen overlying the mid abdomen which are concerning for a possible partial small bowel obstruction versus ileus. **This report has been created using voice recognition software. It may contain minor errors which are inherent in voice recognition technology. ** Final report electronically signed by Dr. Raheel Friend on 11/27/2021 4:38 PM    CT ABDOMEN PELVIS W IV CONTRAST Additional Contrast? Radiologist Recommendation    Result Date: 11/28/2021  PROCEDURE: CT ABDOMEN PELVIS W IV CONTRAST CLINICAL INFORMATION: Follow up possible partial SBO vs ileus, rule out typhlitis . COMPARISON: CT scan of abdomen and pelvis dated 24th of November 2021. TECHNIQUE: Axial 5 mm CT images were obtained through the abdomen and pelvis after the administration of intravenous contrast. Coronal and sagittal reconstructions were obtained. All CT scans at this facility use dose modulation, iterative reconstruction, and/or weight-based dosing when appropriate to reduce radiation dose to as low as reasonably achievable. FINDINGS: There are areas of atelectasis or infiltrate in the right and left lower lobes. .   The base of the heart is within appropriate limits. There is mild diffuse fatty replacement in the liver. The spleen is normal. There is a 12 mm a left adrenal nodule. The right adrenal gland and pancreas are normal. There is gallbladder wall thickening. There is dilatation of the common bile duct which measures 11 mm in diameter. There is dilatation of the common hepatic duct which measures 12 mm in diameter. There is no hydronephrosis or stones of either kidney. No renal masses are noted. There is a hiatal hernia. There are dilated fluid-filled small bowel loops, these appear more prominent than on previous study dated 24th of November 2021. The IVC and aorta are of normal caliber. There is no adenopathy. The urinary bladder is normal. There is a small amount of  pelvic free fluid is no since previous study. The patient is status post right hemicolectomy. There are fluid-filled large bowel loops with mucosal thickening suspicious for inflammatory process. This appears worse than on previous study. There is no definite evidence of typhilitis. There is no adenopathy. No suspicious osseous lesions are identified. 1. Status post right-sided colectomy. 2. Dilated small and large bowel loops, worse than on previous study dated 24th of November 2021.  There is mucosal thickening. These findings could represents an inflammatory process versus obstruction. 3. Small amount of free fluid within the pelvis, new since previous study. 4. There is gallbladder wall thickening. There is dilatation of the common hepatic and common bile ducts more prominent than on previous study dated 24th of November 2021 5. Small left adrenal nodule, unchanged. 6. Hiatal hernia. 7. Areas of atelectasis or infiltrate in the right and left lower lobes. . **This report has been created using voice recognition software. It may contain minor errors which are inherent in voice recognition technology. ** Final report electronically signed by DR Deana Mcguire on 11/28/2021 1:28 PM    CT ABDOMEN PELVIS W IV CONTRAST Additional Contrast? None    Result Date: 11/25/2021  PROCEDURE: CT ABDOMEN PELVIS W IV CONTRAST CLINICAL INFORMATION: colon CA patient, diffuse abdominal pain, on chemo . COMPARISON: CT 10/21/2021. TECHNIQUE: 5 mm axial CT images were obtained through the abdomen and pelvis after the administration of intravenous and oral contrast. Coronal and sagittal reconstructions were obtained. All CT scans at this facility use dose modulation, iterative reconstruction, and/or weight-based dosing when appropriate to reduce radiation dose to as low as reasonably achievable. FINDINGS: The lung bases are clear. There is no pleural effusion or pneumothorax. The base of the heart is within acceptable limits. The gallbladder is slightly thickened, similar to the prior exam. There is mild intrahepatic biliary ductal dilatation. The spleen, pancreas and adrenal glands are within normal limits. The kidneys are symmetric in size, shape and degree of enhancement. There is no hydronephrosis. Postsurgical changes are seen involving the intestine in the mid abdomen. There is extensive wall thickening throughout the small and large bowel loops. There is no ascites. There is no free intraperitoneal air.  The urinary bladder appears normal. The aorta and the IVC are normal in caliber. The bones are intact. 1. Diffuse wall thickening throughout segments of small and large bowel, findings consistent with diffuse enterocolitis. 2. Somewhat thickened appearance of the gallbladder wall which is unchanged since prior exam. **This report has been created using voice recognition software. It may contain minor errors which are inherent in voice recognition technology. ** Final report electronically signed by Dr Gray Leroy on 11/25/2021 12:01 AM    XR CHEST PORTABLE    Result Date: 11/24/2021  PROCEDURE: XR CHEST PORTABLE CLINICAL INFORMATION: 76-year-old male with fever. History of colon cancer. COMPARISON: Radiograph dated 11/12/2021. TECHNIQUE: AP upright view of the chest was obtained. FINDINGS: There is a right-sided subclavian vein access chest wall infusion port. The tip of the catheter is in the SVC. The cardiac silhouette and pulmonary vasculature are within normal limits. There is no significant pleural effusion or pneumothorax. Visualized portions of the upper abdomen are within normal limits. The osseous structures are intact. No acute fractures or suspicious osseous lesions. There is no acute intrathoracic process. **This report has been created using voice recognition software. It may contain minor errors which are inherent in voice recognition technology. ** Final report electronically signed by Dr Gray Leroy on 11/24/2021 9:51 PM       Assessment/Recommendations    1. Colon Cancer - stage IIIA  Began adjuvant chemotherapy with Xeloda and oxaliplatin on 11/9/2021. He is due for cycle #2 of oxaliplatin tomorrow on 11/30/21. Hold chemotherapy during acute hospitalization. 2. Enterocolitis - as noted on CT of the ab/pelvis on 11/24/21 with diffuse wall thickening throughout segments of small and large bowel consistent with diffuse enterocolitis. GI pathogen by PCR negative, C. Diff toxin negative.  Received IV Zosyn in the ED 11/25. Treated with IV Cipro (11/25-11/29), IV Flagyl (11/25-11/27)  3. Ileus vs Partial SBO - as noted on CT of ab/pelvis on 11/28/21. General Surgery following, patient agreeable to NG placement today on 11/29/21. To have MRCP and ultrasound of gallbladder today. Lomotil on hold. 4. Gallbladder Wall Thickening - noted on imaging studies. MRCP and ultrasound of gallbladder ordered. 5. Oral Mucositis - secondary to chemotherapy. Magic mouthwash ordered. 6. Hypokalemia - K 3.2, Mg 1.7. on replacement per protocol. Related to poor oral intake, GI losses. Will continue to follow hospital course. Case discussed with nurse and patient. Questions and concerns addressed.   Plan made in collaboration with Dr. Otis Finch    Electronically signed by   Virginia Donato PA-C on 11/29/2021 at 3:41 PM

## 2021-11-29 NOTE — PROGRESS NOTES
Hospitalist Progress Note      Patient:  Valeriy Warner    Unit/Bed:6E-55/055-A  YOB: 1970  MRN: [id number]   Acct: [de-identified]   PCP: Adriana Viveros MD  Date of Admission: 11/24/2021    Assessment/Plan:    1. Enterocolitis, acute: Possible chemotherapy induced. IV Cipro & Flagyl stopped. 2. SBO vs ileus: General Surgery consulted. KUB showed possible SBO vs ileus. CT A/P this AM showed worsening since previous. CLD. Continue to monitor. Gallbladder US. Labs pending. NG tube in place. 3. Hyponatremia: Sodium low but stable- 129, 130, 128, 131. IVF started. BMP in the AM.   4. Hypokalemia: Potassium 3.1, 3.7, 3.2. Potassium Replacement Protocol. 5. Anemia, chronic: Hgb 13.6, drop to 11.4- dilutional? CBC in the AM.   6. Transverse colon adenocarcinoma: s/p colectomy. Pt follows with Dr. Matthew Luna. Pt is currently receiving infusions at the [facility]. Oncology consulted. 7. Malnutrition: Dietician consulted. Megace also started. Chief Complaint: Abdominal Pain     Initial H and P:-    Initial H&P \"47year-old male patient recently diagnosed with colon cancer September 2021 status post surgery IV chemotherapy and Xeloda.     Presented to the ED today with a 1 week history of intractable periumbilical abdominal pain of 9/10 intensity.   Associated nausea vomiting diarrhea and fever last few days.     Patient now has poor oral intake and generalized body weakness     Vital signs on admission temperature 36.9 °C, respiratory 18, heart rate 89, blood pressure 147/98, oxygen saturation 98% room air.     Labs on admission sodium 131, potassium 3.5, CO2 19, , creatinine 1.0, blood sugar 138, magnesium 1.8, calcium 8.6, total protein 6.0, procalcitonin 397, troponin first set less than 0.010, albumin 3.1, alkaline phosphatase 71, ALT 38, AST is 12, total bilirubin 0.6, lipase 14.2, total protein 6.0.     WBC 7.2, hemoglobin 15.8, MCV 78.9, platelets 632.     Influenza A, B and COVID-19 negative.     Stool studies for GI PCR panel was negative.     ED treatment included IV Zosyn for suspected problem infection and IV fluids. \"     11/26: Pt is frustrated with insurance company today and was on the phone during evaluation and stated that he could not stop the call. Pt states that the pain is manageable at this time. Pt is also concerned about his lack of appetite. 11/27: No acute events overnight. Pt states that he believes he is bloating. Pt states that the pain is more controlled. No other issues or concerns at this time. 11/28: No acute events overnight, Pt has not had any vomiting episodes. Pt has walked around the unit multiple times. Pt is very concerned about dark colored urine. Diet advanced to CLD. Subjective (past 24 hours):   No acute events overnight. Pt tried to walk to meeting last night but had too much pain and had to use wheelchair. General Surgery doing test for other abdominal issues. Pt states that pain is relatively controlled. Past medical history, family history, social history and allergies reviewed again and is unchanged since admission. ROS (All review of systems completed. Pertinent positives noted.  Otherwise All other systems reviewed and negative.)     Medications:  Reviewed    Infusion Medications    dextrose 5% in lactated ringers 125 mL/hr at 11/29/21 1056    sodium chloride 25 mL (11/29/21 0337)     Scheduled Medications    megestrol  40 mg Oral Daily    ondansetron  4 mg Oral Once    [Held by provider] diphenoxylate-atropine  1 tablet Oral 4x Daily    sodium chloride flush  10 mL IntraVENous 2 times per day    enoxaparin  40 mg SubCUTAneous Daily    sucralfate  1 g Oral TID AC    omeprazole  20 mg Oral QAM    dicyclomine  20 mg Oral TID     PRN Meds: ketorolac, potassium chloride **OR** potassium alternative oral replacement **OR** potassium chloride, oxyCODONE, HYDROmorphone, [Held by provider] loperamide, sodium chloride flush, sodium chloride, ondansetron **OR** ondansetron, polyethylene glycol, promethazine, acetaminophen      Intake/Output Summary (Last 24 hours) at 11/29/2021 1538  Last data filed at 11/29/2021 1335  Gross per 24 hour   Intake 1000 ml   Output 300 ml   Net 700 ml       Diet:  Diet NPO Exceptions are: Ice Chips, Sips of Water with Meds, Sips of Clear Liquids    Exam:  /79   Pulse 80   Temp 98.5 °F (36.9 °C) (Oral)   Resp 18   Ht 6' (1.829 m)   Wt 158 lb 12.8 oz (72 kg)   SpO2 99%   BMI 21.54 kg/m²   General appearance: No apparent distress, appears stated age and cooperative. HEENT: Pupils equal, round, and reactive to light. Conjunctivae/corneas clear. NG tube in place. Neck: Supple, with full range of motion. No jugular venous distention. Trachea midline. Respiratory:  Normal respiratory effort on RA. Clear to auscultation, bilaterally without Rales/Wheezes/Rhonchi. Cardiovascular: Regular rate and rhythm with normal S1/S2 without murmurs, rubs or gallops. Abdomen: Soft, mildly tender, mildly distended with hypoactive bowel sounds. Musculoskeletal: passive and active ROM x 4 extremities. Skin: Skin color, texture, turgor normal.  No rashes or lesions. Mediport. Neurologic:  Neurovascularly intact without any focal sensory/motor deficits.  Cranial nerves: II-XII intact, grossly non-focal.  Psychiatric: Alert and oriented, thought content appropriate, normal insight  Capillary Refill: Brisk,< 3 seconds   Peripheral Pulses: +2 palpable, equal bilaterally     Labs:   Recent Labs     11/27/21  0953 11/29/21  0515   WBC 8.2 8.6   HGB 13.6* 11.4*   HCT 40.0* 34.2*    304     Recent Labs     11/26/21 2048 11/27/21 0457 11/27/21  0953 11/29/21  0515   *  --  128* 131*   K 3.2* 3.5 3.7 3.2*   CL 99  --  101 100   CO2 19*  --  19* 24   BUN 9  --  10 12   CREATININE 0.7  --  0.6 0.6   CALCIUM 7.8*  --  8.2* 7.6*     Recent Labs     11/29/21  2042 AST 18   ALT 17   BILIDIR <0.2   BILITOT 0.4   ALKPHOS 173*     No results for input(s): INR in the last 72 hours. No results for input(s): Justen Shorts in the last 72 hours. Microbiology:    Blood culture #1:   Lab Results   Component Value Date    BC No growth-preliminary  11/24/2021       Blood culture #2:No results found for: Loretta Haines    Organism:No results found for: ORG    No results found for: LABGRAM    MRSA culture only:No results found for: [facility]    Urine culture: No results found for: LABURIN    Respiratory culture: No results found for: CULTRESP    Aerobic and Anaerobic :  No results found for: LABAERO  No results found for: LABANAE    Urinalysis:      Lab Results   Component Value Date    NITRU NEGATIVE 11/28/2021    WBCUA 0-2 11/28/2021    BACTERIA NONE SEEN 11/28/2021    RBCUA 0-2 11/28/2021    BLOODU NEGATIVE 11/28/2021    SPECGRAV >1.030 11/28/2021    GLUCOSEU NEGATIVE 11/26/2021       Radiology:  CT ABDOMEN PELVIS W IV CONTRAST Additional Contrast? Radiologist Recommendation   Final Result       1. Status post right-sided colectomy. 2. Dilated small and large bowel loops, worse than on previous study dated 24th of November 2021. There is mucosal thickening. These findings could represents an inflammatory process versus obstruction. 3. Small amount of free fluid within the pelvis, new since previous study. 4. There is gallbladder wall thickening. There is dilatation of the common hepatic and common bile ducts more prominent than on previous study dated 24th of November 2021    5. Small left adrenal nodule, unchanged. 6. Hiatal hernia. 7. Areas of atelectasis or infiltrate in the right and left lower lobes. .               **This report has been created using voice recognition software. It may contain minor errors which are inherent in voice recognition technology. **      Final report electronically signed by DR Allyssa Callaway on 11/28/2021 1:28 PM      XR ABDOMEN (KUB) (SINGLE AP VIEW)   Final Result   1. Distended gas-filled small bowel loops are seen overlying the mid abdomen which are concerning for a possible partial small bowel obstruction versus ileus. **This report has been created using voice recognition software. It may contain minor errors which are inherent in voice recognition technology. **      Final report electronically signed by Dr. Rene Ascencio on 11/27/2021 4:38 PM      CT ABDOMEN PELVIS W IV CONTRAST Additional Contrast? None   Final Result      1. Diffuse wall thickening throughout segments of small and large bowel, findings consistent with diffuse enterocolitis. 2. Somewhat thickened appearance of the gallbladder wall which is unchanged since prior exam.               **This report has been created using voice recognition software. It may contain minor errors which are inherent in voice recognition technology. **      Final report electronically signed by Dr Shannon Baltazar on 11/25/2021 12:01 AM      XR CHEST PORTABLE   Final Result   There is no acute intrathoracic process. **This report has been created using voice recognition software. It may contain minor errors which are inherent in voice recognition technology. **      Final report electronically signed by Dr Shannon Baltazar on 11/24/2021 9:51 PM      [address]    (Results Pending)   MRI ABDOMEN W WO CONTRAST MRCP    (Results Pending)     Electronically signed by LAUREEN Domínguez on 11/29/2021 at 3:38 PM

## 2021-11-30 ENCOUNTER — APPOINTMENT (OUTPATIENT)
Dept: INFUSION THERAPY | Age: 51
DRG: 394 | End: 2021-11-30
Payer: COMMERCIAL

## 2021-11-30 ENCOUNTER — HOSPITAL ENCOUNTER (OUTPATIENT)
Dept: INFUSION THERAPY | Age: 51
End: 2021-11-30

## 2021-11-30 LAB
ANION GAP SERPL CALCULATED.3IONS-SCNC: 9 MEQ/L (ref 8–16)
BASOPHILS # BLD: 0.5 %
BASOPHILS ABSOLUTE: 0 THOU/MM3 (ref 0–0.1)
BLOOD CULTURE, ROUTINE: NORMAL
BLOOD CULTURE, ROUTINE: NORMAL
BUN BLDV-MCNC: 9 MG/DL (ref 7–22)
C-REACTIVE PROTEIN: 7.89 MG/DL (ref 0–1)
CALCIUM SERPL-MCNC: 7.8 MG/DL (ref 8.5–10.5)
CHLORIDE BLD-SCNC: 101 MEQ/L (ref 98–111)
CO2: 26 MEQ/L (ref 23–33)
CREAT SERPL-MCNC: 0.6 MG/DL (ref 0.4–1.2)
EOSINOPHIL # BLD: 1.4 %
EOSINOPHILS ABSOLUTE: 0.1 THOU/MM3 (ref 0–0.4)
ERYTHROCYTE [DISTWIDTH] IN BLOOD BY AUTOMATED COUNT: 19.7 % (ref 11.5–14.5)
ERYTHROCYTE [DISTWIDTH] IN BLOOD BY AUTOMATED COUNT: 52.4 FL (ref 35–45)
GFR SERPL CREATININE-BSD FRML MDRD: > 90 ML/MIN/1.73M2
GLUCOSE BLD-MCNC: 100 MG/DL (ref 70–108)
HCT VFR BLD CALC: 32.8 % (ref 42–52)
HEMOGLOBIN: 11.1 GM/DL (ref 14–18)
IMMATURE GRANS (ABS): 0.29 THOU/MM3 (ref 0–0.07)
IMMATURE GRANULOCYTES: 3.7 %
LYMPHOCYTES # BLD: 14.3 %
LYMPHOCYTES ABSOLUTE: 1.1 THOU/MM3 (ref 1–4.8)
MAGNESIUM: 1.7 MG/DL (ref 1.6–2.4)
MCH RBC QN AUTO: 26.7 PG (ref 26–33)
MCHC RBC AUTO-ENTMCNC: 33.8 GM/DL (ref 32.2–35.5)
MCV RBC AUTO: 78.8 FL (ref 80–94)
MONOCYTES # BLD: 15.5 %
MONOCYTES ABSOLUTE: 1.2 THOU/MM3 (ref 0.4–1.3)
NUCLEATED RED BLOOD CELLS: 0 /100 WBC
PLATELET # BLD: 254 THOU/MM3 (ref 130–400)
PMV BLD AUTO: 8.8 FL (ref 9.4–12.4)
POTASSIUM REFLEX MAGNESIUM: 3.4 MEQ/L (ref 3.5–5.2)
RBC # BLD: 4.16 MILL/MM3 (ref 4.7–6.1)
SEG NEUTROPHILS: 64.6 %
SEGMENTED NEUTROPHILS ABSOLUTE COUNT: 5.1 THOU/MM3 (ref 1.8–7.7)
SODIUM BLD-SCNC: 136 MEQ/L (ref 135–145)
WBC # BLD: 7.9 THOU/MM3 (ref 4.8–10.8)

## 2021-11-30 PROCEDURE — 99232 SBSQ HOSP IP/OBS MODERATE 35: CPT | Performed by: PHYSICIAN ASSISTANT

## 2021-11-30 PROCEDURE — 80048 BASIC METABOLIC PNL TOTAL CA: CPT

## 2021-11-30 PROCEDURE — 2580000003 HC RX 258: Performed by: PHYSICIAN ASSISTANT

## 2021-11-30 PROCEDURE — 83735 ASSAY OF MAGNESIUM: CPT

## 2021-11-30 PROCEDURE — 1200000000 HC SEMI PRIVATE

## 2021-11-30 PROCEDURE — 6370000000 HC RX 637 (ALT 250 FOR IP): Performed by: NURSE PRACTITIONER

## 2021-11-30 PROCEDURE — 6370000000 HC RX 637 (ALT 250 FOR IP): Performed by: FAMILY MEDICINE

## 2021-11-30 PROCEDURE — 86140 C-REACTIVE PROTEIN: CPT

## 2021-11-30 PROCEDURE — 6370000000 HC RX 637 (ALT 250 FOR IP): Performed by: PHYSICIAN ASSISTANT

## 2021-11-30 PROCEDURE — APPSS45 APP SPLIT SHARED TIME 31-45 MINUTES: Performed by: NURSE PRACTITIONER

## 2021-11-30 PROCEDURE — 85025 COMPLETE CBC W/AUTO DIFF WBC: CPT

## 2021-11-30 PROCEDURE — 6370000000 HC RX 637 (ALT 250 FOR IP): Performed by: INTERNAL MEDICINE

## 2021-11-30 PROCEDURE — 6360000002 HC RX W HCPCS: Performed by: INTERNAL MEDICINE

## 2021-11-30 PROCEDURE — 2580000003 HC RX 258: Performed by: INTERNAL MEDICINE

## 2021-11-30 PROCEDURE — 6360000002 HC RX W HCPCS: Performed by: NURSE PRACTITIONER

## 2021-11-30 RX ORDER — BISACODYL 10 MG
10 SUPPOSITORY, RECTAL RECTAL DAILY
Status: DISCONTINUED | OUTPATIENT
Start: 2021-11-30 | End: 2021-12-04 | Stop reason: HOSPADM

## 2021-11-30 RX ORDER — SODIUM CHLORIDE 9 MG/ML
INJECTION, SOLUTION INTRAVENOUS CONTINUOUS
Status: DISCONTINUED | OUTPATIENT
Start: 2021-11-30 | End: 2021-12-04 | Stop reason: HOSPADM

## 2021-11-30 RX ORDER — FUROSEMIDE 10 MG/ML
20 INJECTION INTRAMUSCULAR; INTRAVENOUS ONCE
Status: COMPLETED | OUTPATIENT
Start: 2021-11-30 | End: 2021-11-30

## 2021-11-30 RX ADMIN — SUCRALFATE 1 G: 1 TABLET ORAL at 13:01

## 2021-11-30 RX ADMIN — OXYCODONE HYDROCHLORIDE 5 MG: 5 TABLET ORAL at 22:08

## 2021-11-30 RX ADMIN — SUCRALFATE 1 G: 1 TABLET ORAL at 16:53

## 2021-11-30 RX ADMIN — BISACODYL 10 MG: 10 SUPPOSITORY RECTAL at 11:05

## 2021-11-30 RX ADMIN — MEGESTROL ACETATE 40 MG: 40 TABLET ORAL at 10:21

## 2021-11-30 RX ADMIN — DICYCLOMINE HYDROCHLORIDE 20 MG: 10 CAPSULE ORAL at 14:01

## 2021-11-30 RX ADMIN — FUROSEMIDE 20 MG: 10 INJECTION, SOLUTION INTRAMUSCULAR; INTRAVENOUS at 10:22

## 2021-11-30 RX ADMIN — KETOROLAC TROMETHAMINE 30 MG: 30 INJECTION, SOLUTION INTRAMUSCULAR; INTRAVENOUS at 19:58

## 2021-11-30 RX ADMIN — SODIUM CHLORIDE, SODIUM LACTATE, POTASSIUM CHLORIDE, CALCIUM CHLORIDE AND DEXTROSE MONOHYDRATE: 5; 600; 310; 30; 20 INJECTION, SOLUTION INTRAVENOUS at 05:44

## 2021-11-30 RX ADMIN — SODIUM CHLORIDE: 9 INJECTION, SOLUTION INTRAVENOUS at 16:57

## 2021-11-30 RX ADMIN — Medication 5 ML: at 10:24

## 2021-11-30 RX ADMIN — SODIUM CHLORIDE, PRESERVATIVE FREE 10 ML: 5 INJECTION INTRAVENOUS at 10:21

## 2021-11-30 RX ADMIN — POLYETHYLENE GLYCOL 3350 17 G: 17 POWDER, FOR SOLUTION ORAL at 22:43

## 2021-11-30 RX ADMIN — KETOROLAC TROMETHAMINE 30 MG: 30 INJECTION, SOLUTION INTRAMUSCULAR; INTRAVENOUS at 11:01

## 2021-11-30 RX ADMIN — DICYCLOMINE HYDROCHLORIDE 20 MG: 10 CAPSULE ORAL at 10:22

## 2021-11-30 RX ADMIN — OMEPRAZOLE 20 MG: 20 CAPSULE, DELAYED RELEASE ORAL at 05:45

## 2021-11-30 RX ADMIN — OXYCODONE HYDROCHLORIDE 5 MG: 5 TABLET ORAL at 14:01

## 2021-11-30 RX ADMIN — POTASSIUM CHLORIDE 40 MEQ: 1500 TABLET, EXTENDED RELEASE ORAL at 17:13

## 2021-11-30 RX ADMIN — DICYCLOMINE HYDROCHLORIDE 20 MG: 10 CAPSULE ORAL at 19:58

## 2021-11-30 RX ADMIN — OXYCODONE HYDROCHLORIDE 5 MG: 5 TABLET ORAL at 18:06

## 2021-11-30 RX ADMIN — ENOXAPARIN SODIUM 40 MG: 100 INJECTION SUBCUTANEOUS at 10:21

## 2021-11-30 RX ADMIN — OXYCODONE HYDROCHLORIDE 5 MG: 5 TABLET ORAL at 02:33

## 2021-11-30 RX ADMIN — KETOROLAC TROMETHAMINE 30 MG: 30 INJECTION, SOLUTION INTRAMUSCULAR; INTRAVENOUS at 04:50

## 2021-11-30 ASSESSMENT — PAIN DESCRIPTION - LOCATION
LOCATION: ABDOMEN

## 2021-11-30 ASSESSMENT — PAIN SCALES - GENERAL
PAINLEVEL_OUTOF10: 7
PAINLEVEL_OUTOF10: 7
PAINLEVEL_OUTOF10: 8
PAINLEVEL_OUTOF10: 6
PAINLEVEL_OUTOF10: 8
PAINLEVEL_OUTOF10: 7
PAINLEVEL_OUTOF10: 5
PAINLEVEL_OUTOF10: 7
PAINLEVEL_OUTOF10: 7
PAINLEVEL_OUTOF10: 5

## 2021-11-30 ASSESSMENT — PAIN DESCRIPTION - PAIN TYPE
TYPE: ACUTE PAIN

## 2021-11-30 ASSESSMENT — PAIN DESCRIPTION - DESCRIPTORS
DESCRIPTORS: ACHING;STABBING
DESCRIPTORS: ACHING;CRAMPING

## 2021-11-30 ASSESSMENT — PAIN DESCRIPTION - ORIENTATION
ORIENTATION: MID
ORIENTATION: MID

## 2021-11-30 ASSESSMENT — PAIN DESCRIPTION - FREQUENCY: FREQUENCY: INTERMITTENT

## 2021-11-30 NOTE — PROGRESS NOTES
Hospitalist Progress Note      Patient:  Francesca Hand    Unit/Bed:6E-55/055-A  YOB: 1970  MRN: [id number]   Acct: [de-identified]   PCP: Yamil Cordero MD  Date of Admission: 11/24/2021    Assessment/Plan:    1. Enterocolitis, acute: Possible chemotherapy induced. IV Cipro & Flagyl stopped. 2. SBO vs ileus: Improving. General Surgery consulted. KUB showed possible SBO vs ileus. CT A/P this AM showed worsening since previous. NPO to CLD; pt tolerating it well. NGT clamped; hopeful for removal this afternoon. 3. Possible fluid overload: Pt states that his ankles are swollen. IVF stopped and lasix given. Pt is up 17 liters since admission. 4. Hyponatremia: Sodium low but stable- 129, 130, 128, 131. IVF started. BMP in the AM.   5. Hypokalemia: Potassium 3.1, 3.7, 3.2, 3.4. Potassium Replacement Protocol. 6. Anemia, chronic: Hgb 13.6, drop to 11.4, 11.1- dilutional?   7. Transverse colon adenocarcinoma: s/p colectomy. Pt follows with Dr. Caren Barba. Pt is currently receiving infusions at the [facility]. Oncology consulted. 8. Malnutrition: Dietician consulted. Megace also started. Dispo: Hopeful for DC tomorrow     Chief Complaint: Abdominal Pain     Initial H and P:-    Initial H&P \"47year-old male patient recently diagnosed with colon cancer September 2021 status post surgery IV chemotherapy and Xeloda.     Presented to the ED today with a 1 week history of intractable periumbilical abdominal pain of 9/10 intensity.   Associated nausea vomiting diarrhea and fever last few days.     Patient now has poor oral intake and generalized body weakness     Vital signs on admission temperature 36.9 °C, respiratory 18, heart rate 89, blood pressure 147/98, oxygen saturation 98% room air.     Labs on admission sodium 131, potassium 3.5, CO2 19, , creatinine 1.0, blood sugar 138, magnesium 1.8, calcium 8.6, total protein 6.0, procalcitonin 397, troponin first set less than 0.010, albumin 3.1, alkaline phosphatase 71, ALT 38, AST is 12, total bilirubin 0.6, lipase 14.2, total protein 6.0.     WBC 7.2, hemoglobin 15.8, MCV 78.9, platelets 479.     Influenza A, B and COVID-19 negative.     Stool studies for GI PCR panel was negative.     ED treatment included IV Zosyn for suspected problem infection and IV fluids. \"     11/26: Pt is frustrated with insurance company today and was on the phone during evaluation and stated that he could not stop the call. Pt states that the pain is manageable at this time. Pt is also concerned about his lack of appetite. 11/27: No acute events overnight. Pt states that he believes he is bloating. Pt states that the pain is more controlled. No other issues or concerns at this time. 11/28: No acute events overnight, Pt has not had any vomiting episodes. Pt has walked around the unit multiple times. Pt is very concerned about dark colored urine. Diet advanced to CLD. 11/29: No acute events overnight. Pt tried to walk to meeting last night but had too much pain and had to use wheelchair. General Surgery doing test for other abdominal issues. Pt states that pain is relatively controlled. Subjective (past 24 hours):   No acute events overnight. Patient had large BM this morning. Patient states the pain is relatively controlled. Patient walking in the halls. Patient states that he is feeling better. Patient is hopeful for NG tube removal today. Past medical history, family history, social history and allergies reviewed again and is unchanged since admission. ROS (All review of systems completed. Pertinent positives noted.  Otherwise All other systems reviewed and negative.)     Medications:  Reviewed    Infusion Medications    sodium chloride      sodium chloride Stopped (11/29/21 050)     Scheduled Medications    bisacodyl  10 mg Rectal Daily    magic (miracle) mouthwash  5 mL Swish & Spit 4x Daily    alteplase  1 mg IntraCATHeter Once    megestrol  40 mg Oral Daily    ondansetron  4 mg Oral Once    [Held by provider] diphenoxylate-atropine  1 tablet Oral 4x Daily    sodium chloride flush  10 mL IntraVENous 2 times per day    enoxaparin  40 mg SubCUTAneous Daily    sucralfate  1 g Oral TID AC    omeprazole  20 mg Oral QAM    dicyclomine  20 mg Oral TID     PRN Meds: ketorolac, potassium chloride **OR** potassium alternative oral replacement **OR** potassium chloride, oxyCODONE, HYDROmorphone, [Held by provider] loperamide, sodium chloride flush, sodium chloride, ondansetron **OR** ondansetron, polyethylene glycol, promethazine, acetaminophen      Intake/Output Summary (Last 24 hours) at 11/30/2021 1437  Last data filed at 11/30/2021 1303  Gross per 24 hour   Intake 6867.21 ml   Output 2100 ml   Net 4767.21 ml       Diet:  ADULT DIET; Clear Liquid  ADULT ORAL NUTRITION SUPPLEMENT; Lunch; Clear Liquid Oral Supplement    Exam:  /78   Pulse 82   Temp 98.2 °F (36.8 °C) (Oral)   Resp 16   Ht 6' (1.829 m)   Wt 158 lb 12.8 oz (72 kg)   SpO2 97%   BMI 21.54 kg/m²   General appearance: No apparent distress, appears stated age and cooperative. HEENT: Pupils equal, round, and reactive to light. Conjunctivae/corneas clear. NG tube in place. Neck: Supple, with full range of motion. No jugular venous distention. Trachea midline. Respiratory:  Normal respiratory effort on RA. Clear to auscultation, bilaterally without Rales/Wheezes/Rhonchi. Cardiovascular: Regular rate and rhythm with normal S1/S2 without murmurs, rubs or gallops. Abdomen: Soft, mildly tender, mildly distended with bowel sounds  Musculoskeletal: passive and active ROM x 4 extremities. Skin: Skin color, texture, turgor normal.  No rashes or lesions. Mediport. Neurologic:  Neurovascularly intact without any focal sensory/motor deficits.  Cranial nerves: II-XII intact, grossly non-focal.  Psychiatric: Alert and oriented, thought content appropriate, normal insight  Capillary Refill: Brisk,< 3 seconds   Peripheral Pulses: +2 palpable, equal bilaterally     Labs:   Recent Labs     11/29/21  0515 11/30/21  0600   WBC 8.6 7.9   HGB 11.4* 11.1*   HCT 34.2* 32.8*    254     Recent Labs     11/29/21  0515   *   K 3.2*      CO2 24   BUN 12   CREATININE 0.6   CALCIUM 7.6*     Recent Labs     11/29/21  1250   AST 18   ALT 17   BILIDIR <0.2   BILITOT 0.4   ALKPHOS 173*     No results for input(s): INR in the last 72 hours. No results for input(s): Elena Kaska in the last 72 hours. Microbiology:    Blood culture #1:   Lab Results   Component Value Date    BC No growth-preliminary No growth  11/24/2021       Blood culture #2:No results found for: Bethanne Dubin    Organism:No results found for: ORG    No results found for: LABGRAM    MRSA culture only:No results found for: [facility]    Urine culture: No results found for: LABURIN    Respiratory culture: No results found for: CULTRESP    Aerobic and Anaerobic :  No results found for: LABAERO  No results found for: LABANAE    Urinalysis:      Lab Results   Component Value Date    NITRU NEGATIVE 11/28/2021    WBCUA 0-2 11/28/2021    BACTERIA NONE SEEN 11/28/2021    RBCUA 0-2 11/28/2021    BLOODU NEGATIVE 11/28/2021    SPECGRAV >1.030 11/28/2021    GLUCOSEU NEGATIVE 11/26/2021       Radiology:  MRI ABDOMEN W WO CONTRAST MRCP   Final Result   1. Dilatation of the common bile duct at 1.4 cm without intraductal    filling defect. Tapering in the distal common bile duct with underlying    stricture or stenosis not excluded. No intrahepatic biliary ductal or    pancreatic ductal dilatation. 2. Cholelithiasis without gallbladder wall thickening/edema. 3. Several small bowel loops appear dilated. Partial small bowel    obstruction not excluded. 4. Small bilateral pleural effusions and trace abdominal ascites likely    reactive.       This document has been electronically signed by: Aleshia Mensah MD on    11/29/2021 09:53 PM      [email or web address]   Final Result   1. Biliary sludge. Thickened gallbladder wall. 2. Markedly dilated common duct and dilated intrahepatic radicles, consistent with distal obstruction. 3. MRCP might be considered for further evaluation. **This report has been created using voice recognition software. It may contain minor errors which are inherent in voice recognition technology. **      Final report electronically signed by Dr. Anushka Aleman on 11/29/2021 7:34 PM      CT ABDOMEN PELVIS W IV CONTRAST Additional Contrast? Radiologist Recommendation   Final Result       1. Status post right-sided colectomy. 2. Dilated small and large bowel loops, worse than on previous study dated 24th of November 2021. There is mucosal thickening. These findings could represents an inflammatory process versus obstruction. 3. Small amount of free fluid within the pelvis, new since previous study. 4. There is gallbladder wall thickening. There is dilatation of the common hepatic and common bile ducts more prominent than on previous study dated 24th of November 2021    5. Small left adrenal nodule, unchanged. 6. Hiatal hernia. 7. Areas of atelectasis or infiltrate in the right and left lower lobes. .               **This report has been created using voice recognition software. It may contain minor errors which are inherent in voice recognition technology. **      Final report electronically signed by DR Sukhdev Matos on 11/28/2021 1:28 PM      XR ABDOMEN (KUB) (SINGLE AP VIEW)   Final Result   1. Distended gas-filled small bowel loops are seen overlying the mid abdomen which are concerning for a possible partial small bowel obstruction versus ileus. **This report has been created using voice recognition software. It may contain minor errors which are inherent in voice recognition technology. **      Final report electronically signed by Dr. Rosio Terrazas Bhupendra on 11/27/2021 4:38 PM      CT ABDOMEN PELVIS W IV CONTRAST Additional Contrast? None   Final Result      1. Diffuse wall thickening throughout segments of small and large bowel, findings consistent with diffuse enterocolitis. 2. Somewhat thickened appearance of the gallbladder wall which is unchanged since prior exam.               **This report has been created using voice recognition software. It may contain minor errors which are inherent in voice recognition technology. **      Final report electronically signed by Dr Justin Llanes on 11/25/2021 12:01 AM      XR CHEST PORTABLE   Final Result   There is no acute intrathoracic process. **This report has been created using voice recognition software. It may contain minor errors which are inherent in voice recognition technology. **      Final report electronically signed by Dr Justin Llanes on 11/24/2021 9:51 PM        Electronically signed by LAUREEN Bailey on 11/30/2021 at 2:37 PM

## 2021-11-30 NOTE — PROGRESS NOTES
[facility]    Patient Fredi Duran   MRN -  [id number]   [facility] # - [de-identified]   - 1970      Date of Admission -  2021  9:21 PM  Date of evaluation -  2021  Room - [address] [place] [address] Primary Care Physician - Frances Shearer MD     Reason for Consult    Followed by Dr. Arvin Lawrence  On active chemotherapy for colon cancer   [address] Problems    Diagnosis Date Noted    Enterocolitis [K52.9] 2021    Severe protein-calorie malnutrition ([facility] [facility] [E43] 2021    Dehydration [E86.0] 2021    Diarrhea due to drug [K52.1] 2021    S/P right colectomy [Z90.49] 2021    Cancer of transverse colon ([facility] [C18.4] 2021    Adenocarcinoma, colon ([facility] [facility] [C18.9] 2021    Personal history of tobacco use [Z87.891] 2021    History of alcoholism ([facility] [F10.21] 2021     HPI/Subjective   Fredi Duran is a 45 y.o. male admitted for fever, abdominal pain. The patient recently started adjuvant chemotherapy for colon cancer with XELOX on 21 with oxaliplatin and oral Xeloda. He presented to the ED on 21 with fever, nausea and diarrhea. He had been receiving supportive IV fluids as an outpatient due to nausea, vomiting, diarrhea. He had been alternating Lomotil and Imodium due to diarrhea. Prior to arriving to the ED he had fever with tmax of 101.8F. in the ED, covid-19 negative, Flu A/B negative, GI pathogens by PCR stool panel negative. CT of the abdomen/pelvis was obtained showing diffuse wall thickening throughout segments of the small and large bowel, findings consistent with diffuse enterocolitis. The patient was started on IV Zosyn and admitted under the Hospitalist Service.  General Surgery consulted and recommended NG tube to CASEY, NPO. KUB on 21 showed distended gas-filled small bowel loops are seen overlying the mid abdomen which are concerning for possible partial SBO vs ileus. A  Repeat CT of ab/pelvis on 11/28/21 showed no evidence of typhlitis, no adenopathy. Dilated small and large bowel loops, worse than previous study on 11/24/21 with mucosal thickening - could represent inflammatory process vs obstruction. Small amount of free fluid in pelvic, new since prevous study. GB wall thickening, dilatation of the common hepatic and CBD more prominent than previous study, small left adrenal nodule unchanged. MRCP and ultrasound of the GB have been ordered per General Surgery. Oncology consult was requested to follow up on above. Today on 11/30/2021:   The patient is sitting up in bedside chair. He reports feeling better overnight. He affirms improved abdominal pain, nausea. He reports persistent abdominal bloating. He reports having 2 episodes of diarrhea this am. Denies fever, chills, chest pain, SOB, cough, or urinary changes. NG remains in place. Oncology History   Per Dr. Ryan Schaffer note on 11/9/21: Mr. Jose Mahan is a 58-year-old patient with newly diagnosed stage III colon cancer. Kym Samaniego presents to the medical oncology clinic to discuss postsurgical management. His history of colon cancer goes back to September 2021 when he was seen by  Dr. Littie Najjar to unexplained weight loss of 10-15 lbs and rectal bleeding.  Colonoscopy performed on September 1, 2021 demonstrated multiple polyps but a larger mass at the proximal transverse colon demonstrated the adenocarcinoma.  He did not have signs of obstruction, no abdominal pain, no significant nausea or vomiting.  Some previous episodes of nausea, GERD and epigastric discomfort.  He had CT of the chest abdomen and pelvis on September 2, 2021,no evidence of metastatic disease.   The patient met with Dr. TALLEY [facility] and after discussing his surgical treatment options he proceeded with right hemicolectomy on September 16, 2021.  Final pathology report showed:  A.  Right colon mass, Clear Liquid  ADULT ORAL NUTRITION SUPPLEMENT; Lunch; Clear Liquid Oral Supplement  Allergies    Patient has no known allergies. Social History     Social History     Socioeconomic History    Marital status: Single     Spouse name: Not on file    Number of children: Not on file    Years of education: Not on file    Highest education level: Not on file   Occupational History    Not on file   Tobacco Use    Smoking status: Former Smoker     Packs/day: 1.00     Years: 35.00     Pack years: 35.00     Types: Cigarettes     Quit date: 2020     Years since quittin.8    Smokeless tobacco: Never Used   Vaping Use    Vaping Use: Never used   Substance and Sexual Activity    Alcohol use: Not Currently    Drug use: No    Sexual activity: Not on file   Other Topics Concern    Not on file   Social History Narrative    Not on file     Social Determinants of Health     Financial Resource Strain:     Difficulty of Paying Living Expenses: Not on file   Food Insecurity:     Worried About Running Out of Food in the Last Year: Not on file    Johnnie of Food in the Last Year: Not on file   Transportation Needs:     Lack of Transportation (Medical): Not on file    Lack of Transportation (Non-Medical):  Not on file   Physical Activity:     Days of Exercise per Week: Not on file    Minutes of Exercise per Session: Not on file   Stress:     Feeling of Stress : Not on file   Social Connections:     Frequency of Communication with Friends and Family: Not on file    Frequency of Social Gatherings with Friends and Family: Not on file    Attends Anabaptist Services: Not on file    Active Member of Clubs or Organizations: Not on file    Attends Club or Organization Meetings: Not on file    Marital Status: Not on file   Intimate Partner Violence:     Fear of Current or Ex-Partner: Not on file    Emotionally Abused: Not on file    Physically Abused: Not on file    Sexually Abused: Not on file   Housing Stability:     Unable to Pay for Housing in the Last Year: Not on file    Number of Places Lived in the Last Year: Not on file    Unstable Housing in the Last Year: Not on file     Family History          Problem Relation Age of Onset    Other Mother         Brain aneurysm    COPD Mother     Stroke Mother     Cancer Father         lung mets brain and bone    Stroke Father     Cancer Maternal Grandmother         colon    High Blood Pressure Maternal Grandmother     Cancer Maternal Grandfather         colon     High Blood Pressure Maternal Grandfather     Kidney Disease Paternal Grandmother     Cancer Paternal Grandmother         breast     Diabetes Paternal Grandmother     High Blood Pressure Paternal Grandmother     Cancer Paternal Grandfather         lung-52    Heart Attack Paternal Grandfather     High Blood Pressure Paternal Grandfather     Cancer Paternal Aunt         colon    Heart Attack Paternal Aunt     Cancer Paternal Uncle         lung    Cancer Maternal Aunt         colon    Cancer Maternal Uncle         colon     ROS     Review of Systems   Pertinent review of systems noted in HPI, all other ROS negative. Vitals     height is 6' (1.829 m) and weight is 158 lb 12.8 oz (72 kg). His oral temperature is 98.2 °F (36.8 °C). His blood pressure is 109/78 and his pulse is 82. His respiration is 16 and oxygen saturation is 97%. Exam   Physical Exam   General appearance: No apparent distress, well developed, chronically ill appearing, and cooperative. HEENT: Pupils equal, round, and reactive to light. Conjunctivae/corneas clear. Oral mucosa moist with mucositis to buccal and tongue borders. NG in place. Neck: Supple, with full range of motion. Trachea midline. Respiratory:  Normal respiratory effort. Clear to auscultation bilaterally. No wheezes, rales or rhonchi. Cardiovascular: Regular rate and rhythm with normal S1/S2  Abdomen: Soft, mildly distended.  Hypoactive bowel sounds. Musculoskeletal: bilateral lower extremity edema. Skin: Skin color, texture, turgor normal.  No rashes or lesions. Neurologic:  Neurovascularly intact without any focal sensory/motor deficits. Psychiatric: Alert and oriented    Labs   CBC  Recent Labs     11/27/21  0953 11/29/21  0515 11/30/21  0600   WBC 8.2 8.6 7.9   RBC 5.11 4.30* 4.16*   HGB 13.6* 11.4* 11.1*   HCT 40.0* 34.2* 32.8*   MCV 78.3* 79.5* 78.8*   MCH 26.6 26.5 26.7   MCHC 34.0 33.3 33.8    304 254   MPV 9.1* 8.9* 8.8*      BMP  Recent Labs     11/27/21  0953 11/29/21  0515   * 131*   K 3.7 3.2*    100   CO2 19* 24   BUN 10 12   CREATININE 0.6 0.6   GLUCOSE 103 144*   MG  --  1.7   CALCIUM 8.2* 7.6*     LFT  Recent Labs     11/29/21  1250   AST 18   ALT 17   BILITOT 0.4   ALKPHOS 173*   LIPASE 23.3       Radiology      XR ABDOMEN (KUB) (SINGLE AP VIEW)    Result Date: 11/27/2021  PROCEDURE: XR ABDOMEN (KUB) (SINGLE AP VIEW) CLINICAL INFORMATION: constipation COMPARISON: CT dated 11/24/2021 TECHNIQUE:  AP supine abdomen 2 views  FINDINGS: Distended gas-filled small bowel loops are seen overlying the abdomen which are concerning for a possible partial small bowel obstruction versus ileus. Gas is also seen within loops of large bowel overlying the left upper quadrant. No acute osseous findings are seen. Calcified phleboliths are seen overlying the pelvis. 1. Distended gas-filled small bowel loops are seen overlying the mid abdomen which are concerning for a possible partial small bowel obstruction versus ileus. **This report has been created using voice recognition software. It may contain minor errors which are inherent in voice recognition technology. ** Final report electronically signed by Dr. Alvarez Monroy on 11/27/2021 4:38 PM    CT ABDOMEN PELVIS W IV CONTRAST Additional Contrast? Radiologist Recommendation    Result Date: 11/28/2021  PROCEDURE: CT ABDOMEN PELVIS W IV CONTRAST CLINICAL INFORMATION: Follow up possible partial SBO vs ileus, rule out typhlitis . COMPARISON: CT scan of abdomen and pelvis dated 24th of November 2021. TECHNIQUE: Axial 5 mm CT images were obtained through the abdomen and pelvis after the administration of intravenous contrast. Coronal and sagittal reconstructions were obtained. All CT scans at this facility use dose modulation, iterative reconstruction, and/or weight-based dosing when appropriate to reduce radiation dose to as low as reasonably achievable. FINDINGS: There are areas of atelectasis or infiltrate in the right and left lower lobes. .   The base of the heart is within appropriate limits. There is mild diffuse fatty replacement in the liver. The spleen is normal. There is a 12 mm a left adrenal nodule. The right adrenal gland and pancreas are normal. There is gallbladder wall thickening. There is dilatation of the common bile duct which measures 11 mm in diameter. There is dilatation of the common hepatic duct which measures 12 mm in diameter. There is no hydronephrosis or stones of either kidney. No renal masses are noted. There is a hiatal hernia. There are dilated fluid-filled small bowel loops, these appear more prominent than on previous study dated 24th of November 2021. The IVC and aorta are of normal caliber. There is no adenopathy. The urinary bladder is normal. There is a small amount of  pelvic free fluid is no since previous study. The patient is status post right hemicolectomy. There are fluid-filled large bowel loops with mucosal thickening suspicious for inflammatory process. This appears worse than on previous study. There is no definite evidence of typhilitis. There is no adenopathy. No suspicious osseous lesions are identified. 1. Status post right-sided colectomy. 2. Dilated small and large bowel loops, worse than on previous study dated 24th of November 2021. There is mucosal thickening.  These findings could represents an inflammatory process versus obstruction. 3. Small amount of free fluid within the pelvis, new since previous study. 4. There is gallbladder wall thickening. There is dilatation of the common hepatic and common bile ducts more prominent than on previous study dated 24th of November 2021 5. Small left adrenal nodule, unchanged. 6. Hiatal hernia. 7. Areas of atelectasis or infiltrate in the right and left lower lobes. . **This report has been created using voice recognition software. It may contain minor errors which are inherent in voice recognition technology. ** Final report electronically signed by DR Mary Ann Santana on 11/28/2021 1:28 PM    CT ABDOMEN PELVIS W IV CONTRAST Additional Contrast? None    Result Date: 11/25/2021  PROCEDURE: CT ABDOMEN PELVIS W IV CONTRAST CLINICAL INFORMATION: colon CA patient, diffuse abdominal pain, on chemo . COMPARISON: CT 10/21/2021. TECHNIQUE: 5 mm axial CT images were obtained through the abdomen and pelvis after the administration of intravenous and oral contrast. Coronal and sagittal reconstructions were obtained. All CT scans at this facility use dose modulation, iterative reconstruction, and/or weight-based dosing when appropriate to reduce radiation dose to as low as reasonably achievable. FINDINGS: The lung bases are clear. There is no pleural effusion or pneumothorax. The base of the heart is within acceptable limits. The gallbladder is slightly thickened, similar to the prior exam. There is mild intrahepatic biliary ductal dilatation. The spleen, pancreas and adrenal glands are within normal limits. The kidneys are symmetric in size, shape and degree of enhancement. There is no hydronephrosis. Postsurgical changes are seen involving the intestine in the mid abdomen. There is extensive wall thickening throughout the small and large bowel loops. There is no ascites. There is no free intraperitoneal air. The urinary bladder appears normal. The aorta and the IVC are normal in caliber. The bones are intact. 1. Diffuse wall thickening throughout segments of small and large bowel, findings consistent with diffuse enterocolitis. 2. Somewhat thickened appearance of the gallbladder wall which is unchanged since prior exam. **This report has been created using voice recognition software. It may contain minor errors which are inherent in voice recognition technology. ** Final report electronically signed by Dr Franny Rios on 11/25/2021 12:01 AM    US GALLBLADDER RUQ    Result Date: 11/29/2021  PROCEDURE: US GALLBLADDER RUQ CLINICAL INFORMATION: epigastric pain, gallbladder wall thickening, CBD dilation TECHNIQUE: Multiple sonographic images of the upper abdomen were obtained with specific attention given to the gallbladder. A few images of the liver, pancreas, and right kidney were also obtained. FINDINGS: Liver - L= 18.7 cm Gallbladder - 11.0 x 2.0 x 2.4 cm Gallbladder Wall - 0.4 cm Common Duct - 1.3 cm Corea's Sign: negative Liver, pancreas, right kidney: Limited visualization of the pancreas due to overlying bowel gas. There are several dilated intrahepatic biliary radicles. Visualized portions of right kidney unremarkable. Common bile duct: Mildly dilated common duct. Gallbladder : Moderate gallbladder wall thickening. The wall is not edematous,. Moderate amount of sludge in the gallbladder and several small calcifications in the gallbladder wall. . No gallstones are seen. 1. Biliary sludge. Thickened gallbladder wall. 2. Markedly dilated common duct and dilated intrahepatic radicles, consistent with distal obstruction. 3. MRCP might be considered for further evaluation. **This report has been created using voice recognition software. It may contain minor errors which are inherent in voice recognition technology. ** Final report electronically signed by Dr. Chinedu Vasquez on 11/29/2021 7:34 PM    XR CHEST PORTABLE    Result Date: 11/24/2021  PROCEDURE: XR CHEST PORTABLE CLINICAL INFORMATION: 48-year-old male with fever. History of colon cancer. COMPARISON: Radiograph dated 11/12/2021. TECHNIQUE: AP upright view of the chest was obtained. FINDINGS: There is a right-sided subclavian vein access chest wall infusion port. The tip of the catheter is in the SVC. The cardiac silhouette and pulmonary vasculature are within normal limits. There is no significant pleural effusion or pneumothorax. Visualized portions of the upper abdomen are within normal limits. The osseous structures are intact. No acute fractures or suspicious osseous lesions. There is no acute intrathoracic process. **This report has been created using voice recognition software. It may contain minor errors which are inherent in voice recognition technology. ** Final report electronically signed by Dr Skyler Del Cid on 11/24/2021 9:51 PM    MRI ABDOMEN W WO CONTRAST MRCP    Result Date: 11/29/2021  MR MRCP with and without gadolinium Comparison: None Findings: No intrahepatic biliary ductal dilatation. Common bile duct dilated up to 1.4 cm with focal tapering near the ampulla. No intraductal filling defects. Pancreatic duct is not dilated. Distended gallbladder with intraluminal stones. No gallbladder wall thickening/edema. The liver enhances homogeneously. The spleen, pancreas, and adrenal glands are unremarkable. Both kidneys enhance symmetrically without hydronephrosis. Several partially visualized small bowel loops appear dilated. Trace abdominal ascites maybe reactive. No enlarged abdominal lymph nodes. Small bilateral pleural effusions with basilar atelectasis. No abnormal bone marrow signal.     1. Dilatation of the common bile duct at 1.4 cm without intraductal filling defect. Tapering in the distal common bile duct with underlying stricture or stenosis not excluded. No intrahepatic biliary ductal or pancreatic ductal dilatation. 2. Cholelithiasis without gallbladder wall thickening/edema. 3. Several small bowel loops appear dilated.  Partial small bowel obstruction not excluded. 4. Small bilateral pleural effusions and trace abdominal ascites likely reactive. This document has been electronically signed by: Alverto Morse MD on 11/29/2021 09:53 PM    Assessment/Recommendations    1. Colon Cancer - stage IIIA  Began adjuvant chemotherapy with Xeloda and oxaliplatin on 11/9/2021. He is due for cycle #2 of oxaliplatin today on 11/30/21. Hold chemotherapy during acute hospitalization. 2. Enterocolitis   noted on CT of the ab/pelvis on 11/24/21 with diffuse wall thickening throughout segments of small and large bowel consistent with diffuse enterocolitis. GI pathogen by PCR negative, C. Diff toxin negative. Received IV Zosyn in the ED 11/25. Treated with IV Cipro (11/25-11/29), IV Flagyl (11/25-11/27)  3. Ileus vs Partial SBO   Noted on CT of ab/pelvis on 11/28/21. General Surgery following, patient had NG tube placement on 11/29/21. Lomotil on hold. MRCP on 11/29/21 showed partial SBO not excluded. 4. Gallbladder Wall Thickening   Noted on imaging studies. MRCP showed dilatation of CBD at 1.4 cm without intraductal filling defect, tapering in the distal CBD with underlying stricture or stenosis not excluded. GI consulted. 5. Oral Mucositis   Secondary to chemotherapy. Continue magic mouthwash. Improving. 6. Hypokalemia   K 3.2, Mg 1.7 on 11/29. on replacement per protocol. Related to poor oral intake, GI losses. Check BMP today      Will continue to monitor hospital course. Case discussed with nurse and patient  Questions and concerns addressed.   Plan made in collaboration with Dr. Caren Barba    Electronically signed by   Nilda Gramajo PA-C on 11/30/2021 at 9:45 AM

## 2021-11-30 NOTE — PROGRESS NOTES
Still feels bloated. Abdomen is softly distended. Urine is still dark in nature. Urinalysis looks okay and creat is normal. Denies chest pain or SOB. Requesting an orange jello. Up ad star in room this morning. Trying to wean down off pain medications. CURRENT MEDICATIONS   Scheduled Meds:   megestrol  40 mg Oral Daily    ondansetron  4 mg Oral Once    [Held by provider] diphenoxylate-atropine  1 tablet Oral 4x Daily    sodium chloride flush  10 mL IntraVENous 2 times per day    enoxaparin  40 mg SubCUTAneous Daily    sucralfate  1 g Oral TID AC    omeprazole  20 mg Oral QAM    dicyclomine  20 mg Oral TID     Continuous Infusions:   dextrose 5% in lactated ringers 75 mL/hr at 21 0631    sodium chloride Stopped (21 0503)     PRN Meds:.ketorolac, magic (miracle) mouthwash, potassium chloride **OR** potassium alternative oral replacement **OR** potassium chloride, oxyCODONE, HYDROmorphone, [Held by provider] loperamide, sodium chloride flush, sodium chloride, ondansetron **OR** ondansetron, polyethylene glycol, promethazine, acetaminophen  OBJECTIVE   CURRENT VITALS:  height is 6' (1.829 m) and weight is 158 lb 12.8 oz (72 kg). His oral temperature is 98 °F (36.7 °C). His blood pressure is 117/78 and his pulse is 78. His respiration is 16 and oxygen saturation is 94%. Temperature Range (24h):Temp: 98 °F (36.7 °C) Temp  Av.2 °F (36.8 °C)  Min: 97.7 °F (36.5 °C)  Max: 98.5 °F (36.9 °C)  BP Range (98O): Systolic (02ZMM), QRW:062 , Min:113 , ZIK:091     Diastolic (78MTJ), IGL:29, Min:74, Max:79    Pulse Range (24h): Pulse  Av.2  Min: 56  Max: 80  Respiration Range (24h): Resp  Av  Min: 16  Max: 20  Current Pulse Ox (24h):  SpO2: 94 %  Pulse Ox Range (24h):  SpO2  Av.3 %  Min: 94 %  Max: 99 %  Oxygen Amount and Delivery:    Incentive Spirometry Tx:            GENERAL: alert, cooperative, no distress  SKIN: Skin color, texture, turgor normal. No rashes or lesions.   HEENT: Head is normocephalic, atraumatic. NECK: Supple, symmetrical, trachea midline  LUNGS: clear to ausculation, without wheezes, rales or rhonci but diminished in bases  HEART: normal rate and regular rhythm  ABDOMEN: soft, distended, epigastric/generalized tenderness, bowel sounds present but hypoactive, no peritoneal signs   NEUROLOGIC: There are no focalizing motor or sensory deficits. CN II-XII are grossly intact. EXTREMITIES: no cyanosis, no clubbing. 1+ to lower extremities. In: 4830.2 [I.V.:4431.7]  Out: 500 [Urine:450]  Date 11/30/21 0000 - 11/30/21 2359   Shift 3169-8819 6594-7403 0344-3973 24 Hour Total   INTAKE   I.V.(mL/kg/hr) 687.3   687. 3   Shift Total(mL/kg) 687. 3(9.5)   687. 3(9.5)   OUTPUT   Urine(mL/kg/hr) 450   450   Emesis/NG output 50   50   Shift Total(mL/kg) 500(6.9)   500(6.9)   Weight (kg) 72 72 72 72     LABS     Recent Labs     11/27/21  0953 11/29/21  0515 11/30/21  0600   WBC 8.2 8.6 7.9   HGB 13.6* 11.4* 11.1*   HCT 40.0* 34.2* 32.8*    304 254   * 131*  --    K 3.7 3.2*  --     100  --    CO2 19* 24  --    BUN 10 12  --    CREATININE 0.6 0.6  --    MG  --  1.7  --    CALCIUM 8.2* 7.6*  --       Recent Labs     11/29/21  1250   AST 18   ALT 17   BILITOT 0.4   BILIDIR <0.2   LIPASE 23.3     RADIOLOGY     MR MRCP with and without gadolinium       Comparison: None       Findings:   No intrahepatic biliary ductal dilatation. Common bile duct dilated up to 1.4 cm with focal tapering near the    ampulla. No intraductal filling defects. Pancreatic duct is not dilated.       Distended gallbladder with intraluminal stones. No gallbladder wall thickening/edema.       The liver enhances homogeneously. The spleen, pancreas, and adrenal glands are unremarkable. Both kidneys enhance symmetrically without hydronephrosis.       Several partially visualized small bowel loops appear dilated. Trace abdominal ascites maybe reactive.  No enlarged abdominal lymph nodes.       Small bilateral pleural effusions with basilar atelectasis. No abnormal bone marrow signal.           Impression   1. Dilatation of the common bile duct at 1.4 cm without intraductal    filling defect. Tapering in the distal common bile duct with underlying    stricture or stenosis not excluded. No intrahepatic biliary ductal or    pancreatic ductal dilatation. 2. Cholelithiasis without gallbladder wall thickening/edema. 3. Several small bowel loops appear dilated. Partial small bowel    obstruction not excluded. 4. Small bilateral pleural effusions and trace abdominal ascites likely    reactive.       This document has been electronically signed by: Jamila Estrella MD on    11/29/2021 09:53 PM     PROCEDURE: [address]       CLINICAL INFORMATION: epigastric pain, gallbladder wall thickening, CBD dilation       TECHNIQUE: Multiple sonographic images of the upper abdomen were obtained with specific attention given to the gallbladder. A few images of the liver, pancreas, and right kidney were also obtained.       FINDINGS:        Liver - L= 18.7 cm    Gallbladder - 11.0 x 2.0 x 2.4 cm    Gallbladder Wall - 0.4 cm    Common Duct - 1.3 cm    Corea's Sign: negative           Liver, pancreas, right kidney: Limited visualization of the pancreas due to overlying bowel gas. There are several dilated intrahepatic biliary radicles. Visualized portions of right kidney unremarkable.       Common bile duct: Mildly dilated common duct.       Gallbladder : Moderate gallbladder wall thickening. The wall is not edematous,. Moderate amount of sludge in the gallbladder and several small calcifications in the gallbladder wall. . No gallstones are seen.               Impression   1. Biliary sludge. Thickened gallbladder wall. 2. Markedly dilated common duct and dilated intrahepatic radicles, consistent with distal obstruction.     3.  MRCP might be considered for further evaluation.               **This report has been created using voice recognition software.  It may contain minor errors which are inherent in voice recognition technology. **       Final report electronically signed by Dr. Zee Chapman on 11/29/2021 7:34 PM     PROCEDURE: CT ABDOMEN PELVIS W IV CONTRAST       CLINICAL INFORMATION: Follow up possible partial SBO vs ileus, rule out typhlitis .       COMPARISON: CT scan of abdomen and pelvis dated 24th of November 2021.       TECHNIQUE: Axial 5 mm CT images were obtained through the abdomen and pelvis after the administration of intravenous contrast. Coronal and sagittal reconstructions were obtained.       All CT scans at this facility use dose modulation, iterative reconstruction, and/or weight-based dosing when appropriate to reduce radiation dose to as low as reasonably achievable.       FINDINGS:           There are areas of atelectasis or infiltrate in the right and left lower lobes. .   The base of the heart is within appropriate limits.       There is mild diffuse fatty replacement in the liver. The spleen is normal. There is a 12 mm a left adrenal nodule. The right adrenal gland and pancreas are normal. There is gallbladder wall thickening. There is dilatation of the common bile duct which    measures 11 mm in diameter. There is dilatation of the common hepatic duct which measures 12 mm in diameter.    There is no hydronephrosis or stones of either kidney. No renal masses are noted.           There is a hiatal hernia.       There are dilated fluid-filled small bowel loops, these appear more prominent than on previous study dated 24th of November 2021.  The IVC and aorta are of normal caliber. There is no adenopathy.       The urinary bladder is normal. There is a small amount of  pelvic free fluid is no since previous study.  The patient is status post right hemicolectomy. There are fluid-filled large bowel loops with mucosal thickening suspicious for inflammatory    process.  This appears worse than on previous study. There is no definite evidence of typhilitis. There is no adenopathy. No suspicious osseous lesions are identified.                   Impression       1. Status post right-sided colectomy. 2. Dilated small and large bowel loops, worse than on previous study dated 24th of November 2021. There is mucosal thickening. These findings could represents an inflammatory process versus obstruction. 3. Small amount of free fluid within the pelvis, new since previous study. 4. There is gallbladder wall thickening. There is dilatation of the common hepatic and common bile ducts more prominent than on previous study dated 24th of November 2021    5. Small left adrenal nodule, unchanged. 6. Hiatal hernia. 7. Areas of atelectasis or infiltrate in the right and left lower lobes. .                   **This report has been created using voice recognition software. It may contain minor errors which are inherent in voice recognition technology. **       Final report electronically signed by DR Vitor Ivory on 11/28/2021 1:28 PM     Electronically signed by LUCY Marroquin CNP on 11/30/2021 at 7:34 AM     Above discussed and I agree with Robby Childress CNP. See my additional comments below for updated orders and plan. Labs, cultures, and radiographs where available were reviewed. I discussed patient concerns with Rene Pavon and instructions were given. Please see our orders for the updated patient care plan. -Ultrasound and MRI reviewed. GI consultation. No acute surgical intervention today. Bowel function improving. Clamping NG tube and trialing clears. Full liquids later today if doing better. DVT prophylaxis. Oncology following. Holding on chemotherapy.     Electronically signed by Sushma Mg MD on 11/30/21 at 1:06 PM EST

## 2021-11-30 NOTE — CONSULTS
Consult History & Physical      Patient:  Elly Narayanan  YOB: 1970  MRN: [id number]     Acct: [de-identified]    Chief Complaint:    Chief Complaint   Patient presents with    Fever    Nausea    Diarrhea       Date of Service: Pt seen/examined in consultation on 11/30/2021    History Of Present Illness:      45 y.o. male who we are asked to see/evaluate by LAUREEN Mayfield for medical management of common bile duct dilatation. He came to the ED 11/24/21 for abdominal pain, nausea, vomiting, diarrhea, subjective fever. COVID, influenza A & B negative. Stool studies negative. CT A/P demonstrated diffuse wall thickening throughout segments of small & large bowel consistent with diffuse enterocolitis, somewhat thickened appearance of the gallbladder. He was started on ATBs. General surgery consulted for persistent abdominal pain. Repeat CT A/P demonstrated dilated small & large bowel loops worse than previous study, inflammatory vs obstruction, small amount of free fluid within the pelvis, dilatation of the common hepatic & common bile ducts. NGT was inserted & general surgery is treating for bowel obstruction. He has a history of stage III colon CA s/p right hemicolectomy 09/16/21. He is receiving chemotherapy. US RUQ demonstrated biliary sludge, thickened gallbladder wall, markedly dilated common duct & dilated intrahepatic radicles. MRCP demonstrated dilatation of the common bile duct at 1.4 cm without intraductal filling defect, tapering in the distal common bile duct with underlying stricture or stenosis cannot be excluded, no intrahepatic biliary ductal or pancreatic ductal dilatation, cholelithiasis. LFTs within normal limits. He denies nausea and vomiting. Denies passing flatus. He has generalized abdominal discomfort. Last EGD 09/30/21 demonstrated small hiatal hernia, copious bile in the stomach, antritis, & normal duodenum. Pathology negative.  Last colonoscopy 09/01/21 demonstrated mass consistent with colon cancer in the proximal transverse colon, sigmoid colon polyp, 2 rectal polyps, ascending colon diverticulosis, normal appearing terminal ileum. Pathology demonstrated invasive moderately differentiated adenocarcinoma and tubular adenomas. Past Medical History:    Past Medical History:   Diagnosis Date    Anemia     Colon cancer ([facility] [address] 09/2021       Home Medications:  Prior to Admission medications    Medication Sig Start Date End Date Taking? Authorizing Provider   loperamide (IMODIUM) 2 MG capsule Take 2 mg by mouth 4 times daily as needed for Diarrhea   Yes Historical Provider, MD   ondansetron (ZOFRAN) 4 MG tablet Take 1 tablet by mouth every 6 hours as needed for Nausea or Vomiting 11/8/21  Yes Dave Costa MD   ketorolac (TORADOL) 10 MG tablet Take 1 tablet by mouth every 8 hours as needed for Pain 11/8/21  Yes Dave Costa MD   sucralfate (CARAFATE) 1 GM tablet take 1 tablet by mouth twice a day FIVE TO 60 MINS BEFORE LUNCH AND DINNER 9/30/21  Yes Historical Provider, MD   ondansetron (ZOFRAN-ODT) 4 MG disintegrating tablet  9/20/21  Yes Historical Provider, MD   capecitabine (XELODA) 500 MG chemo tablet Take 4 tablets by mouth 2 times daily 11/1/21  Yes Meri Price MD   omeprazole (PRILOSEC OTC) 20 MG tablet Take 20 mg by mouth daily   Yes Historical Provider, MD   diphenoxylate-atropine (DIPHENATOL) 2.5-0.025 MG per tablet Take 1 tablet by mouth 4 times daily for 10 days.  11/22/21 12/2/21  Meri Price MD   prochlorperazine (COMPAZINE) 5 MG tablet Take 1 tablet by mouth every 12 hours for 14 days Take 30 minutes before morning and afternoon dose of Xeloda 11/9/21 11/23/21  Meri Price MD   ibuprofen (ADVIL;MOTRIN) 200 MG tablet Take 200 mg by mouth every 6 hours as needed for Pain    Historical Provider, MD   ferrous sulfate (IRON 325) 325 (65 Fe) MG tablet Take 1 tablet by mouth daily (with breakfast) 9/23/21   Daisha Jarrett MD       Surgical History:  Past Surgical History:   Procedure Laterality Date    COLONOSCOPY  09/01/2021    Dr. Kelsy Young Right 9/16/2021    ROBOT EXTENDED RIGHT COLECTOMY performed by Luna Murillo MD at [address] Left 2020    [facility] with mesh inguinal    PORT SURGERY N/A 11/8/2021    SINGLE LUMEN SMART PORT INSERTION performed by Luna Murillo MD at [address] History:  Family History   Problem Relation Age of Onset    Other Mother         Brain aneurysm    COPD Mother     Stroke Mother     Cancer Father         lung mets brain and bone    Stroke Father     Cancer Maternal Grandmother         colon    High Blood Pressure Maternal Grandmother     Cancer Maternal Grandfather         colon     High Blood Pressure Maternal Grandfather     Kidney Disease Paternal Grandmother     Cancer Paternal Grandmother         breast     Diabetes Paternal Grandmother     High Blood Pressure Paternal Grandmother     Cancer Paternal Grandfather         lung-52    Heart Attack Paternal Grandfather     High Blood Pressure Paternal Grandfather     Cancer Paternal Aunt         colon    Heart Attack Paternal Aunt     Cancer Paternal Uncle         lung    Cancer Maternal Aunt         colon    Cancer Maternal Uncle         colon       Past GI History:  Hiatal hernia, colon polyps, colon CA s/p right hemicolectomy, GERD, EGD, colonoscopy  Dr. Dorys Sampson patient    Allergies:  Patient has no known allergies. Social History:   TOBACCO:   reports that he quit smoking about 22 months ago. His smoking use included cigarettes. He has a 35.00 pack-year smoking history. He has never used smokeless tobacco.  ETOH:   reports previous alcohol use. Review Of Systems  GENERAL: No fever, chills or weight loss. EYES:  No  blurred vision, double vision   CARDIOVASCULAR: No chest pain or palpitations. RESPIRATORY:  No dyspnea or cough.     GI:  See HPI  MUSCULOSKELETAL: No new painful or swollen joints or myalgias. :   No dysuria or hematuria. SKIN:  No rashes or jaundice. NEUROLOGIC:  No headaches or seizures, numbness or tingling of arms, or legs. PSYCH:  No anxiety or depression. ENDOCRINE:  No polyuria or polydipsia. BLOOD:  +anemia    PHYSICAL EXAM:  /78   Pulse 82   Temp 98.2 °F (36.8 °C) (Oral)   Resp 16   Ht 6' (1.829 m)   Wt 158 lb 12.8 oz (72 kg)   SpO2 97%   BMI 21.54 kg/m²     General appearance: Acute on chronically ill appearing male. HEENT: Normal cephalic, atraumatic without obvious deformity. Pupils equal, round, and reactive to light. NGT in place. Neck: Supple, with full range of motion. No jugular venous distention. Trachea midline. Respiratory:  Normal respiratory effort. Clear, diminished in the bases bilaterally to auscultation, bilaterally without Rales/Wheezes/Rhonchi. Cardiovascular: Regular rate and rhythm without murmurs, rubs or gallops. Abdomen: Soft, tender throughout with palpation, distended with hypoactive bowel sounds. Musculoskeletal: No clubbing, cyanosis bilaterally. BLE edema. Skin: Pink, warm, dry. No rashes or lesions. Psychiatric: Alert and oriented, thought content appropriate, normal insight    Labs:   Recent Labs     11/30/21  0600   WBC 7.9   HGB 11.1*   HCT 32.8*        Recent Labs     11/29/21  0515   *   K 3.2*      CO2 24   BUN 12   CREATININE 0.6   CALCIUM 7.6*     Recent Labs     11/29/21  1250   AST 18   ALT 17   BILIDIR <0.2   BILITOT 0.4   ALKPHOS 173*     Radiology:   CT abdomen/pelvis 11/28/21  Impression       1. Status post right-sided colectomy. 2. Dilated small and large bowel loops, worse than on previous study dated 24th of November 2021. There is mucosal thickening. These findings could represents an inflammatory process versus obstruction. 3. Small amount of free fluid within the pelvis, new since previous study.    4. There is gallbladder wall thickening. There is dilatation of the common hepatic and common bile ducts more prominent than on previous study dated 24th of November 2021    5. Small left adrenal nodule, unchanged. 6. Hiatal hernia. 7. Areas of atelectasis or infiltrate in the right and left lower lobes. US RUQ 11/29/21      Impression   1. Biliary sludge. Thickened gallbladder wall. 2. Markedly dilated common duct and dilated intrahepatic radicles, consistent with distal obstruction.     3. MRCP might be considered for further evaluation. MRCP 11/29/21      Impression   1. Dilatation of the common bile duct at 1.4 cm without intraductal    filling defect. Tapering in the distal common bile duct with underlying    stricture or stenosis not excluded. No intrahepatic biliary ductal or    pancreatic ductal dilatation. 2. Cholelithiasis without gallbladder wall thickening/edema. 3. Several small bowel loops appear dilated. Partial small bowel    obstruction not excluded. 4. Small bilateral pleural effusions and trace abdominal ascites likely    reactive. Code Status: Full Code    ASSESSMENT:  1. Enterocolitis  2. Partial SBO vs ileus  3. Common bile duct dilatation without filling defect noted on MRCP- LFTs WNL  4. Cholelithiasis noted on imaging- LFTs WNL  5. Stage III colon CA s/p right hemicolectomy 09/2021 on chemotherapy- follows with Dr. Caren Barba  6. GERD  7. Chronic anemia- no GI bleeding noted  8. Hyponatremia  9. Hypokalemia  10. Hypoalbuminemia  11.  Elevated alk phos    PLAN:     Monitor H & H, transfuse prn   NPO   NGT to LIWS   Pain & nausea control per primary   Will need ERCP outpatient, does not need done emergently as no filling defect noted on MRCP & patient is asymptomatic   General surgery on board   Case discussed at length with Amarilys AYALA, general surgery   Supportive care per primary team   Will need a 5-6 week follow-up with Dr. Vivi Dakin signing off       Case reviewed and impression/plan reviewed in collaboration with Dr. Mi Horne  Electronically signed by LUCY Hollingsworth CNP on 11/30/2021 at 10:06 AM    [facility]  Thank you for the consultation.

## 2021-11-30 NOTE — CARE COORDINATION
Discharge Planning Update Note:   Presents to ER with complaints of fever, nausea and diarrhea. Hx of colon ca. And presently taking chemo. General surgery consult: Pt has known Colon CA. Pt is having diarrhea and abd distention  Oncology consult: Pt is known to Dr. Madi Ulloa. Colon CA. Active treatment. GI consult: Dilatation of the common bile duct at 1.4 cm without intraductal filing defect but cannot rule out underlying stricture or stenosis  Okay to remove NG tube later this afternoon if tolerating clear liquids well with no worsening abdominal pain/distention or nausea. Okay for full liquids for supper if doing well throughout the day with bowel function  Monitor labs and VS. Pain management. Ambulate with assistance. Too Chapman said he is planning to go home with wife at discharge.

## 2021-12-01 ENCOUNTER — APPOINTMENT (OUTPATIENT)
Dept: GENERAL RADIOLOGY | Age: 51
DRG: 394 | End: 2021-12-01
Payer: COMMERCIAL

## 2021-12-01 LAB
ANION GAP SERPL CALCULATED.3IONS-SCNC: 11 MEQ/L (ref 8–16)
BUN BLDV-MCNC: 9 MG/DL (ref 7–22)
CALCIUM SERPL-MCNC: 8.2 MG/DL (ref 8.5–10.5)
CHLORIDE BLD-SCNC: 96 MEQ/L (ref 98–111)
CO2: 27 MEQ/L (ref 23–33)
CREAT SERPL-MCNC: 0.7 MG/DL (ref 0.4–1.2)
ERYTHROCYTE [DISTWIDTH] IN BLOOD BY AUTOMATED COUNT: 20.2 % (ref 11.5–14.5)
ERYTHROCYTE [DISTWIDTH] IN BLOOD BY AUTOMATED COUNT: 54.6 FL (ref 35–45)
GFR SERPL CREATININE-BSD FRML MDRD: > 90 ML/MIN/1.73M2
GLUCOSE BLD-MCNC: 86 MG/DL (ref 70–108)
HCT VFR BLD CALC: 36.6 % (ref 42–52)
HEMOGLOBIN: 12.1 GM/DL (ref 14–18)
MCH RBC QN AUTO: 26.4 PG (ref 26–33)
MCHC RBC AUTO-ENTMCNC: 33.1 GM/DL (ref 32.2–35.5)
MCV RBC AUTO: 79.9 FL (ref 80–94)
PLATELET # BLD: 383 THOU/MM3 (ref 130–400)
PMV BLD AUTO: 8.5 FL (ref 9.4–12.4)
POTASSIUM SERPL-SCNC: 3.7 MEQ/L (ref 3.5–5.2)
RBC # BLD: 4.58 MILL/MM3 (ref 4.7–6.1)
SODIUM BLD-SCNC: 134 MEQ/L (ref 135–145)
WBC # BLD: 9.3 THOU/MM3 (ref 4.8–10.8)

## 2021-12-01 PROCEDURE — A4641 RADIOPHARM DX AGENT NOC: HCPCS | Performed by: NURSE PRACTITIONER

## 2021-12-01 PROCEDURE — APPSS60 APP SPLIT SHARED TIME 46-60 MINUTES: Performed by: NURSE PRACTITIONER

## 2021-12-01 PROCEDURE — 2500000003 HC RX 250 WO HCPCS: Performed by: NURSE PRACTITIONER

## 2021-12-01 PROCEDURE — 6360000002 HC RX W HCPCS: Performed by: NURSE PRACTITIONER

## 2021-12-01 PROCEDURE — 6370000000 HC RX 637 (ALT 250 FOR IP): Performed by: NURSE PRACTITIONER

## 2021-12-01 PROCEDURE — 85027 COMPLETE CBC AUTOMATED: CPT

## 2021-12-01 PROCEDURE — 6360000002 HC RX W HCPCS: Performed by: INTERNAL MEDICINE

## 2021-12-01 PROCEDURE — 6370000000 HC RX 637 (ALT 250 FOR IP): Performed by: PHYSICIAN ASSISTANT

## 2021-12-01 PROCEDURE — 74240 X-RAY XM UPR GI TRC 1CNTRST: CPT

## 2021-12-01 PROCEDURE — 99232 SBSQ HOSP IP/OBS MODERATE 35: CPT | Performed by: PHYSICIAN ASSISTANT

## 2021-12-01 PROCEDURE — 6360000004 HC RX CONTRAST MEDICATION: Performed by: NURSE PRACTITIONER

## 2021-12-01 PROCEDURE — 80048 BASIC METABOLIC PNL TOTAL CA: CPT

## 2021-12-01 PROCEDURE — 36415 COLL VENOUS BLD VENIPUNCTURE: CPT

## 2021-12-01 PROCEDURE — 1200000000 HC SEMI PRIVATE

## 2021-12-01 RX ORDER — METOCLOPRAMIDE 10 MG/1
10 TABLET ORAL 3 TIMES DAILY
Status: DISCONTINUED | OUTPATIENT
Start: 2021-12-01 | End: 2021-12-04

## 2021-12-01 RX ORDER — FUROSEMIDE 10 MG/ML
40 INJECTION INTRAMUSCULAR; INTRAVENOUS ONCE
Status: COMPLETED | OUTPATIENT
Start: 2021-12-01 | End: 2021-12-01

## 2021-12-01 RX ADMIN — KETOROLAC TROMETHAMINE 30 MG: 30 INJECTION, SOLUTION INTRAMUSCULAR; INTRAVENOUS at 11:14

## 2021-12-01 RX ADMIN — METOCLOPRAMIDE 10 MG: 10 TABLET ORAL at 20:52

## 2021-12-01 RX ADMIN — ANTACID/ANTIFLATULENT 1 EACH: 380; 550; 10; 10 GRANULE, EFFERVESCENT ORAL at 10:13

## 2021-12-01 RX ADMIN — HYDROMORPHONE HYDROCHLORIDE 0.5 MG: 1 INJECTION, SOLUTION INTRAMUSCULAR; INTRAVENOUS; SUBCUTANEOUS at 03:05

## 2021-12-01 RX ADMIN — OXYCODONE HYDROCHLORIDE 5 MG: 5 TABLET ORAL at 06:17

## 2021-12-01 RX ADMIN — BISACODYL 10 MG: 10 SUPPOSITORY RECTAL at 07:36

## 2021-12-01 RX ADMIN — OXYCODONE HYDROCHLORIDE 5 MG: 5 TABLET ORAL at 16:08

## 2021-12-01 RX ADMIN — KETOROLAC TROMETHAMINE 30 MG: 30 INJECTION, SOLUTION INTRAMUSCULAR; INTRAVENOUS at 04:51

## 2021-12-01 RX ADMIN — HYDROMORPHONE HYDROCHLORIDE 0.5 MG: 1 INJECTION, SOLUTION INTRAMUSCULAR; INTRAVENOUS; SUBCUTANEOUS at 13:21

## 2021-12-01 RX ADMIN — BARIUM SULFATE 100 ML: 0.6 SUSPENSION ORAL at 10:12

## 2021-12-01 RX ADMIN — OXYCODONE HYDROCHLORIDE 5 MG: 5 TABLET ORAL at 12:01

## 2021-12-01 RX ADMIN — FUROSEMIDE 40 MG: 10 INJECTION, SOLUTION INTRAMUSCULAR; INTRAVENOUS at 13:21

## 2021-12-01 RX ADMIN — BARIUM SULFATE 140 ML: 980 POWDER, FOR SUSPENSION ORAL at 10:13

## 2021-12-01 RX ADMIN — OXYCODONE HYDROCHLORIDE 5 MG: 5 TABLET ORAL at 20:55

## 2021-12-01 RX ADMIN — HYDROMORPHONE HYDROCHLORIDE 0.5 MG: 1 INJECTION, SOLUTION INTRAMUSCULAR; INTRAVENOUS; SUBCUTANEOUS at 08:32

## 2021-12-01 RX ADMIN — SUCRALFATE 1 G: 1 TABLET ORAL at 18:26

## 2021-12-01 RX ADMIN — ENOXAPARIN SODIUM 40 MG: 100 INJECTION SUBCUTANEOUS at 07:36

## 2021-12-01 RX ADMIN — OMEPRAZOLE 20 MG: 20 CAPSULE, DELAYED RELEASE ORAL at 18:26

## 2021-12-01 RX ADMIN — OXYCODONE HYDROCHLORIDE 5 MG: 5 TABLET ORAL at 02:02

## 2021-12-01 ASSESSMENT — PAIN DESCRIPTION - PROGRESSION
CLINICAL_PROGRESSION: NOT CHANGED

## 2021-12-01 ASSESSMENT — PAIN SCALES - GENERAL
PAINLEVEL_OUTOF10: 9
PAINLEVEL_OUTOF10: 6
PAINLEVEL_OUTOF10: 7
PAINLEVEL_OUTOF10: 10
PAINLEVEL_OUTOF10: 8
PAINLEVEL_OUTOF10: 7
PAINLEVEL_OUTOF10: 8
PAINLEVEL_OUTOF10: 5
PAINLEVEL_OUTOF10: 10
PAINLEVEL_OUTOF10: 10
PAINLEVEL_OUTOF10: 9
PAINLEVEL_OUTOF10: 9
PAINLEVEL_OUTOF10: 10
PAINLEVEL_OUTOF10: 10
PAINLEVEL_OUTOF10: 9
PAINLEVEL_OUTOF10: 7
PAINLEVEL_OUTOF10: 9

## 2021-12-01 ASSESSMENT — PAIN DESCRIPTION - DESCRIPTORS
DESCRIPTORS: ACHING;SHOOTING;SHARP
DESCRIPTORS: ACHING;SHARP;SHOOTING
DESCRIPTORS: ACHING;SHARP;SHOOTING

## 2021-12-01 ASSESSMENT — PAIN DESCRIPTION - PAIN TYPE
TYPE: ACUTE PAIN

## 2021-12-01 ASSESSMENT — PAIN - FUNCTIONAL ASSESSMENT
PAIN_FUNCTIONAL_ASSESSMENT: ACTIVITIES ARE NOT PREVENTED

## 2021-12-01 ASSESSMENT — PAIN DESCRIPTION - FREQUENCY
FREQUENCY: INTERMITTENT
FREQUENCY: INTERMITTENT
FREQUENCY: CONTINUOUS

## 2021-12-01 ASSESSMENT — PAIN DESCRIPTION - ORIENTATION
ORIENTATION: RIGHT;LEFT;LOWER;MID
ORIENTATION: RIGHT;LEFT;MID;LOWER
ORIENTATION: RIGHT;LEFT;MID;LOWER

## 2021-12-01 ASSESSMENT — PAIN DESCRIPTION - LOCATION
LOCATION: ABDOMEN

## 2021-12-01 ASSESSMENT — PAIN DESCRIPTION - ONSET
ONSET: ON-GOING

## 2021-12-01 NOTE — PROGRESS NOTES
Queenie Hernadez [facility]  Daily Progress Note    Pt Name: Robert Spring  Medical Record Number: [id number]  Date of Birth 1970   Today's Date: 12/1/2021    Hospital day # 6     ASSESSMENT   1. Enterocolitis  2. pSBO vs ileus  3. Stage III colon adenocarcinoma: currently undergoing chemotherapy  4. Dehydration  5. Hypokalemia - resolved  6. CBD dilatation and gallbladder thickening per imaging   has a past medical history of Anemia and Colon cancer ([facility] [address] PLAN   1. NG tube removed yesterday. Tolerated clears and minimal fulls. States overnight had worsening bloating and no bowel function. 2. Upper GI and SBFT scheduled for this morning  3. Continue to diuresis as tolerated  4. DVT prophylaxis with Lovenox and SCDs  5. Pain control - limiting narcotic use  6. Up as tolerated  7. Oncology and GI both saw. Nothing really from their standpoint and signed off.   8. Labs reviewed. BMP stable. 9. Patient frustrated and feels like he is making no progress. Seemed to be doing better yesterday and then overnight states things worsened. Abdomen is still distended but fairly benign. Will await SBFT results. No surgical intervention at this time. SUBJECTIVE   Chief complaint: Bloating and swollen all over    Patient sitting up in chair. Very frustrated. States he feels like we are not helping him here. At one point stated maybe he should go across town to [facility]. NG was removed yesterday and did well with liquids until late last night. Bowel function stopped and started having worsening abdominal pain and bloating. Nausea is okay. No emesis. He is urinating well and urine is clear now. Up ad star in room this morning. Feels the nurses are not bringing his medications on time and that is why his pain is out of control. Discussed limiting the use of narcotics if possible.   CURRENT MEDICATIONS   Scheduled Meds:   bisacodyl  10 mg Rectal Daily    magic (miracle) mouthwash  5 mL Swish & Spit 4x Daily    alteplase  1 mg IntraCATHeter Once    megestrol  40 mg Oral Daily    ondansetron  4 mg Oral Once    [Held by provider] diphenoxylate-atropine  1 tablet Oral 4x Daily    sodium chloride flush  10 mL IntraVENous 2 times per day    enoxaparin  40 mg SubCUTAneous Daily    sucralfate  1 g Oral TID AC    omeprazole  20 mg Oral QAM    dicyclomine  20 mg Oral TID     Continuous Infusions:   sodium chloride 20 mL/hr at 21 1657    sodium chloride Stopped (21 0503)     PRN Meds:.ketorolac, potassium chloride **OR** potassium alternative oral replacement **OR** potassium chloride, oxyCODONE, HYDROmorphone, [Held by provider] loperamide, sodium chloride flush, sodium chloride, ondansetron **OR** ondansetron, polyethylene glycol, promethazine, acetaminophen  OBJECTIVE   CURRENT VITALS:  height is 6' (1.829 m) and weight is 158 lb 12.8 oz (72 kg). His oral temperature is 98.5 °F (36.9 °C). His blood pressure is 112/82 and his pulse is 77. His respiration is 18 and oxygen saturation is 93%. Temperature Range (24h):Temp: 98.5 °F (36.9 °C) Temp  Av.3 °F (36.8 °C)  Min: 98 °F (36.7 °C)  Max: 98.5 °F (36.9 °C)  BP Range (56B): Systolic (53JXC), UCU:613 , Min:112 , IQA:129     Diastolic (63RBG), DWC:48, Min:76, Max:82    Pulse Range (24h): Pulse  Av.8  Min: 77  Max: 84  Respiration Range (24h): Resp  Av.6  Min: 16  Max: 18  Current Pulse Ox (24h):  SpO2: 93 %  Pulse Ox Range (24h):  SpO2  Av.4 %  Min: 93 %  Max: 97 %  Oxygen Amount and Delivery:    Incentive Spirometry Tx:            GENERAL: alert, cooperative, no distress, somewhat angry  SKIN: Skin color, texture, turgor normal. No rashes or lesions. HEENT: Head is normocephalic, atraumatic.   NECK: Supple, symmetrical, trachea midline  LUNGS: clear to ausculation, without wheezes, rales or rhonci but diminished in bases  HEART: normal rate and regular rhythm  ABDOMEN: semi firm today with distention, generalized tenderness, bowel sounds present but hypoactive, no peritoneal signs   NEUROLOGIC: There are no focalizing motor or sensory deficits. CN II-XII are grossly intact. EXTREMITIES: no cyanosis, no clubbing. 1+ to lower extremities. No intake/output data recorded. LABS     Recent Labs     11/29/21  0515 11/30/21  0600 12/01/21  0600   WBC 8.6 7.9 9.3   HGB 11.4* 11.1* 12.1*   HCT 34.2* 32.8* 36.6*    254 383   * 136 134*   K 3.2* 3.4* 3.7    101 96*   CO2 24 26 27   BUN 12 9 9   CREATININE 0.6 0.6 0.7   MG 1.7 1.7  --    CALCIUM 7.6* 7.8* 8.2*      Recent Labs     11/29/21  1250   AST 18   ALT 17   BILITOT 0.4   BILIDIR <0.2   LIPASE 23.3     RADIOLOGY   SBFT in process    MR MRCP with and without gadolinium       Comparison: None       Findings:   No intrahepatic biliary ductal dilatation. Common bile duct dilated up to 1.4 cm with focal tapering near the    ampulla. No intraductal filling defects. Pancreatic duct is not dilated.       Distended gallbladder with intraluminal stones. No gallbladder wall thickening/edema.       The liver enhances homogeneously. The spleen, pancreas, and adrenal glands are unremarkable. Both kidneys enhance symmetrically without hydronephrosis.       Several partially visualized small bowel loops appear dilated. Trace abdominal ascites maybe reactive. No enlarged abdominal lymph nodes.       Small bilateral pleural effusions with basilar atelectasis. No abnormal bone marrow signal.           Impression   1. Dilatation of the common bile duct at 1.4 cm without intraductal    filling defect. Tapering in the distal common bile duct with underlying    stricture or stenosis not excluded. No intrahepatic biliary ductal or    pancreatic ductal dilatation. 2. Cholelithiasis without gallbladder wall thickening/edema. 3. Several small bowel loops appear dilated. Partial small bowel    obstruction not excluded.    4. Small bilateral pleural effusions and trace abdominal ascites likely    reactive.       This document has been electronically signed by: Jamar Cadena MD on    11/29/2021 09:53 PM     PROCEDURE: US GALLBLADDER RUQ       CLINICAL INFORMATION: epigastric pain, gallbladder wall thickening, CBD dilation       TECHNIQUE: Multiple sonographic images of the upper abdomen were obtained with specific attention given to the gallbladder. A few images of the liver, pancreas, and right kidney were also obtained.       FINDINGS:        Liver - L= 18.7 cm    Gallbladder - 11.0 x 2.0 x 2.4 cm    Gallbladder Wall - 0.4 cm    Common Duct - 1.3 cm    Corea's Sign: negative           Liver, pancreas, right kidney: Limited visualization of the pancreas due to overlying bowel gas. There are several dilated intrahepatic biliary radicles. Visualized portions of right kidney unremarkable.       Common bile duct: Mildly dilated common duct.       Gallbladder : Moderate gallbladder wall thickening. The wall is not edematous,. Moderate amount of sludge in the gallbladder and several small calcifications in the gallbladder wall. . No gallstones are seen.               Impression   1. Biliary sludge. Thickened gallbladder wall. 2. Markedly dilated common duct and dilated intrahepatic radicles, consistent with distal obstruction.     3. MRCP might be considered for further evaluation.               **This report has been created using voice recognition software.  It may contain minor errors which are inherent in voice recognition technology. **       Final report electronically signed by Dr. Dotty Padilla on 11/29/2021 7:34 PM     PROCEDURE: CT ABDOMEN PELVIS W IV CONTRAST       CLINICAL INFORMATION: Follow up possible partial SBO vs ileus, rule out typhlitis .       COMPARISON: CT scan of abdomen and pelvis dated 24th of November 2021.       TECHNIQUE: Axial 5 mm CT images were obtained through the abdomen and pelvis after the administration of intravenous contrast. Coronal and sagittal reconstructions were obtained.       All CT scans at this facility use dose modulation, iterative reconstruction, and/or weight-based dosing when appropriate to reduce radiation dose to as low as reasonably achievable.       FINDINGS:           There are areas of atelectasis or infiltrate in the right and left lower lobes. .   The base of the heart is within appropriate limits.       There is mild diffuse fatty replacement in the liver. The spleen is normal. There is a 12 mm a left adrenal nodule. The right adrenal gland and pancreas are normal. There is gallbladder wall thickening. There is dilatation of the common bile duct which    measures 11 mm in diameter. There is dilatation of the common hepatic duct which measures 12 mm in diameter.    There is no hydronephrosis or stones of either kidney. No renal masses are noted.           There is a hiatal hernia.       There are dilated fluid-filled small bowel loops, these appear more prominent than on previous study dated 24th of November 2021.  The IVC and aorta are of normal caliber. There is no adenopathy.       The urinary bladder is normal. There is a small amount of  pelvic free fluid is no since previous study.  The patient is status post right hemicolectomy. There are fluid-filled large bowel loops with mucosal thickening suspicious for inflammatory    process. This appears worse than on previous study. There is no definite evidence of typhilitis. There is no adenopathy. No suspicious osseous lesions are identified.                   Impression       1. Status post right-sided colectomy. 2. Dilated small and large bowel loops, worse than on previous study dated 24th of November 2021. There is mucosal thickening. These findings could represents an inflammatory process versus obstruction. 3. Small amount of free fluid within the pelvis, new since previous study. 4. There is gallbladder wall thickening.  There is dilatation of the common hepatic and common bile ducts more prominent than on previous study dated 24th of November 2021    5. Small left adrenal nodule, unchanged. 6. Hiatal hernia. 7. Areas of atelectasis or infiltrate in the right and left lower lobes. .                   **This report has been created using voice recognition software. It may contain minor errors which are inherent in voice recognition technology. **       Final report electronically signed by DR Tera Franks on 11/28/2021 1:28 PM     Electronically signed by LUCY Parks CNP on 12/1/2021 at 3:15 PM     Above discussed and I agree with Susan Kwong CNP. See my additional comments below for updated orders and plan. Labs, cultures, and radiographs where available were reviewed. I discussed patient concerns with Clarita Gee and instructions were given. Please see our orders for the updated patient care plan. -Patient still having some intermittent nausea along with bloating and lack of bowel function. Upper GI and small bowel follow-through completed and shows slow transit but no obvious obstruction. Small sliding hiatal hernia not causing the problem. No surgical intervention from my standpoint. Oncology and GI services following but unfortunately have also signed off as not having a lot more to add inpatient wise. Pain control. Limiting narcotic use. DVT prophylaxis. Diuresis as tolerated per admitting. Slow progress and patient frustrated but not a lot to offer from surgery at this time.     Electronically signed by Samantha Cruz MD on 12/1/21 at 3:59 PM EST

## 2021-12-01 NOTE — CARE COORDINATION
Discharge Planning Update:     Treating N/Diarrhea. Enterocolitis. Also, ileus vs SBO. Hx Colon Ca and taking chemo. NG pulled last adis. Some edema noted. Started clear liquids then Fulls and back to Clears. GI consulted yesterday. (Dilated common bile duct). Will need ERCP (as OP) as pt asymptomatic. Oncology is following managing chemo. Megace initiated for malnutrition. SBFT today. From home with spouse. OP Ca tx.

## 2021-12-01 NOTE — PROGRESS NOTES
Hospitalist Progress Note      Patient:  Alcon Enter    Unit/Bed:6E-55/055-A  YOB: 1970  MRN: [id number]   Acct: [de-identified]   PCP: Sue Barrientos MD  Date of Admission: 11/24/2021    Assessment/Plan:    1. Enterocolitis, acute: Possible chemotherapy induced. IV Cipro & Flagyl stopped. 2. SBO vs ileus: Improving. General Surgery consulted. KUB showed possible SBO vs ileus. CT A/P this AM showed worsening since previous. NPO to CLD; pt tolerating it well. NGT removed yesterday. UGI follow through today, pending results. 3. Possible fluid overload: Pt states that his ankles are swollen. IVF stopped and lasix given again today. 4. Hyponatremia: Sodium low but stable- 129, 130, 128, 131, 136, 134. IVF started. BMP in the AM.   5. Hypokalemia: Potassium 3.1, 3.7, 3.2, 3.4, 3.7. Potassium Replacement Protocol. 6. Anemia, chronic: Hgb 13.6, drop to 11.4, 11.1, 12.1  7. Transverse colon adenocarcinoma: s/p colectomy. Pt follows with Dr. Rossy La. Pt is currently receiving infusions at the [facility]. Oncology consulted. 8. Malnutrition: Dietician consulted. Megace also started. Case discussed with General Surgery NP. Chief Complaint: Abdominal Pain     Initial H and P:-    Initial H&P \"47year-old male patient recently diagnosed with colon cancer September 2021 status post surgery IV chemotherapy and Xeloda.     Presented to the ED today with a 1 week history of intractable periumbilical abdominal pain of 9/10 intensity.   Associated nausea vomiting diarrhea and fever last few days.     Patient now has poor oral intake and generalized body weakness     Vital signs on admission temperature 36.9 °C, respiratory 18, heart rate 89, blood pressure 147/98, oxygen saturation 98% room air.     Labs on admission sodium 131, potassium 3.5, CO2 19, , creatinine 1.0, blood sugar 138, magnesium 1.8, calcium 8.6, total protein 6.0, procalcitonin 397, troponin first set less than 0.010, albumin 3.1, alkaline phosphatase 71, ALT 38, AST is 12, total bilirubin 0.6, lipase 14.2, total protein 6.0.     WBC 7.2, hemoglobin 15.8, MCV 78.9, platelets 032.     Influenza A, B and COVID-19 negative.     Stool studies for GI PCR panel was negative.     ED treatment included IV Zosyn for suspected problem infection and IV fluids. \"     11/26: Pt is frustrated with insurance company today and was on the phone during evaluation and stated that he could not stop the call. Pt states that the pain is manageable at this time. Pt is also concerned about his lack of appetite. 11/27: No acute events overnight. Pt states that he believes he is bloating. Pt states that the pain is more controlled. No other issues or concerns at this time. 11/28: No acute events overnight, Pt has not had any vomiting episodes. Pt has walked around the unit multiple times. Pt is very concerned about dark colored urine. Diet advanced to CLD. 11/29: No acute events overnight. Pt tried to walk to meeting last night but had too much pain and had to use wheelchair. General Surgery doing test for other abdominal issues. Pt states that pain is relatively controlled. 11/30: No acute events overnight. Patient had large BM this morning. Patient states the pain is relatively controlled. Patient walking in the halls. Patient states that he is feeling better. Patient is hopeful for NG tube removal today. Subjective (past 24 hours):   Patient states the pain is relatively controlled today. Patient had upper GI follow-through today. Patient is frustrated with how long he has been in the hospital.  Patient is up walking around and independent with all of his activities. Past medical history, family history, social history and allergies reviewed again and is unchanged since admission. ROS (All review of systems completed. Pertinent positives noted.  Otherwise All other systems reviewed and negative.)     Medications:  Reviewed    Infusion Medications    sodium chloride 20 mL/hr at 11/30/21 1657    sodium chloride Stopped (11/29/21 0503)     Scheduled Medications    bisacodyl  10 mg Rectal Daily    magic (miracle) mouthwash  5 mL Swish & Spit 4x Daily    alteplase  1 mg IntraCATHeter Once    megestrol  40 mg Oral Daily    ondansetron  4 mg Oral Once    [Held by provider] diphenoxylate-atropine  1 tablet Oral 4x Daily    sodium chloride flush  10 mL IntraVENous 2 times per day    enoxaparin  40 mg SubCUTAneous Daily    sucralfate  1 g Oral TID AC    omeprazole  20 mg Oral QAM    dicyclomine  20 mg Oral TID     PRN Meds: ketorolac, potassium chloride **OR** potassium alternative oral replacement **OR** potassium chloride, oxyCODONE, HYDROmorphone, [Held by provider] loperamide, sodium chloride flush, sodium chloride, ondansetron **OR** ondansetron, polyethylene glycol, promethazine, acetaminophen    No intake or output data in the 24 hours ending 12/01/21 1443    Diet:  ADULT ORAL NUTRITION SUPPLEMENT; Lunch; Clear Liquid Oral Supplement  ADULT DIET; Clear Liquid    Exam:  /82   Pulse 77   Temp 98.5 °F (36.9 °C) (Oral)   Resp 18   Ht 6' (1.829 m)   Wt 158 lb 12.8 oz (72 kg)   SpO2 93%   BMI 21.54 kg/m²   General appearance: No apparent distress, appears stated age and cooperative. HEENT: Pupils equal, round, and reactive to light. Conjunctivae/corneas clear. NG tube in place. Neck: Supple, with full range of motion. No jugular venous distention. Trachea midline. Respiratory:  Normal respiratory effort on RA. Clear to auscultation, bilaterally without Rales/Wheezes/Rhonchi. Cardiovascular: Regular rate and rhythm with normal S1/S2 without murmurs, rubs or gallops. Abdomen: Soft, mildly tender, mildly distended with bowel sounds  Musculoskeletal: passive and active ROM x 4 extremities. Skin: Skin color, texture, turgor normal.  No rashes or lesions. [facility]. Neurologic:  Neurovascularly intact without any focal sensory/motor deficits. Cranial nerves: II-XII intact, grossly non-focal.  Psychiatric: Alert and oriented, thought content appropriate, normal insight  Capillary Refill: Brisk,< 3 seconds   Peripheral Pulses: +2 palpable, equal bilaterally     Labs:   Recent Labs     11/29/21  0515 11/30/21  0600 12/01/21  0600   WBC 8.6 7.9 9.3   HGB 11.4* 11.1* 12.1*   HCT 34.2* 32.8* 36.6*    254 383     Recent Labs     11/29/21  0515 11/30/21  0600 12/01/21  0600   * 136 134*   K 3.2* 3.4* 3.7    101 96*   CO2 24 26 27   BUN 12 9 9   CREATININE 0.6 0.6 0.7   CALCIUM 7.6* 7.8* 8.2*     Recent Labs     11/29/21  1250   AST 18   ALT 17   BILIDIR <0.2   BILITOT 0.4   ALKPHOS 173*     No results for input(s): INR in the last 72 hours. No results for input(s): Patrisha Meigs in the last 72 hours. Microbiology:    Blood culture #1:   Lab Results   Component Value Date    BC No growth-preliminary No growth  11/24/2021     Urinalysis:      Lab Results   Component Value Date    NITRU NEGATIVE 11/28/2021    WBCUA 0-2 11/28/2021    BACTERIA NONE SEEN 11/28/2021    RBCUA 0-2 11/28/2021    BLOODU NEGATIVE 11/28/2021    SPECGRAV >1.030 11/28/2021    GLUCOSEU NEGATIVE 11/26/2021       Radiology:  MRI ABDOMEN W WO CONTRAST MRCP   Final Result   1. Dilatation of the common bile duct at 1.4 cm without intraductal    filling defect. Tapering in the distal common bile duct with underlying    stricture or stenosis not excluded. No intrahepatic biliary ductal or    pancreatic ductal dilatation. 2. Cholelithiasis without gallbladder wall thickening/edema. 3. Several small bowel loops appear dilated. Partial small bowel    obstruction not excluded. 4. Small bilateral pleural effusions and trace abdominal ascites likely    reactive.       This document has been electronically signed by: Katlyn Cuevas MD on    11/29/2021 09:53 PM      [address] [employer] Final Result   1. Biliary sludge. Thickened gallbladder wall. 2. Markedly dilated common duct and dilated intrahepatic radicles, consistent with distal obstruction. 3. MRCP might be considered for further evaluation. **This report has been created using voice recognition software. It may contain minor errors which are inherent in voice recognition technology. **      Final report electronically signed by Dr. Morelia Stockton on 11/29/2021 7:34 PM      CT ABDOMEN PELVIS W IV CONTRAST Additional Contrast? Radiologist Recommendation   Final Result       1. Status post right-sided colectomy. 2. Dilated small and large bowel loops, worse than on previous study dated 24th of November 2021. There is mucosal thickening. These findings could represents an inflammatory process versus obstruction. 3. Small amount of free fluid within the pelvis, new since previous study. 4. There is gallbladder wall thickening. There is dilatation of the common hepatic and common bile ducts more prominent than on previous study dated 24th of November 2021    5. Small left adrenal nodule, unchanged. 6. Hiatal hernia. 7. Areas of atelectasis or infiltrate in the right and left lower lobes. .               **This report has been created using voice recognition software. It may contain minor errors which are inherent in voice recognition technology. **      Final report electronically signed by DR Allan Little on 11/28/2021 1:28 PM      XR ABDOMEN (KUB) (SINGLE AP VIEW)   Final Result   1. Distended gas-filled small bowel loops are seen overlying the mid abdomen which are concerning for a possible partial small bowel obstruction versus ileus. **This report has been created using voice recognition software. It may contain minor errors which are inherent in voice recognition technology. **      Final report electronically signed by Dr. Corinne Alosa on 11/27/2021 4:38 PM      CT ABDOMEN PELVIS W IV CONTRAST Additional Contrast? None   Final Result      1. Diffuse wall thickening throughout segments of small and large bowel, findings consistent with diffuse enterocolitis. 2. Somewhat thickened appearance of the gallbladder wall which is unchanged since prior exam.               **This report has been created using voice recognition software. It may contain minor errors which are inherent in voice recognition technology. **      Final report electronically signed by Dr Deya Masters on 11/25/2021 12:01 AM      XR CHEST PORTABLE   Final Result   There is no acute intrathoracic process. **This report has been created using voice recognition software. It may contain minor errors which are inherent in voice recognition technology. **      Final report electronically signed by Dr Deya Masters on 11/24/2021 9:51 PM      FL UGI W SMALL BOWEL    (Results Pending)     Electronically signed by LAUREEN Amezcua on 12/1/2021 at 2:43 PM

## 2021-12-02 LAB
ANION GAP SERPL CALCULATED.3IONS-SCNC: 12 MEQ/L (ref 8–16)
BUN BLDV-MCNC: 11 MG/DL (ref 7–22)
CALCIUM SERPL-MCNC: 8 MG/DL (ref 8.5–10.5)
CHLORIDE BLD-SCNC: 97 MEQ/L (ref 98–111)
CO2: 28 MEQ/L (ref 23–33)
CREAT SERPL-MCNC: 0.7 MG/DL (ref 0.4–1.2)
GFR SERPL CREATININE-BSD FRML MDRD: > 90 ML/MIN/1.73M2
GLUCOSE BLD-MCNC: 100 MG/DL (ref 70–108)
POTASSIUM SERPL-SCNC: 3.3 MEQ/L (ref 3.5–5.2)
POTASSIUM SERPL-SCNC: 3.6 MEQ/L (ref 3.5–5.2)
SODIUM BLD-SCNC: 137 MEQ/L (ref 135–145)

## 2021-12-02 PROCEDURE — 84132 ASSAY OF SERUM POTASSIUM: CPT

## 2021-12-02 PROCEDURE — 99232 SBSQ HOSP IP/OBS MODERATE 35: CPT | Performed by: PHYSICIAN ASSISTANT

## 2021-12-02 PROCEDURE — 6360000002 HC RX W HCPCS: Performed by: NURSE PRACTITIONER

## 2021-12-02 PROCEDURE — 6370000000 HC RX 637 (ALT 250 FOR IP): Performed by: NURSE PRACTITIONER

## 2021-12-02 PROCEDURE — 80048 BASIC METABOLIC PNL TOTAL CA: CPT

## 2021-12-02 PROCEDURE — 1200000000 HC SEMI PRIVATE

## 2021-12-02 PROCEDURE — 6370000000 HC RX 637 (ALT 250 FOR IP): Performed by: PHYSICIAN ASSISTANT

## 2021-12-02 PROCEDURE — APPSS30 APP SPLIT SHARED TIME 16-30 MINUTES: Performed by: NURSE PRACTITIONER

## 2021-12-02 PROCEDURE — 2580000003 HC RX 258: Performed by: PHYSICIAN ASSISTANT

## 2021-12-02 PROCEDURE — 6370000000 HC RX 637 (ALT 250 FOR IP): Performed by: FAMILY MEDICINE

## 2021-12-02 PROCEDURE — 6360000002 HC RX W HCPCS: Performed by: INTERNAL MEDICINE

## 2021-12-02 RX ADMIN — ENOXAPARIN SODIUM 40 MG: 100 INJECTION SUBCUTANEOUS at 08:25

## 2021-12-02 RX ADMIN — METOCLOPRAMIDE 10 MG: 10 TABLET ORAL at 08:26

## 2021-12-02 RX ADMIN — HYDROMORPHONE HYDROCHLORIDE 0.5 MG: 1 INJECTION, SOLUTION INTRAMUSCULAR; INTRAVENOUS; SUBCUTANEOUS at 14:55

## 2021-12-02 RX ADMIN — SUCRALFATE 1 G: 1 TABLET ORAL at 11:21

## 2021-12-02 RX ADMIN — KETOROLAC TROMETHAMINE 30 MG: 30 INJECTION, SOLUTION INTRAMUSCULAR; INTRAVENOUS at 00:07

## 2021-12-02 RX ADMIN — POTASSIUM CHLORIDE 40 MEQ: 1500 TABLET, EXTENDED RELEASE ORAL at 07:36

## 2021-12-02 RX ADMIN — SUCRALFATE 1 G: 1 TABLET ORAL at 09:31

## 2021-12-02 RX ADMIN — METOCLOPRAMIDE 10 MG: 10 TABLET ORAL at 20:02

## 2021-12-02 RX ADMIN — OXYCODONE HYDROCHLORIDE 5 MG: 5 TABLET ORAL at 21:54

## 2021-12-02 RX ADMIN — SUCRALFATE 1 G: 1 TABLET ORAL at 17:42

## 2021-12-02 RX ADMIN — DICYCLOMINE HYDROCHLORIDE 20 MG: 10 CAPSULE ORAL at 20:02

## 2021-12-02 RX ADMIN — OMEPRAZOLE 20 MG: 20 CAPSULE, DELAYED RELEASE ORAL at 05:47

## 2021-12-02 RX ADMIN — OXYCODONE HYDROCHLORIDE 5 MG: 5 TABLET ORAL at 05:39

## 2021-12-02 RX ADMIN — OXYCODONE HYDROCHLORIDE 5 MG: 5 TABLET ORAL at 01:23

## 2021-12-02 RX ADMIN — DICYCLOMINE HYDROCHLORIDE 20 MG: 10 CAPSULE ORAL at 08:26

## 2021-12-02 RX ADMIN — KETOROLAC TROMETHAMINE 30 MG: 30 INJECTION, SOLUTION INTRAMUSCULAR; INTRAVENOUS at 07:35

## 2021-12-02 RX ADMIN — OXYCODONE HYDROCHLORIDE 5 MG: 5 TABLET ORAL at 09:47

## 2021-12-02 RX ADMIN — METOCLOPRAMIDE 10 MG: 10 TABLET ORAL at 13:26

## 2021-12-02 RX ADMIN — MEGESTROL ACETATE 40 MG: 40 TABLET ORAL at 08:27

## 2021-12-02 RX ADMIN — OXYCODONE HYDROCHLORIDE 5 MG: 5 TABLET ORAL at 13:25

## 2021-12-02 RX ADMIN — KETOROLAC TROMETHAMINE 30 MG: 30 INJECTION, SOLUTION INTRAMUSCULAR; INTRAVENOUS at 19:57

## 2021-12-02 RX ADMIN — DICYCLOMINE HYDROCHLORIDE 20 MG: 10 CAPSULE ORAL at 13:26

## 2021-12-02 RX ADMIN — OXYCODONE HYDROCHLORIDE 5 MG: 5 TABLET ORAL at 17:41

## 2021-12-02 RX ADMIN — SODIUM CHLORIDE: 9 INJECTION, SOLUTION INTRAVENOUS at 01:45

## 2021-12-02 RX ADMIN — ONDANSETRON 4 MG: 2 INJECTION INTRAMUSCULAR; INTRAVENOUS at 08:32

## 2021-12-02 RX ADMIN — DICYCLOMINE HYDROCHLORIDE 20 MG: 10 CAPSULE ORAL at 00:07

## 2021-12-02 ASSESSMENT — PAIN SCALES - GENERAL
PAINLEVEL_OUTOF10: 7
PAINLEVEL_OUTOF10: 9
PAINLEVEL_OUTOF10: 5
PAINLEVEL_OUTOF10: 10
PAINLEVEL_OUTOF10: 7
PAINLEVEL_OUTOF10: 7
PAINLEVEL_OUTOF10: 8
PAINLEVEL_OUTOF10: 9
PAINLEVEL_OUTOF10: 8
PAINLEVEL_OUTOF10: 7
PAINLEVEL_OUTOF10: 8
PAINLEVEL_OUTOF10: 6
PAINLEVEL_OUTOF10: 7
PAINLEVEL_OUTOF10: 6
PAINLEVEL_OUTOF10: 7
PAINLEVEL_OUTOF10: 6
PAINLEVEL_OUTOF10: 10
PAINLEVEL_OUTOF10: 8

## 2021-12-02 ASSESSMENT — PAIN DESCRIPTION - PROGRESSION
CLINICAL_PROGRESSION: NOT CHANGED

## 2021-12-02 ASSESSMENT — PAIN DESCRIPTION - FREQUENCY: FREQUENCY: INTERMITTENT

## 2021-12-02 ASSESSMENT — PAIN DESCRIPTION - PAIN TYPE: TYPE: ACUTE PAIN

## 2021-12-02 ASSESSMENT — PAIN DESCRIPTION - ONSET: ONSET: ON-GOING

## 2021-12-02 ASSESSMENT — PAIN - FUNCTIONAL ASSESSMENT: PAIN_FUNCTIONAL_ASSESSMENT: ACTIVITIES ARE NOT PREVENTED

## 2021-12-02 ASSESSMENT — PAIN DESCRIPTION - DESCRIPTORS: DESCRIPTORS: ACHING

## 2021-12-02 ASSESSMENT — PAIN DESCRIPTION - LOCATION: LOCATION: ABDOMEN

## 2021-12-02 NOTE — PROGRESS NOTES
[facility]  Daily Progress Note    Pt Name: Zoraida Morgan  Medical Record Number: [id number]  Date of Birth 1970   Today's Date: 12/2/2021    Hospital day # 7     ASSESSMENT   1. Enterocolitis  2. pSBO vs ileus  3. Stage III colon adenocarcinoma: currently undergoing chemotherapy  4. Dehydration  5. Hypokalemia   6. CBD dilatation and gallbladder thickening per imaging   has a past medical history of Anemia and Colon cancer ([facility] [address] PLAN   1. Upper GI and SBFT completed yesterday. Demonstrates known small hiatal hernia but no evidence of bowel obstruction. He odes have delayed transit which is not unexpected with chemotherapy. Reglan started to see if that will help his motility. 2. Okay to advance diet up as he tolerates but would recommend taking it slow and sticking with liquids and softer diet even for the next couple of weeks until things improve. This may take some time due to ongoing chemotherapy. 3. Continue to diuresis as needed but will defer to medicine   4. DVT prophylaxis with Lovenox and SCDs  5. Pain control - limiting narcotic use  6. Up as tolerated  7. Oncology and GI both saw. Nothing really from their standpoint and signed off.   8. BMP reviewed. Replace K+ as needed. 9. At this time this is nothing really more from a surgical standpoint. Continue to increase diet slowly but no signs of obstruction on imaging. Will see as needed. SUBJECTIVE   Chief complaint: No new complaints this morning    Patient sitting up in chair. Patient okay this morning. Still wants more diuretics although blood pressure a little hypotensive but asymptomatic. Swelling is better. He had some bowel function and tolerated clears. Still bloated but a little better. No nausea. He is urinating well and urine is clear now. Up ad star in room this morning. Still taking his Oxy.    CURRENT MEDICATIONS   Scheduled Meds:   metoclopramide  10 mg Oral TID   Marii bisacodyl  10 mg Rectal Daily    magic (miracle) mouthwash  5 mL Swish & Spit 4x Daily    alteplase  1 mg IntraCATHeter Once    megestrol  40 mg Oral Daily    ondansetron  4 mg Oral Once    [Held by provider] diphenoxylate-atropine  1 tablet Oral 4x Daily    sodium chloride flush  10 mL IntraVENous 2 times per day    enoxaparin  40 mg SubCUTAneous Daily    sucralfate  1 g Oral TID AC    omeprazole  20 mg Oral QAM    dicyclomine  20 mg Oral TID     Continuous Infusions:   sodium chloride 20 mL/hr at 21 0707    sodium chloride Stopped (21 0503)     PRN Meds:.ketorolac, potassium chloride **OR** potassium alternative oral replacement **OR** potassium chloride, oxyCODONE, HYDROmorphone, [Held by provider] loperamide, sodium chloride flush, sodium chloride, ondansetron **OR** ondansetron, polyethylene glycol, promethazine, acetaminophen  OBJECTIVE   CURRENT VITALS:  height is 6' (1.829 m) and weight is 158 lb 12.8 oz (72 kg). His oral temperature is 98.3 °F (36.8 °C). His blood pressure is 100/67 and his pulse is 87. His respiration is 18 and oxygen saturation is 95%. Temperature Range (24h):Temp: 98.3 °F (36.8 °C) Temp  Av.4 °F (36.9 °C)  Min: 98.3 °F (36.8 °C)  Max: 98.6 °F (37 °C)  BP Range (63S): Systolic (55FCC), ONB:618 , Min:100 , CTZ:605     Diastolic (01KMZ), THH:33, Min:66, Max:74    Pulse Range (24h): Pulse  Av  Min: 75  Max: 87  Respiration Range (24h): Resp  Av.3  Min: 16  Max: 18  Current Pulse Ox (24h):  SpO2: 95 %  Pulse Ox Range (24h):  SpO2  Av.3 %  Min: 95 %  Max: 97 %  Oxygen Amount and Delivery:    Incentive Spirometry Tx:            GENERAL: alert, cooperative, no distress  SKIN: Skin color, texture, turgor normal. No rashes or lesions. HEENT: Head is normocephalic, atraumatic.   NECK: Supple, symmetrical, trachea midline  LUNGS: clear to ausculation, without wheezes, rales or rhonci but diminished in bases  HEART: normal rate and regular rhythm  ABDOMEN: softly distended, generalized tenderness, bowel sounds present but hypoactive, no peritoneal signs   NEUROLOGIC: There are no focalizing motor or sensory deficits. CN II-XII are grossly intact. EXTREMITIES: no cyanosis, no clubbing. Swelling a little better. In: 1758.7 [P.O.:1000; I.V.:758.7]  Out: -   Date 12/02/21 0000 - 12/02/21 2359   Shift 5591-5296 5813-4635 9484-9213 24 Hour Total   INTAKE   P.O.  500  500   I. V.(mL/kg/hr) 758.7(1.3)   758. 7   Shift Total(mL/kg) 758. 7(10.5) 500(6.9)  1258. 7(17.5)   OUTPUT   Shift Total(mL/kg)       Weight (kg) 72 72 72 72     LABS     Recent Labs     11/30/21  0600 12/01/21  0600 12/02/21  0545   WBC 7.9 9.3  --    HGB 11.1* 12.1*  --    HCT 32.8* 36.6*  --     383  --     134* 137   K 3.4* 3.7 3.3*    96* 97*   CO2 26 27 28   BUN 9 9 11   CREATININE 0.6 0.7 0.7   MG 1.7  --   --    CALCIUM 7.8* 8.2* 8.0*      Recent Labs     11/29/21  1250   AST 18   ALT 17   BILITOT 0.4   BILIDIR <0.2   LIPASE 23.3     RADIOLOGY     PROCEDURE: FL UGI W SMALL BOWEL       CLINICAL INFORMATION: Nausea, small bowel obstruction, hiatal hernia       TECHNIQUE: A preliminary abdominal radiograph was obtained. Following the oral administration of barium, the anatomy of the distal esophagus, stomach and duodenum were evaluated in a double contrast upper GI examination. Additional barium was    administered for a small bowel follow-through. A total of 20 images were obtained. Total fluoroscopy time 2.3 minutes.       COMPARISON: None.       Correlation: CT abdomen and pelvis 11/28/2021       FINDINGS: Preliminary abdominal radiograph demonstrates multiple dilated gas-filled bowel loops throughout the abdomen. There are phleboliths in the pelvis. Osseous structures are unremarkable.       There is no stenosis or gastroesophageal reflux in the distal esophagus. A small sliding hiatal hernia is present. No gastric mucosal lesions or ulcerations are seen.  The duodenal bulb and sweep are normal.       There are dilated small bowel loops measuring up to 4.4 cm in diameter throughout the abdomen. There is delayed transit of contrast through the small bowel with contrast reaching the distal small bowel by the 3 1/2 hour image. No strictures or extrinsic    abnormalities are identified.           Impression   1. Small sliding hiatal hernia. 2. Delayed transit of contrast through dilated small bowel loops without evidence of complete obstruction.       Final report electronically signed by Dr. Ramesh Guzman on 12/1/2021 3:33 PM     MR MRCP with and without gadolinium       Comparison: None       Findings:   No intrahepatic biliary ductal dilatation. Common bile duct dilated up to 1.4 cm with focal tapering near the    ampulla. No intraductal filling defects. Pancreatic duct is not dilated.       Distended gallbladder with intraluminal stones. No gallbladder wall thickening/edema.       The liver enhances homogeneously. The spleen, pancreas, and adrenal glands are unremarkable. Both kidneys enhance symmetrically without hydronephrosis.       Several partially visualized small bowel loops appear dilated. Trace abdominal ascites maybe reactive. No enlarged abdominal lymph nodes.       Small bilateral pleural effusions with basilar atelectasis. No abnormal bone marrow signal.           Impression   1. Dilatation of the common bile duct at 1.4 cm without intraductal    filling defect. Tapering in the distal common bile duct with underlying    stricture or stenosis not excluded. No intrahepatic biliary ductal or    pancreatic ductal dilatation. 2. Cholelithiasis without gallbladder wall thickening/edema. 3. Several small bowel loops appear dilated. Partial small bowel    obstruction not excluded.    4. Small bilateral pleural effusions and trace abdominal ascites likely    reactive.       This document has been electronically signed by: Brian Beckman MD on and/or weight-based dosing when appropriate to reduce radiation dose to as low as reasonably achievable.       FINDINGS:           There are areas of atelectasis or infiltrate in the right and left lower lobes. .   The base of the heart is within appropriate limits.       There is mild diffuse fatty replacement in the liver. The spleen is normal. There is a 12 mm a left adrenal nodule. The right adrenal gland and pancreas are normal. There is gallbladder wall thickening. There is dilatation of the common bile duct which    measures 11 mm in diameter. There is dilatation of the common hepatic duct which measures 12 mm in diameter.    There is no hydronephrosis or stones of either kidney. No renal masses are noted.           There is a hiatal hernia.       There are dilated fluid-filled small bowel loops, these appear more prominent than on previous study dated 24th of November 2021.  The IVC and aorta are of normal caliber. There is no adenopathy.       The urinary bladder is normal. There is a small amount of  pelvic free fluid is no since previous study.  The patient is status post right hemicolectomy. There are fluid-filled large bowel loops with mucosal thickening suspicious for inflammatory    process. This appears worse than on previous study. There is no definite evidence of typhilitis. There is no adenopathy. No suspicious osseous lesions are identified.                   Impression       1. Status post right-sided colectomy. 2. Dilated small and large bowel loops, worse than on previous study dated 24th of November 2021. There is mucosal thickening. These findings could represents an inflammatory process versus obstruction. 3. Small amount of free fluid within the pelvis, new since previous study. 4. There is gallbladder wall thickening. There is dilatation of the common hepatic and common bile ducts more prominent than on previous study dated 24th of November 2021    5.  Small left adrenal nodule,

## 2021-12-02 NOTE — PROGRESS NOTES
Hospitalist Progress Note      Patient:  Alicia Morales    Unit/Bed:6E-55/055-A  YOB: 1970  MRN: [id number]   Acct: [de-identified]   PCP: Jeyson Solomon MD  Date of Admission: 11/24/2021    Assessment/Plan:    1. Enterocolitis, acute: Possible chemotherapy induced. IV Cipro & Flagyl stopped. 2. SBO vs ileus: Improving. General Surgery consulted. KUB showed possible SBO vs ileus. CT A/P showed worsening since previous. NPO to CLD; pt tolerating it well. NGT removed yesterday. UGI follow through showed no obstruction. Advance diet slowly. 3. Possible fluid overload: Lasix have been given for swelling. Urine is starting to darken and BP is low today. Will hold off on Lasix at this time. 4. Hyponatremia: Sodium low but stable- 129, 130, 128, 131, 136, 134. IVF started. BMP in the AM.   5. Hypokalemia: Potassium 3.1, 3.7, 3.2, 3.4, 3.7, 3.3. Potassium Replacement Protocol. 6. Anemia, chronic: Hgb 13.6, drop to 11.4, 11.1, 12.1  7. Transverse colon adenocarcinoma: s/p colectomy. Pt follows with Dr. Estuardo De Jesus. Pt is currently receiving infusions at the [facility]. Oncology consulted. 8. Malnutrition: Dietician consulted. Megace also started. Dispo: Hopeful for DC tomorrow. Case discussed with Oncology. Chief Complaint: Abdominal Pain     Initial H and P:-    Initial H&P \"47year-old male patient recently diagnosed with colon cancer September 2021 status post surgery IV chemotherapy and Xeloda.     Presented to the ED today with a 1 week history of intractable periumbilical abdominal pain of 9/10 intensity.   Associated nausea vomiting diarrhea and fever last few days.     Patient now has poor oral intake and generalized body weakness     Vital signs on admission temperature 36.9 °C, respiratory 18, heart rate 89, blood pressure 147/98, oxygen saturation 98% room air.     Labs on admission sodium 131, potassium 3.5, CO2 19, , creatinine 1.0, blood sugar 138, magnesium 1.8, calcium 8.6, total protein 6.0, procalcitonin 397, troponin first set less than 0.010, albumin 3.1, alkaline phosphatase 71, ALT 38, AST is 12, total bilirubin 0.6, lipase 14.2, total protein 6.0.     WBC 7.2, hemoglobin 15.8, MCV 78.9, platelets 974.     Influenza A, B and COVID-19 negative.     Stool studies for GI PCR panel was negative.     ED treatment included IV Zosyn for suspected problem infection and IV fluids. \"     11/26: Pt is frustrated with insurance company today and was on the phone during evaluation and stated that he could not stop the call. Pt states that the pain is manageable at this time. Pt is also concerned about his lack of appetite. 11/27: No acute events overnight. Pt states that he believes he is bloating. Pt states that the pain is more controlled. No other issues or concerns at this time. 11/28: No acute events overnight, Pt has not had any vomiting episodes. Pt has walked around the unit multiple times. Pt is very concerned about dark colored urine. Diet advanced to CLD. 11/29: No acute events overnight. Pt tried to walk to meeting last night but had too much pain and had to use wheelchair. General Surgery doing test for other abdominal issues. Pt states that pain is relatively controlled. 11/30: No acute events overnight. Patient had large BM this morning. Patient states the pain is relatively controlled. Patient walking in the halls. Patient states that he is feeling better. Patient is hopeful for NG tube removal today. 12/1: Patient states the pain is relatively controlled today. Patient had upper GI follow-through today. Patient is frustrated with how long he has been in the hospital.  Patient is up walking around and independent with all of his activities. Subjective (past 24 hours):   No acute events overnight. Pt states that her pain is relatively controlled. Pt has no issues or concerns at this time. Past medical history, family history, social history and allergies reviewed again and is unchanged since admission. ROS (All review of systems completed. Pertinent positives noted. Otherwise All other systems reviewed and negative.)     Medications:  Reviewed    Infusion Medications    sodium chloride 20 mL/hr at 12/02/21 9689    sodium chloride Stopped (11/29/21 0503)     Scheduled Medications    metoclopramide  10 mg Oral TID    bisacodyl  10 mg Rectal Daily    magic (miracle) mouthwash  5 mL Swish & Spit 4x Daily    alteplase  1 mg IntraCATHeter Once    megestrol  40 mg Oral Daily    ondansetron  4 mg Oral Once    [Held by provider] diphenoxylate-atropine  1 tablet Oral 4x Daily    sodium chloride flush  10 mL IntraVENous 2 times per day    enoxaparin  40 mg SubCUTAneous Daily    sucralfate  1 g Oral TID AC    omeprazole  20 mg Oral QAM    dicyclomine  20 mg Oral TID     PRN Meds: ketorolac, potassium chloride **OR** potassium alternative oral replacement **OR** potassium chloride, oxyCODONE, HYDROmorphone, [Held by provider] loperamide, sodium chloride flush, sodium chloride, ondansetron **OR** ondansetron, polyethylene glycol, promethazine, acetaminophen      Intake/Output Summary (Last 24 hours) at 12/2/2021 1303  Last data filed at 12/2/2021 0933  Gross per 24 hour   Intake 1758.69 ml   Output    Net 1758.69 ml       Diet:  ADULT ORAL NUTRITION SUPPLEMENT; Lunch; Clear Liquid Oral Supplement  ADULT DIET; Regular    Exam:  /67   Pulse 87   Temp 98.3 °F (36.8 °C) (Oral)   Resp 18   Ht 6' (1.829 m)   Wt 158 lb 12.8 oz (72 kg)   SpO2 95%   BMI 21.54 kg/m²   General appearance: No apparent distress, appears stated age and cooperative. HEENT: Pupils equal, round, and reactive to light. Conjunctivae/corneas clear. NG tube in place. Neck: Supple, with full range of motion. No jugular venous distention. Trachea midline. Respiratory:  Normal respiratory effort on RA.  Clear to auscultation, bilaterally without Rales/Wheezes/Rhonchi. Cardiovascular: Regular rate and rhythm with normal S1/S2 without murmurs, rubs or gallops. Abdomen: Soft, mildly tender, mildly distended with bowel sounds  Musculoskeletal: passive and active ROM x 4 extremities. Skin: Skin color, texture, turgor normal.  No rashes or lesions. Mediport. Neurologic:  Neurovascularly intact without any focal sensory/motor deficits. Cranial nerves: II-XII intact, grossly non-focal.  Psychiatric: Alert and oriented, thought content appropriate, normal insight  Capillary Refill: Brisk,< 3 seconds   Peripheral Pulses: +2 palpable, equal bilaterally     Labs:   Recent Labs     11/30/21  0600 12/01/21  0600   WBC 7.9 9.3   HGB 11.1* 12.1*   HCT 32.8* 36.6*    383     Recent Labs     11/30/21  0600 12/01/21  0600 12/02/21  0545    134* 137   K 3.4* 3.7 3.3*    96* 97*   CO2 26 27 28   BUN 9 9 11   CREATININE 0.6 0.7 0.7   CALCIUM 7.8* 8.2* 8.0*     No results for input(s): AST, ALT, BILIDIR, BILITOT, ALKPHOS in the last 72 hours. No results for input(s): INR in the last 72 hours. No results for input(s): Sharmin Ill in the last 72 hours. Microbiology:    Blood culture #1:   Lab Results   Component Value Date    BC No growth-preliminary No growth  11/24/2021     Urinalysis:      Lab Results   Component Value Date    NITRU NEGATIVE 11/28/2021    WBCUA 0-2 11/28/2021    BACTERIA NONE SEEN 11/28/2021    RBCUA 0-2 11/28/2021    BLOODU NEGATIVE 11/28/2021    SPECGRAV >1.030 11/28/2021    GLUCOSEU NEGATIVE 11/26/2021       Radiology:  FL UGI W SMALL BOWEL   Final Result   1. Small sliding hiatal hernia. 2. Delayed transit of contrast through dilated small bowel loops without evidence of complete obstruction. Final report electronically signed by Dr. Milton Melgoza on 12/1/2021 3:33 PM      MRI ABDOMEN W WO CONTRAST MRCP   Final Result   1.  Dilatation of the common bile duct at 1.4 cm without intraductal    filling defect. Tapering in the distal common bile duct with underlying    stricture or stenosis not excluded. No intrahepatic biliary ductal or    pancreatic ductal dilatation. 2. Cholelithiasis without gallbladder wall thickening/edema. 3. Several small bowel loops appear dilated. Partial small bowel    obstruction not excluded. 4. Small bilateral pleural effusions and trace abdominal ascites likely    reactive. This document has been electronically signed by: Katlyn Cuevas MD on    11/29/2021 09:53 PM      [email or web address]   Final Result   1. Biliary sludge. Thickened gallbladder wall. 2. Markedly dilated common duct and dilated intrahepatic radicles, consistent with distal obstruction. 3. MRCP might be considered for further evaluation. **This report has been created using voice recognition software. It may contain minor errors which are inherent in voice recognition technology. **      Final report electronically signed by Dr. Uri Hatch on 11/29/2021 7:34 PM      CT ABDOMEN PELVIS W IV CONTRAST Additional Contrast? Radiologist Recommendation   Final Result       1. Status post right-sided colectomy. 2. Dilated small and large bowel loops, worse than on previous study dated 24th of November 2021. There is mucosal thickening. These findings could represents an inflammatory process versus obstruction. 3. Small amount of free fluid within the pelvis, new since previous study. 4. There is gallbladder wall thickening. There is dilatation of the common hepatic and common bile ducts more prominent than on previous study dated 24th of November 2021    5. Small left adrenal nodule, unchanged. 6. Hiatal hernia. 7. Areas of atelectasis or infiltrate in the right and left lower lobes. .               **This report has been created using voice recognition software. It may contain minor errors which are inherent in voice recognition technology. **      Final report electronically signed by DR Violetta Joel on 11/28/2021 1:28 PM      XR ABDOMEN (KUB) (SINGLE AP VIEW)   Final Result   1. Distended gas-filled small bowel loops are seen overlying the mid abdomen which are concerning for a possible partial small bowel obstruction versus ileus. **This report has been created using voice recognition software. It may contain minor errors which are inherent in voice recognition technology. **      Final report electronically signed by Dr. Ashley Corral on 11/27/2021 4:38 PM      CT ABDOMEN PELVIS W IV CONTRAST Additional Contrast? None   Final Result      1. Diffuse wall thickening throughout segments of small and large bowel, findings consistent with diffuse enterocolitis. 2. Somewhat thickened appearance of the gallbladder wall which is unchanged since prior exam.               **This report has been created using voice recognition software. It may contain minor errors which are inherent in voice recognition technology. **      Final report electronically signed by Dr Bernard Douglas on 11/25/2021 12:01 AM      XR CHEST PORTABLE   Final Result   There is no acute intrathoracic process. **This report has been created using voice recognition software. It may contain minor errors which are inherent in voice recognition technology. **      Final report electronically signed by Dr Bernard Douglas on 11/24/2021 9:51 PM        Electronically signed by Dylan Mandel on 12/2/2021 at 1:03 PM

## 2021-12-02 NOTE — CARE COORDINATION
Discharge Planning Update:       Treating N/Diarrhea. Enterocolitis. Also, ileus vs SBO. Hx Colon Ca and taking chemo. Advanced to regular diet. No lasix now due to BP lower BP. Swelling is some improved from yesterday. Possible discharge home tomorrow.

## 2021-12-03 PROCEDURE — 99233 SBSQ HOSP IP/OBS HIGH 50: CPT | Performed by: INTERNAL MEDICINE

## 2021-12-03 PROCEDURE — 6370000000 HC RX 637 (ALT 250 FOR IP): Performed by: FAMILY MEDICINE

## 2021-12-03 PROCEDURE — 6370000000 HC RX 637 (ALT 250 FOR IP): Performed by: NURSE PRACTITIONER

## 2021-12-03 PROCEDURE — 6360000002 HC RX W HCPCS: Performed by: NURSE PRACTITIONER

## 2021-12-03 PROCEDURE — 6370000000 HC RX 637 (ALT 250 FOR IP): Performed by: PHYSICIAN ASSISTANT

## 2021-12-03 PROCEDURE — 6370000000 HC RX 637 (ALT 250 FOR IP): Performed by: INTERNAL MEDICINE

## 2021-12-03 PROCEDURE — 1200000000 HC SEMI PRIVATE

## 2021-12-03 PROCEDURE — 6360000002 HC RX W HCPCS: Performed by: INTERNAL MEDICINE

## 2021-12-03 PROCEDURE — 99232 SBSQ HOSP IP/OBS MODERATE 35: CPT | Performed by: PHYSICIAN ASSISTANT

## 2021-12-03 RX ORDER — NEOSTIGMINE METHYLSULFATE 1 MG/ML
0.5 INJECTION, SOLUTION INTRAVENOUS EVERY 6 HOURS
Status: DISCONTINUED | OUTPATIENT
Start: 2021-12-03 | End: 2021-12-04 | Stop reason: HOSPADM

## 2021-12-03 RX ADMIN — HYDROMORPHONE HYDROCHLORIDE 0.5 MG: 1 INJECTION, SOLUTION INTRAMUSCULAR; INTRAVENOUS; SUBCUTANEOUS at 09:25

## 2021-12-03 RX ADMIN — MEGESTROL ACETATE 40 MG: 40 TABLET ORAL at 09:42

## 2021-12-03 RX ADMIN — METOCLOPRAMIDE 10 MG: 10 TABLET ORAL at 14:34

## 2021-12-03 RX ADMIN — DICYCLOMINE HYDROCHLORIDE 20 MG: 10 CAPSULE ORAL at 14:34

## 2021-12-03 RX ADMIN — ENOXAPARIN SODIUM 40 MG: 100 INJECTION SUBCUTANEOUS at 09:42

## 2021-12-03 RX ADMIN — METOCLOPRAMIDE 10 MG: 10 TABLET ORAL at 09:42

## 2021-12-03 RX ADMIN — METOCLOPRAMIDE 10 MG: 10 TABLET ORAL at 20:10

## 2021-12-03 RX ADMIN — NALOXEGOL OXALATE 12.5 MG: 12.5 TABLET, FILM COATED ORAL at 12:43

## 2021-12-03 RX ADMIN — DICYCLOMINE HYDROCHLORIDE 20 MG: 10 CAPSULE ORAL at 09:42

## 2021-12-03 RX ADMIN — KETOROLAC TROMETHAMINE 30 MG: 30 INJECTION, SOLUTION INTRAMUSCULAR; INTRAVENOUS at 11:25

## 2021-12-03 RX ADMIN — OXYCODONE HYDROCHLORIDE 5 MG: 5 TABLET ORAL at 06:17

## 2021-12-03 RX ADMIN — OXYCODONE HYDROCHLORIDE 5 MG: 5 TABLET ORAL at 22:43

## 2021-12-03 RX ADMIN — OXYCODONE HYDROCHLORIDE 5 MG: 5 TABLET ORAL at 10:26

## 2021-12-03 RX ADMIN — DICYCLOMINE HYDROCHLORIDE 20 MG: 10 CAPSULE ORAL at 20:10

## 2021-12-03 RX ADMIN — OMEPRAZOLE 20 MG: 20 CAPSULE, DELAYED RELEASE ORAL at 06:17

## 2021-12-03 RX ADMIN — KETOROLAC TROMETHAMINE 30 MG: 30 INJECTION, SOLUTION INTRAMUSCULAR; INTRAVENOUS at 04:42

## 2021-12-03 RX ADMIN — OXYCODONE HYDROCHLORIDE 5 MG: 5 TABLET ORAL at 18:43

## 2021-12-03 RX ADMIN — SUCRALFATE 1 G: 1 TABLET ORAL at 12:44

## 2021-12-03 RX ADMIN — SUCRALFATE 1 G: 1 TABLET ORAL at 17:00

## 2021-12-03 RX ADMIN — SUCRALFATE 1 G: 1 TABLET ORAL at 06:17

## 2021-12-03 RX ADMIN — ONDANSETRON 4 MG: 2 INJECTION INTRAMUSCULAR; INTRAVENOUS at 09:42

## 2021-12-03 RX ADMIN — OXYCODONE HYDROCHLORIDE 5 MG: 5 TABLET ORAL at 02:04

## 2021-12-03 RX ADMIN — OXYCODONE HYDROCHLORIDE 5 MG: 5 TABLET ORAL at 14:31

## 2021-12-03 ASSESSMENT — PAIN DESCRIPTION - ORIENTATION
ORIENTATION: MID

## 2021-12-03 ASSESSMENT — PAIN DESCRIPTION - LOCATION
LOCATION: ABDOMEN

## 2021-12-03 ASSESSMENT — PAIN SCALES - GENERAL
PAINLEVEL_OUTOF10: 7
PAINLEVEL_OUTOF10: 7
PAINLEVEL_OUTOF10: 10
PAINLEVEL_OUTOF10: 7
PAINLEVEL_OUTOF10: 6
PAINLEVEL_OUTOF10: 7
PAINLEVEL_OUTOF10: 8
PAINLEVEL_OUTOF10: 7
PAINLEVEL_OUTOF10: 8
PAINLEVEL_OUTOF10: 8
PAINLEVEL_OUTOF10: 7

## 2021-12-03 ASSESSMENT — PAIN DESCRIPTION - PROGRESSION
CLINICAL_PROGRESSION: NOT CHANGED
CLINICAL_PROGRESSION: NOT CHANGED
CLINICAL_PROGRESSION: RAPIDLY WORSENING

## 2021-12-03 ASSESSMENT — PAIN DESCRIPTION - FREQUENCY
FREQUENCY: CONTINUOUS
FREQUENCY: INTERMITTENT
FREQUENCY: INTERMITTENT

## 2021-12-03 ASSESSMENT — PAIN DESCRIPTION - DESCRIPTORS
DESCRIPTORS: STABBING
DESCRIPTORS: ACHING
DESCRIPTORS: ACHING;SHOOTING

## 2021-12-03 ASSESSMENT — PAIN DESCRIPTION - PAIN TYPE
TYPE: ACUTE PAIN

## 2021-12-03 ASSESSMENT — PAIN - FUNCTIONAL ASSESSMENT
PAIN_FUNCTIONAL_ASSESSMENT: ACTIVITIES ARE NOT PREVENTED

## 2021-12-03 ASSESSMENT — PAIN DESCRIPTION - ONSET
ONSET: ON-GOING
ONSET: PROGRESSIVE
ONSET: ON-GOING

## 2021-12-03 ASSESSMENT — PAIN DESCRIPTION - DIRECTION: RADIATING_TOWARDS: BACK

## 2021-12-03 NOTE — PROGRESS NOTES
Hospitalist Progress Note      Patient:  Anel Caldwell    Unit/Bed:6E-55/055-A  YOB: 1970  MRN: [id number]   Acct: [de-identified]   PCP: Austyn Gonzalez MD  Date of Admission: 11/24/2021    Assessment/Plan:    1. Enterocolitis, acute: Possible chemotherapy induced. IV Cipro & Flagyl stopped. 2. SBO vs ileus:General Surgery consulted. KUB showed possible SBO vs ileus. CT A/P showed worsening since previous. NPO to CLD; pt tolerating it well. NGT removedUGI follow through showed no obstruction. Advance diet slowly. Patient endorsing more pain today that is sharp and stabbing. No obstruction on abdominal and imaging. Discussed with GI and general surgery will begin Movantik given his significant opioid use for pain control and also trial neostigmine with tapwater enemas  3. Possible fluid overload: Lasix have been given for swelling. Urine is starting to darken and BP is low today. Will hold off on Lasix at this time. 4. Hyponatremia: Sodium low but stable- 129, 130, 128, 131, 136, 134. IVF started. BMP in the AM.   5. Hypokalemia: Potassium 3.1, 3.7, 3.2, 3.4, 3.7, 3.3. Potassium Replacement Protocol. 6. Anemia, chronic: Hgb 13.6, drop to 11.4, 11.1, 12.1  7. Transverse colon adenocarcinoma: s/p colectomy. Pt follows with Dr. Kang Rodriguez. Pt is currently receiving infusions at the [facility]. Oncology consulted. 8. Malnutrition: Dietician consulted. Megace also started. Dispo: Pending resolution of ileus    Case discussed with Oncology GI and general surgery    Chief Complaint: Abdominal Pain     Initial H and P:-    Initial H&P \"47year-old male patient recently diagnosed with colon cancer September 2021 status post surgery IV chemotherapy and Xeloda.     Presented to the ED today with a 1 week history of intractable periumbilical abdominal pain of 9/10 intensity.   Associated nausea vomiting diarrhea and fever last few days.     Patient now has poor oral intake and generalized body weakness     Vital signs on admission temperature 36.9 °C, respiratory 18, heart rate 89, blood pressure 147/98, oxygen saturation 98% room air.     Labs on admission sodium 131, potassium 3.5, CO2 19, , creatinine 1.0, blood sugar 138, magnesium 1.8, calcium 8.6, total protein 6.0, procalcitonin 397, troponin first set less than 0.010, albumin 3.1, alkaline phosphatase 71, ALT 38, AST is 12, total bilirubin 0.6, lipase 14.2, total protein 6.0.     WBC 7.2, hemoglobin 15.8, MCV 78.9, platelets 124.     Influenza A, B and COVID-19 negative.     Stool studies for GI PCR panel was negative.     ED treatment included IV Zosyn for suspected problem infection and IV fluids. \"     11/26: Pt is frustrated with insurance company today and was on the phone during evaluation and stated that he could not stop the call. Pt states that the pain is manageable at this time. Pt is also concerned about his lack of appetite. 11/27: No acute events overnight. Pt states that he believes he is bloating. Pt states that the pain is more controlled. No other issues or concerns at this time. 11/28: No acute events overnight, Pt has not had any vomiting episodes. Pt has walked around the unit multiple times. Pt is very concerned about dark colored urine. Diet advanced to CLD. 11/29: No acute events overnight. Pt tried to walk to meeting last night but had too much pain and had to use wheelchair. General Surgery doing test for other abdominal issues. Pt states that pain is relatively controlled. 11/30: No acute events overnight. Patient had large BM this morning. Patient states the pain is relatively controlled. Patient walking in the halls. Patient states that he is feeling better. Patient is hopeful for NG tube removal today. 12/1: Patient states the pain is relatively controlled today. Patient had upper GI follow-through today.   Patient is frustrated with how long he has been in the hospital.  Patient is up walking around and independent with all of his activities. Subjective (past 24 hours):   Patient's abdominal pain is worse today. It is sharp and stabbing and is requiring IV Dilaudid to resolve it. We reviewed his imaging and demonstrated that he does not really have much of anything for ascites and its more so due to dilated loops of bowel. Given this information and likelihood of ileus we discussed with general surgery and GI about consideration for neostigmine and Movantik. We will start Movantik given his opioid use. And we will trial neostigmine as well. Patient agreed with this plan. He otherwise wants to go home but wants his abdominal discomfort to be improved. No other issues overnight he denies any fevers or chills. He denies any vomiting but is nauseous. He is passing stools and they are loose but with minimal stool content. He states he is eating well. Past medical history, family history, social history and allergies reviewed again and is unchanged since admission. ROS (All review of systems completed. Pertinent positives noted.  Otherwise All other systems reviewed and negative.)     Medications:  Reviewed    Infusion Medications    sodium chloride 20 mL/hr at 12/02/21 8400    sodium chloride Stopped (11/29/21 0503)     Scheduled Medications    naloxegol  12.5 mg Oral QAM    neostigmine  0.5 mg IntraMUSCular Q6H    metoclopramide  10 mg Oral TID    bisacodyl  10 mg Rectal Daily    magic (miracle) mouthwash  5 mL Swish & Spit 4x Daily    alteplase  1 mg IntraCATHeter Once    ondansetron  4 mg Oral Once    [Held by provider] diphenoxylate-atropine  1 tablet Oral 4x Daily    sodium chloride flush  10 mL IntraVENous 2 times per day    enoxaparin  40 mg SubCUTAneous Daily    sucralfate  1 g Oral TID AC    omeprazole  20 mg Oral QAM    dicyclomine  20 mg Oral TID     PRN Meds: ketorolac, potassium chloride **OR** potassium alternative oral replacement **OR** potassium chloride, oxyCODONE, HYDROmorphone, [Held by provider] loperamide, sodium chloride flush, sodium chloride, ondansetron **OR** ondansetron, polyethylene glycol, promethazine, acetaminophen      Intake/Output Summary (Last 24 hours) at 12/3/2021 1426  Last data filed at 12/3/2021 0620  Gross per 24 hour   Intake 520 ml   Output 425 ml   Net 95 ml       Diet:  ADULT ORAL NUTRITION SUPPLEMENT; Lunch; Clear Liquid Oral Supplement  ADULT DIET; Regular    Exam:  /67   Pulse 76   Temp 98.4 °F (36.9 °C) (Oral)   Resp 22   Ht 6' (1.829 m)   Wt 158 lb 12.8 oz (72 kg)   SpO2 92%   BMI 21.54 kg/m²   General appearance: No apparent distress, appears stated age and cooperative. HEENT: Pupils equal, round, and reactive to light. Conjunctivae/corneas clear. NG tube in place. Neck: Supple, with full range of motion. No jugular venous distention. Trachea midline. Respiratory:  Normal respiratory effort on RA. Clear to auscultation, bilaterally without Rales/Wheezes/Rhonchi. Cardiovascular: Regular rate and rhythm with normal S1/S2 without murmurs, rubs or gallops. Abdomen: Soft, distended with hypoactive bowel sounds and tympanic to percussion  Musculoskeletal: passive and active ROM x 4 extremities. Skin: Skin color, texture, turgor normal.  No rashes or lesions. Mediport. Neurologic:  Neurovascularly intact without any focal sensory/motor deficits.  Cranial nerves: II-XII intact, grossly non-focal.  Psychiatric: Alert and oriented, thought content appropriate, normal insight  Capillary Refill: Brisk,< 3 seconds   Peripheral Pulses: +2 palpable, equal bilaterally     Labs:   Recent Labs     12/01/21  0600   WBC 9.3   HGB 12.1*   HCT 36.6*        Recent Labs     12/01/21  0600 12/02/21  0545 12/02/21  1245   * 137  --    K 3.7 3.3* 3.6   CL 96* 97*  --    CO2 27 28  --    BUN 9 11  --    CREATININE 0.7 0.7  --    CALCIUM 8.2* 8.0* --      No results for input(s): AST, ALT, BILIDIR, BILITOT, ALKPHOS in the last 72 hours. No results for input(s): INR in the last 72 hours. No results for input(s): Shira Mould in the last 72 hours. Microbiology:    Blood culture #1:   Lab Results   Component Value Date    BC No growth-preliminary No growth  11/24/2021     Urinalysis:      Lab Results   Component Value Date    NITRU NEGATIVE 11/28/2021    WBCUA 0-2 11/28/2021    BACTERIA NONE SEEN 11/28/2021    RBCUA 0-2 11/28/2021    BLOODU NEGATIVE 11/28/2021    SPECGRAV >1.030 11/28/2021    GLUCOSEU NEGATIVE 11/26/2021       Radiology:  FL UGI W SMALL BOWEL   Final Result   1. Small sliding hiatal hernia. 2. Delayed transit of contrast through dilated small bowel loops without evidence of complete obstruction. Final report electronically signed by Dr. Claudette Loth on 12/1/2021 3:33 PM      MRI ABDOMEN W WO CONTRAST MRCP   Final Result   1. Dilatation of the common bile duct at 1.4 cm without intraductal    filling defect. Tapering in the distal common bile duct with underlying    stricture or stenosis not excluded. No intrahepatic biliary ductal or    pancreatic ductal dilatation. 2. Cholelithiasis without gallbladder wall thickening/edema. 3. Several small bowel loops appear dilated. Partial small bowel    obstruction not excluded. 4. Small bilateral pleural effusions and trace abdominal ascites likely    reactive. This document has been electronically signed by: Elizabeth Alfred MD on    11/29/2021 09:53 PM      [address]   Final Result   1. Biliary sludge. Thickened gallbladder wall. 2. Markedly dilated common duct and dilated intrahepatic radicles, consistent with distal obstruction. 3. MRCP might be considered for further evaluation. **This report has been created using voice recognition software. It may contain minor errors which are inherent in voice recognition technology. **      Final report electronically signed by Dr. Rush Merino on 11/29/2021 7:34 PM      CT ABDOMEN PELVIS W IV CONTRAST Additional Contrast? Radiologist Recommendation   Final Result       1. Status post right-sided colectomy. 2. Dilated small and large bowel loops, worse than on previous study dated 24th of November 2021. There is mucosal thickening. These findings could represents an inflammatory process versus obstruction. 3. Small amount of free fluid within the pelvis, new since previous study. 4. There is gallbladder wall thickening. There is dilatation of the common hepatic and common bile ducts more prominent than on previous study dated 24th of November 2021    5. Small left adrenal nodule, unchanged. 6. Hiatal hernia. 7. Areas of atelectasis or infiltrate in the right and left lower lobes. .               **This report has been created using voice recognition software. It may contain minor errors which are inherent in voice recognition technology. **      Final report electronically signed by DR Chelsi Mccoy on 11/28/2021 1:28 PM      XR ABDOMEN (KUB) (SINGLE AP VIEW)   Final Result   1. Distended gas-filled small bowel loops are seen overlying the mid abdomen which are concerning for a possible partial small bowel obstruction versus ileus. **This report has been created using voice recognition software. It may contain minor errors which are inherent in voice recognition technology. **      Final report electronically signed by Dr. Rosie Collado on 11/27/2021 4:38 PM      CT ABDOMEN PELVIS W IV CONTRAST Additional Contrast? None   Final Result      1. Diffuse wall thickening throughout segments of small and large bowel, findings consistent with diffuse enterocolitis. 2. Somewhat thickened appearance of the gallbladder wall which is unchanged since prior exam.               **This report has been created using voice recognition software.  It may contain minor errors which are inherent in voice recognition technology. **      Final report electronically signed by Dr Jeremiah Rubio on 11/25/2021 12:01 AM      XR CHEST PORTABLE   Final Result   There is no acute intrathoracic process. **This report has been created using voice recognition software. It may contain minor errors which are inherent in voice recognition technology. **      Final report electronically signed by Dr Jeremiah Rubio on 11/24/2021 9:51 PM        Electronically signed by Daly Pat MD on 12/3/2021 at 2:26 PM

## 2021-12-03 NOTE — PROGRESS NOTES
which are concerning for possible partial SBO vs ileus. A  Repeat CT of ab/pelvis on 11/28/21 showed no evidence of typhlitis, no adenopathy. Dilated small and large bowel loops, worse than previous study on 11/24/21 with mucosal thickening - could represent inflammatory process vs obstruction. Small amount of free fluid in pelvic, new since prevous study. GB wall thickening, dilatation of the common hepatic and CBD more prominent than previous study, small left adrenal nodule unchanged. MRCP and ultrasound of the GB have been ordered per General Surgery. Oncology consult was requested to follow up on above. Today on 12/3/2021:   The patient is resting in bed. Hospitalist Dr. Nikki Patino in room during evaluation. Patient states over the last 12 hours he has had increased mid abdominal pain described as sharp, shooting, stabbing. He does not relate increase in pain to diet changes to general diet. Affirms intermittent nausea improved with antiemetic. Affirms loose stools. Denies fever, chills, chest pain, SOB, cough or urinary changes. He has been able to ambulate on unit without difficulty. Oncology History   Per Dr. Adriano Gates note on 11/9/21: Mr. Wagner Charles is a 54-year-old patient with newly diagnosed stage III colon cancer. Rachna Jackman presents to the medical oncology clinic to discuss postsurgical management. His history of colon cancer goes back to September 2021 when he was seen by  Dr. Islas Arms to unexplained weight loss of 10-15 lbs and rectal bleeding.  Colonoscopy performed on September 1, 2021 demonstrated multiple polyps but a larger mass at the proximal transverse colon demonstrated the adenocarcinoma.  He did not have signs of obstruction, no abdominal pain, no significant nausea or vomiting.  Some previous episodes of nausea, GERD and epigastric discomfort.  He had CT of the chest abdomen and pelvis on September 2, 2021,no evidence of metastatic disease.   The patient met with Dr. Zeina Ragland and after discussing his surgical treatment options he proceeded with right hemicolectomy on September 16, 2021.  Final pathology report showed:  A.  Right colon mass, hemicolectomy:    Invasive moderately differentiated colonic adenocarcinoma, pT2.    Margins are free of neoplasia.           Pericolonic lymph nodes (17): 2 out of 17 are positive for   metastatic colonic adenocarcinoma.             Appendix-no pathologic abnormality. Zuniga Ally base lymph nodes (2), resection:             Negative for malignancy (0/2).      pT2 pN1b     He had uneventful post surgical course.     Extensive history of colorectal disease paternal side.  Steven Farr is fairly active and eats a healthy diet.  Denies any new urinary complaints.     Meds    Current Medications    metoclopramide  10 mg Oral TID    bisacodyl  10 mg Rectal Daily    magic (miracle) mouthwash  5 mL Swish & Spit 4x Daily    alteplase  1 mg IntraCATHeter Once    megestrol  40 mg Oral Daily    ondansetron  4 mg Oral Once    [Held by provider] diphenoxylate-atropine  1 tablet Oral 4x Daily    sodium chloride flush  10 mL IntraVENous 2 times per day    enoxaparin  40 mg SubCUTAneous Daily    sucralfate  1 g Oral TID AC    omeprazole  20 mg Oral QAM    dicyclomine  20 mg Oral TID     ketorolac, potassium chloride **OR** potassium alternative oral replacement **OR** potassium chloride, oxyCODONE, HYDROmorphone, [Held by provider] loperamide, sodium chloride flush, sodium chloride, ondansetron **OR** ondansetron, polyethylene glycol, promethazine, acetaminophen  IV Drips/Infusions   sodium chloride 20 mL/hr at 12/02/21 0707    sodium chloride Stopped (11/29/21 0503)     Past Medical History         Diagnosis Date    Anemia     Colon cancer ([facility] [address] 09/2021      Past Surgical History           Procedure Laterality Date    COLONOSCOPY  09/01/2021    Dr. Jordin Casey Right 9/16/2021    ROBOT EXTENDED RIGHT COLECTOMY performed by Nawaf Carpenter Elvis Hayward MD at [address] Left     [address] with mesh inguinal    PORT SURGERY N/A 2021    SINGLE LUMEN SMART PORT INSERTION performed by Vianca Finch MD at [address]; Lunch; Clear Liquid Oral Supplement  ADULT DIET; Regular  Allergies    Patient has no known allergies. Social History     Social History     Socioeconomic History    Marital status: Single     Spouse name: Not on file    Number of children: Not on file    Years of education: Not on file    Highest education level: Not on file   Occupational History    Not on file   Tobacco Use    Smoking status: Former Smoker     Packs/day: 1.00     Years: 35.00     Pack years: 35.00     Types: Cigarettes     Quit date: 2020     Years since quittin.8    Smokeless tobacco: Never Used   Vaping Use    Vaping Use: Never used   Substance and Sexual Activity    Alcohol use: Not Currently    Drug use: No    Sexual activity: Not on file   Other Topics Concern    Not on file   Social History Narrative    Not on file     Social Determinants of Health     Financial Resource Strain:     Difficulty of Paying Living Expenses: Not on file   Food Insecurity:     Worried About Running Out of Food in the Last Year: Not on file    Johnnie of Food in the Last Year: Not on file   Transportation Needs:     Lack of Transportation (Medical): Not on file    Lack of Transportation (Non-Medical):  Not on file   Physical Activity:     Days of Exercise per Week: Not on file    Minutes of Exercise per Session: Not on file   Stress:     Feeling of Stress : Not on file   Social Connections:     Frequency of Communication with Friends and Family: Not on file    Frequency of Social Gatherings with Friends and Family: Not on file    Attends Latter-day Services: Not on file    Active Member of Clubs or Organizations: Not on file    Attends Club or Organization Meetings: Not on file    Marital Status: Not on file   Intimate Partner Violence:     Fear of Current or Ex-Partner: Not on file    Emotionally Abused: Not on file    Physically Abused: Not on file    Sexually Abused: Not on file   Housing Stability:     Unable to Pay for Housing in the Last Year: Not on file    Number of [place] in the Last Year: Not on file    Unstable Housing in the Last Year: Not on file     Family History          Problem Relation Age of Onset    Other Mother         Brain aneurysm    COPD Mother     Stroke Mother     Cancer Father         lung mets brain and bone    Stroke Father     Cancer Maternal Grandmother         colon    High Blood Pressure Maternal Grandmother     Cancer Maternal Grandfather         colon     High Blood Pressure Maternal Grandfather     Kidney Disease Paternal Grandmother     Cancer Paternal Grandmother         breast     Diabetes Paternal Grandmother     High Blood Pressure Paternal Grandmother     Cancer Paternal Grandfather         lung-52    Heart Attack Paternal Grandfather     High Blood Pressure Paternal Grandfather     Cancer Paternal Aunt         colon    Heart Attack Paternal Aunt     Cancer Paternal Uncle         lung    Cancer Maternal Aunt         colon    Cancer Maternal Uncle         colon     ROS     Review of Systems   Pertinent review of systems noted in HPI, all other ROS negative. Vitals     height is 6' (1.829 m) and weight is 158 lb 12.8 oz (72 kg). His oral temperature is 98.4 °F (36.9 °C). His blood pressure is 117/67 and his pulse is 76. His respiration is 22 and oxygen saturation is 92%. Exam   Physical Exam   General appearance: No apparent distress, well developed and cooperative. HEENT: Pupils equal, round, and reactive to light. Conjunctivae/corneas clear. Oral mucosa moist, improving mucositis. Neck: Supple, with full range of motion. Trachea midline.    Respiratory:  Normal respiratory effort. Diminished at bases, on room air. O2 sat 92%. No wheezes. Cardiovascular: Regular rate and rhythm with normal S1/S2   Abdomen: distended abdomen, hypoactive bowel sounds. Musculoskeletal: trace bilateral LE edema. SCDs in place bilaterally. Skin: Skin color, texture, turgor normal.  No visible rashes or lesions. Neurologic:  Neurovascularly intact without any focal sensory/motor deficits. Psychiatric: Alert and oriented     Labs   CBC  Recent Labs     12/01/21  0600   WBC 9.3   RBC 4.58*   HGB 12.1*   HCT 36.6*   MCV 79.9*   MCH 26.4   MCHC 33.1      MPV 8.5*      BMP  Recent Labs     12/01/21  0600 12/02/21  0545 12/02/21  1245   * 137  --    K 3.7 3.3* 3.6   CL 96* 97*  --    CO2 27 28  --    BUN 9 11  --    CREATININE 0.7 0.7  --    GLUCOSE 86 100  --    CALCIUM 8.2* 8.0*  --        Radiology      XR ABDOMEN (KUB) (SINGLE AP VIEW)    Result Date: 11/27/2021  PROCEDURE: XR ABDOMEN (KUB) (SINGLE AP VIEW) CLINICAL INFORMATION: constipation COMPARISON: CT dated 11/24/2021 TECHNIQUE:  AP supine abdomen 2 views  FINDINGS: Distended gas-filled small bowel loops are seen overlying the abdomen which are concerning for a possible partial small bowel obstruction versus ileus. Gas is also seen within loops of large bowel overlying the left upper quadrant. No acute osseous findings are seen. Calcified phleboliths are seen overlying the pelvis. 1. Distended gas-filled small bowel loops are seen overlying the mid abdomen which are concerning for a possible partial small bowel obstruction versus ileus. **This report has been created using voice recognition software. It may contain minor errors which are inherent in voice recognition technology. ** Final report electronically signed by Dr. Manjula Blood on 11/27/2021 4:38 PM    CT ABDOMEN PELVIS W IV CONTRAST Additional Contrast? Radiologist Recommendation    Result Date: 11/28/2021  PROCEDURE: CT ABDOMEN PELVIS W IV CONTRAST CLINICAL INFORMATION: Follow up possible partial SBO vs ileus, rule out typhlitis . COMPARISON: CT scan of abdomen and pelvis dated 24th of November 2021. TECHNIQUE: Axial 5 mm CT images were obtained through the abdomen and pelvis after the administration of intravenous contrast. Coronal and sagittal reconstructions were obtained. All CT scans at this facility use dose modulation, iterative reconstruction, and/or weight-based dosing when appropriate to reduce radiation dose to as low as reasonably achievable. FINDINGS: There are areas of atelectasis or infiltrate in the right and left lower lobes. .   The base of the heart is within appropriate limits. There is mild diffuse fatty replacement in the liver. The spleen is normal. There is a 12 mm a left adrenal nodule. The right adrenal gland and pancreas are normal. There is gallbladder wall thickening. There is dilatation of the common bile duct which measures 11 mm in diameter. There is dilatation of the common hepatic duct which measures 12 mm in diameter. There is no hydronephrosis or stones of either kidney. No renal masses are noted. There is a hiatal hernia. There are dilated fluid-filled small bowel loops, these appear more prominent than on previous study dated 24th of November 2021. The IVC and aorta are of normal caliber. There is no adenopathy. The urinary bladder is normal. There is a small amount of  pelvic free fluid is no since previous study. The patient is status post right hemicolectomy. There are fluid-filled large bowel loops with mucosal thickening suspicious for inflammatory process. This appears worse than on previous study. There is no definite evidence of typhilitis. There is no adenopathy. No suspicious osseous lesions are identified. 1. Status post right-sided colectomy. 2. Dilated small and large bowel loops, worse than on previous study dated 24th of November 2021. There is mucosal thickening.  These findings could represents an inflammatory process versus obstruction. 3. Small amount of free fluid within the pelvis, new since previous study. 4. There is gallbladder wall thickening. There is dilatation of the common hepatic and common bile ducts more prominent than on previous study dated 24th of November 2021 5. Small left adrenal nodule, unchanged. 6. Hiatal hernia. 7. Areas of atelectasis or infiltrate in the right and left lower lobes. . **This report has been created using voice recognition software. It may contain minor errors which are inherent in voice recognition technology. ** Final report electronically signed by DR Etienne Alberts on 11/28/2021 1:28 PM    CT ABDOMEN PELVIS W IV CONTRAST Additional Contrast? None    Result Date: 11/25/2021  PROCEDURE: CT ABDOMEN PELVIS W IV CONTRAST CLINICAL INFORMATION: colon CA patient, diffuse abdominal pain, on chemo . COMPARISON: CT 10/21/2021. TECHNIQUE: 5 mm axial CT images were obtained through the abdomen and pelvis after the administration of intravenous and oral contrast. Coronal and sagittal reconstructions were obtained. All CT scans at this facility use dose modulation, iterative reconstruction, and/or weight-based dosing when appropriate to reduce radiation dose to as low as reasonably achievable. FINDINGS: The lung bases are clear. There is no pleural effusion or pneumothorax. The base of the heart is within acceptable limits. The gallbladder is slightly thickened, similar to the prior exam. There is mild intrahepatic biliary ductal dilatation. The spleen, pancreas and adrenal glands are within normal limits. The kidneys are symmetric in size, shape and degree of enhancement. There is no hydronephrosis. Postsurgical changes are seen involving the intestine in the mid abdomen. There is extensive wall thickening throughout the small and large bowel loops. There is no ascites. There is no free intraperitoneal air. The urinary bladder appears normal. The aorta and the IVC are normal in caliber.  The bones are intact. 1. Diffuse wall thickening throughout segments of small and large bowel, findings consistent with diffuse enterocolitis. 2. Somewhat thickened appearance of the gallbladder wall which is unchanged since prior exam. **This report has been created using voice recognition software. It may contain minor errors which are inherent in voice recognition technology. ** Final report electronically signed by Dr Cole Shepard on 11/25/2021 12:01 AM    US GALLBLADDER RUQ    Result Date: 11/29/2021  PROCEDURE: US GALLBLADDER RUQ CLINICAL INFORMATION: epigastric pain, gallbladder wall thickening, CBD dilation TECHNIQUE: Multiple sonographic images of the upper abdomen were obtained with specific attention given to the gallbladder. A few images of the liver, pancreas, and right kidney were also obtained. FINDINGS: Liver - L= 18.7 cm Gallbladder - 11.0 x 2.0 x 2.4 cm Gallbladder Wall - 0.4 cm Common Duct - 1.3 cm Corea's Sign: negative Liver, pancreas, right kidney: Limited visualization of the pancreas due to overlying bowel gas. There are several dilated intrahepatic biliary radicles. Visualized portions of right kidney unremarkable. Common bile duct: Mildly dilated common duct. Gallbladder : Moderate gallbladder wall thickening. The wall is not edematous,. Moderate amount of sludge in the gallbladder and several small calcifications in the gallbladder wall. . No gallstones are seen. 1. Biliary sludge. Thickened gallbladder wall. 2. Markedly dilated common duct and dilated intrahepatic radicles, consistent with distal obstruction. 3. MRCP might be considered for further evaluation. **This report has been created using voice recognition software. It may contain minor errors which are inherent in voice recognition technology. ** Final report electronically signed by Dr. Zee Chapman on 11/29/2021 7:34 PM    XR CHEST PORTABLE    Result Date: 11/24/2021  PROCEDURE: XR CHEST PORTABLE CLINICAL INFORMATION: 71-year-old male with fever. History of colon cancer. COMPARISON: Radiograph dated 11/12/2021. TECHNIQUE: AP upright view of the chest was obtained. FINDINGS: There is a right-sided subclavian vein access chest wall infusion port. The tip of the catheter is in the SVC. The cardiac silhouette and pulmonary vasculature are within normal limits. There is no significant pleural effusion or pneumothorax. Visualized portions of the upper abdomen are within normal limits. The osseous structures are intact. No acute fractures or suspicious osseous lesions. There is no acute intrathoracic process. **This report has been created using voice recognition software. It may contain minor errors which are inherent in voice recognition technology. ** Final report electronically signed by Dr Jeremiah Rubio on 11/24/2021 9:51 PM    MRI ABDOMEN W WO CONTRAST MRCP    Result Date: 11/29/2021  MR MRCP with and without gadolinium Comparison: None Findings: No intrahepatic biliary ductal dilatation. Common bile duct dilated up to 1.4 cm with focal tapering near the ampulla. No intraductal filling defects. Pancreatic duct is not dilated. Distended gallbladder with intraluminal stones. No gallbladder wall thickening/edema. The liver enhances homogeneously. The spleen, pancreas, and adrenal glands are unremarkable. Both kidneys enhance symmetrically without hydronephrosis. Several partially visualized small bowel loops appear dilated. Trace abdominal ascites maybe reactive. No enlarged abdominal lymph nodes. Small bilateral pleural effusions with basilar atelectasis. No abnormal bone marrow signal.     1. Dilatation of the common bile duct at 1.4 cm without intraductal filling defect. Tapering in the distal common bile duct with underlying stricture or stenosis not excluded. No intrahepatic biliary ductal or pancreatic ductal dilatation. 2. Cholelithiasis without gallbladder wall thickening/edema.  3. Several small bowel loops appear dilated. Partial small bowel obstruction not excluded. 4. Small bilateral pleural effusions and trace abdominal ascites likely reactive. This document has been electronically signed by: Vanessa Wynn MD on 11/29/2021 09:53 PM    Assessment/Recommendations    1. Colon Cancer - stage IIIA  Began adjuvant chemotherapy with Xeloda and oxaliplatin on 11/9/2021. He was due for cycle #2 of oxaliplatin on 11/30/21. Hold chemotherapy during acute hospitalization. Tentative plan for follow up on 12/15/21 and possible chemo. Discussed with patient pending hospital course. 2. Enterocolitis   noted on CT of the ab/pelvis on 11/24/21 with diffuse wall thickening throughout segments of small and large bowel consistent with diffuse enterocolitis. GI pathogen by PCR negative, C. Diff toxin negative. Received IV Zosyn in the ED 11/25. Treated with IV Cipro (11/25-11/29), IV Flagyl (11/25-11/27)  3. Ileus vs Partial SBO   Noted on CT of ab/pelvis on 11/28/21. General Surgery following. Lomotil on hold. MRCP on 11/29/21 showed partial SBO not excluded. Upper GI 12/1/21 showing delayed transit of contrast through dilated small bowel loops without evidence of complete obstruction.    -Attending to begin Movantik, to discuss with GI for recommendations. 4. Gallbladder Wall Thickening   Noted on imaging studies. MRCP showed dilatation of CBD at 1.4 cm without intraductal filling defect, tapering in the distal CBD with underlying stricture or stenosis not excluded. GI evaluated, recommends outpatient ERCP.   5. Oral Mucositis   Secondary to chemotherapy. Improved. Continue biotene and magic mouthwash prn.   6. Hypokalemia   Improved. K 3.6 on 12/2/21. Related to poor oral intake, GI losses. Will continue to follow hospital course. Case discussed with nurse and patient/Attending Dr. Princess Moreland   Questions and concerns addressed.   Plan made in collaboration with Dr. Cristhian Caldwell    Electronically signed by   Robinson Jamil PA-C on 12/3/2021 at 10:53 AM

## 2021-12-03 NOTE — PROGRESS NOTES
Physician Progress Note      Genie Young  [facility] #:                  [id number]  :                       1970  ADMIT DATE:       2021 9:21 PM  [address] [place] DATE:  RESPONDING  PROVIDER #:        Jono Wiley MD          QUERY TEXT:    Pt admitted with enterocolitis. Noted documentation of moderate malnutrition   on  by ordered dietician consultant. If possible, please document in   progress notes and discharge summary:    The medical record reflects the following:    Risk Factors: poor po intake  Clinical Indicators: Moderate malnutrition per dietician per AND/ASPEN   guidelines as evidenced by energy intake 75% or less of estimated energy   requirements for 7 or more days, weight loss 13.2% loss over 9 months, Mild   muscle mass loss Clavicles (pectoralis & deltoids), Mild muscle mass loss   Clavicles (pectoralis & deltoids)  Treatment: Dietician consult & Ensure Enlive TID    Thank you! Dkaotah Pettit CRCR  RN Clinical   P: [phone]  Options provided:  -- Moderate malnutrition confirmed present on admission  -- Moderate malnutrition confirmed not present on admission  -- Moderate malnutrition ruled out  -- Other - I will add my own diagnosis  -- Disagree - Not applicable / Not valid  -- Disagree - Clinically unable to determine / Unknown  -- Refer to Clinical Documentation Reviewer    PROVIDER RESPONSE TEXT:    The diagnosis of moderate malnutrition was confirmed as present on admission.     Query created by: Anh Found on 2021 2:43 PM      Electronically signed by:  Jono Wiley MD 12/3/2021 2:26 PM

## 2021-12-04 VITALS
WEIGHT: 158.8 LBS | HEART RATE: 62 BPM | SYSTOLIC BLOOD PRESSURE: 110 MMHG | BODY MASS INDEX: 21.51 KG/M2 | RESPIRATION RATE: 18 BRPM | TEMPERATURE: 97.8 F | HEIGHT: 72 IN | DIASTOLIC BLOOD PRESSURE: 74 MMHG | OXYGEN SATURATION: 96 %

## 2021-12-04 LAB
ANION GAP SERPL CALCULATED.3IONS-SCNC: 7 MEQ/L (ref 8–16)
BUN BLDV-MCNC: 8 MG/DL (ref 7–22)
CALCIUM SERPL-MCNC: 7.7 MG/DL (ref 8.5–10.5)
CHLORIDE BLD-SCNC: 102 MEQ/L (ref 98–111)
CO2: 29 MEQ/L (ref 23–33)
CREAT SERPL-MCNC: 0.7 MG/DL (ref 0.4–1.2)
ERYTHROCYTE [DISTWIDTH] IN BLOOD BY AUTOMATED COUNT: 20.2 % (ref 11.5–14.5)
ERYTHROCYTE [DISTWIDTH] IN BLOOD BY AUTOMATED COUNT: 57.3 FL (ref 35–45)
GFR SERPL CREATININE-BSD FRML MDRD: > 90 ML/MIN/1.73M2
GLUCOSE BLD-MCNC: 87 MG/DL (ref 70–108)
HCT VFR BLD CALC: 34.1 % (ref 42–52)
HEMOGLOBIN: 11.2 GM/DL (ref 14–18)
MCH RBC QN AUTO: 26.7 PG (ref 26–33)
MCHC RBC AUTO-ENTMCNC: 32.8 GM/DL (ref 32.2–35.5)
MCV RBC AUTO: 81.4 FL (ref 80–94)
PLATELET # BLD: 356 THOU/MM3 (ref 130–400)
PMV BLD AUTO: 8.5 FL (ref 9.4–12.4)
POTASSIUM SERPL-SCNC: 4 MEQ/L (ref 3.5–5.2)
RBC # BLD: 4.19 MILL/MM3 (ref 4.7–6.1)
SODIUM BLD-SCNC: 138 MEQ/L (ref 135–145)
WBC # BLD: 7 THOU/MM3 (ref 4.8–10.8)

## 2021-12-04 PROCEDURE — 6370000000 HC RX 637 (ALT 250 FOR IP): Performed by: INTERNAL MEDICINE

## 2021-12-04 PROCEDURE — 85027 COMPLETE CBC AUTOMATED: CPT

## 2021-12-04 PROCEDURE — 6370000000 HC RX 637 (ALT 250 FOR IP): Performed by: NURSE PRACTITIONER

## 2021-12-04 PROCEDURE — 6360000002 HC RX W HCPCS: Performed by: INTERNAL MEDICINE

## 2021-12-04 PROCEDURE — 6370000000 HC RX 637 (ALT 250 FOR IP): Performed by: PHYSICIAN ASSISTANT

## 2021-12-04 PROCEDURE — 2580000003 HC RX 258: Performed by: PHYSICIAN ASSISTANT

## 2021-12-04 PROCEDURE — 99239 HOSP IP/OBS DSCHRG MGMT >30: CPT | Performed by: INTERNAL MEDICINE

## 2021-12-04 PROCEDURE — 6370000000 HC RX 637 (ALT 250 FOR IP): Performed by: FAMILY MEDICINE

## 2021-12-04 PROCEDURE — 6360000002 HC RX W HCPCS: Performed by: NURSE PRACTITIONER

## 2021-12-04 PROCEDURE — 80048 BASIC METABOLIC PNL TOTAL CA: CPT

## 2021-12-04 RX ORDER — DICYCLOMINE HYDROCHLORIDE 10 MG/1
20 CAPSULE ORAL 3 TIMES DAILY
Qty: 120 CAPSULE | Refills: 0 | Status: SHIPPED | OUTPATIENT
Start: 2021-12-04 | End: 2022-06-01 | Stop reason: ALTCHOICE

## 2021-12-04 RX ORDER — METOCLOPRAMIDE 5 MG/1
5 TABLET ORAL 3 TIMES DAILY
Qty: 90 TABLET | Refills: 0 | Status: SHIPPED | OUTPATIENT
Start: 2021-12-04 | End: 2021-12-06

## 2021-12-04 RX ORDER — HEPARIN SODIUM (PORCINE) LOCK FLUSH IV SOLN 100 UNIT/ML 100 UNIT/ML
500 SOLUTION INTRAVENOUS PRN
Status: DISCONTINUED | OUTPATIENT
Start: 2021-12-04 | End: 2021-12-04 | Stop reason: HOSPADM

## 2021-12-04 RX ORDER — METOCLOPRAMIDE 10 MG/1
5 TABLET ORAL 3 TIMES DAILY
Status: DISCONTINUED | OUTPATIENT
Start: 2021-12-04 | End: 2021-12-04 | Stop reason: HOSPADM

## 2021-12-04 RX ORDER — BISACODYL 10 MG
10 SUPPOSITORY, RECTAL RECTAL DAILY PRN
Qty: 30 SUPPOSITORY | Refills: 0 | Status: SHIPPED | OUTPATIENT
Start: 2021-12-04 | End: 2021-12-06 | Stop reason: ALTCHOICE

## 2021-12-04 RX ORDER — POLYETHYLENE GLYCOL 3350 17 G/17G
17 POWDER, FOR SOLUTION ORAL DAILY PRN
Qty: 30 EACH | Refills: 0 | Status: SHIPPED | OUTPATIENT
Start: 2021-12-04 | End: 2021-12-06 | Stop reason: ALTCHOICE

## 2021-12-04 RX ADMIN — DICYCLOMINE HYDROCHLORIDE 20 MG: 10 CAPSULE ORAL at 14:48

## 2021-12-04 RX ADMIN — ENOXAPARIN SODIUM 40 MG: 100 INJECTION SUBCUTANEOUS at 09:08

## 2021-12-04 RX ADMIN — METOCLOPRAMIDE 10 MG: 10 TABLET ORAL at 09:08

## 2021-12-04 RX ADMIN — SODIUM CHLORIDE: 9 INJECTION, SOLUTION INTRAVENOUS at 03:57

## 2021-12-04 RX ADMIN — METOCLOPRAMIDE 5 MG: 10 TABLET ORAL at 14:48

## 2021-12-04 RX ADMIN — KETOROLAC TROMETHAMINE 30 MG: 30 INJECTION, SOLUTION INTRAMUSCULAR; INTRAVENOUS at 09:08

## 2021-12-04 RX ADMIN — SUCRALFATE 1 G: 1 TABLET ORAL at 15:52

## 2021-12-04 RX ADMIN — SUCRALFATE 1 G: 1 TABLET ORAL at 11:36

## 2021-12-04 RX ADMIN — OMEPRAZOLE 20 MG: 20 CAPSULE, DELAYED RELEASE ORAL at 05:59

## 2021-12-04 RX ADMIN — DICYCLOMINE HYDROCHLORIDE 20 MG: 10 CAPSULE ORAL at 09:08

## 2021-12-04 RX ADMIN — HEPARIN 500 UNITS: 100 SYRINGE at 17:59

## 2021-12-04 RX ADMIN — KETOROLAC TROMETHAMINE 30 MG: 30 INJECTION, SOLUTION INTRAMUSCULAR; INTRAVENOUS at 03:03

## 2021-12-04 RX ADMIN — NALOXEGOL OXALATE 12.5 MG: 12.5 TABLET, FILM COATED ORAL at 09:08

## 2021-12-04 RX ADMIN — OXYCODONE HYDROCHLORIDE 5 MG: 5 TABLET ORAL at 15:47

## 2021-12-04 ASSESSMENT — PAIN SCALES - GENERAL
PAINLEVEL_OUTOF10: 7
PAINLEVEL_OUTOF10: 4
PAINLEVEL_OUTOF10: 5
PAINLEVEL_OUTOF10: 4

## 2021-12-04 NOTE — PROGRESS NOTES
Went over discharge instructions, already scheduled follow up appointments, and instructed to schedule follow up with pcp, and dr Feliciano Recinos with patient. Gave 6 prescriptions and instructed to take to any pharmacy. Answered all questions at this time and patient stated understanding. Pt awaiting wife to pick him up, will be discharged home with wife and support of family.

## 2021-12-04 NOTE — DISCHARGE SUMMARY
Discharge Summary     Patient Identification:  Karuna Smith  : 1970  MRN: [id number]   Account: [de-identified]     Admit date: 2021  Discharge date: 21   Attending provider: Marycruz Elmore DO        Primary care provider: Severo Noe MD     Discharge Diagnoses:   Principal Problem:    Enterocolitis  Active Problems:    Personal history of tobacco use    History of alcoholism ([facility] [address]    Adenocarcinoma, colon ([facility]    Cancer of transverse colon ([facility] [address]    S/P right colectomy    Diarrhea due to drug    Severe protein-calorie malnutrition ([facility] [address]    Dehydration  Resolved Problems:    * No resolved hospital problems. *       From prior note: \"Assessment/Plan:     1. Enterocolitis, acute: Possible chemotherapy induced. IV Cipro & Flagyl stopped. 2. SBO vs ileus:General Surgery consulted. KUB showed possible SBO vs ileus. CT A/P showed worsening since previous. NPO to CLD; pt tolerating it well. NGT removedUGI follow through showed no obstruction. Advance diet slowly. Patient endorsing more pain today that is sharp and stabbing. No obstruction on abdominal and imaging. Discussed with GI and general surgery will begin Movantik given his significant opioid use for pain control and also trial neostigmine with tapwater enemas  3. Possible fluid overload: Lasix have been given for swelling. Urine is starting to darken and BP is low today. Will hold off on Lasix at this time. 4. Hyponatremia: Sodium low but stable- 129, 130, 128, 131, 136, 134. IVF started. BMP in the AM.   5. Hypokalemia: Potassium 3.1, 3.7, 3.2, 3.4, 3.7, 3.3. Potassium Replacement Protocol. 6. Anemia, chronic: Hgb 13.6, drop to 11.4, 11.1, 12.1  7. Transverse colon adenocarcinoma: s/p colectomy. Pt follows with Dr. Tho Gusman. Pt is currently receiving infusions at the [facility]. Oncology consulted. 8. Malnutrition: Dietician consulted.  Megace also started.       Dispo: Pending resolution of ileus     Case discussed with Oncology GI and general surgery     Chief Complaint: Abdominal Pain      Initial H and P:-    Initial H&P \"47year-old male patient recently diagnosed with colon cancer September 2021 status post surgery IV chemotherapy and Xeloda.     Presented to the ED today with a 1 week history of intractable periumbilical abdominal pain of 9/10 intensity.  Associated nausea vomiting diarrhea and fever last few days.     Patient now has poor oral intake and generalized body weakness     Vital signs on admission temperature 36.9 °C, respiratory 18, heart rate 89, blood pressure 147/98, oxygen saturation 98% room air.     Labs on admission sodium 131, potassium 3.5, CO2 19, , creatinine 1.0, blood sugar 138, magnesium 1.8, calcium 8.6, total protein 6.0, procalcitonin 397, troponin first set less than 0.010, albumin 3.1, alkaline phosphatase 71, ALT 38, AST is 12, total bilirubin 0.6, lipase 14.2, total protein 6.0.     WBC 7.2, hemoglobin 15.8, MCV 78.9, platelets 385.     Influenza A, B and COVID-19 negative.     Stool studies for GI PCR panel was negative.     ED treatment included IV Zosyn for suspected problem infection and IV fluids. \"      11/26: Pt is frustrated with insurance company today and was on the phone during evaluation and stated that he could not stop the call. Pt states that the pain is manageable at this time. Pt is also concerned about his lack of appetite.      11/27: No acute events overnight. Pt states that he believes he is bloating. Pt states that the pain is more controlled. No other issues or concerns at this time.      11/28: No acute events overnight, Pt has not had any vomiting episodes. Pt has walked around the unit multiple times. Pt is very concerned about dark colored urine. Diet advanced to CLD.      11/29: No acute events overnight. Pt tried to walk to meeting last night but had too much pain and had to use wheelchair. General Surgery doing test for other abdominal issues.  Pt states BENTYL  Take 2 capsules by mouth 3 times daily     Magic Mouthwash  Commonly known as: Miracle Mouthwash  Swish and spit 5 mLs 4 times daily as needed for Irritation Mixture of 30mL diphenhydramine, 60mL maalox, 4g carafate as ratio     metoclopramide 5 MG tablet  Commonly known as: REGLAN  Take 1 tablet by mouth 3 times daily     naloxegol 12.5 MG Tabs tablet  Commonly known as: MOVANTIK  Take 1 tablet by mouth every morning  Start taking on: December 5, 2021     polyethylene glycol 17 g packet  Commonly known as: GLYCOLAX  Take 17 g by mouth daily as needed for Constipation        CONTINUE taking these medications    capecitabine 500 MG chemo tablet  Commonly known as: XELODA  Take 4 tablets by mouth 2 times daily     ferrous sulfate 325 (65 Fe) MG tablet  Commonly known as: IRON 325  Take 1 tablet by mouth daily (with breakfast)     ibuprofen 200 MG tablet  Commonly known as: ADVIL;MOTRIN     ketorolac 10 MG tablet  Commonly known as: TORADOL  Take 1 tablet by mouth every 8 hours as needed for Pain     loperamide 2 MG capsule  Commonly known as: IMODIUM     ondansetron 4 MG disintegrating tablet  Commonly known as: ZOFRAN-ODT     ondansetron 4 MG tablet  Commonly known as: Zofran  Take 1 tablet by mouth every 6 hours as needed for Nausea or Vomiting     PriLOSEC OTC 20 MG tablet  Generic drug: omeprazole     prochlorperazine 5 MG tablet  Commonly known as: COMPAZINE  Take 1 tablet by mouth every 12 hours for 14 days Take 30 minutes before morning and afternoon dose of Xeloda     sucralfate 1 GM tablet  Commonly known as: CARAFATE        STOP taking these medications    diphenoxylate-atropine 2.5-0.025 MG per tablet  Commonly known as: Diphenatol           Where to Get Your Medications      You can get these medications from any pharmacy    Bring a paper prescription for each of these medications  · bisacodyl 10 MG suppository  · dicyclomine 10 MG capsule  · Magic Mouthwash   · metoclopramide 5 MG Neurological - alert, oriented, normal speech, no focal findings or movement disorder noted  Extremities - peripheral pulses normal, no pedal edema, no clubbing or cyanosis  Skin - normal coloration and turgor, no rashes, no suspicious skin lesions noted    Significant Diagnostics:   Radiology: CT ABDOMEN PELVIS W IV CONTRAST Additional Contrast? None    Result Date: 11/25/2021  PROCEDURE: CT ABDOMEN PELVIS W IV CONTRAST CLINICAL INFORMATION: colon CA patient, diffuse abdominal pain, on chemo . COMPARISON: CT 10/21/2021. TECHNIQUE: 5 mm axial CT images were obtained through the abdomen and pelvis after the administration of intravenous and oral contrast. Coronal and sagittal reconstructions were obtained. All CT scans at this facility use dose modulation, iterative reconstruction, and/or weight-based dosing when appropriate to reduce radiation dose to as low as reasonably achievable. FINDINGS: The lung bases are clear. There is no pleural effusion or pneumothorax. The base of the heart is within acceptable limits. The gallbladder is slightly thickened, similar to the prior exam. There is mild intrahepatic biliary ductal dilatation. The spleen, pancreas and adrenal glands are within normal limits. The kidneys are symmetric in size, shape and degree of enhancement. There is no hydronephrosis. Postsurgical changes are seen involving the intestine in the mid abdomen. There is extensive wall thickening throughout the small and large bowel loops. There is no ascites. There is no free intraperitoneal air. The urinary bladder appears normal. The aorta and the IVC are normal in caliber. The bones are intact. 1. Diffuse wall thickening throughout segments of small and large bowel, findings consistent with diffuse enterocolitis. 2. Somewhat thickened appearance of the gallbladder wall which is unchanged since prior exam. **This report has been created using voice recognition software.  It may contain minor errors which are inherent in voice recognition technology. ** Final report electronically signed by Dr Yamileth Trevino on 11/25/2021 12:01 AM    XR CHEST PORTABLE    Result Date: 11/24/2021  PROCEDURE: XR CHEST PORTABLE CLINICAL INFORMATION: 58-year-old male with fever. History of colon cancer. COMPARISON: Radiograph dated 11/12/2021. TECHNIQUE: AP upright view of the chest was obtained. FINDINGS: There is a right-sided subclavian vein access chest wall infusion port. The tip of the catheter is in the SVC. The cardiac silhouette and pulmonary vasculature are within normal limits. There is no significant pleural effusion or pneumothorax. Visualized portions of the upper abdomen are within normal limits. The osseous structures are intact. No acute fractures or suspicious osseous lesions. There is no acute intrathoracic process. **This report has been created using voice recognition software. It may contain minor errors which are inherent in voice recognition technology. ** Final report electronically signed by Dr Yamileth Trevino on 11/24/2021 9:51 PM      Labs:   Recent Results (from the past 72 hour(s))   Basic Metabolic Panel    Collection Time: 12/02/21  5:45 AM   Result Value Ref Range    Sodium 137 135 - 145 meq/L    Potassium 3.3 (L) 3.5 - 5.2 meq/L    Chloride 97 (L) 98 - 111 meq/L    CO2 28 23 - 33 meq/L    Glucose 100 70 - 108 mg/dL    BUN 11 7 - 22 mg/dL    CREATININE 0.7 0.4 - 1.2 mg/dL    Calcium 8.0 (L) 8.5 - 10.5 mg/dL   Anion Gap    Collection Time: 12/02/21  5:45 AM   Result Value Ref Range    Anion Gap 12.0 8.0 - 16.0 meq/L   Glomerular Filtration Rate, Estimated    Collection Time: 12/02/21  5:45 AM   Result Value Ref Range    Est, Glom Filt Rate >90 ml/min/1.73m2   Potassium    Collection Time: 12/02/21 12:45 PM   Result Value Ref Range    Potassium 3.6 3.5 - 5.2 meq/L   Basic Metabolic Panel    Collection Time: 12/04/21  6:00 AM   Result Value Ref Range    Sodium 138 135 - 145 meq/L    Potassium 4.0 3.5 - 5.2 meq/L    Chloride 102 98 - 111 meq/L    CO2 29 23 - 33 meq/L    Glucose 87 70 - 108 mg/dL    BUN 8 7 - 22 mg/dL    CREATININE 0.7 0.4 - 1.2 mg/dL    Calcium 7.7 (L) 8.5 - 10.5 mg/dL   CBC    Collection Time: 12/04/21  6:00 AM   Result Value Ref Range    WBC 7.0 4.8 - 10.8 thou/mm3    RBC 4.19 (L) 4.70 - 6.10 mill/mm3    Hemoglobin 11.2 (L) 14.0 - 18.0 gm/dl    Hematocrit 34.1 (L) 42.0 - 52.0 %    MCV 81.4 80.0 - 94.0 fL    MCH 26.7 26.0 - 33.0 pg    MCHC 32.8 32.2 - 35.5 gm/dl    RDW-CV 20.2 (H) 11.5 - 14.5 %    RDW-SD 57.3 (H) 35.0 - 45.0 fL    Platelets 693 235 - 705 thou/mm3    MPV 8.5 (L) 9.4 - 12.4 fL   Anion Gap    Collection Time: 12/04/21  6:00 AM   Result Value Ref Range    Anion Gap 7.0 (L) 8.0 - 16.0 meq/L   Glomerular Filtration Rate, Estimated    Collection Time: 12/04/21  6:00 AM   Result Value Ref Range    Est, Glom Filt Rate >90 ml/min/1.73m2       Discharge condition: good  Disposition: Home  Time spent on discharge: 35 minutes    Electronically signed by Pastor Herbert DO on 12/4/2021 at 1:05 PM

## 2021-12-06 ENCOUNTER — TELEPHONE (OUTPATIENT)
Dept: ONCOLOGY | Age: 51
End: 2021-12-06

## 2021-12-06 DIAGNOSIS — C18.9 MALIGNANT NEOPLASM OF COLON, UNSPECIFIED PART OF COLON (HCC): Primary | ICD-10-CM

## 2021-12-06 RX ORDER — METOCLOPRAMIDE 5 MG/1
5 TABLET ORAL 2 TIMES DAILY
Qty: 90 TABLET | Refills: 0
Start: 2021-12-06 | End: 2021-12-15 | Stop reason: ALTCHOICE

## 2021-12-06 NOTE — TELEPHONE ENCOUNTER
Called and discussed with patient. Reviewed discharge summary and medication list.     Instructed the patient to stop the following:  -Dulcolax suppository  -states he has not taken  -Movantik - patient is not taking narcotic pain medication. Instructed to take if taking narcotic pain medication.   -Imodium   -Glycolax - states he has not taken   -Ibuprofen  -Toradol       Continue:  -Bentyl 3 times daily  -Magic mouthwash 4 times daily as needed  -ferrous sulfate daily    -Zofran every 8 hours as needed  -omeprazole   -Compazine every 6 hours as needed        Modify:  -Reglan - instructed to decrease from 3 times daily to 2 times daily. -Xeloda - HOLD until follow up     The above adjustments were made in Epic and reviewed with the patient with readback method. Instructed patient to drink 1-2 boosts/ensures per day. Discussed due to symptoms recommend nurse evaluation, BMP and possible hydration on 12/7/21. Patient agreeable. Appointment made for 12:30 PM in [facility].

## 2021-12-06 NOTE — TELEPHONE ENCOUNTER
Patient called to give an update since being discharged from the hospital on 12/4/21. He continues with 3-4 loose/watery stools per day, he states it is worse than when he was in the hospital.  He is confused on which medications to take (related to diarrhea) as some were added/changed in the hospital.    Please advise.

## 2021-12-07 ENCOUNTER — HOSPITAL ENCOUNTER (OUTPATIENT)
Dept: INFUSION THERAPY | Age: 51
Discharge: HOME | End: 2021-12-07
Payer: COMMERCIAL

## 2021-12-07 ENCOUNTER — CLINICAL DOCUMENTATION (OUTPATIENT)
Dept: NUTRITION | Age: 51
End: 2021-12-07

## 2021-12-07 ENCOUNTER — TELEPHONE (OUTPATIENT)
Dept: ONCOLOGY | Age: 51
End: 2021-12-07

## 2021-12-07 VITALS
HEART RATE: 71 BPM | DIASTOLIC BLOOD PRESSURE: 70 MMHG | RESPIRATION RATE: 18 BRPM | TEMPERATURE: 98.7 F | OXYGEN SATURATION: 100 % | SYSTOLIC BLOOD PRESSURE: 130 MMHG

## 2021-12-07 DIAGNOSIS — Z90.49 S/P RIGHT COLECTOMY: ICD-10-CM

## 2021-12-07 DIAGNOSIS — C18.9 MALIGNANT NEOPLASM OF COLON, UNSPECIFIED PART OF COLON (HCC): Primary | ICD-10-CM

## 2021-12-07 DIAGNOSIS — R19.7 DIARRHEA, UNSPECIFIED TYPE: ICD-10-CM

## 2021-12-07 DIAGNOSIS — C18.9 ADENOCARCINOMA, COLON (HCC): ICD-10-CM

## 2021-12-07 DIAGNOSIS — Z87.891 PERSONAL HISTORY OF TOBACCO USE: ICD-10-CM

## 2021-12-07 DIAGNOSIS — F10.21 HISTORY OF ALCOHOLISM (HCC): ICD-10-CM

## 2021-12-07 DIAGNOSIS — C18.4 CANCER OF TRANSVERSE COLON (HCC): ICD-10-CM

## 2021-12-07 DIAGNOSIS — Z80.9 FAMILY HISTORY OF CANCER: ICD-10-CM

## 2021-12-07 LAB
ABSOLUTE IMMATURE GRANULOCYTE: 0.13 THOU/MM3 (ref 0–0.07)
ALBUMIN SERPL-MCNC: 2.2 G/DL (ref 3.5–5.1)
ALP BLD-CCNC: 148 U/L (ref 38–126)
ALT SERPL-CCNC: 36 U/L (ref 11–66)
AST SERPL-CCNC: 58 U/L (ref 5–40)
BASINOPHIL, AUTOMATED: 1 % (ref 0–3)
BASOPHILS ABSOLUTE: 0.1 THOU/MM3 (ref 0–0.1)
BILIRUB SERPL-MCNC: 0.2 MG/DL (ref 0.3–1.2)
BILIRUBIN DIRECT: < 0.2 MG/DL (ref 0–0.3)
BUN, WHOLE BLOOD: 10 MG/DL (ref 8–26)
C DIFF TOXIN/ANTIGEN: NEGATIVE
CHLORIDE, WHOLE BLOOD: 106 MEQ/L (ref 98–109)
CREATININE, WHOLE BLOOD: 0.6 MG/DL (ref 0.5–1.2)
EOSINOPHILS ABSOLUTE: 0.1 THOU/MM3 (ref 0–0.4)
EOSINOPHILS RELATIVE PERCENT: 1 % (ref 0–4)
GFR, ESTIMATED: > 90 ML/MIN/1.73M2
GLUCOSE, WHOLE BLOOD: 75 MG/DL (ref 70–108)
HCT VFR BLD CALC: 33.7 % (ref 42–52)
HEMOGLOBIN: 10.9 GM/DL (ref 14–18)
IMMATURE GRANULOCYTES: 2 %
IONIZED CALCIUM, WHOLE BLOOD: 1.04 MMOL/L (ref 1.12–1.32)
LYMPHOCYTES # BLD: 20 % (ref 15–47)
LYMPHOCYTES ABSOLUTE: 1.6 THOU/MM3 (ref 1–4.8)
MCH RBC QN AUTO: 26.3 PG (ref 26–33)
MCHC RBC AUTO-ENTMCNC: 32.3 GM/DL (ref 32.2–35.5)
MCV RBC AUTO: 81 FL (ref 80–94)
MONOCYTES ABSOLUTE: 0.9 THOU/MM3 (ref 0.4–1.3)
MONOCYTES: 12 % (ref 0–12)
PDW BLD-RTO: 19.6 % (ref 11.5–14.5)
PLATELET # BLD: 377 THOU/MM3 (ref 130–400)
PMV BLD AUTO: 8.1 FL (ref 9.4–12.4)
POTASSIUM, WHOLE BLOOD: 3.1 MEQ/L (ref 3.5–4.9)
RBC # BLD: 4.15 MILL/MM3 (ref 4.7–6.1)
SEG NEUTROPHILS: 65 % (ref 43–75)
SEGMENTED NEUTROPHILS ABSOLUTE COUNT: 5.1 THOU/MM3 (ref 1.8–7.7)
SODIUM, WHOLE BLOOD: 141 MEQ/L (ref 138–146)
TOTAL CO2, WHOLE BLOOD: 24 MEQ/L (ref 23–33)
TOTAL PROTEIN: 4.9 G/DL (ref 6.1–8)
WBC # BLD: 7.9 THOU/MM3 (ref 4.8–10.8)

## 2021-12-07 PROCEDURE — 80047 BASIC METABLC PNL IONIZED CA: CPT

## 2021-12-07 PROCEDURE — 2580000003 HC RX 258: Performed by: INTERNAL MEDICINE

## 2021-12-07 PROCEDURE — 6360000002 HC RX W HCPCS: Performed by: INTERNAL MEDICINE

## 2021-12-07 PROCEDURE — 36591 DRAW BLOOD OFF VENOUS DEVICE: CPT

## 2021-12-07 PROCEDURE — 85025 COMPLETE CBC W/AUTO DIFF WBC: CPT

## 2021-12-07 PROCEDURE — 80076 HEPATIC FUNCTION PANEL: CPT

## 2021-12-07 PROCEDURE — 96365 THER/PROPH/DIAG IV INF INIT: CPT

## 2021-12-07 PROCEDURE — 99211 OFF/OP EST MAY X REQ PHY/QHP: CPT

## 2021-12-07 PROCEDURE — 87449 NOS EACH ORGANISM AG IA: CPT

## 2021-12-07 PROCEDURE — 6360000002 HC RX W HCPCS: Performed by: PHYSICIAN ASSISTANT

## 2021-12-07 PROCEDURE — 96366 THER/PROPH/DIAG IV INF ADDON: CPT

## 2021-12-07 RX ORDER — HEPARIN SODIUM (PORCINE) LOCK FLUSH IV SOLN 100 UNIT/ML 100 UNIT/ML
500 SOLUTION INTRAVENOUS PRN
Status: DISCONTINUED | OUTPATIENT
Start: 2021-12-07 | End: 2021-12-08 | Stop reason: HOSPADM

## 2021-12-07 RX ORDER — HEPARIN SODIUM (PORCINE) LOCK FLUSH IV SOLN 100 UNIT/ML 100 UNIT/ML
500 SOLUTION INTRAVENOUS PRN
Status: CANCELLED | OUTPATIENT
Start: 2021-12-07

## 2021-12-07 RX ORDER — SODIUM CHLORIDE 9 MG/ML
25 INJECTION, SOLUTION INTRAVENOUS PRN
Status: CANCELLED | OUTPATIENT
Start: 2021-12-07

## 2021-12-07 RX ORDER — SODIUM CHLORIDE 0.9 % (FLUSH) 0.9 %
5-40 SYRINGE (ML) INJECTION PRN
Status: CANCELLED | OUTPATIENT
Start: 2021-12-07

## 2021-12-07 RX ORDER — SODIUM CHLORIDE 0.9 % (FLUSH) 0.9 %
5-40 SYRINGE (ML) INJECTION PRN
Status: DISCONTINUED | OUTPATIENT
Start: 2021-12-07 | End: 2021-12-08 | Stop reason: HOSPADM

## 2021-12-07 RX ADMIN — SODIUM CHLORIDE AND POTASSIUM CHLORIDE: 9; 2.98 INJECTION, SOLUTION INTRAVENOUS at 13:41

## 2021-12-07 RX ADMIN — SODIUM CHLORIDE, PRESERVATIVE FREE 20 ML: 5 INJECTION INTRAVENOUS at 12:38

## 2021-12-07 RX ADMIN — SODIUM CHLORIDE, PRESERVATIVE FREE 10 ML: 5 INJECTION INTRAVENOUS at 15:42

## 2021-12-07 RX ADMIN — HEPARIN SODIUM (PORCINE) LOCK FLUSH IV SOLN 100 UNIT/ML 500 UNITS: 100 SOLUTION at 15:43

## 2021-12-07 RX ADMIN — SODIUM CHLORIDE, PRESERVATIVE FREE 10 ML: 5 INJECTION INTRAVENOUS at 12:37

## 2021-12-07 NOTE — PLAN OF CARE
Problem: Musculor/Skeletal Functional Status  Goal: Absence of falls  Outcome: Met This Shift  Note: Free from falls while in O.P. Oncology. Intervention: Fall precautions  Note: Discussed the need to use the call light for assistance when getting up to ambulate. Problem: Intellectual/Education/Knowledge Deficit  Goal: Teaching initiated upon admission  Outcome: Met This Shift  Note: Patient verbalizes understanding to verbal information given on 1 liter 0.9 NS with 40 Meq KCL over 2 hours,action and possible side effects. Aware to call MD if develop complications. Intervention: Verbal/written education provided  Note: Patient verbalizes understanding to verbal information given on 1 liter 0.9 NS with 40 Meq KCL over 2 hours,action and possible side effects. Aware to call MD if develop complications. Problem: Discharge Planning:  Goal: Discharged to appropriate level of care  Description: Discharged to appropriate level of care  Outcome: Met This Shift  Note: Verbalize understanding of discharge instructions, follow up appointments, and when to call Physician. Intervention: Assess readiness for discharge  Note: Discuss understanding of discharge instructions, follow up appointments and when to call Physician. Problem: Infection - Central Venous Catheter-Associated Bloodstream Infection:  Goal: Will show no infection signs and symptoms  Description: Will show no infection signs and symptoms  Outcome: Met This Shift  Note: Discuss port maintenance,infection prevention, sign of infection and when to call MD.   Intervention: Infection risk assessment  Note: Mediport site with no redness or warmth. Skin over port site intact with no signs of breakdown noted. Patient verbalizes signs/symptoms of port infection and when to notify the physician. Care plan reviewed with patient. Patient verbalize understanding of the plan of care and contribute to goal setting.

## 2021-12-07 NOTE — PROGRESS NOTES
Patient tolerated 1 liter 0.9 NS with 40 Meq KCL over 2 hours without any complications. Denies dizziness, lightheadedness, acute nausea or vomiting, headache, heart palpitations, rash/itching or increased SOB. Last vital signs  /70   Pulse 71   Temp 98.7 °F (37.1 °C) (Oral)   Resp 18   SpO2 100%     Patient instructed if they experience any of the above symptoms following today's visit, he/she is to notify the Physician or go to the Emergency Dept. Discharge instructions given to patient, Verbalizes understanding. Ambulated off unit per self in stable condition with all belongings.

## 2021-12-09 ENCOUNTER — HOSPITAL ENCOUNTER (OUTPATIENT)
Dept: INFUSION THERAPY | Age: 51
Discharge: HOME | End: 2021-12-09
Payer: COMMERCIAL

## 2021-12-09 VITALS
HEART RATE: 80 BPM | BODY MASS INDEX: 17.74 KG/M2 | HEIGHT: 72 IN | DIASTOLIC BLOOD PRESSURE: 67 MMHG | RESPIRATION RATE: 18 BRPM | WEIGHT: 131 LBS | OXYGEN SATURATION: 99 % | SYSTOLIC BLOOD PRESSURE: 111 MMHG | TEMPERATURE: 98.1 F

## 2021-12-09 DIAGNOSIS — Z80.9 FAMILY HISTORY OF CANCER: ICD-10-CM

## 2021-12-09 DIAGNOSIS — Z87.891 PERSONAL HISTORY OF TOBACCO USE: ICD-10-CM

## 2021-12-09 DIAGNOSIS — F10.21 HISTORY OF ALCOHOLISM (HCC): ICD-10-CM

## 2021-12-09 DIAGNOSIS — C18.4 CANCER OF TRANSVERSE COLON (HCC): ICD-10-CM

## 2021-12-09 DIAGNOSIS — Z90.49 S/P RIGHT COLECTOMY: ICD-10-CM

## 2021-12-09 DIAGNOSIS — C18.9 ADENOCARCINOMA, COLON (HCC): ICD-10-CM

## 2021-12-09 DIAGNOSIS — C18.9 MALIGNANT NEOPLASM OF COLON, UNSPECIFIED PART OF COLON (HCC): Primary | ICD-10-CM

## 2021-12-09 LAB
BUN, WHOLE BLOOD: 14 MG/DL (ref 8–26)
CHLORIDE, WHOLE BLOOD: 101 MEQ/L (ref 98–109)
CREATININE, WHOLE BLOOD: 0.8 MG/DL (ref 0.5–1.2)
GFR, ESTIMATED: > 90 ML/MIN/1.73M2
GLUCOSE, WHOLE BLOOD: 109 MG/DL (ref 70–108)
IONIZED CALCIUM, WHOLE BLOOD: 1.17 MMOL/L (ref 1.12–1.32)
POTASSIUM, WHOLE BLOOD: 3.9 MEQ/L (ref 3.5–4.9)
SODIUM, WHOLE BLOOD: 140 MEQ/L (ref 138–146)
TOTAL CO2, WHOLE BLOOD: 29 MEQ/L (ref 23–33)

## 2021-12-09 PROCEDURE — 36591 DRAW BLOOD OFF VENOUS DEVICE: CPT

## 2021-12-09 PROCEDURE — 96360 HYDRATION IV INFUSION INIT: CPT

## 2021-12-09 PROCEDURE — 80047 BASIC METABLC PNL IONIZED CA: CPT

## 2021-12-09 PROCEDURE — 99211 OFF/OP EST MAY X REQ PHY/QHP: CPT

## 2021-12-09 PROCEDURE — 6360000002 HC RX W HCPCS: Performed by: INTERNAL MEDICINE

## 2021-12-09 PROCEDURE — 2580000003 HC RX 258: Performed by: INTERNAL MEDICINE

## 2021-12-09 PROCEDURE — 2580000003 HC RX 258: Performed by: PHYSICIAN ASSISTANT

## 2021-12-09 PROCEDURE — 96361 HYDRATE IV INFUSION ADD-ON: CPT

## 2021-12-09 RX ORDER — HEPARIN SODIUM (PORCINE) LOCK FLUSH IV SOLN 100 UNIT/ML 100 UNIT/ML
500 SOLUTION INTRAVENOUS PRN
Status: DISCONTINUED | OUTPATIENT
Start: 2021-12-09 | End: 2021-12-10 | Stop reason: HOSPADM

## 2021-12-09 RX ORDER — SODIUM CHLORIDE 0.9 % (FLUSH) 0.9 %
5-40 SYRINGE (ML) INJECTION PRN
Status: CANCELLED | OUTPATIENT
Start: 2021-12-09

## 2021-12-09 RX ORDER — SODIUM CHLORIDE 9 MG/ML
25 INJECTION, SOLUTION INTRAVENOUS PRN
Status: CANCELLED | OUTPATIENT
Start: 2021-12-09

## 2021-12-09 RX ORDER — HEPARIN SODIUM (PORCINE) LOCK FLUSH IV SOLN 100 UNIT/ML 100 UNIT/ML
500 SOLUTION INTRAVENOUS PRN
Status: CANCELLED | OUTPATIENT
Start: 2021-12-09

## 2021-12-09 RX ORDER — 0.9 % SODIUM CHLORIDE 0.9 %
1000 INTRAVENOUS SOLUTION INTRAVENOUS ONCE
Status: COMPLETED | OUTPATIENT
Start: 2021-12-09 | End: 2021-12-09

## 2021-12-09 RX ORDER — SODIUM CHLORIDE 0.9 % (FLUSH) 0.9 %
5-40 SYRINGE (ML) INJECTION PRN
Status: DISCONTINUED | OUTPATIENT
Start: 2021-12-09 | End: 2021-12-10 | Stop reason: HOSPADM

## 2021-12-09 RX ADMIN — SODIUM CHLORIDE, PRESERVATIVE FREE 10 ML: 5 INJECTION INTRAVENOUS at 08:46

## 2021-12-09 RX ADMIN — SODIUM CHLORIDE, PRESERVATIVE FREE 10 ML: 5 INJECTION INTRAVENOUS at 09:04

## 2021-12-09 RX ADMIN — Medication 500 UNITS: at 11:07

## 2021-12-09 RX ADMIN — SODIUM CHLORIDE, PRESERVATIVE FREE 10 ML: 5 INJECTION INTRAVENOUS at 11:07

## 2021-12-09 RX ADMIN — SODIUM CHLORIDE 1000 ML: 9 INJECTION, SOLUTION INTRAVENOUS at 09:06

## 2021-12-09 RX ADMIN — SODIUM CHLORIDE, PRESERVATIVE FREE 10 ML: 5 INJECTION INTRAVENOUS at 08:45

## 2021-12-09 NOTE — PROGRESS NOTES
Patient assessed for the following post IV hydration:    Dizziness   No  Lightheadedness  No      Acute nausea/vomiting No  Headache   No  Chest pain/pressure  No  Rash/itching   No  Shortness of breath  No    Patient tolerated IV hydration without any complications. Last vital signs:   /67   Pulse 80   Temp 98.1 °F (36.7 °C) (Oral)   Resp 18   Ht 6' (1.829 m)   Wt 131 lb (59.4 kg)   SpO2 99%   BMI 17.77 kg/m²       Patient instructed if experience any of the above symptoms following today's infusion, he is to notify MD immediately or go to the emergency department. Discharge instructions given to patient. Verbalizes understanding. Ambulated off unit per self with belongings.

## 2021-12-09 NOTE — PLAN OF CARE
Problem: Musculor/Skeletal Functional Status  Goal: Absence of falls  Outcome: Met This Shift  Note: Patient free of falls this visit. Intervention: Fall precautions  Note: Fall risks assessed. Precautions discussed. Call light within reach during visit. Problem: Intellectual/Education/Knowledge Deficit  Goal: Teaching initiated upon admission  Outcome: Met This Shift  Note: Patient verbalizes understanding to verbal information given on IV hydration, including action and possible side effects. Aware to call MD if develop complications. Intervention: Verbal/written education provided  Note: Verbal education provided on IV hydration prior to administration. Problem: Discharge Planning:  Goal: Discharged to appropriate level of care  Description: Discharged to appropriate level of care  Outcome: Met This Shift  Note: Patient verbalizes understanding of discharge instructions, follow up appointment, and when to call physician if needed   Intervention: Assess readiness for discharge  Note: Discharge instructions given to pt and reviewed. Follow up appointments discussed. Problem: Infection - Central Venous Catheter-Associated Bloodstream Infection:  Goal: Will show no infection signs and symptoms  Description: Will show no infection signs and symptoms  Outcome: Met This Shift  Note: Mediport site with no redness or warmth. Skin over port site intact with no signs of breakdown noted. Patient verbalizes signs/symptoms of port infection and when to notify the physician. Intervention: Infection risk assessment  Note: Discuss port maintenance, infection prevention, signs of infection, and when to call the doctor. Care plan reviewed with patient. Patient verbalizes understanding of the plan of care and contributes to goal setting.

## 2021-12-15 ENCOUNTER — OFFICE VISIT (OUTPATIENT)
Dept: ONCOLOGY | Age: 51
End: 2021-12-15
Payer: COMMERCIAL

## 2021-12-15 ENCOUNTER — HOSPITAL ENCOUNTER (OUTPATIENT)
Dept: INFUSION THERAPY | Age: 51
Discharge: HOME | End: 2021-12-15
Payer: COMMERCIAL

## 2021-12-15 VITALS
HEART RATE: 83 BPM | RESPIRATION RATE: 16 BRPM | DIASTOLIC BLOOD PRESSURE: 66 MMHG | BODY MASS INDEX: 18.45 KG/M2 | WEIGHT: 136.2 LBS | HEIGHT: 72 IN | TEMPERATURE: 98.5 F | OXYGEN SATURATION: 96 % | SYSTOLIC BLOOD PRESSURE: 118 MMHG

## 2021-12-15 VITALS
RESPIRATION RATE: 18 BRPM | HEIGHT: 72 IN | TEMPERATURE: 98.7 F | DIASTOLIC BLOOD PRESSURE: 75 MMHG | HEART RATE: 77 BPM | OXYGEN SATURATION: 98 % | BODY MASS INDEX: 18.42 KG/M2 | WEIGHT: 136 LBS | SYSTOLIC BLOOD PRESSURE: 127 MMHG

## 2021-12-15 DIAGNOSIS — R11.2 CHEMOTHERAPY INDUCED NAUSEA AND VOMITING: ICD-10-CM

## 2021-12-15 DIAGNOSIS — Z80.9 FAMILY HISTORY OF CANCER: ICD-10-CM

## 2021-12-15 DIAGNOSIS — C18.9 ADENOCARCINOMA, COLON (HCC): ICD-10-CM

## 2021-12-15 DIAGNOSIS — C18.4 CANCER OF TRANSVERSE COLON (HCC): ICD-10-CM

## 2021-12-15 DIAGNOSIS — Z90.49 S/P RIGHT COLECTOMY: ICD-10-CM

## 2021-12-15 DIAGNOSIS — T45.1X5A CHEMOTHERAPY INDUCED NAUSEA AND VOMITING: ICD-10-CM

## 2021-12-15 DIAGNOSIS — F10.21 HISTORY OF ALCOHOLISM (HCC): ICD-10-CM

## 2021-12-15 DIAGNOSIS — Z87.891 PERSONAL HISTORY OF TOBACCO USE: ICD-10-CM

## 2021-12-15 DIAGNOSIS — C18.9 MALIGNANT NEOPLASM OF COLON, UNSPECIFIED PART OF COLON (HCC): Primary | ICD-10-CM

## 2021-12-15 DIAGNOSIS — Z51.11 ENCOUNTER FOR CHEMOTHERAPY MANAGEMENT: ICD-10-CM

## 2021-12-15 DIAGNOSIS — K52.1 DIARRHEA DUE TO DRUG: ICD-10-CM

## 2021-12-15 LAB
ABSOLUTE IMMATURE GRANULOCYTE: 0.06 THOU/MM3 (ref 0–0.07)
ALBUMIN SERPL-MCNC: 3.2 G/DL (ref 3.5–5.1)
ALP BLD-CCNC: 232 U/L (ref 38–126)
ALT SERPL-CCNC: 91 U/L (ref 11–66)
AST SERPL-CCNC: 42 U/L (ref 5–40)
BASINOPHIL, AUTOMATED: 1 % (ref 0–3)
BASOPHILS ABSOLUTE: 0.1 THOU/MM3 (ref 0–0.1)
BILIRUB SERPL-MCNC: 0.2 MG/DL (ref 0.3–1.2)
BILIRUBIN DIRECT: < 0.2 MG/DL (ref 0–0.3)
BUN, WHOLE BLOOD: 11 MG/DL (ref 8–26)
CHLORIDE, WHOLE BLOOD: 104 MEQ/L (ref 98–109)
CREATININE, WHOLE BLOOD: 0.7 MG/DL (ref 0.5–1.2)
EOSINOPHILS ABSOLUTE: 0 THOU/MM3 (ref 0–0.4)
EOSINOPHILS RELATIVE PERCENT: 1 % (ref 0–4)
GFR, ESTIMATED: > 90 ML/MIN/1.73M2
GLUCOSE, WHOLE BLOOD: 74 MG/DL (ref 70–108)
HCT VFR BLD CALC: 35.4 % (ref 42–52)
HEMOGLOBIN: 11.4 GM/DL (ref 14–18)
IMMATURE GRANULOCYTES: 1 %
IONIZED CALCIUM, WHOLE BLOOD: 1.2 MMOL/L (ref 1.12–1.32)
LYMPHOCYTES # BLD: 17 % (ref 15–47)
LYMPHOCYTES ABSOLUTE: 1.5 THOU/MM3 (ref 1–4.8)
MCH RBC QN AUTO: 27 PG (ref 26–33)
MCHC RBC AUTO-ENTMCNC: 32.2 GM/DL (ref 32.2–35.5)
MCV RBC AUTO: 84 FL (ref 80–94)
MONOCYTES ABSOLUTE: 0.8 THOU/MM3 (ref 0.4–1.3)
MONOCYTES: 10 % (ref 0–12)
PDW BLD-RTO: 20.8 % (ref 11.5–14.5)
PLATELET # BLD: 234 THOU/MM3 (ref 130–400)
PMV BLD AUTO: 8.5 FL (ref 9.4–12.4)
POTASSIUM, WHOLE BLOOD: 3.5 MEQ/L (ref 3.5–4.9)
RBC # BLD: 4.22 MILL/MM3 (ref 4.7–6.1)
SEG NEUTROPHILS: 71 % (ref 43–75)
SEGMENTED NEUTROPHILS ABSOLUTE COUNT: 6.1 THOU/MM3 (ref 1.8–7.7)
SODIUM, WHOLE BLOOD: 140 MEQ/L (ref 138–146)
TOTAL CO2, WHOLE BLOOD: 27 MEQ/L (ref 23–33)
TOTAL PROTEIN: 6 G/DL (ref 6.1–8)
WBC # BLD: 8.6 THOU/MM3 (ref 4.8–10.8)

## 2021-12-15 PROCEDURE — 99215 OFFICE O/P EST HI 40 MIN: CPT | Performed by: INTERNAL MEDICINE

## 2021-12-15 PROCEDURE — 96415 CHEMO IV INFUSION ADDL HR: CPT

## 2021-12-15 PROCEDURE — 85025 COMPLETE CBC W/AUTO DIFF WBC: CPT

## 2021-12-15 PROCEDURE — 6360000002 HC RX W HCPCS: Performed by: INTERNAL MEDICINE

## 2021-12-15 PROCEDURE — 36593 DECLOT VASCULAR DEVICE: CPT

## 2021-12-15 PROCEDURE — 96413 CHEMO IV INFUSION 1 HR: CPT

## 2021-12-15 PROCEDURE — 2580000003 HC RX 258: Performed by: INTERNAL MEDICINE

## 2021-12-15 PROCEDURE — 80076 HEPATIC FUNCTION PANEL: CPT

## 2021-12-15 PROCEDURE — 99211 OFF/OP EST MAY X REQ PHY/QHP: CPT

## 2021-12-15 PROCEDURE — 96367 TX/PROPH/DG ADDL SEQ IV INF: CPT

## 2021-12-15 PROCEDURE — 80047 BASIC METABLC PNL IONIZED CA: CPT

## 2021-12-15 PROCEDURE — 36591 DRAW BLOOD OFF VENOUS DEVICE: CPT

## 2021-12-15 RX ORDER — METHYLPREDNISOLONE SODIUM SUCCINATE 125 MG/2ML
125 INJECTION, POWDER, LYOPHILIZED, FOR SOLUTION INTRAMUSCULAR; INTRAVENOUS ONCE
Status: CANCELLED | OUTPATIENT
Start: 2021-12-15 | End: 2021-12-15

## 2021-12-15 RX ORDER — CAPECITABINE 500 MG/1
600 TABLET, FILM COATED ORAL 2 TIMES DAILY
Qty: 56 TABLET | Refills: 0 | Status: SHIPPED | OUTPATIENT
Start: 2021-12-15 | End: 2021-12-29

## 2021-12-15 RX ORDER — SODIUM CHLORIDE 9 MG/ML
25 INJECTION, SOLUTION INTRAVENOUS PRN
Status: CANCELLED | OUTPATIENT
Start: 2021-12-15

## 2021-12-15 RX ORDER — DEXTROSE MONOHYDRATE 50 MG/ML
INJECTION, SOLUTION INTRAVENOUS ONCE
Status: COMPLETED | OUTPATIENT
Start: 2021-12-15 | End: 2021-12-15

## 2021-12-15 RX ORDER — EPINEPHRINE 1 MG/ML
0.3 INJECTION, SOLUTION, CONCENTRATE INTRAVENOUS PRN
Status: CANCELLED | OUTPATIENT
Start: 2021-12-15

## 2021-12-15 RX ORDER — HEPARIN SODIUM (PORCINE) LOCK FLUSH IV SOLN 100 UNIT/ML 100 UNIT/ML
500 SOLUTION INTRAVENOUS PRN
Status: CANCELLED | OUTPATIENT
Start: 2021-12-15

## 2021-12-15 RX ORDER — PROMETHAZINE HYDROCHLORIDE 12.5 MG/1
12.5 TABLET ORAL EVERY 6 HOURS PRN
Qty: 28 TABLET | Refills: 1 | Status: SHIPPED | OUTPATIENT
Start: 2021-12-15 | End: 2021-12-22

## 2021-12-15 RX ORDER — HEPARIN SODIUM (PORCINE) LOCK FLUSH IV SOLN 100 UNIT/ML 100 UNIT/ML
500 SOLUTION INTRAVENOUS PRN
Status: DISCONTINUED | OUTPATIENT
Start: 2021-12-15 | End: 2021-12-16 | Stop reason: HOSPADM

## 2021-12-15 RX ORDER — ONDANSETRON 4 MG/1
4 TABLET, FILM COATED ORAL EVERY 6 HOURS PRN
Qty: 30 TABLET | Refills: 1 | Status: CANCELLED | OUTPATIENT
Start: 2021-12-15

## 2021-12-15 RX ORDER — SODIUM CHLORIDE 9 MG/ML
INJECTION, SOLUTION INTRAVENOUS CONTINUOUS
Status: CANCELLED | OUTPATIENT
Start: 2021-12-15

## 2021-12-15 RX ORDER — PROCHLORPERAZINE MALEATE 5 MG/1
5 TABLET ORAL EVERY 12 HOURS
Qty: 28 TABLET | Refills: 2 | Status: CANCELLED | OUTPATIENT
Start: 2021-12-15 | End: 2021-12-29

## 2021-12-15 RX ORDER — DIPHENHYDRAMINE HYDROCHLORIDE 50 MG/ML
50 INJECTION INTRAMUSCULAR; INTRAVENOUS ONCE
Status: CANCELLED | OUTPATIENT
Start: 2021-12-15 | End: 2021-12-15

## 2021-12-15 RX ORDER — ONDANSETRON 4 MG/1
4 TABLET, ORALLY DISINTEGRATING ORAL 3 TIMES DAILY PRN
Qty: 21 TABLET | Refills: 0 | Status: SHIPPED | OUTPATIENT
Start: 2021-12-15 | End: 2022-01-24 | Stop reason: SDUPTHER

## 2021-12-15 RX ORDER — SODIUM CHLORIDE 0.9 % (FLUSH) 0.9 %
5-40 SYRINGE (ML) INJECTION PRN
Status: DISCONTINUED | OUTPATIENT
Start: 2021-12-15 | End: 2021-12-16 | Stop reason: HOSPADM

## 2021-12-15 RX ORDER — SODIUM CHLORIDE 0.9 % (FLUSH) 0.9 %
5-40 SYRINGE (ML) INJECTION PRN
Status: CANCELLED | OUTPATIENT
Start: 2021-12-15

## 2021-12-15 RX ORDER — DEXTROSE MONOHYDRATE 50 MG/ML
INJECTION, SOLUTION INTRAVENOUS ONCE
Status: CANCELLED | OUTPATIENT
Start: 2021-12-15 | End: 2021-12-15

## 2021-12-15 RX ADMIN — SODIUM CHLORIDE, PRESERVATIVE FREE 10 ML: 5 INJECTION INTRAVENOUS at 14:01

## 2021-12-15 RX ADMIN — DEXAMETHASONE SODIUM PHOSPHATE 12 MG: 4 INJECTION, SOLUTION INTRA-ARTICULAR; INTRALESIONAL; INTRAMUSCULAR; INTRAVENOUS; SOFT TISSUE at 10:44

## 2021-12-15 RX ADMIN — ALTEPLASE 2 MG: 2.2 INJECTION, POWDER, LYOPHILIZED, FOR SOLUTION INTRAVENOUS at 09:12

## 2021-12-15 RX ADMIN — SODIUM CHLORIDE, PRESERVATIVE FREE 20 ML: 5 INJECTION INTRAVENOUS at 08:50

## 2021-12-15 RX ADMIN — OXALIPLATIN 190 MG: 5 INJECTION, SOLUTION INTRAVENOUS at 11:38

## 2021-12-15 RX ADMIN — SODIUM CHLORIDE, PRESERVATIVE FREE 10 ML: 5 INJECTION INTRAVENOUS at 10:41

## 2021-12-15 RX ADMIN — Medication 500 UNITS: at 14:01

## 2021-12-15 RX ADMIN — WATER 2.2 ML: 1 INJECTION INTRAMUSCULAR; INTRAVENOUS; SUBCUTANEOUS at 09:12

## 2021-12-15 RX ADMIN — DEXTROSE MONOHYDRATE: 50 INJECTION, SOLUTION INTRAVENOUS at 10:42

## 2021-12-15 RX ADMIN — FOSAPREPITANT 150 MG: 150 INJECTION, POWDER, LYOPHILIZED, FOR SOLUTION INTRAVENOUS at 11:02

## 2021-12-15 NOTE — PLAN OF CARE
Problem: Musculor/Skeletal Functional Status  Goal: Absence of falls  Outcome: Met This Shift  Note: No falls occurred with visit today. Intervention: Fall precautions  Note: Verbalized understanding of fall prevention to ask for assistance with ambulation. Call light within reach. Problem: Intellectual/Education/Knowledge Deficit  Goal: Teaching initiated upon admission  Outcome: Met This Shift  Note: Patient verbalizes understanding to verbal information given on Oxaliplatin,action and possible side effects. Aware to call MD if develop complications. Intervention: Verbal/written education provided  Note: Chemotherapy Teaching     What is Chemotherapy   Drug action [x]   Method of Administration [x]   Handouts given []     Side Effects  Nausea/vomiting [x]   Diarrhea [x]   Fatigue [x]   Signs / Symptoms of infection [x]   Neutropenia [x]   Thrombocytopenia [x]   Alopecia [x]   neuropathy [x]   Hutchinson diet &  the importance of fluids [x]       Micellaneous  Importance of nutrition [x]   Importance of oral hygiene [x]   When to call the MD [x]   Monitoring labs [x]   Use of supportive services []     Explanation of Drug Regimen / Frequency  Oxaliplatin     Comments  Verbalized understanding to drug,action,side effects and when to call MD         Problem: Discharge Planning:  Goal: Discharged to appropriate level of care  Description: Discharged to appropriate level of care  Outcome: Met This Shift  Note: Verbalized understanding of discharge instructions, follow-up appointments, and when to call the physician. Intervention: Assess readiness for discharge  Description: Assess readiness for discharge  Note: Discuss understanding of discharge instructions,follow-up appointments, and when to call the physician.       Problem: Infection - Central Venous Catheter-Associated Bloodstream Infection:  Goal: Will show no infection signs and symptoms  Description: Will show no infection signs and symptoms  Outcome: Met This Shift  Note: Mediport site with no redness or warmth. Skin over port site intact with no signs of breakdown noted. Patient verbalizes signs/symptoms of port infection and when to notify the physician. Intervention: Infection risk assessment  Description: Infection risk assessment  Note: Discuss port maintenance,infection prevention, sign of infection and when to call MD.    Care plan reviewed with patient . Patient  verbalize understanding of the plan of care and contribute to goal setting.

## 2021-12-15 NOTE — PATIENT INSTRUCTIONS
1. Proceed with cycle #2 of oxaliplatin and Xeloda. The dose of oxaliplatin is reduced from 130 mg/m² to 100 mg/m². The dose of Xeloda is reduced to 1000 mg p.o. twice daily for 14 days. 2.The patient was instructed to take Zofran half an hour before the evening and morning dose of Xeloda. 3. For breakthrough nausea he received prescription for Phenergan  4. Patient was instructed to call us if he develops any diarrhea. For mild diarrhea he was instructed to start taking Lomotil.   5.  RTC to see NP Karyle Ivory me for labs: CBC, BMP, LFTs and a next dose of chemotherapy in 2 weeks

## 2021-12-15 NOTE — PROGRESS NOTES
Patient assessed for the following post chemotherapy:    Dizziness   No  Lightheadedness  No      Acute nausea/vomiting No  Headache   No  Chest pain/pressure  No  Rash/itching   No  Shortness of breath  No    Patient kept for 20 minutes observation post infusion chemotherapy. Patient tolerated chemotherapy treatment Oxaliplatin without any complications. Last vital signs:   /66   Pulse 83   Temp 98.5 °F (36.9 °C) (Oral)   Resp 16   Ht 6' (1.829 m)   Wt 136 lb 3.2 oz (61.8 kg)   SpO2 96%   BMI 18.47 kg/m²     Patient instructed if experience any of the above symptoms following today's infusion, he is to notify MD immediately or go to the emergency department. Discharge instructions given to patient. Verbalizes understanding. Ambulated off unit per self, with belongings.

## 2021-12-21 ENCOUNTER — TELEPHONE (OUTPATIENT)
Dept: ONCOLOGY | Age: 51
End: 2021-12-21

## 2021-12-21 NOTE — TELEPHONE ENCOUNTER
Patient called this morning to report that he received the Marijean Lash booster on 12/13. On 12/15 when he came in for treatment, his port was \"clotted\". He is wondering if this is from the vaccine booster and if he should take aspirin or anything else? Please advise.

## 2021-12-21 NOTE — TELEPHONE ENCOUNTER
Patient last had diarrhea on 12/12/21. No further complaints. Advised him to not take aspirin at this time.

## 2021-12-22 DIAGNOSIS — Z51.11 ENCOUNTER FOR CHEMOTHERAPY MANAGEMENT: Primary | ICD-10-CM

## 2021-12-25 PROBLEM — E86.0 DEHYDRATION: Status: RESOLVED | Noted: 2021-11-25 | Resolved: 2021-12-25

## 2021-12-29 ENCOUNTER — HOSPITAL ENCOUNTER (OUTPATIENT)
Dept: INFUSION THERAPY | Age: 51
Discharge: HOME | End: 2021-12-29
Payer: COMMERCIAL

## 2021-12-29 ENCOUNTER — OFFICE VISIT (OUTPATIENT)
Dept: ONCOLOGY | Age: 51
End: 2021-12-29
Payer: COMMERCIAL

## 2021-12-29 VITALS
SYSTOLIC BLOOD PRESSURE: 142 MMHG | RESPIRATION RATE: 16 BRPM | HEART RATE: 82 BPM | DIASTOLIC BLOOD PRESSURE: 69 MMHG | OXYGEN SATURATION: 98 % | TEMPERATURE: 98.1 F

## 2021-12-29 VITALS
HEIGHT: 72 IN | BODY MASS INDEX: 19.45 KG/M2 | DIASTOLIC BLOOD PRESSURE: 69 MMHG | WEIGHT: 143.6 LBS | RESPIRATION RATE: 16 BRPM | HEART RATE: 82 BPM | SYSTOLIC BLOOD PRESSURE: 142 MMHG | OXYGEN SATURATION: 98 % | TEMPERATURE: 98.1 F

## 2021-12-29 DIAGNOSIS — C18.4 CANCER OF TRANSVERSE COLON (HCC): ICD-10-CM

## 2021-12-29 DIAGNOSIS — F10.21 HISTORY OF ALCOHOLISM (HCC): ICD-10-CM

## 2021-12-29 DIAGNOSIS — Z87.891 PERSONAL HISTORY OF TOBACCO USE: ICD-10-CM

## 2021-12-29 DIAGNOSIS — E87.6 HYPOKALEMIA: ICD-10-CM

## 2021-12-29 DIAGNOSIS — Z80.9 FAMILY HISTORY OF CANCER: ICD-10-CM

## 2021-12-29 DIAGNOSIS — D64.9 ANEMIA, UNSPECIFIED TYPE: ICD-10-CM

## 2021-12-29 DIAGNOSIS — C18.9 ADENOCARCINOMA, COLON (HCC): Primary | ICD-10-CM

## 2021-12-29 DIAGNOSIS — Z51.11 ENCOUNTER FOR CHEMOTHERAPY MANAGEMENT: Primary | ICD-10-CM

## 2021-12-29 DIAGNOSIS — Z90.49 S/P RIGHT COLECTOMY: ICD-10-CM

## 2021-12-29 DIAGNOSIS — C18.9 ADENOCARCINOMA, COLON (HCC): ICD-10-CM

## 2021-12-29 LAB
ABSOLUTE IMMATURE GRANULOCYTE: 0.02 THOU/MM3 (ref 0–0.07)
ALBUMIN SERPL-MCNC: 3.8 G/DL (ref 3.5–5.1)
ALP BLD-CCNC: 128 U/L (ref 38–126)
ALT SERPL-CCNC: 44 U/L (ref 11–66)
AST SERPL-CCNC: 36 U/L (ref 5–40)
BASINOPHIL, AUTOMATED: 1 % (ref 0–3)
BASOPHILS ABSOLUTE: 0.1 THOU/MM3 (ref 0–0.1)
BILIRUB SERPL-MCNC: 0.4 MG/DL (ref 0.3–1.2)
BILIRUBIN DIRECT: < 0.2 MG/DL (ref 0–0.3)
BUN, WHOLE BLOOD: 11 MG/DL (ref 8–26)
CHLORIDE, WHOLE BLOOD: 107 MEQ/L (ref 98–109)
CREATININE, WHOLE BLOOD: 0.6 MG/DL (ref 0.5–1.2)
EOSINOPHILS ABSOLUTE: 0.1 THOU/MM3 (ref 0–0.4)
EOSINOPHILS RELATIVE PERCENT: 2 % (ref 0–4)
GFR, ESTIMATED: > 90 ML/MIN/1.73M2
GLUCOSE, WHOLE BLOOD: 91 MG/DL (ref 70–108)
HCT VFR BLD CALC: 36.9 % (ref 42–52)
HEMOGLOBIN: 11.9 GM/DL (ref 14–18)
IMMATURE GRANULOCYTES: 0 %
IONIZED CALCIUM, WHOLE BLOOD: 1.19 MMOL/L (ref 1.12–1.32)
LYMPHOCYTES # BLD: 26 % (ref 15–47)
LYMPHOCYTES ABSOLUTE: 1.9 THOU/MM3 (ref 1–4.8)
MCH RBC QN AUTO: 27.9 PG (ref 26–33)
MCHC RBC AUTO-ENTMCNC: 32.2 GM/DL (ref 32.2–35.5)
MCV RBC AUTO: 86 FL (ref 80–94)
MONOCYTES ABSOLUTE: 0.9 THOU/MM3 (ref 0.4–1.3)
MONOCYTES: 12 % (ref 0–12)
PDW BLD-RTO: 21.7 % (ref 11.5–14.5)
PLATELET # BLD: 246 THOU/MM3 (ref 130–400)
PMV BLD AUTO: 9.4 FL (ref 9.4–12.4)
POTASSIUM, WHOLE BLOOD: 3.3 MEQ/L (ref 3.5–4.9)
RBC # BLD: 4.27 MILL/MM3 (ref 4.7–6.1)
SEG NEUTROPHILS: 59 % (ref 43–75)
SEGMENTED NEUTROPHILS ABSOLUTE COUNT: 4.4 THOU/MM3 (ref 1.8–7.7)
SODIUM, WHOLE BLOOD: 144 MEQ/L (ref 138–146)
TOTAL CO2, WHOLE BLOOD: 27 MEQ/L (ref 23–33)
TOTAL PROTEIN: 6.5 G/DL (ref 6.1–8)
WBC # BLD: 7.4 THOU/MM3 (ref 4.8–10.8)

## 2021-12-29 PROCEDURE — 80047 BASIC METABLC PNL IONIZED CA: CPT

## 2021-12-29 PROCEDURE — 36591 DRAW BLOOD OFF VENOUS DEVICE: CPT

## 2021-12-29 PROCEDURE — 6360000002 HC RX W HCPCS: Performed by: INTERNAL MEDICINE

## 2021-12-29 PROCEDURE — 2580000003 HC RX 258: Performed by: INTERNAL MEDICINE

## 2021-12-29 PROCEDURE — 99215 OFFICE O/P EST HI 40 MIN: CPT | Performed by: NURSE PRACTITIONER

## 2021-12-29 PROCEDURE — 85025 COMPLETE CBC W/AUTO DIFF WBC: CPT

## 2021-12-29 PROCEDURE — 80076 HEPATIC FUNCTION PANEL: CPT

## 2021-12-29 PROCEDURE — 99211 OFF/OP EST MAY X REQ PHY/QHP: CPT

## 2021-12-29 RX ORDER — HEPARIN SODIUM (PORCINE) LOCK FLUSH IV SOLN 100 UNIT/ML 100 UNIT/ML
500 SOLUTION INTRAVENOUS PRN
Status: CANCELLED | OUTPATIENT
Start: 2021-12-29

## 2021-12-29 RX ORDER — SODIUM CHLORIDE 0.9 % (FLUSH) 0.9 %
5-40 SYRINGE (ML) INJECTION PRN
Status: CANCELLED | OUTPATIENT
Start: 2021-12-29

## 2021-12-29 RX ORDER — SODIUM CHLORIDE 0.9 % (FLUSH) 0.9 %
5-40 SYRINGE (ML) INJECTION PRN
Status: DISCONTINUED | OUTPATIENT
Start: 2021-12-29 | End: 2021-12-30 | Stop reason: HOSPADM

## 2021-12-29 RX ORDER — SODIUM CHLORIDE 9 MG/ML
25 INJECTION, SOLUTION INTRAVENOUS PRN
Status: CANCELLED | OUTPATIENT
Start: 2021-12-29

## 2021-12-29 RX ORDER — HEPARIN SODIUM (PORCINE) LOCK FLUSH IV SOLN 100 UNIT/ML 100 UNIT/ML
500 SOLUTION INTRAVENOUS PRN
Status: DISCONTINUED | OUTPATIENT
Start: 2021-12-29 | End: 2021-12-30 | Stop reason: HOSPADM

## 2021-12-29 RX ADMIN — SODIUM CHLORIDE, PRESERVATIVE FREE 10 ML: 5 INJECTION INTRAVENOUS at 08:11

## 2021-12-29 RX ADMIN — SODIUM CHLORIDE, PRESERVATIVE FREE 20 ML: 5 INJECTION INTRAVENOUS at 08:12

## 2021-12-29 RX ADMIN — HEPARIN 500 UNITS: 100 SYRINGE at 08:46

## 2021-12-29 NOTE — PROGRESS NOTES
Patient tolerated  Lab draw from [address] [facility] without any complications. Discharge instructions given to patient-verbalizes understanding. Ambulated off unit per self with belongings.

## 2021-12-29 NOTE — PLAN OF CARE
Problem: Musculor/Skeletal Functional Status  Goal: Absence of falls  Outcome: Met This Shift  Note: No falls occurred with visit today. Intervention: Fall precautions  Note: Discussed the need to use the call light for assistance when getting up to ambulate. Problem: Intellectual/Education/Knowledge Deficit  Goal: Teaching initiated upon admission  Outcome: Met This Shift  Note: Instructed on lab draw from [address] [facility]. Intervention: Verbal/written education provided  Note: Patient verbalizes understanding to verbal information given on lab draw,action and possible side effects. Aware to call MD if develop complications. Problem: Discharge Planning:  Goal: Discharged to appropriate level of care  Description: Discharged to appropriate level of care  Outcome: Met This Shift  Note: Verbalize understanding of discharge instructions, follow up appointments, and when to call Physician. Intervention: Assess readiness for discharge  Note: Discuss understanding of discharge instructions, follow up appointments and when to call Physician. Problem: Infection - Central Venous Catheter-Associated Bloodstream Infection:  Goal: Will show no infection signs and symptoms  Description: Will show no infection signs and symptoms  Outcome: Met This Shift  Note: Mediport site with no redness or warmth. Skin over port site intact with no signs of breakdown noted. Patient verbalizes signs/symptoms of port infection and when to notify the physician. Intervention: Infection risk assessment  Note: Discuss port maintenance,infection prevention, sign of infection and when to call MD.     Care plan reviewed with patient. Patient verbalizes understanding of the plan of care and contributes to goal setting.

## 2021-12-29 NOTE — PATIENT INSTRUCTIONS
1.  Return to clinic to see Dr. Jean-Pierre Patterson with labs in 1 week  2. Return to clinic in 1 week for next cycle of treatment. Patient Education        Potassium-Rich Diet: Care Instructions  Your Care Instructions     Potassium is a mineral. It helps keep the right mix of fluids in your body. It also helps your nerves and muscles work as they should. You'll find it in milk and meats. It's also in all fresh foods, including fruits and vegetables. Most adults need about 5 grams of potassium a day. The foods you eat should supply all that you need. Some health conditions can cause a loss of potassium. For example, kidney problems and stomach problems with vomiting and diarrhea can cause you to lose this mineral. Some medicines, such as water pills (diuretics), can cause low potassium. If you can't get enough potassium from what you eat, your doctor may advise you to take supplements. Follow-up care is a key part of your treatment and safety. Be sure to make and go to all appointments, and call your doctor if you are having problems. It's also a good idea to know your test results and keep a list of the medicines you take. How can you care for yourself at home? · Plan your diet around foods that are rich in potassium. Fresh, unprocessed whole foods have the most. These foods include:  ? Milk and other dairy products. ? Vegetables, especially broccoli, cooked dry beans, tomatoes, potatoes, artichokes, winter squash, and spinach. ? Fruits, especially citrus fruits, bananas, and apricots. Dried apricots contain more potassium than fresh apricots. ? Meat, poultry, and fish. · Ask your doctor about using a salt substitute or \"light\" salt. These often contain potassium. Where can you learn more? Go to [email or web address] org and sign in to your [employer] account. Enter H315 in the [employer] box to learn more about \"Potassium-Rich Diet: Care Instructions. \"     If you do not have an account, please click on the \"Sign Up Now\" link. Current as of: September 8, 2021               Content Version: 13.1  © 2006-2021 [employer]. Care instructions adapted under license by [facility] ([facility]). If you have questions about a medical condition or this instruction, always ask your healthcare professional. [address] any warranty or liability for your use of this information.

## 2021-12-29 NOTE — PROGRESS NOTES
[facility], [address] [facility]  [address] [address]  Dept: [phone]  Dept Fax: [phone]: [phone]      Visit Date:12/29/2021     Yang Larkin is a 45 y.o. male who presents today for:   Chief Complaint   Patient presents with    Follow-up     Malignant neoplasm of colon, unspecified part of colon         HPI:   Yang Larkin is a 45 y.o. male who follows in our office with Dr. Justin George for colon cancer. Per Dr. Nell Whitfield note on 11/9/2021:  His history of colon cancer goes back to September 2021 when he was seen by  Dr. Meeta Sims to unexplained weight loss of 10-15 lbs and rectal bleeding.  Colonoscopy performed on September 1, 2021 demonstrated multiple polyps but a larger mass at the proximal transverse colon demonstrated the adenocarcinoma.  He did not have signs of obstruction, no abdominal pain, no significant nausea or vomiting.  Some previous episodes of nausea, GERD and epigastric discomfort. He had CT of the chest abdomen and pelvis on September 2, 2021,no evidence of metastatic disease. The patient met with Dr. Ryan Austin and after discussing his surgical treatment options he proceeded with right hemicolectomy on September 16, 2021. Final pathology report showed:  A.  Right colon mass, hemicolectomy:    Invasive moderately differentiated colonic adenocarcinoma, pT2.    Margins are free of neoplasia.           Pericolonic lymph nodes (17): 2 out of 17 are positive for   metastatic colonic adenocarcinoma.             Appendix-no pathologic abnormality. Doy Giovanni base lymph nodes (2), resection:             Negative for malignancy (0/2).      pT2 pN1b     He had uneventful post surgical course.     Extensive history of colorectal disease paternal side.  [facility] is fairly active and eats a healthy diet.  Denies any new urinary complaints.       Current history on November 9, 2021:  The patient presents to the medical oncology clinic to start adjuvant chemotherapy with XELOX today. He received Xeloda and medication. In preparation for chemotherapy he underwent successful and uncomplicated Mediport placement. Today the patient denies having any new complaints since last visit. His appetite is fine he denies having any abdominal pain      Interval History 12/29/2021:   Pt presents to the office today for follow up and evaluation of colon cancer, as well as evaluation after starting Xeloda at dose reduction. Pt reports diarrhea improved, K 3.3 today. Pt would like to try to increase K level via diet. K rich diet info. Added to AVS.  Pt reports mouth sores improved. No redness to hands/feet, cold sensitivity remains. Pt also reports cold sensitivity in teeth, hasn't been to dentist in 5 years. He reports that he is overdue to have dental work done but is waiting until he is done with treatment. Pt states appetite improved, has gained 7#. Pt reports nausea is improved, very occasional now. Pt denies fever/chills, s/s infections, H/A, dizziness, cough, SOB, CP, heart palpitations, abdominal pain, V/C/D, peripheral edema, s/s bleeding. PMH, SH, and FH:  I reviewed the patient's medication and allergy lists as noted on the electronic medical record. The PMH, SH, and FH were also reviewed as noted on the EMR.         Past Medical History:   Diagnosis Date    Anemia     Colon cancer ([facility] [address] 09/2021      Past Surgical History:   Procedure Laterality Date    COLONOSCOPY  09/01/2021    Dr. Michelle Minaya Right 9/16/2021    ROBOT EXTENDED RIGHT COLECTOMY performed by Dave Costa MD at [address] Left 2020    [address] with mesh inguinal    PORT SURGERY N/A 11/8/2021    SINGLE LUMEN SMART PORT INSERTION performed by Dave Costa MD at [facility] History   Problem Relation Age of Onset    Other Mother         Brain aneurysm    COPD Mother     Stroke Mother    Sintia Fontaine Cancer Father         lung mets brain and bone    Stroke Father     Cancer Maternal Grandmother         colon    High Blood Pressure Maternal Grandmother     Cancer Maternal Grandfather         colon     High Blood Pressure Maternal Grandfather     Kidney Disease Paternal Grandmother     Cancer Paternal Grandmother         breast     Diabetes Paternal Grandmother     High Blood Pressure Paternal Grandmother     Cancer Paternal Grandfather         lung-52    Heart Attack Paternal Grandfather     High Blood Pressure Paternal Grandfather     Cancer Paternal Aunt         colon    Heart Attack Paternal Aunt     Cancer Paternal Uncle         lung    Cancer Maternal Aunt         colon    Cancer Maternal Uncle         colon      Social History     Tobacco Use    Smoking status: Former Smoker     Packs/day: 1.00     Years: 35.00     Pack years: 35.00     Types: Cigarettes     Quit date: 2020     Years since quittin.9    Smokeless tobacco: Never Used   Substance Use Topics    Alcohol use: Not Currently      Current Outpatient Medications   Medication Sig Dispense Refill    capecitabine (XELODA) 500 MG chemo tablet Take 2 tablets (1,000 mg total) by mouth 2 times daily for 14 days 56 tablet 0    Handicap Placard MISC by Does not apply route Diagnosis: No primary diagnosis found. Expiration Date: 2022 1 each 0    Handicap Placard MISC by Does not apply route Diagnosis: colon cancer, stage IIIA.  Currently completing chemotherapy   Expiration: 6 months 1 each 0    sucralfate (CARAFATE) 1 GM tablet take 1 tablet by mouth twice a day FIVE TO 60 MINS BEFORE LUNCH AND DINNER      ferrous sulfate (IRON 325) 325 (65 Fe) MG tablet Take 1 tablet by mouth daily (with breakfast) 90 tablet 1    omeprazole (PRILOSEC OTC) 20 MG tablet Take 20 mg by mouth daily       ondansetron (ZOFRAN-ODT) 4 MG disintegrating tablet Take 1 tablet by mouth 3 times daily as needed for Nausea or Vomiting (Patient not taking: Reported on 12/29/2021) 21 tablet 0    naloxegol (MOVANTIK) 12.5 MG TABS tablet Take 1 tablet by mouth every morning (Patient not taking: Reported on 12/15/2021) 30 tablet 0    dicyclomine (BENTYL) 10 MG capsule Take 2 capsules by mouth 3 times daily (Patient not taking: Reported on 12/15/2021) 120 capsule 0    Magic Mouthwash (MIRACLE MOUTHWASH) Swish and spit 5 mLs 4 times daily as needed for Irritation Mixture of 30mL diphenhydramine, 60mL maalox, 4g carafate as ratio (Patient not taking: Reported on 12/15/2021) 300 mL 0    ondansetron (ZOFRAN) 4 MG tablet Take 1 tablet by mouth every 6 hours as needed for Nausea or Vomiting (Patient not taking: Reported on 12/29/2021) 30 tablet 1    ondansetron (ZOFRAN-ODT) 4 MG disintegrating tablet  (Patient not taking: Reported on 12/29/2021)       No current facility-administered medications for this visit. Facility-Administered Medications Ordered in Other Visits   Medication Dose Route Frequency Provider Last Rate Last Admin    sodium chloride flush 0.9 % injection 5-40 mL  5-40 mL IntraVENous PRN Nida Edmonds MD   20 mL at 12/29/21 0812    heparin flush 100 UNIT/ML injection 500 Units  500 Units IntraCATHeter PRN Nida Edmonds MD   500 Units at 12/29/21 0846      No Known Allergies       Review of Systems:   Review of Systems   Pertinent review of systems noted in HPI, all other ROS negative. Objective:   Physical Exam   BP (!) 142/69 (Site: Left Upper Arm, Position: Sitting, Cuff Size: Medium Adult)   Pulse 82   Temp 98.1 °F (36.7 °C) (Oral)   Resp 16   Ht 6' (1.829 m)   Wt 143 lb 9.6 oz (65.1 kg)   SpO2 98%   BMI 19.48 kg/m²    General appearance: No apparent distress, calm and cooperative. HEENT: Pupils equal, round, and reactive to light. Conjunctivae/corneas clear. Oral mucosa moist.  Neck: Supple, with full range of motion. Trachea midline. Respiratory:  Normal respiratory effort. Clear to auscultation all lung fields.   Cardiovascular: RRR, S1/S2. Abdomen: Soft, non-tender, non-distended with active BS x 4. Musculoskeletal: No clubbing, cyanosis or edema bilaterally. Pt able to ambulate in office without difficulty or use of assistive device. Skin: Skin color, texture, turgor normal.  No visible rashes or lesions. Neurologic:  Neurovascularly intact without any focal sensory/motor deficits. Cranial nerves: II-XII intact, grossly non-focal.  Psychiatric: Alert and oriented x 3, thought content appropriate, normal insight  Capillary Refill: Brisk,< 3 seconds   Peripheral Pulses: +2 palpable, equal bilaterally       Imaging Studies and Labs:   CBC:   Lab Results   Component Value Date    WBC 7.4 12/29/2021    HGB 11.9 (L) 12/29/2021    HCT 36.9 (L) 12/29/2021    MCV 86 12/29/2021     12/29/2021     BMP:   Lab Results   Component Value Date     12/29/2021     12/04/2021    K 3.3 12/29/2021    K 4.0 12/04/2021    K 3.4 11/30/2021     12/04/2021    CO2 29 12/04/2021    BUN 8 12/04/2021    CREATININE 0.6 12/29/2021    CREATININE 0.7 12/04/2021    GLUCOSE 87 12/04/2021    CALCIUM 7.7 12/04/2021      LFT:   Lab Results   Component Value Date    ALT 91 (H) 12/15/2021    AST 42 (H) 12/15/2021    ALKPHOS 232 (H) 12/15/2021    BILITOT 0.2 (L) 12/15/2021         Assessment and Plan:     1. Adenocarcinoma, colon ([facility] [address]  S/p R hemicolectomy with LN dissection. Path (+) invasive moderately differentiated colonic adenocarcinoma. Adjuvant chemotherapy recommended with lymph node involvement with goal of curative intent to eradicate micrometastases. Treatmentn recommendations include capecitabine/OXALIplatin with Xeloda. Pt tolerating reduced dose of Xeloda well, diarrhea greatly improved. K 3.3 today, pt wishes to try K rich diet vs K replacement. Info. Added to AVS.  Pt to return to clinic next week for next cycle of treatment. 2. Anemia, unspecified type  H/H 11.9/36.9. MCV 86. Pt denies s/s bleeding. Trend.     3. Hypokalemia  K 3.3. Pt wishes to try K rich diet vs K replacement. Info. Added to AVS.        Return in about 1 week (around 1/5/2022). All patient questions answered. Pt voiced understanding. Patient agreed with treatment plan. Follow up as directed. Patient instructed to call for questions or concerns. Electronically signed by   LUCY Reddy CNP     I spent a total of 41 minutes on the day of the visit.

## 2022-01-02 ASSESSMENT — ENCOUNTER SYMPTOMS
RECTAL PAIN: 0
VOMITING: 0
CONSTIPATION: 0
FACIAL SWELLING: 0
NAUSEA: 0
SORE THROAT: 0
SHORTNESS OF BREATH: 0
DIARRHEA: 0
WHEEZING: 0
TROUBLE SWALLOWING: 0
BACK PAIN: 0
CHEST TIGHTNESS: 0
ABDOMINAL PAIN: 0
EYE DISCHARGE: 0
COUGH: 0
COLOR CHANGE: 0
ABDOMINAL DISTENTION: 0
BLOOD IN STOOL: 0

## 2022-01-02 NOTE — PROGRESS NOTES
[facility] [address] [facility] [address], [address] [facility] [address]  [facility]  Dept: [phone]  Dept Fax: [phone]: [phone]    Visit Date:12/15/2021     Tete Rodriguez is a 45 y.o. male who presents today for:   Chief Complaint   Patient presents with    Follow-up     Malignant neoplasm of colon, unspecified part of colon [facility])        HPI:     Mr. Fely Cuevas is a 50-year-old patient with newly diagnosed stage III colon cancer. He presents to the medical oncology clinic to discuss postsurgical management. His history of colon cancer goes back to September 2021 when he was seen by  Dr. Taisha Olsen due to unexplained weight loss of 10-15 lbs and rectal bleeding. Colonoscopy performed on September 1, 2021 demonstrated multiple polyps but a larger mass at the proximal transverse colon demonstrated the adenocarcinoma. He did not have signs of obstruction, no abdominal pain, no significant nausea or vomiting. Some previous episodes of nausea, GERD and epigastric discomfort. He had CT of the chest abdomen and pelvis on September 2, 2021,no evidence of metastatic disease. The patient met with Dr. Dawit Betts and after discussing his surgical treatment options he proceeded with right hemicolectomy on September 16, 2021. Final pathology report showed:  A.  Right colon mass, hemicolectomy:    Invasive moderately differentiated colonic adenocarcinoma, pT2.    Margins are free of neoplasia.           Pericolonic lymph nodes (17): 2 out of 17 are positive for   metastatic colonic adenocarcinoma.             Appendix-no pathologic abnormality. Caleb Kristin base lymph nodes (2), resection:             Negative for malignancy (0/2). pT2 pN1b    He had uneventful post surgical course. Extensive history of colorectal disease on paternal side.     On November 9, 2021 the patient received first cycle of adjuvant chemotherapy with Xeloda    Current history on December 15, 2021:  On November 24, 2021 he was admitted to the hospital with a 1 week history of intractable periumbilical abdominal pain of 9/10 intensity.  Associated nausea vomiting diarrhea and fever last few days. Diagnosed with acute enterocolitis, possible chemotherapy induced. IV Cipro & Flagyl during hospitalization. SBO vs ileus:General Surgery consulted. KUB showed possible SBO vs ileus. CT A/P showed worsening since previous. Managed medically with NGT. Advance diet slowly.  Possible fluid overload: Lasix have been given for swelling. On discharge from the hospital on December 4, 2021 the patient was placed on the following medications:  Bentyl 3 times daily,-Magic mouthwash 4 times daily as needed, -ferrous sulfate daily, Zofran every 8 hours as needed,-omeprazole,Compazine every 6 hours as needed, Reglan 2 times daily. After being discharged from the hospital the patient presented to chemotherapy suite on few occasions for IV hydration and potassium supplementation. He also had outpatient dietary consult. Janey Rodriguez the patient denies having aminal pain, no diarrhea. He is slowly advancing his diet. He reports grade 1 fatigue. He denies having any peripheral neuropathy from oxaliplatin. Oral mucositis is resolved.   HPI   Past Medical History:   Diagnosis Date    Anemia     Colon cancer ([address] 09/2021      Past Surgical History:   Procedure Laterality Date    COLONOSCOPY  09/01/2021    Dr. Kya Haynes Right 9/16/2021    ROBOT EXTENDED RIGHT COLECTOMY performed by Prasanth Linn MD at [facility] Left 2020    [address] with mesh inguinal    PORT SURGERY N/A 11/8/2021    SINGLE LUMEN SMART PORT INSERTION performed by Prasanth Linn MD at [address] [facility] History   Problem Relation Age of Onset    Other Mother         Brain aneurysm    COPD Mother     Stroke Mother     Cancer Father         lung mets brain and bone    Stroke Father     Cancer Maternal Grandmother         colon    High Blood Pressure Maternal Grandmother     Cancer Maternal Grandfather         colon     High Blood Pressure Maternal Grandfather     Kidney Disease Paternal Grandmother     Cancer Paternal Grandmother         breast     Diabetes Paternal Grandmother     High Blood Pressure Paternal Grandmother     Cancer Paternal Grandfather         lung-52    Heart Attack Paternal Grandfather     High Blood Pressure Paternal Grandfather     Cancer Paternal Aunt         colon    Heart Attack Paternal Aunt     Cancer Paternal Uncle         lung    Cancer Maternal Aunt         colon    Cancer Maternal Uncle         colon      Social History     Tobacco Use    Smoking status: Former Smoker     Packs/day: 1.00     Years: 35.00     Pack years: 35.00     Types: Cigarettes     Quit date: 2020     Years since quittin.9    Smokeless tobacco: Never Used   Substance Use Topics    Alcohol use: Not Currently      Current Outpatient Medications   Medication Sig Dispense Refill    ondansetron (ZOFRAN-ODT) 4 MG disintegrating tablet Take 1 tablet by mouth 3 times daily as needed for Nausea or Vomiting (Patient not taking: Reported on 2021) 21 tablet 0    Handicap Placard MISC by Does not apply route Diagnosis: No primary diagnosis found. Expiration Date: 2022 1 each 0    Handicap Placard MISC by Does not apply route Diagnosis: colon cancer, stage IIIA.  Currently completing chemotherapy   Expiration: 6 months 1 each 0    ondansetron (ZOFRAN) 4 MG tablet Take 1 tablet by mouth every 6 hours as needed for Nausea or Vomiting (Patient not taking: Reported on 2021) 30 tablet 1    sucralfate (CARAFATE) 1 GM tablet take 1 tablet by mouth twice a day FIVE TO 60 MINS BEFORE LUNCH AND DINNER      ondansetron (ZOFRAN-ODT) 4 MG disintegrating tablet  (Patient not taking: Reported on 2021)      ferrous sulfate (IRON 325) 325 (65 Fe) MG tablet Take 1 tablet by mouth daily (with breakfast) 90 tablet 1    omeprazole (PRILOSEC OTC) 20 MG tablet Take 20 mg by mouth daily       naloxegol (MOVANTIK) 12.5 MG TABS tablet Take 1 tablet by mouth every morning (Patient not taking: Reported on 12/15/2021) 30 tablet 0    dicyclomine (BENTYL) 10 MG capsule Take 2 capsules by mouth 3 times daily (Patient not taking: Reported on 12/15/2021) 120 capsule 0    Magic Mouthwash (MIRACLE MOUTHWASH) Swish and spit 5 mLs 4 times daily as needed for Irritation Mixture of 30mL diphenhydramine, 60mL maalox, 4g carafate as ratio (Patient not taking: Reported on 12/15/2021) 300 mL 0     No current facility-administered medications for this visit. No Known Allergies   Health Maintenance   Topic Date Due    Shingles Vaccine (2 of 2) 11/18/2021    Depression Screen  07/23/2022    Pneumococcal 0-64 years Vaccine (2 of 4 - PCV13) 07/23/2022    Colon cancer screen colonoscopy  09/01/2022    Low dose CT lung screening  09/02/2022    Lipid screen  07/23/2026    DTaP/Tdap/Td vaccine (2 - Td or Tdap) 07/23/2031    Flu vaccine  Completed    COVID-19 Vaccine  Completed    Hepatitis A vaccine  Aged Out    Hepatitis B vaccine  Aged Out    Hib vaccine  Aged Out    Meningococcal (ACWY) vaccine  Aged Out    Hepatitis C screen  Discontinued    HIV screen  Discontinued        Subjective:   Review of Systems   Constitutional: Negative for activity change, appetite change, fatigue and fever. HENT: Negative for congestion, dental problem, facial swelling, hearing loss, mouth sores, nosebleeds, sore throat, tinnitus and trouble swallowing. Eyes: Negative for discharge and visual disturbance. Respiratory: Negative for cough, chest tightness, shortness of breath and wheezing. Cardiovascular: Negative for chest pain, palpitations and leg swelling. Gastrointestinal: Negative for abdominal distention, abdominal pain, blood in stool, constipation, diarrhea, nausea, rectal pain and vomiting.    Endocrine: Negative for cold intolerance, polydipsia and polyuria. Genitourinary: Negative for decreased urine volume, difficulty urinating, dysuria, flank pain, hematuria and urgency. Musculoskeletal: Negative for arthralgias, back pain, gait problem, joint swelling, myalgias and neck stiffness. Skin: Negative for color change, rash and wound. Neurological: Negative for dizziness, tremors, seizures, speech difficulty, weakness, light-headedness, numbness and headaches. Hematological: Negative for adenopathy. Does not bruise/bleed easily. Psychiatric/Behavioral: Negative for confusion and sleep disturbance. The patient is not nervous/anxious. Objective:   Physical Exam  Vitals reviewed. Constitutional:       General: He is not in acute distress. Appearance: He is well-developed. HENT:      Head: Normocephalic. Mouth/Throat:      Pharynx: No oropharyngeal exudate. Eyes:      General: No scleral icterus. Right eye: No discharge. Left eye: No discharge. Pupils: Pupils are equal, round, and reactive to light. Neck:      Thyroid: No thyromegaly. Vascular: No JVD. Trachea: No tracheal deviation. Cardiovascular:      Rate and Rhythm: Normal rate. Heart sounds: Normal heart sounds. No murmur heard. No friction rub. No gallop. Pulmonary:      Effort: Pulmonary effort is normal. No respiratory distress. Breath sounds: Normal breath sounds. No stridor. No wheezing or rales. Chest:      Chest wall: No tenderness. Abdominal:      General: Bowel sounds are normal. There is no distension. Palpations: Abdomen is soft. There is no mass. Tenderness: There is no abdominal tenderness. There is no rebound. Musculoskeletal:         General: Normal range of motion. Cervical back: Normal range of motion and neck supple. Comments: Good range of motion in all four extremities. Lymphadenopathy:      Cervical: No cervical adenopathy.    Skin: General: Skin is warm. Findings: No erythema or rash. Neurological:      Mental Status: He is alert and oriented to person, place, and time. Cranial Nerves: No cranial nerve deficit. Motor: No abnormal muscle tone. Deep Tendon Reflexes: Reflexes are normal and symmetric. Psychiatric:         Behavior: Behavior normal.         Thought Content: Thought content normal.         Judgment: Judgment normal.         /75 (Site: Left Upper Arm, Position: Sitting, Cuff Size: Medium Adult)   Pulse 77   Temp 98.7 °F (37.1 °C) (Oral)   Resp 18   Ht 6' (1.829 m)   Wt 136 lb (61.7 kg)   SpO2 98%   BMI 18.44 kg/m²      ECOG status is 0    Imaging studies and labs:   CT of the abdomen and pelvis on September 2, 2021 showed:   1. Unremarkable CT abdomen and pelvis with IV contrast.    CT of the chest on September 2, 2021 showed:  1. No suspicious pulmonary nodules. 2. No evidence of metastatic disease. 3. No acute infiltrate or pleural effusion       CT ABDOMEN PELVIS W IV CONTRAST Additional Contrast? None    Result Date: 10/21/2021  PROCEDURE: CT ABDOMEN PELVIS W IV CONTRAST CLINICAL INFORMATION: Abdominal pain. COMPARISON: CT abdomen and pelvis 9/2/2021. TECHNIQUE: Axial 5 mm CT images were obtained through the abdomen and pelvis after the administration of 80  cc Isovue 370 intravenous contrast. Coronal and sagittal reconstructions were obtained. All CT scans at this facility use dose modulation, iterative reconstruction, and/or weight-based dosing when appropriate to reduce radiation dose to as low as reasonably achievable. FINDINGS: Lung bases: Atelectasis is noted at the lung bases. The heart size is normal. Liver/gallbladder/bilary tree: Thickening of the wall of the gallbladder near the fundus could represent adenomyomatosis. No radiopaque gallstones or biliary ductal dilatation is observed. No liver lesions are identified. Pancreas/spleen/adrenal glands: Normal size and attenuation. Kidneys/ureters/bladder: No renal calculus, hydronephrosis, or hydroureter is present. The urinary bladder is unremarkable. Gastrointestinal:  Postoperative changes related to right hemicolectomy with a surgical anastomosis in the right lower quadrant appears intact. There is question area of intussusception of small bowel loops within the transverse colon at the site of the anastomosis (series 2, image 45). No bowel obstruction, free fluid, fluid collection, or free air is observed. Moderate retained fecal material is seen throughout the colon. Retroperitoneum/lymph nodes: The aorta is not dilated. No lymphadenopathy is present. Pelvis: The prostate gland is not enlarged. Musculoskeletal: The visualized skeletal structures appear intact. 1. Postoperative changes related to right hemicolectomy appear intact. There may be intussusception of the small bowel within the colon at the site of the surgical anastomosis (series 2, image 45) however there is no associated bowel obstruction, free fluid, fluid collection, or free air identified. Moderate retained fecal material is seen throughout the colon suggesting fecal stasis. Assessment for bowel wall thickening is limited without oral contrast. 2. 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 No radiopaque gallstones or biliary ductal dilatation identified. Correlate with liver function tests. Chronic findings are discussed. **This report has been created using voice recognition software. It may contain minor errors which are inherent in voice recognition technology. ** Final report electronically signed by Dr Yobany Leone on 10/21/2021 10:02 AM      Lab Results   Component Value Date    WBC 7.4 12/29/2021    HGB 11.9 (L) 12/29/2021    HCT 36.9 (L) 12/29/2021    MCV 86 12/29/2021     12/29/2021       Chemistry        Component Value Date/Time     12/29/2021 0823     12/04/2021 0600    K 3.3 (L) 12/29/2021 0823    K 4.0 12/04/2021 0600    K 3.4 (L) 11/30/2021 0600     12/04/2021 0600    CO2 29 12/04/2021 0600    BUN 8 12/04/2021 0600    CREATININE 0.6 12/29/2021 0823    CREATININE 0.7 12/04/2021 0600        Component Value Date/Time    CALCIUM 7.7 (L) 12/04/2021 0600    ALKPHOS 128 (H) 12/29/2021 0824    AST 36 12/29/2021 0824    ALT 44 12/29/2021 0824    BILITOT 0.4 12/29/2021 0824          Assessment/Plan:   1. Stage III A colon cancer. This is a 62-year-old patient with newly diagnosed stage IIIa colon cancer. He is status post right hemicolectomy colectomy with lymph node dissection. I had a lengthy discussion with the patient and his wife about post surgical management. Adjuvant chemotherapy is indicated for the patients with lymph node involvement. For patients who have undergone potentially curative resection of a colon cancer, the goal of postoperative chemotherapy is to eradicate micrometastases, thereby reducing the likelihood of disease recurrence and increasing the cure rate. The benefits of adjuvant chemotherapy for patients with node-positive disease are in the range of 30 % reduction in the risk of disease recurrence and a 20-30% reduction in mortality with modern chemotherapy. Adjuvant therapy for resected stage III colon cancer involves a six-month course of a combination of several chemotherapy drugs, which are given intravenously, in a specific order on specific days. However, based on the [facility] and NSABP C-07 trials and an analysis of the International Duration Evaluation of Adjuvant Chemotherapy (IDEA) collaboration (six separate randomized trials of six versus three months of adjuvant oxaliplatin-based therapy), we continue to suggest six months of therapy for individuals with high-risk cancers (T4N2). However, patients with high-risk cancers ramiro limiting adjuvant therapy to three months in patients with low-risk disease (T1-3N1) is valid option.  Orally active fluoropyrimidine,  Xeloda offers increased convenience.  CAPOX is tablet     Sig: Take 1 tablet by mouth 3 times daily as needed for Nausea or Vomiting     Dispense:  21 tablet     Refill:  0    promethazine (PHENERGAN) 12.5 MG tablet     Sig: Take 1 tablet by mouth every 6 hours as needed for Nausea     Dispense:  28 tablet     Refill:  1            Discussed use, benefit, and side effectsof prescribed medications. All patient questions answered. Pt voiced understanding. Instructed to continue current medications, diet and exercise. Patient agreed with treatment plan. Follow up as directed.     Electronically signed by Shayy Tobias MD on 11/3/21 at 8:15 AM EDT

## 2022-01-04 RX ORDER — METHYLPREDNISOLONE SODIUM SUCCINATE 125 MG/2ML
125 INJECTION, POWDER, LYOPHILIZED, FOR SOLUTION INTRAMUSCULAR; INTRAVENOUS ONCE
Status: CANCELLED | OUTPATIENT
Start: 2022-01-05 | End: 2022-01-05

## 2022-01-04 RX ORDER — SODIUM CHLORIDE 0.9 % (FLUSH) 0.9 %
5-40 SYRINGE (ML) INJECTION PRN
Status: CANCELLED | OUTPATIENT
Start: 2022-01-05

## 2022-01-04 RX ORDER — SODIUM CHLORIDE 9 MG/ML
25 INJECTION, SOLUTION INTRAVENOUS PRN
Status: CANCELLED | OUTPATIENT
Start: 2022-01-05

## 2022-01-04 RX ORDER — SODIUM CHLORIDE 9 MG/ML
INJECTION, SOLUTION INTRAVENOUS CONTINUOUS
Status: CANCELLED | OUTPATIENT
Start: 2022-01-05

## 2022-01-04 RX ORDER — DIPHENHYDRAMINE HYDROCHLORIDE 50 MG/ML
50 INJECTION INTRAMUSCULAR; INTRAVENOUS ONCE
Status: CANCELLED | OUTPATIENT
Start: 2022-01-05 | End: 2022-01-05

## 2022-01-04 RX ORDER — HEPARIN SODIUM (PORCINE) LOCK FLUSH IV SOLN 100 UNIT/ML 100 UNIT/ML
500 SOLUTION INTRAVENOUS PRN
Status: CANCELLED | OUTPATIENT
Start: 2022-01-05

## 2022-01-05 ENCOUNTER — OFFICE VISIT (OUTPATIENT)
Dept: ONCOLOGY | Age: 52
End: 2022-01-05
Payer: COMMERCIAL

## 2022-01-05 ENCOUNTER — HOSPITAL ENCOUNTER (OUTPATIENT)
Dept: INFUSION THERAPY | Age: 52
Discharge: HOME | End: 2022-01-05
Payer: COMMERCIAL

## 2022-01-05 VITALS
SYSTOLIC BLOOD PRESSURE: 137 MMHG | WEIGHT: 149.6 LBS | HEIGHT: 72 IN | OXYGEN SATURATION: 97 % | BODY MASS INDEX: 20.26 KG/M2 | RESPIRATION RATE: 16 BRPM | HEART RATE: 69 BPM | TEMPERATURE: 97.8 F | DIASTOLIC BLOOD PRESSURE: 86 MMHG

## 2022-01-05 VITALS
DIASTOLIC BLOOD PRESSURE: 87 MMHG | RESPIRATION RATE: 16 BRPM | SYSTOLIC BLOOD PRESSURE: 130 MMHG | HEIGHT: 72 IN | HEART RATE: 66 BPM | WEIGHT: 149.69 LBS | TEMPERATURE: 97.7 F | BODY MASS INDEX: 20.28 KG/M2 | OXYGEN SATURATION: 98 %

## 2022-01-05 DIAGNOSIS — Z51.11 ENCOUNTER FOR CHEMOTHERAPY MANAGEMENT: ICD-10-CM

## 2022-01-05 DIAGNOSIS — F10.21 HISTORY OF ALCOHOLISM (HCC): ICD-10-CM

## 2022-01-05 DIAGNOSIS — C18.9 MALIGNANT NEOPLASM OF COLON, UNSPECIFIED PART OF COLON (HCC): ICD-10-CM

## 2022-01-05 DIAGNOSIS — C18.9 ADENOCARCINOMA, COLON (HCC): Primary | ICD-10-CM

## 2022-01-05 DIAGNOSIS — L53.8 PALMAR ERYTHEMA: ICD-10-CM

## 2022-01-05 DIAGNOSIS — C18.9 ADENOCARCINOMA, COLON (HCC): ICD-10-CM

## 2022-01-05 DIAGNOSIS — Z80.9 FAMILY HISTORY OF CANCER: ICD-10-CM

## 2022-01-05 DIAGNOSIS — Z90.49 S/P RIGHT COLECTOMY: ICD-10-CM

## 2022-01-05 DIAGNOSIS — K52.1 DIARRHEA DUE TO DRUG: ICD-10-CM

## 2022-01-05 DIAGNOSIS — C18.4 CANCER OF TRANSVERSE COLON (HCC): ICD-10-CM

## 2022-01-05 DIAGNOSIS — Z90.49 S/P RIGHT COLECTOMY: Primary | ICD-10-CM

## 2022-01-05 DIAGNOSIS — Z87.891 PERSONAL HISTORY OF TOBACCO USE: ICD-10-CM

## 2022-01-05 LAB
ABSOLUTE IMMATURE GRANULOCYTE: 0.01 THOU/MM3 (ref 0–0.07)
ALBUMIN SERPL-MCNC: 4.1 G/DL (ref 3.5–5.1)
ALP BLD-CCNC: 106 U/L (ref 38–126)
ALT SERPL-CCNC: 69 U/L (ref 11–66)
AST SERPL-CCNC: 44 U/L (ref 5–40)
BASINOPHIL, AUTOMATED: 1 % (ref 0–3)
BASOPHILS ABSOLUTE: 0 THOU/MM3 (ref 0–0.1)
BILIRUB SERPL-MCNC: 0.5 MG/DL (ref 0.3–1.2)
BILIRUBIN DIRECT: < 0.2 MG/DL (ref 0–0.3)
BUN, WHOLE BLOOD: 11 MG/DL (ref 8–26)
CHLORIDE, WHOLE BLOOD: 105 MEQ/L (ref 98–109)
CREATININE, WHOLE BLOOD: 0.8 MG/DL (ref 0.5–1.2)
EOSINOPHILS ABSOLUTE: 0.1 THOU/MM3 (ref 0–0.4)
EOSINOPHILS RELATIVE PERCENT: 2 % (ref 0–4)
GFR, ESTIMATED: > 90 ML/MIN/1.73M2
GLUCOSE, WHOLE BLOOD: 99 MG/DL (ref 70–108)
HCT VFR BLD CALC: 37 % (ref 42–52)
HEMOGLOBIN: 12 GM/DL (ref 14–18)
IMMATURE GRANULOCYTES: 0 %
IONIZED CALCIUM, WHOLE BLOOD: 1.16 MMOL/L (ref 1.12–1.32)
LYMPHOCYTES # BLD: 28 % (ref 15–47)
LYMPHOCYTES ABSOLUTE: 1.5 THOU/MM3 (ref 1–4.8)
MCH RBC QN AUTO: 29 PG (ref 26–33)
MCHC RBC AUTO-ENTMCNC: 32.4 GM/DL (ref 32.2–35.5)
MCV RBC AUTO: 89 FL (ref 80–94)
MONOCYTES ABSOLUTE: 0.7 THOU/MM3 (ref 0.4–1.3)
MONOCYTES: 13 % (ref 0–12)
PDW BLD-RTO: 22.5 % (ref 11.5–14.5)
PLATELET # BLD: 197 THOU/MM3 (ref 130–400)
PMV BLD AUTO: 9.1 FL (ref 9.4–12.4)
POTASSIUM, WHOLE BLOOD: 3.7 MEQ/L (ref 3.5–4.9)
RBC # BLD: 4.14 MILL/MM3 (ref 4.7–6.1)
SEG NEUTROPHILS: 57 % (ref 43–75)
SEGMENTED NEUTROPHILS ABSOLUTE COUNT: 3.1 THOU/MM3 (ref 1.8–7.7)
SODIUM, WHOLE BLOOD: 141 MEQ/L (ref 138–146)
TOTAL CO2, WHOLE BLOOD: 27 MEQ/L (ref 23–33)
TOTAL PROTEIN: 6.1 G/DL (ref 6.1–8)
WBC # BLD: 5.5 THOU/MM3 (ref 4.8–10.8)

## 2022-01-05 PROCEDURE — 36591 DRAW BLOOD OFF VENOUS DEVICE: CPT

## 2022-01-05 PROCEDURE — 96367 TX/PROPH/DG ADDL SEQ IV INF: CPT

## 2022-01-05 PROCEDURE — 6360000002 HC RX W HCPCS: Performed by: INTERNAL MEDICINE

## 2022-01-05 PROCEDURE — 96415 CHEMO IV INFUSION ADDL HR: CPT

## 2022-01-05 PROCEDURE — G0463 HOSPITAL OUTPT CLINIC VISIT: HCPCS

## 2022-01-05 PROCEDURE — 96413 CHEMO IV INFUSION 1 HR: CPT

## 2022-01-05 PROCEDURE — 99211 OFF/OP EST MAY X REQ PHY/QHP: CPT

## 2022-01-05 PROCEDURE — 80047 BASIC METABLC PNL IONIZED CA: CPT

## 2022-01-05 PROCEDURE — 85025 COMPLETE CBC W/AUTO DIFF WBC: CPT

## 2022-01-05 PROCEDURE — 2580000003 HC RX 258: Performed by: INTERNAL MEDICINE

## 2022-01-05 PROCEDURE — 99215 OFFICE O/P EST HI 40 MIN: CPT | Performed by: INTERNAL MEDICINE

## 2022-01-05 PROCEDURE — 80076 HEPATIC FUNCTION PANEL: CPT

## 2022-01-05 RX ORDER — ONDANSETRON 4 MG/1
4 TABLET, FILM COATED ORAL EVERY 6 HOURS PRN
Qty: 30 TABLET | Refills: 1 | Status: SHIPPED | OUTPATIENT
Start: 2022-01-05 | End: 2022-06-01 | Stop reason: ALTCHOICE

## 2022-01-05 RX ORDER — SODIUM CHLORIDE 0.9 % (FLUSH) 0.9 %
5-40 SYRINGE (ML) INJECTION PRN
Status: CANCELLED | OUTPATIENT
Start: 2022-01-05

## 2022-01-05 RX ORDER — SODIUM CHLORIDE 0.9 % (FLUSH) 0.9 %
5-40 SYRINGE (ML) INJECTION PRN
Status: DISCONTINUED | OUTPATIENT
Start: 2022-01-05 | End: 2022-01-06 | Stop reason: HOSPADM

## 2022-01-05 RX ORDER — HEPARIN SODIUM (PORCINE) LOCK FLUSH IV SOLN 100 UNIT/ML 100 UNIT/ML
500 SOLUTION INTRAVENOUS PRN
Status: DISCONTINUED | OUTPATIENT
Start: 2022-01-05 | End: 2022-01-06 | Stop reason: HOSPADM

## 2022-01-05 RX ORDER — CAPECITABINE 500 MG/1
800 TABLET, FILM COATED ORAL 2 TIMES DAILY
Qty: 84 TABLET | Refills: 0 | Status: SHIPPED | OUTPATIENT
Start: 2022-01-05 | End: 2022-01-26 | Stop reason: SDUPTHER

## 2022-01-05 RX ORDER — DEXTROSE MONOHYDRATE 50 MG/ML
INJECTION, SOLUTION INTRAVENOUS ONCE
Status: COMPLETED | OUTPATIENT
Start: 2022-01-05 | End: 2022-01-05

## 2022-01-05 RX ORDER — SODIUM CHLORIDE 9 MG/ML
25 INJECTION, SOLUTION INTRAVENOUS PRN
Status: CANCELLED | OUTPATIENT
Start: 2022-01-05

## 2022-01-05 RX ORDER — HEPARIN SODIUM (PORCINE) LOCK FLUSH IV SOLN 100 UNIT/ML 100 UNIT/ML
500 SOLUTION INTRAVENOUS PRN
Status: CANCELLED | OUTPATIENT
Start: 2022-01-05

## 2022-01-05 RX ADMIN — Medication 500 UNITS: at 17:38

## 2022-01-05 RX ADMIN — DEXTROSE MONOHYDRATE: 50 INJECTION, SOLUTION INTRAVENOUS at 14:14

## 2022-01-05 RX ADMIN — DEXAMETHASONE SODIUM PHOSPHATE 12 MG: 4 INJECTION, SOLUTION INTRAMUSCULAR; INTRAVENOUS at 14:15

## 2022-01-05 RX ADMIN — SODIUM CHLORIDE, PRESERVATIVE FREE 20 ML: 5 INJECTION INTRAVENOUS at 17:38

## 2022-01-05 RX ADMIN — FOSAPREPITANT 150 MG: 150 INJECTION, POWDER, LYOPHILIZED, FOR SOLUTION INTRAVENOUS at 14:46

## 2022-01-05 RX ADMIN — OXALIPLATIN 210 MG: 5 INJECTION, SOLUTION INTRAVENOUS at 15:25

## 2022-01-05 RX ADMIN — SODIUM CHLORIDE, PRESERVATIVE FREE 20 ML: 5 INJECTION INTRAVENOUS at 12:29

## 2022-01-05 RX ADMIN — SODIUM CHLORIDE, PRESERVATIVE FREE 10 ML: 5 INJECTION INTRAVENOUS at 12:28

## 2022-01-05 ASSESSMENT — ENCOUNTER SYMPTOMS
RECTAL PAIN: 0
EYE DISCHARGE: 0
BACK PAIN: 0
CONSTIPATION: 0
ABDOMINAL DISTENTION: 0
ABDOMINAL PAIN: 0
COUGH: 0
WHEEZING: 0
COLOR CHANGE: 0
DIARRHEA: 0
NAUSEA: 0
VOMITING: 0
BLOOD IN STOOL: 0
CHEST TIGHTNESS: 0
FACIAL SWELLING: 0
SORE THROAT: 0
SHORTNESS OF BREATH: 0
TROUBLE SWALLOWING: 0

## 2022-01-05 NOTE — PROGRESS NOTES
[facility] [address] [facility] [address], [address] [facility] [address]  [place] [facility]  Dept: [phone]  Dept Fax: [phone]: [phone]    Visit Date:1/5/2022     Lila Pendleton is a 45 y.o. male who presents today for:   Chief Complaint   Patient presents with    Follow-up     Malignant neoplasm of colon, unspecified part of colon [facility])        HPI:     Mr. Bailey Fortune is a 27-year-old patient with newly diagnosed stage III colon cancer. He presents to the medical oncology clinic to discuss postsurgical management. His history of colon cancer goes back to September 2021 when he was seen by  Dr. Richard Myers due to unexplained weight loss of 10-15 lbs and rectal bleeding. Colonoscopy performed on September 1, 2021 demonstrated multiple polyps but a larger mass at the proximal transverse colon demonstrated the adenocarcinoma. He did not have signs of obstruction, no abdominal pain, no significant nausea or vomiting. Some previous episodes of nausea, GERD and epigastric discomfort. He had CT of the chest abdomen and pelvis on September 2, 2021,no evidence of metastatic disease. The patient met with Dr. Kym Franklin and after discussing his surgical treatment options he proceeded with right hemicolectomy on September 16, 2021. Final pathology report showed:  A.  Right colon mass, hemicolectomy:    Invasive moderately differentiated colonic adenocarcinoma, pT2.    Margins are free of neoplasia.           Pericolonic lymph nodes (17): 2 out of 17 are positive for   metastatic colonic adenocarcinoma.             Appendix-no pathologic abnormality. [place] base lymph nodes (2), resection:             Negative for malignancy (0/2). pT2 pN1b    He had uneventful post surgical course. Extensive history of colorectal disease on paternal side.     On November 9, 2021 the patient received first cycle of adjuvant chemotherapy with Xeloda  On November 24, 2021 he was admitted to the hospital with a 1 week history of intractable periumbilical abdominal pain of 9/10 intensity.  Associated nausea vomiting diarrhea and fever last few days. Diagnosed with acute enterocolitis, possible chemotherapy induced. IV Cipro & Flagyl during hospitalization. SBO vs ileus:General Surgery consulted. KUB showed possible SBO vs ileus. CT A/P showed worsening since previous. Managed medically with NGT. Advance diet slowly.  Possible fluid overload: Lasix have been given for swelling. On discharge from the hospital on December 4, 2021 the patient was placed on the following medications:  Bentyl 3 times daily,-Magic mouthwash 4 times daily as needed, -ferrous sulfate daily, Zofran every 8 hours as needed,-omeprazole,Compazine every 6 hours as needed, Reglan 2 times daily. After being discharged from the hospital the patient presented to chemotherapy suite on few occasions for IV hydration and potassium supplementation. He also had outpatient dietary consult. Current history on 01/05/2022:  Patient presents to the medical oncology clinic for cycle #3 of oxaliplatin and Xeloda. He says cycle #2 started on December 15, 2021. Overall he tolerated it well. No diarrhea no abdominal pain. The patient did develop some palmar and plantar erythema with some skin peeling. Lugene Kathy the patient denies having abdominal pain, no diarrhea. His appetite is improved and he tolerates more versatile diet. He reports grade 1 fatigue. He denies having any peripheral neuropathy from oxaliplatin. Oral mucositis is resolved.   HPI   Past Medical History:   Diagnosis Date    Anemia     Colon cancer ([address] 09/2021      Past Surgical History:   Procedure Laterality Date    COLONOSCOPY  09/01/2021    Dr. Foster Yeager Right 9/16/2021    ROBOT EXTENDED RIGHT COLECTOMY performed by Meeta Enrique MD at [facility]    [facility]    PORT SURGERY N/A 2021    SINGLE LUMEN SMART PORT INSERTION performed by Roxana Flores MD at [facility] History   Problem Relation Age of Onset   Colvin Other Mother         Brain aneurysm    COPD Mother     Stroke Mother     Cancer Father         lung mets brain and bone    Stroke Father     Cancer Maternal Grandmother         colon    High Blood Pressure Maternal Grandmother     Cancer Maternal Grandfather         colon     High Blood Pressure Maternal Grandfather     Kidney Disease Paternal Grandmother     Cancer Paternal Grandmother         breast     Diabetes Paternal Grandmother     High Blood Pressure Paternal Grandmother     Cancer Paternal Grandfather         lung-52    Heart Attack Paternal Grandfather     High Blood Pressure Paternal Grandfather     Cancer Paternal Aunt         colon    Heart Attack Paternal Aunt     Cancer Paternal Uncle         lung    Cancer Maternal Aunt         colon    Cancer Maternal Uncle         colon      Social History     Tobacco Use    Smoking status: Former Smoker     Packs/day: 1.00     Years: 35.00     Pack years: 35.00     Types: Cigarettes     Quit date: 2020     Years since quittin.0    Smokeless tobacco: Never Used   Substance Use Topics    Alcohol use: Not Currently      Current Outpatient Medications   Medication Sig Dispense Refill    ondansetron (ZOFRAN) 4 MG tablet Take 1 tablet by mouth every 6 hours as needed for Nausea or Vomiting 30 tablet 1    Handicap Placard MISC by Does not apply route Diagnosis: No primary diagnosis found. Expiration Date: 2022 1 each 0    Handicap Placard MISC by Does not apply route Diagnosis: colon cancer, stage IIIA.  Currently completing chemotherapy   Expiration: 6 months 1 each 0    sucralfate (CARAFATE) 1 GM tablet take 1 tablet by mouth twice a day FIVE TO 60 MINS BEFORE LUNCH AND DINNER      ferrous sulfate (IRON 325) 325 (65 Fe) MG tablet Take 1 tablet by mouth daily (with breakfast) 90 tablet 1    omeprazole (PRILOSEC OTC) 20 MG tablet Take 20 mg by mouth daily       ondansetron (ZOFRAN-ODT) 4 MG disintegrating tablet Take 1 tablet by mouth 3 times daily as needed for Nausea or Vomiting 21 tablet 0    naloxegol (MOVANTIK) 12.5 MG TABS tablet Take 1 tablet by mouth every morning (Patient not taking: Reported on 12/15/2021) 30 tablet 0    dicyclomine (BENTYL) 10 MG capsule Take 2 capsules by mouth 3 times daily (Patient not taking: Reported on 12/15/2021) 120 capsule 0    Magic Mouthwash (MIRACLE MOUTHWASH) Swish and spit 5 mLs 4 times daily as needed for Irritation Mixture of 30mL diphenhydramine, 60mL maalox, 4g carafate as ratio (Patient not taking: Reported on 12/15/2021) 300 mL 0    ondansetron (ZOFRAN-ODT) 4 MG disintegrating tablet  (Patient not taking: Reported on 12/29/2021)       No current facility-administered medications for this visit. No Known Allergies   Health Maintenance   Topic Date Due    Shingles Vaccine (2 of 2) 11/18/2021    Depression Screen  07/23/2022    Pneumococcal 0-64 years Vaccine (2 of 4 - PCV13) 07/23/2022    Colon cancer screen colonoscopy  09/01/2022    Low dose CT lung screening  09/02/2022    Lipid screen  07/23/2026    DTaP/Tdap/Td vaccine (2 - Td or Tdap) 07/23/2031    Flu vaccine  Completed    COVID-19 Vaccine  Completed    Hepatitis A vaccine  Aged Out    Hepatitis B vaccine  Aged Out    Hib vaccine  Aged Out    Meningococcal (ACWY) vaccine  Aged Out    Hepatitis C screen  Discontinued    HIV screen  Discontinued        Subjective:   Review of Systems   Constitutional: Negative for activity change, appetite change, fatigue and fever. HENT: Negative for congestion, dental problem, facial swelling, hearing loss, mouth sores, nosebleeds, sore throat, tinnitus and trouble swallowing. Eyes: Negative for discharge and visual disturbance. Respiratory: Negative for cough, chest tightness, shortness of breath and wheezing. Cardiovascular: Negative for chest pain, palpitations and leg swelling. Gastrointestinal: Negative for abdominal distention, abdominal pain, blood in stool, constipation, diarrhea, nausea, rectal pain and vomiting. Endocrine: Negative for cold intolerance, polydipsia and polyuria. Genitourinary: Negative for decreased urine volume, difficulty urinating, dysuria, flank pain, hematuria and urgency. Musculoskeletal: Negative for arthralgias, back pain, gait problem, joint swelling, myalgias and neck stiffness. Skin: Negative for color change, rash and wound. Neurological: Negative for dizziness, tremors, seizures, speech difficulty, weakness, light-headedness, numbness and headaches. Hematological: Negative for adenopathy. Does not bruise/bleed easily. Psychiatric/Behavioral: Negative for confusion and sleep disturbance. The patient is not nervous/anxious. Objective:   Physical Exam  Vitals reviewed. Constitutional:       General: He is not in acute distress. Appearance: He is well-developed. HENT:      Head: Normocephalic. Mouth/Throat:      Pharynx: No oropharyngeal exudate. Eyes:      General: No scleral icterus. Right eye: No discharge. Left eye: No discharge. Pupils: Pupils are equal, round, and reactive to light. Neck:      Thyroid: No thyromegaly. Vascular: No JVD. Trachea: No tracheal deviation. Cardiovascular:      Rate and Rhythm: Normal rate. Heart sounds: Normal heart sounds. No murmur heard. No friction rub. No gallop. Pulmonary:      Effort: Pulmonary effort is normal. No respiratory distress. Breath sounds: Normal breath sounds. No stridor. No wheezing or rales. Chest:      Chest wall: No tenderness. Abdominal:      General: Bowel sounds are normal. There is no distension. Palpations: Abdomen is soft. There is no mass. Tenderness: There is no abdominal tenderness. There is no rebound.    Musculoskeletal: General: Normal range of motion. Cervical back: Normal range of motion and neck supple. Comments: Good range of motion in all four extremities. Lymphadenopathy:      Cervical: No cervical adenopathy. Skin:     General: Skin is warm. Findings: No erythema or rash. Neurological:      Mental Status: He is alert and oriented to person, place, and time. Cranial Nerves: No cranial nerve deficit. Motor: No abnormal muscle tone. Deep Tendon Reflexes: Reflexes are normal and symmetric. Psychiatric:         Behavior: Behavior normal.         Thought Content: Thought content normal.         Judgment: Judgment normal.         /87 (Site: Right Upper Arm, Position: Sitting, Cuff Size: Medium Adult)   Pulse 66   Temp 97.7 °F (36.5 °C) (Oral)   Resp 16   Ht 6' (1.829 m)   Wt 149 lb 11.1 oz (67.9 kg)   SpO2 98%   BMI 20.30 kg/m²      ECOG status is 0    Imaging studies and labs:   CT of the abdomen and pelvis on September 2, 2021 showed:   1. Unremarkable CT abdomen and pelvis with IV contrast.    CT of the chest on September 2, 2021 showed:  1. No suspicious pulmonary nodules. 2. No evidence of metastatic disease. 3. No acute infiltrate or pleural effusion       CT ABDOMEN PELVIS W IV CONTRAST Additional Contrast? None    Result Date: 10/21/2021  PROCEDURE: CT ABDOMEN PELVIS W IV CONTRAST CLINICAL INFORMATION: Abdominal pain. COMPARISON: CT abdomen and pelvis 9/2/2021. TECHNIQUE: Axial 5 mm CT images were obtained through the abdomen and pelvis after the administration of 80  cc Isovue 370 intravenous contrast. Coronal and sagittal reconstructions were obtained. All CT scans at this facility use dose modulation, iterative reconstruction, and/or weight-based dosing when appropriate to reduce radiation dose to as low as reasonably achievable. FINDINGS: Lung bases: Atelectasis is noted at the lung bases. The heart size is normal. Liver/gallbladder/bilary tree:  Thickening of the wall of the gallbladder near the fundus could represent adenomyomatosis. No radiopaque gallstones or biliary ductal dilatation is observed. No liver lesions are identified. Pancreas/spleen/adrenal glands: Normal size and attenuation. Kidneys/ureters/bladder: No renal calculus, hydronephrosis, or hydroureter is present. The urinary bladder is unremarkable. Gastrointestinal:  Postoperative changes related to right hemicolectomy with a surgical anastomosis in the right lower quadrant appears intact. There is question area of intussusception of small bowel loops within the transverse colon at the site of the anastomosis (series 2, image 45). No bowel obstruction, free fluid, fluid collection, or free air is observed. Moderate retained fecal material is seen throughout the colon. Retroperitoneum/lymph nodes: The aorta is not dilated. No lymphadenopathy is present. Pelvis: The prostate gland is not enlarged. Musculoskeletal: The visualized skeletal structures appear intact. 1. Postoperative changes related to right hemicolectomy appear intact. There may be intussusception of the small bowel within the colon at the site of the surgical anastomosis (series 2, image 45) however there is no associated bowel obstruction, free fluid, fluid collection, or free air identified. Moderate retained fecal material is seen throughout the colon suggesting fecal stasis. Assessment for bowel wall thickening is limited without oral contrast. 2. 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 No radiopaque gallstones or biliary ductal dilatation identified. Correlate with liver function tests. Chronic findings are discussed. **This report has been created using voice recognition software. It may contain minor errors which are inherent in voice recognition technology. ** Final report electronically signed by Dr Anny Navarrete on 10/21/2021 10:02 AM      Lab Results   Component Value Date    WBC 5.5 01/05/2022    HGB 12.0 (L) 01/05/2022 HCT 37.0 (L) 01/05/2022    MCV 89 01/05/2022     01/05/2022       Chemistry        Component Value Date/Time     01/05/2022 1443     12/04/2021 0600    K 3.7 01/05/2022 1443    K 4.0 12/04/2021 0600    K 3.4 (L) 11/30/2021 0600     12/04/2021 0600    CO2 29 12/04/2021 0600    BUN 8 12/04/2021 0600    CREATININE 0.8 01/05/2022 1443    CREATININE 0.7 12/04/2021 0600        Component Value Date/Time    CALCIUM 7.7 (L) 12/04/2021 0600    ALKPHOS 158 (H) 01/25/2022 1630    AST 66 (H) 01/25/2022 1630     (H) 01/25/2022 1630    BILITOT 0.8 01/25/2022 1630          Assessment/Plan:   1. Stage III A colon cancer. This is a 53-year-old patient with newly diagnosed stage IIIa colon cancer. He is status post right hemicolectomy colectomy with lymph node dissection. I had a lengthy discussion with the patient and his wife about post surgical management. Adjuvant chemotherapy is indicated for the patients with lymph node involvement. For patients who have undergone potentially curative resection of a colon cancer, the goal of postoperative chemotherapy is to eradicate micrometastases, thereby reducing the likelihood of disease recurrence and increasing the cure rate. The benefits of adjuvant chemotherapy for patients with node-positive disease are in the range of 30 % reduction in the risk of disease recurrence and a 20-30% reduction in mortality with modern chemotherapy. Adjuvant therapy for resected stage III colon cancer involves a six-month course of a combination of several chemotherapy drugs, which are given intravenously, in a specific order on specific days.   However, based on the [facility] and NSABP C-07 trials and an analysis of the International Duration Evaluation of Adjuvant Chemotherapy (IDEA) collaboration (six separate randomized trials of six versus three months of adjuvant oxaliplatin-based therapy), we continue to suggest six months of therapy for individuals with high-risk cancers (T4N2). However, patients with high-risk cancers ramiro limiting adjuvant therapy to three months in patients with low-risk disease (T1-3N1) is valid option.  Orally active fluoropyrimidine,  Xeloda offers increased convenience. CAPOX is the preferred regimen if a 3-month course of adjuvant therapy is chosen. 2. Adjuvant chemotherapy with  CAPOX. Received  cycle #1 of CAPOX on 11/09/2021. Cycle was complicated by hospitalization for diffuse acute enterocolitis. Patient required almost 1 week hospitalization. He required electrolytes correction antibiotic treatment and hydration. He also developed small bowel obstruction that was managed medically with NG tube placement. Patient reports that today he is significantly improved. He denies having any abdominal pain, no nausea no vomiting. He received cycle 2 on  12/15/2021. The dose of oxaliplatin was reduced from 130 mg/m² to 100 mg/m². The dose of Xeloda was reduced to 1000 mg p.o. twice daily for 14 days. He tolerated cycle #2 much better. Minimal nausea no vomiting no diarrhea no abdominal pain. Developed Palmar and plantar erythema with some skin peeling. Today, his vital signs are stable and labs are within range allowing for treatment. He will proceed with treat with cycle #3 of XELOX. Oxaliplatin is increased from 100 mg/m² to 110 mg/m²  He will continue Xeloda 3 tablets p.o. twice daily for 2 weeks starting tonight. RTC to see me for labs: CBC, BMP, LFTs and last dose of XELOX in 3 weeks  Chemotherapy orders were reviewed and signed. The patient was instructed to take Zofran half an hour before the evening and morning dose of Xeloda. For breakthrough nausea he received prescription for Phenergan  Patient was instructed to call us if he develops any diarrhea. For mild diarrhea he was instructed to start taking Lomotil. Diagnosis Orders   1. Adenocarcinoma, colon ([facility] [address]     2. Encounter for chemotherapy management     3.  S/P right colectomy     4. Diarrhea due to drug     5. Palmar erythema          Plan:   Return in about 3 weeks (around 1/26/2022). Orders Placed:   No orders of the defined types were placed in this encounter. Medications Prescribed:   Orders Placed This Encounter   Medications    ondansetron (ZOFRAN) 4 MG tablet     Sig: Take 1 tablet by mouth every 6 hours as needed for Nausea or Vomiting     Dispense:  30 tablet     Refill:  1    capecitabine (XELODA) 500 MG chemo tablet     Sig: Take 3 tablets (1,500 mg total) by mouth 2 times daily for 14 days     Dispense:  84 tablet     Refill:  0            Discussed use, benefit, and side effectsof prescribed medications. All patient questions answered. Pt voiced understanding. Instructed to continue current medications, diet and exercise. Patient agreed with treatment plan. Follow up as directed.     Electronically signed by Geovanni Medina MD on 11/3/21 at 8:15 AM EDT

## 2022-01-05 NOTE — PLAN OF CARE
Problem: Musculor/Skeletal Functional Status  Goal: Absence of falls  Outcome: Met This Shift  Note: Free from falls while in O.P. Oncology. Intervention: Fall precautions  Note: Discussed the need to use the call light for assistance when getting up to ambulate. Problem: Intellectual/Education/Knowledge Deficit  Goal: Teaching initiated upon admission  Outcome: Met This Shift  Note: Patient verbalizes understanding to verbal information given on Eloxatin,action and possible side effects. Aware to call MD if develop complications. Intervention: Verbal/written education provided  Note: Chemotherapy Teaching     What is Chemotherapy   Drug action [x]   Method of Administration [x]   Handouts given []     Side Effects  Nausea/vomiting [x]   Diarrhea [x]   Fatigue [x]   Signs / Symptoms of infection [x]   Neutropenia [x]   Thrombocytopenia [x]   Alopecia [x]   neuropathy [x]   [place] diet &  the importance of fluids [x]       Micellaneous  Importance of nutrition [x]   Importance of oral hygiene [x]   When to call the MD [x]   Monitoring labs [x]   Use of supportive services []     Explanation of Drug Regimen / Frequency  Eloxatin     Comments  Verbalized understanding to drug,action,side effects and when to call MD         Problem: Discharge Planning:  Goal: Discharged to appropriate level of care  Description: Discharged to appropriate level of care  Outcome: Met This Shift  Note: Verbalize understanding of discharge instructions, follow up appointments, and when to call Physician. Intervention: Assess readiness for discharge  Note: Discuss understanding of discharge instructions, follow up appointments and when to call Physician.       Problem: Infection - Central Venous Catheter-Associated Bloodstream Infection:  Goal: Will show no infection signs and symptoms  Description: Will show no infection signs and symptoms  Outcome: Met This Shift  Note: Discuss port maintenance,infection prevention, sign of infection and when to call MD.   Intervention: Infection risk assessment  Note: Mediport site with no redness or warmth. Skin over port site intact with no signs of breakdown noted. Patient verbalizes signs/symptoms of port infection and when to notify the physician. Care plan reviewed with patient. Patient  verbalize understanding of the plan of care and contribute to goal setting.

## 2022-01-05 NOTE — PATIENT INSTRUCTIONS
1. Treat with cycle #3 of XELOX. Oxaliplatin is increased from 100 mg/m² to 110 mg/m²  2. Was instructed to take Xeloda 3 tablets p.o. twice daily for 2 weeks starting tonight.   3.  RTC to see me for labs: CBC, BMP, LFTs and last dose of XELOX in 3 weeks

## 2022-01-24 RX ORDER — ONDANSETRON 4 MG/1
4 TABLET, ORALLY DISINTEGRATING ORAL 3 TIMES DAILY PRN
Qty: 21 TABLET | Refills: 0 | Status: SHIPPED | OUTPATIENT
Start: 2022-01-24 | End: 2022-03-28 | Stop reason: ALTCHOICE

## 2022-01-25 ENCOUNTER — TELEPHONE (OUTPATIENT)
Dept: ONCOLOGY | Age: 52
End: 2022-01-25

## 2022-01-25 ENCOUNTER — HOSPITAL ENCOUNTER (OUTPATIENT)
Age: 52
Discharge: HOME | End: 2022-01-25
Payer: COMMERCIAL

## 2022-01-25 LAB
ALBUMIN SERPL-MCNC: 4.3 G/DL (ref 3.5–5.1)
ALP BLD-CCNC: 158 U/L (ref 38–126)
ALT SERPL-CCNC: 115 U/L (ref 11–66)
AST SERPL-CCNC: 66 U/L (ref 5–40)
BILIRUB SERPL-MCNC: 0.8 MG/DL (ref 0.3–1.2)
BILIRUBIN DIRECT: 0.3 MG/DL (ref 0–0.3)
FERRITIN: 296 NG/ML (ref 22–322)
HBV SURFACE AB TITR SER: NEGATIVE {TITER}
HEPATITIS B SURFACE ANTIGEN: NEGATIVE
HEPATITIS C ANTIBODY: NEGATIVE
INR BLD: 0.99 (ref 0.85–1.13)
IRON: 49 UG/DL (ref 65–195)
TOTAL IRON BINDING CAPACITY: 363 UG/DL (ref 171–450)
TOTAL PROTEIN: 6.6 G/DL (ref 6.1–8)

## 2022-01-25 PROCEDURE — 87340 HEPATITIS B SURFACE AG IA: CPT

## 2022-01-25 PROCEDURE — 83540 ASSAY OF IRON: CPT

## 2022-01-25 PROCEDURE — 82728 ASSAY OF FERRITIN: CPT

## 2022-01-25 PROCEDURE — 82390 ASSAY OF CERULOPLASMIN: CPT

## 2022-01-25 PROCEDURE — 83516 IMMUNOASSAY NONANTIBODY: CPT

## 2022-01-25 PROCEDURE — 86708 HEPATITIS A ANTIBODY: CPT

## 2022-01-25 PROCEDURE — 86038 ANTINUCLEAR ANTIBODIES: CPT

## 2022-01-25 PROCEDURE — 36415 COLL VENOUS BLD VENIPUNCTURE: CPT

## 2022-01-25 PROCEDURE — 86704 HEP B CORE ANTIBODY TOTAL: CPT

## 2022-01-25 PROCEDURE — 86803 HEPATITIS C AB TEST: CPT

## 2022-01-25 PROCEDURE — 80076 HEPATIC FUNCTION PANEL: CPT

## 2022-01-25 PROCEDURE — 82784 ASSAY IGA/IGD/IGG/IGM EACH: CPT

## 2022-01-25 PROCEDURE — 85610 PROTHROMBIN TIME: CPT

## 2022-01-25 PROCEDURE — 86706 HEP B SURFACE ANTIBODY: CPT

## 2022-01-25 PROCEDURE — 83550 IRON BINDING TEST: CPT

## 2022-01-25 RX ORDER — SODIUM CHLORIDE 0.9 % (FLUSH) 0.9 %
5-40 SYRINGE (ML) INJECTION PRN
Status: CANCELLED | OUTPATIENT
Start: 2022-01-26

## 2022-01-25 RX ORDER — DIPHENHYDRAMINE HYDROCHLORIDE 50 MG/ML
50 INJECTION INTRAMUSCULAR; INTRAVENOUS ONCE
Status: CANCELLED | OUTPATIENT
Start: 2022-01-26 | End: 2022-01-26

## 2022-01-25 RX ORDER — HEPARIN SODIUM (PORCINE) LOCK FLUSH IV SOLN 100 UNIT/ML 100 UNIT/ML
500 SOLUTION INTRAVENOUS PRN
Status: CANCELLED | OUTPATIENT
Start: 2022-01-26

## 2022-01-25 RX ORDER — EPINEPHRINE 1 MG/ML
0.3 INJECTION, SOLUTION, CONCENTRATE INTRAVENOUS PRN
Status: CANCELLED | OUTPATIENT
Start: 2022-01-26

## 2022-01-25 RX ORDER — SODIUM CHLORIDE 9 MG/ML
25 INJECTION, SOLUTION INTRAVENOUS PRN
Status: CANCELLED | OUTPATIENT
Start: 2022-01-26

## 2022-01-25 RX ORDER — DEXTROSE MONOHYDRATE 50 MG/ML
INJECTION, SOLUTION INTRAVENOUS ONCE
Status: CANCELLED | OUTPATIENT
Start: 2022-01-26 | End: 2022-01-26

## 2022-01-25 RX ORDER — METHYLPREDNISOLONE SODIUM SUCCINATE 125 MG/2ML
125 INJECTION, POWDER, LYOPHILIZED, FOR SOLUTION INTRAMUSCULAR; INTRAVENOUS ONCE
Status: CANCELLED | OUTPATIENT
Start: 2022-01-26 | End: 2022-01-26

## 2022-01-25 RX ORDER — SODIUM CHLORIDE 9 MG/ML
INJECTION, SOLUTION INTRAVENOUS CONTINUOUS
Status: CANCELLED | OUTPATIENT
Start: 2022-01-26

## 2022-01-25 NOTE — TELEPHONE ENCOUNTER
Hopefully not but, we have to see what his lab work shows tomorrow.   I want him to keep his appointment for tomorrow

## 2022-01-25 NOTE — TELEPHONE ENCOUNTER
Pt scheduled for treatment tomorrow came to the office stating he had a bit of a cold last week, is completely asymptomatic now and has been having some loose stools. He started taking immodium yesterday which is helping. Pt wondering if this will affect his scheduled tx tomorrow?

## 2022-01-26 ENCOUNTER — HOSPITAL ENCOUNTER (OUTPATIENT)
Dept: INFUSION THERAPY | Age: 52
Discharge: HOME | End: 2022-01-26
Payer: COMMERCIAL

## 2022-01-26 ENCOUNTER — OFFICE VISIT (OUTPATIENT)
Dept: ONCOLOGY | Age: 52
End: 2022-01-26
Payer: COMMERCIAL

## 2022-01-26 VITALS
HEART RATE: 64 BPM | TEMPERATURE: 97.8 F | WEIGHT: 153 LBS | HEIGHT: 72 IN | DIASTOLIC BLOOD PRESSURE: 89 MMHG | OXYGEN SATURATION: 98 % | RESPIRATION RATE: 18 BRPM | SYSTOLIC BLOOD PRESSURE: 141 MMHG | BODY MASS INDEX: 20.72 KG/M2

## 2022-01-26 VITALS
HEIGHT: 72 IN | HEART RATE: 67 BPM | WEIGHT: 153 LBS | RESPIRATION RATE: 16 BRPM | SYSTOLIC BLOOD PRESSURE: 140 MMHG | DIASTOLIC BLOOD PRESSURE: 87 MMHG | BODY MASS INDEX: 20.72 KG/M2 | TEMPERATURE: 98.5 F | OXYGEN SATURATION: 99 %

## 2022-01-26 DIAGNOSIS — C18.9 ADENOCARCINOMA, COLON (HCC): Primary | ICD-10-CM

## 2022-01-26 DIAGNOSIS — Z90.49 S/P RIGHT COLECTOMY: ICD-10-CM

## 2022-01-26 DIAGNOSIS — Z80.9 FAMILY HISTORY OF CANCER: ICD-10-CM

## 2022-01-26 DIAGNOSIS — F10.21 HISTORY OF ALCOHOLISM (HCC): ICD-10-CM

## 2022-01-26 DIAGNOSIS — L53.8 PALMAR ERYTHEMA: ICD-10-CM

## 2022-01-26 DIAGNOSIS — C18.4 CANCER OF TRANSVERSE COLON (HCC): ICD-10-CM

## 2022-01-26 DIAGNOSIS — Z51.11 ENCOUNTER FOR CHEMOTHERAPY MANAGEMENT: ICD-10-CM

## 2022-01-26 DIAGNOSIS — Z87.891 PERSONAL HISTORY OF TOBACCO USE: ICD-10-CM

## 2022-01-26 DIAGNOSIS — E87.6 HYPOKALEMIA: ICD-10-CM

## 2022-01-26 DIAGNOSIS — K52.1 DIARRHEA DUE TO DRUG: ICD-10-CM

## 2022-01-26 LAB
ABSOLUTE IMMATURE GRANULOCYTE: 0.02 THOU/MM3 (ref 0–0.07)
ALBUMIN SERPL-MCNC: 4.1 G/DL (ref 3.5–5.1)
ALP BLD-CCNC: 147 U/L (ref 38–126)
ALT SERPL-CCNC: 100 U/L (ref 11–66)
AST SERPL-CCNC: 60 U/L (ref 5–40)
BASINOPHIL, AUTOMATED: 1 % (ref 0–3)
BASOPHILS ABSOLUTE: 0.1 THOU/MM3 (ref 0–0.1)
BILIRUB SERPL-MCNC: 0.5 MG/DL (ref 0.3–1.2)
BILIRUBIN DIRECT: < 0.2 MG/DL (ref 0–0.3)
BUN, WHOLE BLOOD: 13 MG/DL (ref 8–26)
CHLORIDE, WHOLE BLOOD: 106 MEQ/L (ref 98–109)
CREATININE, WHOLE BLOOD: 0.6 MG/DL (ref 0.5–1.2)
EOSINOPHILS ABSOLUTE: 0.2 THOU/MM3 (ref 0–0.4)
EOSINOPHILS RELATIVE PERCENT: 3 % (ref 0–4)
GFR, ESTIMATED: > 90 ML/MIN/1.73M2
GLUCOSE, WHOLE BLOOD: 114 MG/DL (ref 70–108)
HAV AB SERPL IA-ACNC: NONREACTIVE
HCT VFR BLD CALC: 34.6 % (ref 42–52)
HEMOGLOBIN: 11.7 GM/DL (ref 14–18)
IGA: 112 MG/DL (ref 70–400)
IMMATURE GRANULOCYTES: 0 %
IONIZED CALCIUM, WHOLE BLOOD: 1.17 MMOL/L (ref 1.12–1.32)
LYMPHOCYTES # BLD: 18 % (ref 15–47)
LYMPHOCYTES ABSOLUTE: 1.4 THOU/MM3 (ref 1–4.8)
MCH RBC QN AUTO: 30.8 PG (ref 26–33)
MCHC RBC AUTO-ENTMCNC: 33.8 GM/DL (ref 32.2–35.5)
MCV RBC AUTO: 91 FL (ref 80–94)
MONOCYTES ABSOLUTE: 1 THOU/MM3 (ref 0.4–1.3)
MONOCYTES: 13 % (ref 0–12)
PDW BLD-RTO: 19.6 % (ref 11.5–14.5)
PLATELET # BLD: 235 THOU/MM3 (ref 130–400)
PMV BLD AUTO: 9.1 FL (ref 9.4–12.4)
POTASSIUM, WHOLE BLOOD: 3 MEQ/L (ref 3.5–4.9)
RBC # BLD: 3.8 MILL/MM3 (ref 4.7–6.1)
SEG NEUTROPHILS: 65 % (ref 43–75)
SEGMENTED NEUTROPHILS ABSOLUTE COUNT: 5.1 THOU/MM3 (ref 1.8–7.7)
SODIUM, WHOLE BLOOD: 143 MEQ/L (ref 138–146)
TOTAL CO2, WHOLE BLOOD: 26 MEQ/L (ref 23–33)
TOTAL PROTEIN: 6.2 G/DL (ref 6.1–8)
WBC # BLD: 7.7 THOU/MM3 (ref 4.8–10.8)

## 2022-01-26 PROCEDURE — 85025 COMPLETE CBC W/AUTO DIFF WBC: CPT

## 2022-01-26 PROCEDURE — 99214 OFFICE O/P EST MOD 30 MIN: CPT | Performed by: INTERNAL MEDICINE

## 2022-01-26 PROCEDURE — 96413 CHEMO IV INFUSION 1 HR: CPT

## 2022-01-26 PROCEDURE — 80047 BASIC METABLC PNL IONIZED CA: CPT

## 2022-01-26 PROCEDURE — 6360000002 HC RX W HCPCS: Performed by: INTERNAL MEDICINE

## 2022-01-26 PROCEDURE — 2580000003 HC RX 258: Performed by: INTERNAL MEDICINE

## 2022-01-26 PROCEDURE — 80076 HEPATIC FUNCTION PANEL: CPT

## 2022-01-26 PROCEDURE — 96367 TX/PROPH/DG ADDL SEQ IV INF: CPT

## 2022-01-26 PROCEDURE — 96415 CHEMO IV INFUSION ADDL HR: CPT

## 2022-01-26 PROCEDURE — 99211 OFF/OP EST MAY X REQ PHY/QHP: CPT

## 2022-01-26 PROCEDURE — 36591 DRAW BLOOD OFF VENOUS DEVICE: CPT

## 2022-01-26 PROCEDURE — G0463 HOSPITAL OUTPT CLINIC VISIT: HCPCS

## 2022-01-26 RX ORDER — CAPECITABINE 500 MG/1
800 TABLET, FILM COATED ORAL 2 TIMES DAILY
Qty: 84 TABLET | Refills: 0 | Status: SHIPPED | OUTPATIENT
Start: 2022-01-26 | End: 2022-02-01 | Stop reason: SDUPTHER

## 2022-01-26 RX ORDER — SODIUM CHLORIDE 9 MG/ML
25 INJECTION, SOLUTION INTRAVENOUS PRN
Status: DISCONTINUED | OUTPATIENT
Start: 2022-01-26 | End: 2022-01-27 | Stop reason: HOSPADM

## 2022-01-26 RX ORDER — POTASSIUM CHLORIDE 20 MEQ/1
20 TABLET, EXTENDED RELEASE ORAL DAILY
Qty: 7 TABLET | Refills: 1 | Status: SHIPPED | OUTPATIENT
Start: 2022-01-26 | End: 2022-02-16

## 2022-01-26 RX ORDER — DEXTROSE MONOHYDRATE 50 MG/ML
INJECTION, SOLUTION INTRAVENOUS ONCE
Status: COMPLETED | OUTPATIENT
Start: 2022-01-26 | End: 2022-01-26

## 2022-01-26 RX ORDER — SODIUM CHLORIDE 0.9 % (FLUSH) 0.9 %
5-40 SYRINGE (ML) INJECTION PRN
Status: DISCONTINUED | OUTPATIENT
Start: 2022-01-26 | End: 2022-01-27 | Stop reason: HOSPADM

## 2022-01-26 RX ORDER — HEPARIN SODIUM (PORCINE) LOCK FLUSH IV SOLN 100 UNIT/ML 100 UNIT/ML
500 SOLUTION INTRAVENOUS PRN
Status: CANCELLED | OUTPATIENT
Start: 2022-01-26

## 2022-01-26 RX ORDER — HEPARIN SODIUM (PORCINE) LOCK FLUSH IV SOLN 100 UNIT/ML 100 UNIT/ML
500 SOLUTION INTRAVENOUS PRN
OUTPATIENT
Start: 2022-01-26

## 2022-01-26 RX ORDER — SODIUM CHLORIDE 9 MG/ML
25 INJECTION, SOLUTION INTRAVENOUS PRN
Status: CANCELLED | OUTPATIENT
Start: 2022-01-26

## 2022-01-26 RX ORDER — HEPARIN SODIUM (PORCINE) LOCK FLUSH IV SOLN 100 UNIT/ML 100 UNIT/ML
500 SOLUTION INTRAVENOUS PRN
Status: DISCONTINUED | OUTPATIENT
Start: 2022-01-26 | End: 2022-01-27 | Stop reason: HOSPADM

## 2022-01-26 RX ORDER — SODIUM CHLORIDE 0.9 % (FLUSH) 0.9 %
5-40 SYRINGE (ML) INJECTION PRN
OUTPATIENT
Start: 2022-01-26

## 2022-01-26 RX ORDER — SODIUM CHLORIDE 0.9 % (FLUSH) 0.9 %
5-40 SYRINGE (ML) INJECTION PRN
Status: CANCELLED | OUTPATIENT
Start: 2022-01-26

## 2022-01-26 RX ORDER — POTASSIUM CHLORIDE 29.8 MG/ML
20 INJECTION INTRAVENOUS ONCE
Status: COMPLETED | OUTPATIENT
Start: 2022-01-26 | End: 2022-01-26

## 2022-01-26 RX ADMIN — FOSAPREPITANT 150 MG: 150 INJECTION, POWDER, LYOPHILIZED, FOR SOLUTION INTRAVENOUS at 11:52

## 2022-01-26 RX ADMIN — HEPARIN 500 UNITS: 100 SYRINGE at 14:48

## 2022-01-26 RX ADMIN — OXALIPLATIN 200 MG: 5 INJECTION, SOLUTION, CONCENTRATE INTRAVENOUS at 12:33

## 2022-01-26 RX ADMIN — SODIUM CHLORIDE, PRESERVATIVE FREE 10 ML: 5 INJECTION INTRAVENOUS at 14:48

## 2022-01-26 RX ADMIN — SODIUM CHLORIDE, PRESERVATIVE FREE 10 ML: 5 INJECTION INTRAVENOUS at 09:00

## 2022-01-26 RX ADMIN — POTASSIUM CHLORIDE 20 MEQ: 400 INJECTION, SOLUTION INTRAVENOUS at 10:21

## 2022-01-26 RX ADMIN — DEXTROSE MONOHYDRATE: 50 INJECTION, SOLUTION INTRAVENOUS at 12:31

## 2022-01-26 RX ADMIN — SODIUM CHLORIDE, PRESERVATIVE FREE 20 ML: 5 INJECTION INTRAVENOUS at 09:01

## 2022-01-26 RX ADMIN — SODIUM CHLORIDE 25 ML: 9 INJECTION, SOLUTION INTRAVENOUS at 10:19

## 2022-01-26 RX ADMIN — DEXAMETHASONE SODIUM PHOSPHATE 12 MG: 4 INJECTION, SOLUTION INTRAMUSCULAR; INTRAVENOUS at 11:30

## 2022-01-26 RX ADMIN — SODIUM CHLORIDE, PRESERVATIVE FREE 10 ML: 5 INJECTION INTRAVENOUS at 10:19

## 2022-01-26 ASSESSMENT — ENCOUNTER SYMPTOMS
DIARRHEA: 0
BLOOD IN STOOL: 0
WHEEZING: 0
CONSTIPATION: 0
EYE DISCHARGE: 0
CHEST TIGHTNESS: 0
FACIAL SWELLING: 0
ABDOMINAL DISTENTION: 0
TROUBLE SWALLOWING: 0
NAUSEA: 0
RECTAL PAIN: 0
COUGH: 0
ABDOMINAL PAIN: 0
SORE THROAT: 0
COLOR CHANGE: 0
BACK PAIN: 0
SHORTNESS OF BREATH: 0
VOMITING: 0

## 2022-01-26 NOTE — PLAN OF CARE

## 2022-01-26 NOTE — PROGRESS NOTES
[facility] [address] [place] [address]  [address]  Dept: [phone]  Dept Fax: [phone]: [phone]    Visit Date:1/26/2022     Ruchi Peralta is a 45 y.o. male who presents today for:   Chief Complaint   Patient presents with    Follow-up     colon cancer        HPI:     Mr. Cat Mccoy is a 78-year-old patient with newly diagnosed stage III colon cancer. He presents to the medical oncology clinic to discuss postsurgical management. His history of colon cancer goes back to September 2021 when he was seen by  Dr. Benjamin Hdz due to unexplained weight loss of 10-15 lbs and rectal bleeding. Colonoscopy performed on September 1, 2021 demonstrated multiple polyps but a larger mass at the proximal transverse colon demonstrated the adenocarcinoma. He did not have signs of obstruction, no abdominal pain, no significant nausea or vomiting. Some previous episodes of nausea, GERD and epigastric discomfort. He had CT of the chest abdomen and pelvis on September 2, 2021,no evidence of metastatic disease. The patient met with Dr. Lang Mata and after discussing his surgical treatment options he proceeded with right hemicolectomy on September 16, 2021. Final pathology report showed:  A.  Right colon mass, hemicolectomy:    Invasive moderately differentiated colonic adenocarcinoma, pT2.    Margins are free of neoplasia.           Pericolonic lymph nodes (17): 2 out of 17 are positive for   metastatic colonic adenocarcinoma.             Appendix-no pathologic abnormality. Torres Cram base lymph nodes (2), resection:             Negative for malignancy (0/2). pT2 pN1b    He had uneventful post surgical course. Extensive history of colorectal disease on paternal side.     On November 9, 2021 the patient received first cycle of adjuvant chemotherapy with Xeloda  On November 24, 2021 he was admitted to the hospital with a 1 week history of intractable periumbilical abdominal pain of 9/10 intensity.  Associated nausea vomiting diarrhea and fever last few days. Diagnosed with acute enterocolitis, possible chemotherapy induced. IV Cipro & Flagyl during hospitalization. SBO vs ileus:General Surgery consulted. KUB showed possible SBO vs ileus. CT A/P showed worsening since previous. Managed medically with NGT. Advance diet slowly.  Possible fluid overload: Lasix have been given for swelling. On discharge from the hospital on December 4, 2021 the patient was placed on the following medications:  Bentyl 3 times daily,-Magic mouthwash 4 times daily as needed, -ferrous sulfate daily, Zofran every 8 hours as needed,-omeprazole,Compazine every 6 hours as needed, Reglan 2 times daily. After being discharged from the hospital the patient presented to chemotherapy suite on few occasions for IV hydration and potassium supplementation. He also had outpatient dietary consult. Current history on 01/26/2022:  Patient presents to the medical oncology clinic for cycle #4 of oxaliplatin and Xeloda. His cycle #3 was with oxaliplatin  110 mg/m²,  Xeloda 3 tablets p.o. twice daily for 2 weeks. Overall he tolerated it well. No diarrhea no abdominal pain. The patient did develop some palmar and plantar erythema with some skin peeling. Ricki Cons the patient denies having abdominal pain, no diarrhea. His appetite is improved and he tolerates more versatile diet. He reports grade 1 fatigue. He denies having any peripheral neuropathy from oxaliplatin. Oral mucositis is resolved.   HPI   Past Medical History:   Diagnosis Date    Anemia     Colon cancer ([address] 09/2021      Past Surgical History:   Procedure Laterality Date    COLONOSCOPY  09/01/2021    Dr. Ronda Burrows ERCP N/A 2/8/2022    ERCP DIAGNOSTIC performed by Apolonia Hartmann MD at [address] [facility] Right 9/16/2021    ROBOT EXTENDED RIGHT COLECTOMY performed by Danni Livingston MD at [address] Left [facility] with mesh inguinal    PORT SURGERY N/A 2021    SINGLE LUMEN SMART PORT INSERTION performed by Adrianne López MD at [address] [place] [address] History   Problem Relation Age of Onset   Jennifer Olivarese Other Mother         Brain aneurysm    COPD Mother    Jennifer Rejikle Stroke Mother    Jennifer Edward Cancer Father         lung mets brain and bone    Stroke Father     Cancer Maternal Grandmother         colon    High Blood Pressure Maternal Grandmother     Cancer Maternal Grandfather         colon     High Blood Pressure Maternal Grandfather     Kidney Disease Paternal Grandmother     Cancer Paternal Grandmother         breast     Diabetes Paternal Grandmother     High Blood Pressure Paternal Grandmother     Cancer Paternal Grandfather         lung-52    Heart Attack Paternal Grandfather     High Blood Pressure Paternal Grandfather     Cancer Paternal Aunt         colon    Heart Attack Paternal Aunt     Cancer Paternal Uncle         lung    Cancer Maternal Aunt         colon    Cancer Maternal Uncle         colon      Social History     Tobacco Use    Smoking status: Former Smoker     Packs/day: 1.00     Years: 35.00     Pack years: 35.00     Types: Cigarettes     Quit date: 2020     Years since quittin.0    Smokeless tobacco: Never Used   Substance Use Topics    Alcohol use: Not Currently      Current Outpatient Medications   Medication Sig Dispense Refill    potassium chloride (KLOR-CON M) 20 MEQ extended release tablet Take 1 tablet by mouth daily for 7 days 7 tablet 1    ondansetron (ZOFRAN-ODT) 4 MG disintegrating tablet Take 1 tablet by mouth 3 times daily as needed for Nausea or Vomiting 21 tablet 0    ondansetron (ZOFRAN) 4 MG tablet Take 1 tablet by mouth every 6 hours as needed for Nausea or Vomiting 30 tablet 1    Handicap Placard MISC by Does not apply route Diagnosis: No primary diagnosis found.   Expiration Date: 2022 1 each 0    Handicap Placard MISC by Does not apply route Diagnosis: colon cancer, stage IIIA. Currently completing chemotherapy   Expiration: 6 months 1 each 0    sucralfate (CARAFATE) 1 GM tablet take 1 tablet by mouth twice a day FIVE TO 60 MINS BEFORE LUNCH AND DINNER      ferrous sulfate (IRON 325) 325 (65 Fe) MG tablet Take 1 tablet by mouth daily (with breakfast) 90 tablet 1    omeprazole (PRILOSEC OTC) 20 MG tablet Take 20 mg by mouth daily       capecitabine (XELODA) 500 MG chemo tablet Take 3 tablets by mouth 2 times daily for 14 days 84 tablet 0    prochlorperazine (COMPAZINE) 5 MG tablet Take 1 tablet by mouth every 12 hours for 14 days Take 30 minutes before morning and afternoon dose of Xeloda 28 tablet 2    naloxegol (MOVANTIK) 12.5 MG TABS tablet Take 1 tablet by mouth every morning (Patient not taking: Reported on 12/15/2021) 30 tablet 0    dicyclomine (BENTYL) 10 MG capsule Take 2 capsules by mouth 3 times daily (Patient not taking: Reported on 12/15/2021) 120 capsule 0    Magic Mouthwash (MIRACLE MOUTHWASH) Swish and spit 5 mLs 4 times daily as needed for Irritation Mixture of 30mL diphenhydramine, 60mL maalox, 4g carafate as ratio (Patient not taking: Reported on 12/15/2021) 300 mL 0    ondansetron (ZOFRAN-ODT) 4 MG disintegrating tablet  (Patient not taking: Reported on 12/29/2021)       No current facility-administered medications for this visit.       No Known Allergies   Health Maintenance   Topic Date Due    Shingles Vaccine (2 of 2) 11/18/2021    Depression Screen  07/23/2022    Pneumococcal 0-64 years Vaccine (2 of 4 - PCV13) 07/23/2022    Colon cancer screen colonoscopy  09/01/2022    Low dose CT lung screening  09/02/2022    Lipid screen  07/23/2026    DTaP/Tdap/Td vaccine (2 - Td or Tdap) 07/23/2031    Flu vaccine  Completed    COVID-19 Vaccine  Completed    Hepatitis A vaccine  Aged Out    Hepatitis B vaccine  Aged Out    Hib vaccine  Aged Out    Meningococcal (ACWY) vaccine  Aged Out    Hepatitis C screen  Discontinued    HIV screen  Discontinued        Subjective:   Review of Systems   Constitutional: Negative for activity change, appetite change, fatigue and fever. HENT: Negative for congestion, dental problem, facial swelling, hearing loss, mouth sores, nosebleeds, sore throat, tinnitus and trouble swallowing. Eyes: Negative for discharge and visual disturbance. Respiratory: Negative for cough, chest tightness, shortness of breath and wheezing. Cardiovascular: Negative for chest pain, palpitations and leg swelling. Gastrointestinal: Negative for abdominal distention, abdominal pain, blood in stool, constipation, diarrhea, nausea, rectal pain and vomiting. Endocrine: Negative for cold intolerance, polydipsia and polyuria. Genitourinary: Negative for decreased urine volume, difficulty urinating, dysuria, flank pain, hematuria and urgency. Musculoskeletal: Negative for arthralgias, back pain, gait problem, joint swelling, myalgias and neck stiffness. Skin: Negative for color change, rash and wound. Neurological: Negative for dizziness, tremors, seizures, speech difficulty, weakness, light-headedness, numbness and headaches. Hematological: Negative for adenopathy. Does not bruise/bleed easily. Psychiatric/Behavioral: Negative for confusion and sleep disturbance. The patient is not nervous/anxious. Objective:   Physical Exam  Vitals reviewed. Constitutional:       General: He is not in acute distress. Appearance: He is well-developed. HENT:      Head: Normocephalic. Mouth/Throat:      Pharynx: No oropharyngeal exudate. Eyes:      General: No scleral icterus. Right eye: No discharge. Left eye: No discharge. Pupils: Pupils are equal, round, and reactive to light. Neck:      Thyroid: No thyromegaly. Vascular: No JVD. Trachea: No tracheal deviation. Cardiovascular:      Rate and Rhythm: Normal rate.       Heart sounds: Normal heart sounds. No murmur heard. No friction rub. No gallop. Pulmonary:      Effort: Pulmonary effort is normal. No respiratory distress. Breath sounds: Normal breath sounds. No stridor. No wheezing or rales. Chest:      Chest wall: No tenderness. Abdominal:      General: Bowel sounds are normal. There is no distension. Palpations: Abdomen is soft. There is no mass. Tenderness: There is no abdominal tenderness. There is no rebound. Musculoskeletal:         General: Normal range of motion. Cervical back: Normal range of motion and neck supple. Comments: Good range of motion in all four extremities. Lymphadenopathy:      Cervical: No cervical adenopathy. Skin:     General: Skin is warm. Findings: No erythema or rash. Neurological:      Mental Status: He is alert and oriented to person, place, and time. Cranial Nerves: No cranial nerve deficit. Motor: No abnormal muscle tone. Deep Tendon Reflexes: Reflexes are normal and symmetric. Psychiatric:         Behavior: Behavior normal.         Thought Content: Thought content normal.         Judgment: Judgment normal.         BP (!) 140/87 (Site: Right Upper Arm, Position: Sitting, Cuff Size: Medium Adult)   Pulse 67   Temp 98.5 °F (36.9 °C) (Oral)   Resp 16   Ht 6' (1.829 m)   Wt 153 lb (69.4 kg)   SpO2 99%   BMI 20.75 kg/m²      ECOG status is 0    Imaging studies and labs:   CT of the abdomen and pelvis on September 2, 2021 showed:   1. Unremarkable CT abdomen and pelvis with IV contrast.    CT of the chest on September 2, 2021 showed:  1. No suspicious pulmonary nodules. 2. No evidence of metastatic disease. 3. No acute infiltrate or pleural effusion       CT ABDOMEN PELVIS W IV CONTRAST Additional Contrast? None    Result Date: 10/21/2021  PROCEDURE: CT ABDOMEN PELVIS W IV CONTRAST CLINICAL INFORMATION: Abdominal pain. COMPARISON: CT abdomen and pelvis 9/2/2021.  TECHNIQUE: Axial 5 mm CT images were obtained through the abdomen and pelvis after the administration of 80  cc Isovue 370 intravenous contrast. Coronal and sagittal reconstructions were obtained. All CT scans at this facility use dose modulation, iterative reconstruction, and/or weight-based dosing when appropriate to reduce radiation dose to as low as reasonably achievable. FINDINGS: Lung bases: Atelectasis is noted at the lung bases. The heart size is normal. Liver/gallbladder/bilary tree: Thickening of the wall of the gallbladder near the fundus could represent adenomyomatosis. No radiopaque gallstones or biliary ductal dilatation is observed. No liver lesions are identified. Pancreas/spleen/adrenal glands: Normal size and attenuation. Kidneys/ureters/bladder: No renal calculus, hydronephrosis, or hydroureter is present. The urinary bladder is unremarkable. Gastrointestinal:  Postoperative changes related to right hemicolectomy with a surgical anastomosis in the right lower quadrant appears intact. There is question area of intussusception of small bowel loops within the transverse colon at the site of the anastomosis (series 2, image 45). No bowel obstruction, free fluid, fluid collection, or free air is observed. Moderate retained fecal material is seen throughout the colon. Retroperitoneum/lymph nodes: The aorta is not dilated. No lymphadenopathy is present. Pelvis: The prostate gland is not enlarged. Musculoskeletal: The visualized skeletal structures appear intact. 1. Postoperative changes related to right hemicolectomy appear intact. There may be intussusception of the small bowel within the colon at the site of the surgical anastomosis (series 2, image 45) however there is no associated bowel obstruction, free fluid, fluid collection, or free air identified. Moderate retained fecal material is seen throughout the colon suggesting fecal stasis.  Assessment for bowel wall thickening is limited without oral contrast. 2. 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 No radiopaque gallstones or biliary ductal dilatation identified. Correlate with liver function tests. Chronic findings are discussed. **This report has been created using voice recognition software. It may contain minor errors which are inherent in voice recognition technology. ** Final report electronically signed by Dr Smita Galaviz on 10/21/2021 10:02 AM      Lab Results   Component Value Date    WBC 7.7 01/26/2022    HGB 11.7 (L) 01/26/2022    HCT 34.6 (L) 01/26/2022    MCV 91 01/26/2022     01/26/2022       Chemistry        Component Value Date/Time     01/26/2022 0908     12/04/2021 0600    K 3.0 (L) 01/26/2022 0908    K 4.0 12/04/2021 0600    K 3.4 (L) 11/30/2021 0600     12/04/2021 0600    CO2 29 12/04/2021 0600    BUN 8 12/04/2021 0600    CREATININE 0.6 01/26/2022 0908    CREATININE 0.7 12/04/2021 0600        Component Value Date/Time    CALCIUM 7.7 (L) 12/04/2021 0600    ALKPHOS 147 (H) 01/26/2022 0908    AST 60 (H) 01/26/2022 0908     (H) 01/26/2022 0908    BILITOT 0.5 01/26/2022 0908          Assessment/Plan:   1. Stage III A colon cancer. This is a 26-year-old patient with newly diagnosed stage IIIa colon cancer. He is status post right hemicolectomy colectomy with lymph node dissection. I had a lengthy discussion with the patient and his wife about post surgical management. Adjuvant chemotherapy is indicated for the patients with lymph node involvement. For patients who have undergone potentially curative resection of a colon cancer, the goal of postoperative chemotherapy is to eradicate micrometastases, thereby reducing the likelihood of disease recurrence and increasing the cure rate. The benefits of adjuvant chemotherapy for patients with node-positive disease are in the range of 30 % reduction in the risk of disease recurrence and a 20-30% reduction in mortality with modern chemotherapy.  Adjuvant therapy for resected stage III colon cancer involves a six-month course of a combination of several chemotherapy drugs, which are given intravenously, in a specific order on specific days. However, based on the [facility] and NSABP C-07 trials and an analysis of the International Duration Evaluation of Adjuvant Chemotherapy (IDEA) collaboration (six separate randomized trials of six versus three months of adjuvant oxaliplatin-based therapy), we continue to suggest six months of therapy for individuals with high-risk cancers (T4N2). However, patients with high-risk cancers ramiro limiting adjuvant therapy to three months in patients with low-risk disease (T1-3N1) is valid option.  Orally active fluoropyrimidine,  Xeloda offers increased convenience. CAPOX is the preferred regimen if a 3-month course of adjuvant therapy is chosen. 2. Adjuvant chemotherapy with  CAPOX. Received  cycle #1 of CAPOX on 11/09/2021. Cycle was complicated by hospitalization for diffuse acute enterocolitis. Patient required almost 1 week hospitalization. He required electrolytes correction antibiotic treatment and hydration. He also developed small bowel obstruction that was managed medically with NG tube placement. Patient reports that today he is significantly improved. He denies having any abdominal pain, no nausea no vomiting. He received cycle 2 on  12/15/2021. The dose of oxaliplatin was reduced from 130 mg/m² to 100 mg/m². The dose of Xeloda was reduced to 1000 mg p.o. twice daily for 14 days. He tolerated cycle #2 much better. Minimal nausea no vomiting no diarrhea no abdominal pain. Developed Palmar and plantar erythema with some skin peeling. Cycle #3 of XELOX, the dose of Oxaliplatin was increased to 110 mg/m², he continued Xeloda 3 tablets p.o. twice daily. Cycle #3 was reasonably well-tolerated, few episodes of diarrhea. The patient denies having any palmar plantar erythema no skin peeling. He denies having any peripheral neuropathy.   We will proceed with cycle #4 of XELOX today. We will continue the same dose of oxaliplatin and Xeloda. Chemotherapy orders were reviewed and signed. The patient was instructed to take Zofran half an hour before the evening and morning dose of Xeloda. For breakthrough nausea he received prescription for Phenergan  Patient was instructed to call us if he develops any diarrhea. For mild diarrhea he was instructed to start taking Lomotil. 3.  Hypokalemia. His potassium is 3.0 today. The patient is placed on oral potassium supplementation 20 mEq daily. Diagnosis Orders   1. Adenocarcinoma, colon (HCC)  CBC Auto Differential    POC PANEL BMP W/IOCA    Hepatic Function Panel   2. Encounter for chemotherapy management     3. S/P right colectomy     4. Palmar erythema     5. Diarrhea due to drug     6. Hypokalemia          Plan:   Return in about 3 weeks (around 2/16/2022). Orders Placed:   Orders Placed This Encounter   Procedures    CBC Auto Differential     Standing Status:   Future     Number of Occurrences:   1     Standing Expiration Date:   1/26/2023    POC PANEL BMP W/IOCA     Standing Status:   Future     Number of Occurrences:   1     Standing Expiration Date:   1/26/2023    Hepatic Function Panel     Standing Status:   Future     Number of Occurrences:   1     Standing Expiration Date:   1/26/2023        Medications Prescribed:   Orders Placed This Encounter   Medications    DISCONTD: capecitabine (XELODA) 500 MG chemo tablet     Sig: Take 3 tablets by mouth 2 times daily for 14 days     Dispense:  84 tablet     Refill:  0    potassium chloride (KLOR-CON M) 20 MEQ extended release tablet     Sig: Take 1 tablet by mouth daily for 7 days     Dispense:  7 tablet     Refill:  1            Discussed use, benefit, and side effectsof prescribed medications. All patient questions answered. Pt voiced understanding. Instructed to continue current medications, diet and exercise. Patient agreed with treatment plan.  Follow up as directed.     Electronically signed by Emeka Martínez MD on 11/3/21 at 8:15 AM EDT

## 2022-01-26 NOTE — PROGRESS NOTES
Patient assessed for the following post chemotherapy:    Dizziness   No  Lightheadedness  No      Acute nausea/vomiting No  Headache   No  Chest pain/pressure  No  Rash/itching   No  Shortness of breath  No    Patient declined to be kept for 20 minutes observation post infusion chemotherapy. Patient tolerated chemotherapy treatment oxaliplatin and potassium without any complications. Last vital signs:   BP (!) 141/89   Pulse 64   Temp 97.8 °F (36.6 °C) (Oral)   Resp 18   Ht 6' (1.829 m)   Wt 153 lb (69.4 kg)   SpO2 98%   BMI 20.75 kg/m²     Patient instructed if experience any of the above symptoms following today's infusion,he is to notify MD immediately or go to the emergency department. Discharge instructions given to patient. Verbalizes understanding. Ambulated off unit per self with belongings.

## 2022-01-26 NOTE — PROGRESS NOTES
Chemotherapy Administration    Pre-assessment Data: Antineoplastic Agents  Other:   See toxicity flow sheet for assessment [x]     Physician Notification of Concerns Related to Chemotherapy Administration:   Physician Notified Ishaan Bolus / Time of Notification      Interventions:   Lab work assessed  [x]   Height / Weight verified for dose [x]   Current MAR reviewed [x]   Emergency drugs available as appropriate [x]   Anaphylaxis assessment completed [x]   Pre-medications administered as ordered [x]   Blood return noted upon initiation of chemotherapy [x]   Blood return noted each 1-2ml of a vesicant medication if given IV push []   Blood return noted each 2-3ml of a non-vesicant medication if given IV push []   Monitor for signs / symptoms of hypersensitivity reaction [x]   Chemotherapy orders (drug/dose/rate) verified by 2 Chemo certified RNs [x]   Monitor IV site and blood return throughout the infusion of the medication [x]   Document IV site checks on the IV assessment form [x]   Document chemotherapy teaching on the Patient Education tab [x]   Document patient verbalizes understanding of medications being administered [x]   If IV infiltration, see ONS Guidelines []   Other:      []

## 2022-01-26 NOTE — PATIENT INSTRUCTIONS
1. Proceed with cycle #4 of XELOX today. 2.  Potassium 20 mEq IV  in 100 mL over 1 hour today  3.   RTC to see me for labs: CBC, BMP, LFTs and a next dose of XELOX in 3 weeks

## 2022-01-27 ENCOUNTER — TELEPHONE (OUTPATIENT)
Dept: ONCOLOGY | Age: 52
End: 2022-01-27

## 2022-01-27 LAB
HEPATITIS B CORE TOTAL ANTIBODY: NONREACTIVE
MITOCHONDRIAL M2 AB, IGG: < 0.5 U/ML (ref 0–4)
TISSUE TRANSGLUTAMINASE IGA: 0.3 U/ML
TTG, IGG: < 0.6 U/ML

## 2022-01-27 NOTE — TELEPHONE ENCOUNTER
Spoke with patient and he stated that Dr Julieta Torres wanted to know how he did with pain meds he had at home. Patient stated that he took Ketorolac 10 mg and it work for 6 hrs and he keep ahead of the pain. Than he let the pain over take him and took the Percocet and it took care of his pain and patient letting Dr Julieta Torres to decide what is his best route and to sent a refill to [facility].

## 2022-01-28 DIAGNOSIS — M79.2 NEUROPATHIC PAIN: ICD-10-CM

## 2022-01-28 DIAGNOSIS — C18.9 ADENOCARCINOMA, COLON (HCC): Primary | ICD-10-CM

## 2022-01-28 LAB
CERULOPLASMIN: 26 MG/DL (ref 17–54)
F-ACTIN AB IGG: 6 UNITS (ref 0–19)

## 2022-01-28 RX ORDER — PROCHLORPERAZINE MALEATE 5 MG/1
5 TABLET ORAL EVERY 12 HOURS
Qty: 28 TABLET | Refills: 2 | Status: SHIPPED | OUTPATIENT
Start: 2022-01-28 | End: 2022-03-23 | Stop reason: SDUPTHER

## 2022-01-28 RX ORDER — OXYCODONE HYDROCHLORIDE AND ACETAMINOPHEN 5; 325 MG/1; MG/1
1 TABLET ORAL EVERY 6 HOURS PRN
Qty: 64 TABLET | Refills: 0 | Status: SHIPPED | OUTPATIENT
Start: 2022-01-28 | End: 2022-02-16 | Stop reason: SDUPTHER

## 2022-01-29 LAB
MYELOPEROXIDASE AB, IGG: 0 AU/ML (ref 0–19)
SERINE PROTEASE 3, IGG: 6 AU/ML (ref 0–19)

## 2022-01-30 LAB — ANA SCREEN: NORMAL

## 2022-02-01 RX ORDER — CAPECITABINE 500 MG/1
800 TABLET, FILM COATED ORAL 2 TIMES DAILY
Qty: 84 TABLET | Refills: 0 | Status: SHIPPED | OUTPATIENT
Start: 2022-02-01 | End: 2022-02-16 | Stop reason: SDUPTHER

## 2022-02-08 ENCOUNTER — ANESTHESIA EVENT (OUTPATIENT)
Dept: ENDOSCOPY | Age: 52
End: 2022-02-08
Payer: COMMERCIAL

## 2022-02-08 ENCOUNTER — APPOINTMENT (OUTPATIENT)
Dept: GENERAL RADIOLOGY | Age: 52
End: 2022-02-08
Attending: INTERNAL MEDICINE
Payer: COMMERCIAL

## 2022-02-08 ENCOUNTER — ANESTHESIA (OUTPATIENT)
Dept: ENDOSCOPY | Age: 52
End: 2022-02-08
Payer: COMMERCIAL

## 2022-02-08 ENCOUNTER — HOSPITAL ENCOUNTER (OUTPATIENT)
Age: 52
Setting detail: OUTPATIENT SURGERY
Discharge: HOME | End: 2022-02-08
Attending: INTERNAL MEDICINE | Admitting: INTERNAL MEDICINE
Payer: COMMERCIAL

## 2022-02-08 VITALS — OXYGEN SATURATION: 100 % | SYSTOLIC BLOOD PRESSURE: 142 MMHG | DIASTOLIC BLOOD PRESSURE: 91 MMHG

## 2022-02-08 VITALS
OXYGEN SATURATION: 97 % | WEIGHT: 151.8 LBS | DIASTOLIC BLOOD PRESSURE: 102 MMHG | HEART RATE: 66 BPM | BODY MASS INDEX: 20.56 KG/M2 | SYSTOLIC BLOOD PRESSURE: 151 MMHG | TEMPERATURE: 97 F | HEIGHT: 72 IN | RESPIRATION RATE: 20 BRPM

## 2022-02-08 PROCEDURE — 7100000000 HC PACU RECOVERY - FIRST 15 MIN: Performed by: INTERNAL MEDICINE

## 2022-02-08 PROCEDURE — 2500000003 HC RX 250 WO HCPCS: Performed by: NURSE ANESTHETIST, CERTIFIED REGISTERED

## 2022-02-08 PROCEDURE — 6360000002 HC RX W HCPCS: Performed by: INTERNAL MEDICINE

## 2022-02-08 PROCEDURE — C2625 STENT, NON-COR, TEM W/DEL SY: HCPCS | Performed by: INTERNAL MEDICINE

## 2022-02-08 PROCEDURE — 3700000001 HC ADD 15 MINUTES (ANESTHESIA): Performed by: INTERNAL MEDICINE

## 2022-02-08 PROCEDURE — 6370000000 HC RX 637 (ALT 250 FOR IP): Performed by: INTERNAL MEDICINE

## 2022-02-08 PROCEDURE — 2580000003 HC RX 258: Performed by: INTERNAL MEDICINE

## 2022-02-08 PROCEDURE — 3700000000 HC ANESTHESIA ATTENDED CARE: Performed by: INTERNAL MEDICINE

## 2022-02-08 PROCEDURE — 7100000001 HC PACU RECOVERY - ADDTL 15 MIN: Performed by: INTERNAL MEDICINE

## 2022-02-08 PROCEDURE — 2720000010 HC SURG SUPPLY STERILE: Performed by: INTERNAL MEDICINE

## 2022-02-08 PROCEDURE — 3209999900 FLUORO FOR SURGICAL PROCEDURES

## 2022-02-08 PROCEDURE — 3609018800 HC ERCP DX COLLECTION SPECIMEN BRUSHING/WASHING: Performed by: INTERNAL MEDICINE

## 2022-02-08 PROCEDURE — 7100000010 HC PHASE II RECOVERY - FIRST 15 MIN: Performed by: INTERNAL MEDICINE

## 2022-02-08 PROCEDURE — 74018 RADEX ABDOMEN 1 VIEW: CPT

## 2022-02-08 PROCEDURE — 6360000002 HC RX W HCPCS: Performed by: NURSE ANESTHETIST, CERTIFIED REGISTERED

## 2022-02-08 PROCEDURE — 76000 FLUOROSCOPY <1 HR PHYS/QHP: CPT

## 2022-02-08 PROCEDURE — 7100000011 HC PHASE II RECOVERY - ADDTL 15 MIN: Performed by: INTERNAL MEDICINE

## 2022-02-08 PROCEDURE — 2709999900 HC NON-CHARGEABLE SUPPLY: Performed by: INTERNAL MEDICINE

## 2022-02-08 DEVICE — PANCREATIC STENT KIT
Type: IMPLANTABLE DEVICE | Status: FUNCTIONAL
Brand: ADVANIX™ PANCREATIC STENT KIT

## 2022-02-08 RX ORDER — SODIUM CHLORIDE 9 MG/ML
25 INJECTION, SOLUTION INTRAVENOUS PRN
Status: DISCONTINUED | OUTPATIENT
Start: 2022-02-08 | End: 2022-02-08 | Stop reason: HOSPADM

## 2022-02-08 RX ORDER — HEPARIN SODIUM (PORCINE) LOCK FLUSH IV SOLN 100 UNIT/ML 100 UNIT/ML
500 SOLUTION INTRAVENOUS PRN
Status: DISCONTINUED | OUTPATIENT
Start: 2022-02-08 | End: 2022-02-08 | Stop reason: HOSPADM

## 2022-02-08 RX ORDER — SUCCINYLCHOLINE/SOD CL,ISO/PF 200MG/10ML
SYRINGE (ML) INTRAVENOUS PRN
Status: DISCONTINUED | OUTPATIENT
Start: 2022-02-08 | End: 2022-02-08 | Stop reason: SDUPTHER

## 2022-02-08 RX ORDER — MIDAZOLAM HYDROCHLORIDE 1 MG/ML
INJECTION INTRAMUSCULAR; INTRAVENOUS PRN
Status: DISCONTINUED | OUTPATIENT
Start: 2022-02-08 | End: 2022-02-08 | Stop reason: SDUPTHER

## 2022-02-08 RX ORDER — SODIUM CHLORIDE 0.9 % (FLUSH) 0.9 %
5-40 SYRINGE (ML) INJECTION EVERY 12 HOURS SCHEDULED
Status: DISCONTINUED | OUTPATIENT
Start: 2022-02-08 | End: 2022-02-08 | Stop reason: HOSPADM

## 2022-02-08 RX ORDER — ONDANSETRON 2 MG/ML
INJECTION INTRAMUSCULAR; INTRAVENOUS PRN
Status: DISCONTINUED | OUTPATIENT
Start: 2022-02-08 | End: 2022-02-08 | Stop reason: SDUPTHER

## 2022-02-08 RX ORDER — CIPROFLOXACIN 2 MG/ML
400 INJECTION, SOLUTION INTRAVENOUS ONCE
Status: COMPLETED | OUTPATIENT
Start: 2022-02-08 | End: 2022-02-08

## 2022-02-08 RX ORDER — LIDOCAINE HYDROCHLORIDE 20 MG/ML
INJECTION, SOLUTION INTRAVENOUS PRN
Status: DISCONTINUED | OUTPATIENT
Start: 2022-02-08 | End: 2022-02-08 | Stop reason: SDUPTHER

## 2022-02-08 RX ORDER — FENTANYL CITRATE 50 UG/ML
INJECTION, SOLUTION INTRAMUSCULAR; INTRAVENOUS PRN
Status: DISCONTINUED | OUTPATIENT
Start: 2022-02-08 | End: 2022-02-08 | Stop reason: SDUPTHER

## 2022-02-08 RX ORDER — SODIUM CHLORIDE 0.9 % (FLUSH) 0.9 %
5-40 SYRINGE (ML) INJECTION PRN
Status: DISCONTINUED | OUTPATIENT
Start: 2022-02-08 | End: 2022-02-08 | Stop reason: HOSPADM

## 2022-02-08 RX ORDER — PROPOFOL 10 MG/ML
INJECTION, EMULSION INTRAVENOUS PRN
Status: DISCONTINUED | OUTPATIENT
Start: 2022-02-08 | End: 2022-02-08 | Stop reason: SDUPTHER

## 2022-02-08 RX ORDER — DEXAMETHASONE SODIUM PHOSPHATE 4 MG/ML
INJECTION, SOLUTION INTRA-ARTICULAR; INTRALESIONAL; INTRAMUSCULAR; INTRAVENOUS; SOFT TISSUE PRN
Status: DISCONTINUED | OUTPATIENT
Start: 2022-02-08 | End: 2022-02-08 | Stop reason: SDUPTHER

## 2022-02-08 RX ADMIN — MIDAZOLAM 2 MG: 1 INJECTION INTRAMUSCULAR; INTRAVENOUS at 12:35

## 2022-02-08 RX ADMIN — PHENYLEPHRINE HYDROCHLORIDE 100 MCG: 10 INJECTION INTRAVENOUS at 13:15

## 2022-02-08 RX ADMIN — PHENYLEPHRINE HYDROCHLORIDE 100 MCG: 10 INJECTION INTRAVENOUS at 13:07

## 2022-02-08 RX ADMIN — ONDANSETRON 4 MG: 2 INJECTION INTRAMUSCULAR; INTRAVENOUS at 13:27

## 2022-02-08 RX ADMIN — PHENYLEPHRINE HYDROCHLORIDE 100 MCG: 10 INJECTION INTRAVENOUS at 13:14

## 2022-02-08 RX ADMIN — Medication 120 MG: at 12:50

## 2022-02-08 RX ADMIN — FENTANYL CITRATE 100 MCG: 50 INJECTION, SOLUTION INTRAMUSCULAR; INTRAVENOUS at 12:50

## 2022-02-08 RX ADMIN — CIPROFLOXACIN 400 MG: 2 INJECTION, SOLUTION INTRAVENOUS at 12:04

## 2022-02-08 RX ADMIN — HEPARIN 500 UNITS: 100 SYRINGE at 15:03

## 2022-02-08 RX ADMIN — SODIUM CHLORIDE: 9 INJECTION, SOLUTION INTRAVENOUS at 11:45

## 2022-02-08 RX ADMIN — PROPOFOL 150 MG: 10 INJECTION, EMULSION INTRAVENOUS at 12:50

## 2022-02-08 RX ADMIN — INDOMETHACIN 100 MG: 50 SUPPOSITORY RECTAL at 12:05

## 2022-02-08 RX ADMIN — DEXAMETHASONE SODIUM PHOSPHATE 8 MG: 4 INJECTION, SOLUTION INTRAMUSCULAR; INTRAVENOUS at 12:55

## 2022-02-08 RX ADMIN — LIDOCAINE HYDROCHLORIDE 100 MG: 20 INJECTION, SOLUTION INTRAVENOUS at 12:50

## 2022-02-08 ASSESSMENT — PULMONARY FUNCTION TESTS
PIF_VALUE: 17
PIF_VALUE: 16
PIF_VALUE: 15
PIF_VALUE: 1
PIF_VALUE: 17
PIF_VALUE: 1
PIF_VALUE: 18
PIF_VALUE: 16
PIF_VALUE: 18
PIF_VALUE: 25
PIF_VALUE: 16
PIF_VALUE: 19
PIF_VALUE: 22
PIF_VALUE: 11
PIF_VALUE: 13
PIF_VALUE: 23
PIF_VALUE: 1
PIF_VALUE: 17
PIF_VALUE: 16
PIF_VALUE: 18
PIF_VALUE: 17
PIF_VALUE: 20
PIF_VALUE: 17
PIF_VALUE: 17
PIF_VALUE: 18
PIF_VALUE: 16
PIF_VALUE: 11
PIF_VALUE: 1
PIF_VALUE: 5
PIF_VALUE: 0
PIF_VALUE: 1
PIF_VALUE: 15
PIF_VALUE: 15
PIF_VALUE: 19
PIF_VALUE: 2
PIF_VALUE: 11
PIF_VALUE: 0
PIF_VALUE: 30
PIF_VALUE: 16
PIF_VALUE: 31
PIF_VALUE: 0
PIF_VALUE: 5
PIF_VALUE: 18
PIF_VALUE: 16
PIF_VALUE: 16
PIF_VALUE: 15
PIF_VALUE: 1
PIF_VALUE: 11
PIF_VALUE: 17
PIF_VALUE: 13
PIF_VALUE: 18
PIF_VALUE: 16
PIF_VALUE: 1
PIF_VALUE: 16
PIF_VALUE: 2
PIF_VALUE: 21
PIF_VALUE: 3
PIF_VALUE: 18
PIF_VALUE: 17
PIF_VALUE: 3

## 2022-02-08 ASSESSMENT — PAIN SCALES - GENERAL
PAINLEVEL_OUTOF10: 0

## 2022-02-08 ASSESSMENT — PAIN - FUNCTIONAL ASSESSMENT: PAIN_FUNCTIONAL_ASSESSMENT: 0-10

## 2022-02-08 NOTE — H&P
[facility]  Sedation/Analgesia History & Physical    Patient: Jovany Bourgeois : 1970  [facility] Rec#: [id number] Acc#: [id number]   Provider Performing Procedure: Paolo Rainey MD  Primary Care Physician: Asmita Rossi MD    PRE-PROCEDURE   Full CODE [x]Yes  DNR-CCA/DNR-CC []Yes   Brief History/Pre-Procedure Diagnosis:cbd stricture          MEDICAL HISTORY  []CAD/Valve  []Liver Disease  []Lung Disease []Diabetes  []Hypertension []Renal Disease  []Additional information:       has a past medical history of Anemia and Colon cancer ([facility] [address] SURGICAL HISTORY   has a past surgical history that includes Inguinal hernia repair (Left, ); Colonoscopy (2021); hemicolectomy (Right, 2021); and Port Surgery (N/A, 2021). Additional information:       ALLERGIES   Allergies as of 2022    (No Known Allergies)     Additional information:       MEDICATIONS   Coumadin Use Last 7 Days [x]No []Yes  Antiplatelet drug therapy use last 7 days  [x]No []Yes  Other anticoagulant use last 7 days  [x]No []Yes    Current Facility-Administered Medications:     sodium chloride flush 0.9 % injection 5-40 mL, 5-40 mL, IntraVENous, 2 times per day, Paolo Rainey MD    sodium chloride flush 0.9 % injection 5-40 mL, 5-40 mL, IntraVENous, PRN, Paolo Rainey MD    0.9 % sodium chloride infusion, 25 mL, IntraVENous, PRN, Paolo Rainey MD    ciprofloxacin (CIPRO) IVPB 400 mg, 400 mg, IntraVENous, Once, Paolo Rainey MD    indomethacin (INDOCIN) 50 MG suppository 100 mg, 100 mg, Rectal, Once, Paolo Rainey MD    heparin flush 100 UNIT/ML injection 500 Units, 500 Units, IntraCATHeter, PRN, Paolo Rainey MD  Prior to Admission medications    Medication Sig Start Date End Date Taking?  Authorizing Provider   capecitabine (XELODA) 500 MG chemo tablet Take 3 tablets by mouth 2 times daily for 14 days 2/1/22 2/15/22 Yes Isabella Rider MD   prochlorperazine (COMPAZINE) 5 MG tablet Take 1 tablet by mouth every 12 hours for 14 days Take 30 minutes before morning and afternoon dose of Xeloda 1/28/22 2/11/22 Yes Kyle Ingram MD   oxyCODONE-acetaminophen (PERCOCET) 5-325 MG per tablet Take 1 tablet by mouth every 6 hours as needed for Pain for up to 14 days. 1/28/22 2/11/22 Yes Kyle Ingram MD   potassium chloride (KLOR-CON M) 20 MEQ extended release tablet Take 1 tablet by mouth daily for 7 days 1/26/22 2/8/22 Yes Kyle Ingram MD   ondansetron (ZOFRAN) 4 MG tablet Take 1 tablet by mouth every 6 hours as needed for Nausea or Vomiting 1/5/22  Yes Kyle Ingram MD   sucralfate (CARAFATE) 1 GM tablet take 1 tablet by mouth twice a day FIVE TO 60 MINS BEFORE LUNCH AND DINNER 9/30/21  Yes Historical Provider, MD   ferrous sulfate (IRON 325) 325 (65 Fe) MG tablet Take 1 tablet by mouth daily (with breakfast) 9/23/21  Yes Tan Jiménez MD   omeprazole (PRILOSEC OTC) 20 MG tablet Take 20 mg by mouth daily    Yes Historical Provider, MD   ondansetron (ZOFRAN-ODT) 4 MG disintegrating tablet Take 1 tablet by mouth 3 times daily as needed for Nausea or Vomiting 1/24/22   LUCY Luevano - CNP   Handicap Placard MISC by Does not apply route Diagnosis: No primary diagnosis found. Expiration Date: 06/30/2022 12/7/21   MD Qing Poole MISC by Does not apply route Diagnosis: colon cancer, stage IIIA.  Currently completing chemotherapy   Expiration: 6 months 12/7/21   Selina Conley PA-C   naloxegol (MOVANTIK) 12.5 MG TABS tablet Take 1 tablet by mouth every morning  Patient not taking: Reported on 12/15/2021 12/5/21   Katie Glow, DO   dicyclomine (BENTYL) 10 MG capsule Take 2 capsules by mouth 3 times daily  Patient not taking: Reported on 12/15/2021 12/4/21   Katie Glow, DO   Magic Mouthwash (MIRACLE MOUTHWASH) Swish and spit 5 mLs 4 times daily as needed for Irritation Mixture of 30mL diphenhydramine, 60mL maalox, 4g carafate as ratio  Patient not taking: Reported on 12/15/2021 12/4/21 Rasheeda To, DO   ondansetron (ZOFRAN-ODT) 4 MG disintegrating tablet  9/20/21   Historical Provider, MD     Additional information:       PHYSICAL:   Heart:  [x]Regular rate and rhythm  []Other:    Lungs:  [x]Clear    []Other:    Abdomen: [x]Soft    []Other:    Mental Status: [x]Alert & Oriented  []Other:      VITAL SIGNS   Patient Vitals for the past 24 hrs:   BP Temp Temp src Pulse Resp SpO2 Height Weight   02/08/22 1133 (!) 144/82 97.7 °F (36.5 °C) Temporal 62 16 98 % 6' (1.829 m) 151 lb 12.8 oz (68.9 kg)       PLANNED PROCEDURE  []EGD  []Colonoscopy []Flex Sigmoid  [x]ERCP []EUS   []Cystoscopy  [] CATH [] BRONCH   Consent: I have discussed with the patient and/or the patient representative the indication, alternatives, and the possible risks and/or complications of the planned procedure and the anesthesia methods. The patient and/or patient representative appear to understand and agree to proceed. SEDATION  Planned agent:[]Midazolam []Meperidine []Sublimaze []Morphine  []Diazepam [x]Propofol  []Other:     ASA Classification: Class 3 - A patient with severe systemic disease that limits activity but is not incapacitating    Airway Assessment: Mallampati Class I - (soft palate, fauces, uvula & anterior/posterior tonsillar pillars are visible)    Monitoring and Safety: The patient will be placed on a cardiac monitor and vital signs, pulse oximetry and level of consciousness will be continuously evaluated throughout the procedure. The patient will be closely monitored until recovery from the medications is complete and the patient has returned to baseline status. Respiratory therapy will be on standby during the procedure. [x]Pre-procedure diagnostic studies complete and results available. Comment:    [x]Previous sedation/anesthesia experiences assessed. Comment:    [x]The patient is an appropriate candidate to undergo the planned procedure sedation and anesthesia.  (Refer to nursing sedation/analgesia documentation record)  [x]Formulation and discussion of sedation/procedure plan, risks, and expectations with patient and/or responsible adult completed. [x]Patient examined immediately prior to the procedure.  (Refer to nursing sedation/analgesia documentation record)    Aziza Machuca MD, MD   Electronically signed 2/8/2022 at 12:02 PM

## 2022-02-08 NOTE — ANESTHESIA POSTPROCEDURE EVALUATION
Department of Anesthesiology  Postprocedure Note    Patient: Alice Nunez  MRN: [id number]  YOB: 1970  Date of evaluation: 2/8/2022  Time:  5:18 PM     Procedure Summary     Date: 02/08/22 Room / Location: [address] [place] [address] / [address] [place] [address]    Anesthesia Start: 8288 Anesthesia Stop: 6818    Procedure: ERCP DIAGNOSTIC (N/A ) Diagnosis: (ELEVATED LFT'S, DILATED CBD)    Surgeons: Mary Anne Greenwood MD Responsible Provider: Catarino Dow DO    Anesthesia Type: general ASA Status: 3          Anesthesia Type: general    Maurice Phase I: Maurice Score: 10    Maurice Phase II: Maurice Score: 10    Last vitals: Reviewed and per EMR flowsheets.        Anesthesia Post Evaluation    Patient location during evaluation: PACU  Patient participation: complete - patient participated  Level of consciousness: awake  Airway patency: patent  Nausea & Vomiting: no nausea  Complications: no  Cardiovascular status: hemodynamically stable  Respiratory status: acceptable  Hydration status: stable

## 2022-02-08 NOTE — PROGRESS NOTES
Scope #.169 Sphincterotomy completed. Grounding pad outer thigh, skin intact upon removal.IV dye Lot [id number]. EXP 06/2023 4 cc contrast used. Pictures taken. Tolerated well. Mediport right upper chest free of  pressure through out entire case. Indocin received prior to procedure.  Care turned over to PACU

## 2022-02-08 NOTE — POST SEDATION
[facility]  Sedation/Analgesia Post Sedation Record    Patient: Evelyn Salvador : 1970  Med Rec#: [id number] Acc#: [id number]   Procedure Performed By: Barbara Alvarado MD  Primary Care Physician: Tavia Rucker MD    POST-PROCEDURE    Physicians/Assistants: Barbara Alvarado MD  Procedure Performed:    Sedation/Anesthesia:     Estimated Blood Loss:          ml  Specimens Removed:  []None [x]Other:      Disposition of Specimen:  [x]Pathology []Other      Complications:   [x]None Immediate []Other:     Post-procedure Diagnosis/Findings:             Recommendations:     cbd stricture          Barbara Alvarado MD, MD [facility]  Electronically signed 2022 at 1:33 PM

## 2022-02-08 NOTE — ANESTHESIA PRE PROCEDURE
Department of Anesthesiology  Preprocedure Note       Name:  Beatrice Shrestha   Age:  45 y.o.  :  1970                                          MRN:  [id number]         Date:  2022      Surgeon: Mike Faulkner):  Yudy Taylor MD    Procedure: Procedure(s):  ERCP DIAGNOSTIC    Medications prior to admission:   Prior to Admission medications    Medication Sig Start Date End Date Taking? Authorizing Provider   capecitabine (XELODA) 500 MG chemo tablet Take 3 tablets by mouth 2 times daily for 14 days 2/1/22 2/15/22 Yes Anjelica Niño MD   prochlorperazine (COMPAZINE) 5 MG tablet Take 1 tablet by mouth every 12 hours for 14 days Take 30 minutes before morning and afternoon dose of Xeloda 22 Yes Anjelica Niño MD   oxyCODONE-acetaminophen (PERCOCET) 5-325 MG per tablet Take 1 tablet by mouth every 6 hours as needed for Pain for up to 14 days. 22 Yes Anjelica Niño MD   potassium chloride (KLOR-CON M) 20 MEQ extended release tablet Take 1 tablet by mouth daily for 7 days 22 Yes Anjelica Niño MD   ondansetron (ZOFRAN) 4 MG tablet Take 1 tablet by mouth every 6 hours as needed for Nausea or Vomiting 22  Yes Anjelica Niño MD   sucralfate (CARAFATE) 1 GM tablet take 1 tablet by mouth twice a day FIVE TO 60 MINS BEFORE LUNCH AND DINNER 21  Yes Historical Provider, MD   ferrous sulfate (IRON 325) 325 (65 Fe) MG tablet Take 1 tablet by mouth daily (with breakfast) 21  Yes Ray Brown MD   omeprazole (PRILOSEC OTC) 20 MG tablet Take 20 mg by mouth daily    Yes Historical Provider, MD   ondansetron (ZOFRAN-ODT) 4 MG disintegrating tablet Take 1 tablet by mouth 3 times daily as needed for Nausea or Vomiting 22   LUCY Crews - AIDE   Handicap Placard MISC by Does not apply route Diagnosis: No primary diagnosis found. Expiration Date: 2022   Anjelica Niño MD   Handicap Placard MISC by Does not apply route Diagnosis: colon cancer, stage IIIA. Currently completing chemotherapy   Expiration: 6 months 12/7/21   Selina Conley PA-C   naloxegol (MOVANTIK) 12.5 MG TABS tablet Take 1 tablet by mouth every morning  Patient not taking: Reported on 12/15/2021 12/5/21   Ivan Adams DO   dicyclomine (BENTYL) 10 MG capsule Take 2 capsules by mouth 3 times daily  Patient not taking: Reported on 12/15/2021 12/4/21   Ivan Adams DO   Magic Mouthwash (MIRACLE MOUTHWASH) Swish and spit 5 mLs 4 times daily as needed for Irritation Mixture of 30mL diphenhydramine, 60mL maalox, 4g carafate as ratio  Patient not taking: Reported on 12/15/2021 12/4/21   Ivan Adams DO   ondansetron (ZOFRAN-ODT) 4 MG disintegrating tablet  9/20/21   Historical Provider, MD       Current medications:    Current Facility-Administered Medications   Medication Dose Route Frequency Provider Last Rate Last Admin    sodium chloride flush 0.9 % injection 5-40 mL  5-40 mL IntraVENous 2 times per day Dorina Martin MD        sodium chloride flush 0.9 % injection 5-40 mL  5-40 mL IntraVENous PRN Dorina Martin MD        0.9 % sodium chloride infusion  25 mL IntraVENous PRN Dorina Martin MD        ciprofloxacin (CIPRO) IVPB 400 mg  400 mg IntraVENous Once Dorina Martin MD        indomethacin (INDOCIN) 50 MG suppository 100 mg  100 mg Rectal Once Dorina Martin MD        heparin flush 100 UNIT/ML injection 500 Units  500 Units IntraCATHeter PRN Dorina Martin MD           Allergies:  No Known Allergies    Problem List:    Patient Active Problem List   Diagnosis Code    Personal history of tobacco use Z87.891    History of alcoholism ([facility] [address] F10.21    Weight loss R63.4    Bradycardia R00.1    Heartburn R12    Varicose veins of both lower extremities I83.93    Family history of cancer Z80.9    Adenocarcinoma, colon ([facility] [address] C18.9    Cancer of transverse colon ([facility] [address] C18.4    S/P right colectomy Z90.49    Hypokalemia E87.6    Diarrhea due to drug K52.1    Enterocolitis K52.9    Severe protein-calorie malnutrition ([facility] [address] E43       Past Medical History:        Diagnosis Date    Anemia     Colon cancer ([facility] [address] 2021       Past Surgical History:        Procedure Laterality Date    COLONOSCOPY  2021    Dr. Jacek Pierson Right 2021    ROBOT EXTENDED RIGHT COLECTOMY performed by John Ortega MD at [address] Left     [facility] with mesh inguinal    PORT SURGERY N/A 2021    SINGLE LUMEN SMART PORT INSERTION performed by John Ortega MD at [facility] History:    Social History     Tobacco Use    Smoking status: Former Smoker     Packs/day: 1.00     Years: 35.00     Pack years: 35.00     Types: Cigarettes     Quit date: 2020     Years since quittin.0    Smokeless tobacco: Never Used   Substance Use Topics    Alcohol use: Not Currently                                Counseling given: Not Answered      Vital Signs (Current):   Vitals:    22 1133   BP: (!) 144/82   Pulse: 62   Resp: 16   Temp: 36.5 °C (97.7 °F)   TempSrc: Temporal   SpO2: 98%   Weight: 151 lb 12.8 oz (68.9 kg)   Height: 6' (1.829 m)                                              BP Readings from Last 3 Encounters:   22 (!) 144/82   22 (!) 140/87   22 (!) 141/89       NPO Status: Time of last liquid consumption: 0900 (sips water with meds)                        Time of last solid consumption: 2330                        Date of last liquid consumption: 22                        Date of last solid food consumption: 22    BMI:   Wt Readings from Last 3 Encounters:   22 151 lb 12.8 oz (68.9 kg)   22 153 lb (69.4 kg)   22 153 lb (69.4 kg)     Body mass index is 20.59 kg/m².     CBC:   Lab Results   Component Value Date    WBC 7.7 2022    RBC 3.80 2022    HGB 11.7 2022    HCT 34.6 2022    MCV 91 2022    RDW 19.6 2022     01/26/2022       CMP:   Lab Results   Component Value Date     01/26/2022     12/04/2021    K 3.0 01/26/2022    K 4.0 12/04/2021    K 3.4 11/30/2021     12/04/2021    CO2 29 12/04/2021    BUN 8 12/04/2021    CREATININE 0.6 01/26/2022    CREATININE 0.7 12/04/2021    LABGLOM >90 12/04/2021    GLUCOSE 87 12/04/2021    PROT 6.2 01/26/2022    CALCIUM 7.7 12/04/2021    BILITOT 0.5 01/26/2022    ALKPHOS 147 01/26/2022    AST 60 01/26/2022     01/26/2022       POC Tests: No results for input(s): POCGLU, POCNA, POCK, POCCL, POCBUN, POCHEMO, POCHCT in the last 72 hours. Coags:   Lab Results   Component Value Date    INR 0.99 01/25/2022    APTT 30.4 07/23/2021       HCG (If Applicable): No results found for: PREGTESTUR, PREGSERUM, HCG, HCGQUANT     ABGs: No results found for: PHART, PO2ART, UCB5BQT, QKB5LWH, BEART, L8NJLWRI     Type & Screen (If Applicable):  Lab Results   Component Value Date    LABRH POS 09/16/2021       Drug/Infectious Status (If Applicable):  Lab Results   Component Value Date    HEPCAB Negative 01/25/2022       COVID-19 Screening (If Applicable):   Lab Results   Component Value Date    COVID19 NOT  DETECTED 11/24/2021    COVID19 Not Detected 09/23/2021           Anesthesia Evaluation  Patient summary reviewed and Nursing notes reviewed no history of anesthetic complications:   Airway: Mallampati: III  TM distance: >3 FB   Neck ROM: full  Mouth opening: > = 3 FB Dental:      Comment: Multiple dental issues per patient. Pulmonary:                             ROS comment: Former smoker 35 pack year   Cardiovascular:  Exercise tolerance: good (>4 METS),                     Neuro/Psych:                ROS comment: History of alcoholism GI/Hepatic/Renal:            ROS comment: Colon cancer resected. .   Endo/Other:    (+) blood dyscrasia: anemia:., malignancy/cancer. Abdominal:             Vascular: negative vascular ROS.          Other Findings: Anesthesia Plan      general     ASA 3       Induction: intravenous. MIPS: Postoperative opioids intended and Prophylactic antiemetics administered. Anesthetic plan and risks discussed with patient and spouse. Plan discussed with attending.                   LUCY Lomeli - CRNA   2/8/2022

## 2022-02-09 NOTE — OP NOTE
[address] [id number]                                OPERATIVE REPORT    PATIENT NAME: Rio Finn                :        1970  MED REC NO:   [id number]                           ROOM:  ACCOUNT NO:   [de-identified]                           ADMIT DATE: 2022  PROVIDER:     Elodia Lopez M.D.    DATE OF PROCEDURE:  2022    PROCEDURE:  ERCP plus pancreatic stent placement. INDICATION FOR THE PROCEDURE:  The patient with a history of CBD  stricture, dilated common bile duct on the MRCP, 1.4 cm common bile duct  with abnormal liver panel, history implying possible gallbladder disease  with biliary colic attacks. We are doing further exploration of common  bile duct and possible stricture, benign versus malignant. The patient  also having history of colon cancer and had undergone right  hemicolectomy with two lymph nodes positive and is undergoing  chemotherapy at the moment. Following with Dr. Tavia Fraser. See the preop  note for rest of clinicals. SURGEON:  Elodia Lopez MD    ASA CLASSIFICATION:  III. MEDICATIONS:  General anesthesia. BIOPSY:  None. PHOTOGRAPHS:  Yes. BLOOD LOSS:  Less than 5 mL. DESCRIPTION OF PROCEDURE:  Informed consent was obtained after  explaining risks and benefits of the procedure, possible complications  including bleeding, reaction to medicine, bleeding requiring  transfusion, perforation requiring surgery were discussed with the  patient and wife. Pancreatitis which carries nearly 17% risk was  discussed. Also aspiration, inability to intubate common bile duct,  hypotension, and sepsis were discussed. The patient did get antibiotic. Before the procedure also we did give Indocin suppository 30 minutes  before procedure to try to reduce the risk of pancreatitis and  afterwards, the patient was intubated and put in prone position.    Therapeutic ERCP scope was advanced through oropharynx, esophagus,  stomach into the ampulla. Ampulla is very flat, was recognized, partly  hiding underneath the fold. The sphincterotome was used to intubate the  common bile duct, but I did go in the pancreatic duct and multiple  passes were done and each time the wire would go inside the pancreatic  duct, a small dye was injected which showed his pancreatic duct and a  small precut was made to facilitate entry into bile duct, but after a  few attempts, I found that probably the septum edema had caused closure  of the distal common bile duct or probably stricture was too tight and  procedure was abandoned at that point. I did put in pancreatic stent to  reduce the risk for pancreatitis. The patient tolerated the procedure  well. IMPRESSION:  1. Inability to intubate common bile duct with multiple attempts. 2.  Repeated pancreatic duct cannulation and stent was left behind to  reduce the risk of pancreatitis. RECOMMENDATIONS:  The patient still has very high risk of pancreatitis  and we will be watching the patient, and if the patient does not have  much pain, we can send the patient home and we will bring the patient  back in next week. We will set up a meeting with Winston Schmidt and we  will check x-ray at that time to see if pancreatic stent has gone out. If not, we may have to do EGD to remove it and we will think about  further evaluation at tertiary center like [place].         Mariana Sampson M.D.    D: 02/08/2022 13:50:11       T: 02/08/2022 18:10:10     [email or web address]  Job#: [id number]     Doc#: [id number]    CC:  MD Marcie Espinoza MD

## 2022-02-15 RX ORDER — SODIUM CHLORIDE 9 MG/ML
INJECTION, SOLUTION INTRAVENOUS CONTINUOUS
Status: CANCELLED | OUTPATIENT
Start: 2022-02-16

## 2022-02-15 RX ORDER — DIPHENHYDRAMINE HYDROCHLORIDE 50 MG/ML
50 INJECTION INTRAMUSCULAR; INTRAVENOUS ONCE
Status: CANCELLED | OUTPATIENT
Start: 2022-02-16 | End: 2022-02-16

## 2022-02-15 RX ORDER — SODIUM CHLORIDE 0.9 % (FLUSH) 0.9 %
5-40 SYRINGE (ML) INJECTION PRN
Status: CANCELLED | OUTPATIENT
Start: 2022-02-16

## 2022-02-15 RX ORDER — HEPARIN SODIUM (PORCINE) LOCK FLUSH IV SOLN 100 UNIT/ML 100 UNIT/ML
500 SOLUTION INTRAVENOUS PRN
Status: CANCELLED | OUTPATIENT
Start: 2022-02-16

## 2022-02-15 RX ORDER — METHYLPREDNISOLONE SODIUM SUCCINATE 125 MG/2ML
125 INJECTION, POWDER, LYOPHILIZED, FOR SOLUTION INTRAMUSCULAR; INTRAVENOUS ONCE
Status: CANCELLED | OUTPATIENT
Start: 2022-02-16 | End: 2022-02-16

## 2022-02-15 RX ORDER — SODIUM CHLORIDE 9 MG/ML
25 INJECTION, SOLUTION INTRAVENOUS PRN
Status: CANCELLED | OUTPATIENT
Start: 2022-02-16

## 2022-02-16 ENCOUNTER — HOSPITAL ENCOUNTER (OUTPATIENT)
Dept: INFUSION THERAPY | Age: 52
Discharge: HOME | End: 2022-02-16
Payer: COMMERCIAL

## 2022-02-16 ENCOUNTER — OFFICE VISIT (OUTPATIENT)
Dept: ONCOLOGY | Age: 52
End: 2022-02-16
Payer: COMMERCIAL

## 2022-02-16 VITALS
WEIGHT: 152.2 LBS | HEIGHT: 72 IN | RESPIRATION RATE: 16 BRPM | OXYGEN SATURATION: 95 % | DIASTOLIC BLOOD PRESSURE: 73 MMHG | TEMPERATURE: 98.5 F | HEART RATE: 68 BPM | BODY MASS INDEX: 20.62 KG/M2 | SYSTOLIC BLOOD PRESSURE: 122 MMHG

## 2022-02-16 VITALS
HEIGHT: 72 IN | OXYGEN SATURATION: 97 % | TEMPERATURE: 98.7 F | BODY MASS INDEX: 20.62 KG/M2 | SYSTOLIC BLOOD PRESSURE: 142 MMHG | HEART RATE: 65 BPM | WEIGHT: 152.2 LBS | DIASTOLIC BLOOD PRESSURE: 83 MMHG | RESPIRATION RATE: 16 BRPM

## 2022-02-16 DIAGNOSIS — Z90.49 S/P RIGHT COLECTOMY: ICD-10-CM

## 2022-02-16 DIAGNOSIS — L53.8 PALMAR ERYTHEMA: ICD-10-CM

## 2022-02-16 DIAGNOSIS — Z90.49 S/P RIGHT COLECTOMY: Primary | ICD-10-CM

## 2022-02-16 DIAGNOSIS — F10.21 HISTORY OF ALCOHOLISM (HCC): ICD-10-CM

## 2022-02-16 DIAGNOSIS — C18.9 ADENOCARCINOMA, COLON (HCC): Primary | ICD-10-CM

## 2022-02-16 DIAGNOSIS — Z87.891 PERSONAL HISTORY OF TOBACCO USE: ICD-10-CM

## 2022-02-16 DIAGNOSIS — C18.4 CANCER OF TRANSVERSE COLON (HCC): ICD-10-CM

## 2022-02-16 DIAGNOSIS — Z80.9 FAMILY HISTORY OF CANCER: ICD-10-CM

## 2022-02-16 DIAGNOSIS — C18.9 ADENOCARCINOMA, COLON (HCC): ICD-10-CM

## 2022-02-16 DIAGNOSIS — E87.6 HYPOKALEMIA: ICD-10-CM

## 2022-02-16 DIAGNOSIS — Z51.11 ENCOUNTER FOR CHEMOTHERAPY MANAGEMENT: ICD-10-CM

## 2022-02-16 DIAGNOSIS — M79.2 NEUROPATHIC PAIN: ICD-10-CM

## 2022-02-16 LAB
ABSOLUTE IMMATURE GRANULOCYTE: 0.02 THOU/MM3 (ref 0–0.07)
ALBUMIN SERPL-MCNC: 4.2 G/DL (ref 3.5–5.1)
ALP BLD-CCNC: 273 U/L (ref 38–126)
ALT SERPL-CCNC: 210 U/L (ref 11–66)
AST SERPL-CCNC: 124 U/L (ref 5–40)
BASINOPHIL, AUTOMATED: 1 % (ref 0–3)
BASOPHILS ABSOLUTE: 0 THOU/MM3 (ref 0–0.1)
BILIRUB SERPL-MCNC: 0.9 MG/DL (ref 0.3–1.2)
BILIRUBIN DIRECT: 0.3 MG/DL (ref 0–0.3)
BUN, WHOLE BLOOD: 14 MG/DL (ref 8–26)
CHLORIDE, WHOLE BLOOD: 106 MEQ/L (ref 98–109)
CREATININE, WHOLE BLOOD: 1 MG/DL (ref 0.5–1.2)
EOSINOPHILS ABSOLUTE: 0.2 THOU/MM3 (ref 0–0.4)
EOSINOPHILS RELATIVE PERCENT: 3 % (ref 0–4)
GFR, ESTIMATED: 83 ML/MIN/1.73M2
GLUCOSE, WHOLE BLOOD: 96 MG/DL (ref 70–108)
HCT VFR BLD CALC: 35.6 % (ref 42–52)
HEMOGLOBIN: 12.2 GM/DL (ref 14–18)
IMMATURE GRANULOCYTES: 0 %
IONIZED CALCIUM, WHOLE BLOOD: 1.19 MMOL/L (ref 1.12–1.32)
LYMPHOCYTES # BLD: 33 % (ref 15–47)
LYMPHOCYTES ABSOLUTE: 1.9 THOU/MM3 (ref 1–4.8)
MCH RBC QN AUTO: 32.9 PG (ref 26–33)
MCHC RBC AUTO-ENTMCNC: 34.3 GM/DL (ref 32.2–35.5)
MCV RBC AUTO: 96 FL (ref 80–94)
MONOCYTES ABSOLUTE: 0.9 THOU/MM3 (ref 0.4–1.3)
MONOCYTES: 16 % (ref 0–12)
PDW BLD-RTO: 19.8 % (ref 11.5–14.5)
PLATELET # BLD: 211 THOU/MM3 (ref 130–400)
PMV BLD AUTO: 10 FL (ref 9.4–12.4)
POTASSIUM, WHOLE BLOOD: 3.3 MEQ/L (ref 3.5–4.9)
RBC # BLD: 3.71 MILL/MM3 (ref 4.7–6.1)
SEG NEUTROPHILS: 47 % (ref 43–75)
SEGMENTED NEUTROPHILS ABSOLUTE COUNT: 2.7 THOU/MM3 (ref 1.8–7.7)
SODIUM, WHOLE BLOOD: 142 MEQ/L (ref 138–146)
TOTAL CO2, WHOLE BLOOD: 27 MEQ/L (ref 23–33)
TOTAL PROTEIN: 6.5 G/DL (ref 6.1–8)
WBC # BLD: 5.8 THOU/MM3 (ref 4.8–10.8)

## 2022-02-16 PROCEDURE — 80076 HEPATIC FUNCTION PANEL: CPT

## 2022-02-16 PROCEDURE — 99211 OFF/OP EST MAY X REQ PHY/QHP: CPT

## 2022-02-16 PROCEDURE — 36591 DRAW BLOOD OFF VENOUS DEVICE: CPT

## 2022-02-16 PROCEDURE — 96415 CHEMO IV INFUSION ADDL HR: CPT

## 2022-02-16 PROCEDURE — 99215 OFFICE O/P EST HI 40 MIN: CPT | Performed by: INTERNAL MEDICINE

## 2022-02-16 PROCEDURE — 85025 COMPLETE CBC W/AUTO DIFF WBC: CPT

## 2022-02-16 PROCEDURE — 6360000002 HC RX W HCPCS: Performed by: INTERNAL MEDICINE

## 2022-02-16 PROCEDURE — 96413 CHEMO IV INFUSION 1 HR: CPT

## 2022-02-16 PROCEDURE — 96367 TX/PROPH/DG ADDL SEQ IV INF: CPT

## 2022-02-16 PROCEDURE — G0463 HOSPITAL OUTPT CLINIC VISIT: HCPCS

## 2022-02-16 PROCEDURE — 80047 BASIC METABLC PNL IONIZED CA: CPT

## 2022-02-16 PROCEDURE — 2580000003 HC RX 258: Performed by: INTERNAL MEDICINE

## 2022-02-16 RX ORDER — CAPECITABINE 500 MG/1
800 TABLET, FILM COATED ORAL 2 TIMES DAILY
Qty: 84 TABLET | Refills: 0 | Status: SHIPPED | OUTPATIENT
Start: 2022-02-16 | End: 2022-03-02

## 2022-02-16 RX ORDER — HEPARIN SODIUM (PORCINE) LOCK FLUSH IV SOLN 100 UNIT/ML 100 UNIT/ML
500 SOLUTION INTRAVENOUS PRN
Status: DISCONTINUED | OUTPATIENT
Start: 2022-02-16 | End: 2022-02-17 | Stop reason: HOSPADM

## 2022-02-16 RX ORDER — SODIUM CHLORIDE 0.9 % (FLUSH) 0.9 %
5-40 SYRINGE (ML) INJECTION PRN
Status: DISCONTINUED | OUTPATIENT
Start: 2022-02-16 | End: 2022-02-17 | Stop reason: HOSPADM

## 2022-02-16 RX ORDER — OXYCODONE HYDROCHLORIDE AND ACETAMINOPHEN 5; 325 MG/1; MG/1
1 TABLET ORAL EVERY 8 HOURS PRN
Qty: 64 TABLET | Refills: 0 | Status: SHIPPED | OUTPATIENT
Start: 2022-02-16 | End: 2022-03-09

## 2022-02-16 RX ORDER — DEXTROSE MONOHYDRATE 50 MG/ML
INJECTION, SOLUTION INTRAVENOUS ONCE
Status: COMPLETED | OUTPATIENT
Start: 2022-02-16 | End: 2022-02-16

## 2022-02-16 RX ORDER — HEPARIN SODIUM (PORCINE) LOCK FLUSH IV SOLN 100 UNIT/ML 100 UNIT/ML
500 SOLUTION INTRAVENOUS PRN
Status: CANCELLED | OUTPATIENT
Start: 2022-02-16

## 2022-02-16 RX ORDER — SODIUM CHLORIDE 9 MG/ML
25 INJECTION, SOLUTION INTRAVENOUS PRN
Status: CANCELLED | OUTPATIENT
Start: 2022-02-16

## 2022-02-16 RX ORDER — SODIUM CHLORIDE 0.9 % (FLUSH) 0.9 %
5-40 SYRINGE (ML) INJECTION PRN
Status: CANCELLED | OUTPATIENT
Start: 2022-02-16

## 2022-02-16 RX ADMIN — SODIUM CHLORIDE, PRESERVATIVE FREE 10 ML: 5 INJECTION INTRAVENOUS at 13:23

## 2022-02-16 RX ADMIN — SODIUM CHLORIDE, PRESERVATIVE FREE 20 ML: 5 INJECTION INTRAVENOUS at 08:36

## 2022-02-16 RX ADMIN — SODIUM CHLORIDE, PRESERVATIVE FREE 10 ML: 5 INJECTION INTRAVENOUS at 08:35

## 2022-02-16 RX ADMIN — DEXAMETHASONE SODIUM PHOSPHATE 12 MG: 4 INJECTION, SOLUTION INTRAMUSCULAR; INTRAVENOUS at 09:51

## 2022-02-16 RX ADMIN — DEXTROSE MONOHYDRATE: 50 INJECTION, SOLUTION INTRAVENOUS at 09:48

## 2022-02-16 RX ADMIN — OXALIPLATIN 175 MG: 5 INJECTION, SOLUTION INTRAVENOUS at 10:57

## 2022-02-16 RX ADMIN — FOSAPREPITANT 150 MG: 150 INJECTION, POWDER, LYOPHILIZED, FOR SOLUTION INTRAVENOUS at 10:16

## 2022-02-16 RX ADMIN — Medication 500 UNITS: at 13:23

## 2022-02-16 ASSESSMENT — ENCOUNTER SYMPTOMS
SORE THROAT: 0
BLOOD IN STOOL: 0
VOMITING: 0
DIARRHEA: 0
RECTAL PAIN: 0
COUGH: 0
TROUBLE SWALLOWING: 0
CONSTIPATION: 0
BACK PAIN: 0
EYE DISCHARGE: 0
ABDOMINAL PAIN: 0
SHORTNESS OF BREATH: 0
FACIAL SWELLING: 0
NAUSEA: 0
CHEST TIGHTNESS: 0
COLOR CHANGE: 0
ABDOMINAL DISTENTION: 0
WHEEZING: 0

## 2022-02-16 NOTE — PLAN OF CARE
Problem: Infection - Central Venous Catheter-Associated Bloodstream Infection:  Goal: Will show no infection signs and symptoms  Description: Will show no infection signs and symptoms  Outcome: Met This Shift  Note: Mediport site with no redness or warmth. Skin over port site intact with no signs of breakdown noted. Patient verbalizes signs/symptoms of port infection and when to notify the physician. Intervention: Central line needs assessment  Note: Discuss port maintenance,infection prevention, sign of infection and when to call MD.      Problem: Musculor/Skeletal Functional Status  Goal: Absence of falls  Outcome: Met This Shift  Note: No falls occurred with visit today. Intervention: Fall precautions  Note: Discussed the need to use the call light for assistance when getting up to ambulate. Problem: Intellectual/Education/Knowledge Deficit  Goal: Teaching initiated upon admission  Outcome: Met This Shift  Note: Patient verbalizes understanding to verbal information given on oxaliplatin,action and possible side effects. Aware to call MD if develop complications.     Intervention: Verbal/written education provided  Note: Chemotherapy Teaching     What is Chemotherapy   Drug action [x]   Method of Administration [x]   Handouts given []     Side Effects  Nausea/vomiting [x]   Diarrhea [x]   Fatigue [x]   Signs / Symptoms of infection [x]   Neutropenia [x]   Thrombocytopenia [x]   Alopecia [x]   neuropathy [x]   [place] diet &  the importance of fluids [x]       Micellaneous  Importance of nutrition [x]   Importance of oral hygiene [x]   When to call the MD [x]   Monitoring labs [x]   Use of supportive services []     Explanation of Drug Regimen / Frequency  oxaliplatin     Comments  Verbalized understanding to drug,action,side effects and when to call MD         Problem: Discharge Planning  Goal: Knowledge of discharge instructions  Description: Knowledge of discharge instructions  Outcome: Met This Shift  Note: Verbalize understanding of discharge instructions, follow up appointments, and when to call Physician. Intervention: Interaction with patient/family and care team  Note: Discuss understanding of discharge instructions, follow up appointments and when to call Physician. Care plan reviewed with patient. Patient verbalizes understanding of the plan of care and contributes to goal setting.

## 2022-02-16 NOTE — PROGRESS NOTES
Patient tolerated Oxaliplatin over 2 hours without any complications. Denies dizziness, lightheadedness, acute nausea or vomiting, headache, heart palpitations, rash/itching or increased SOB. Last vital signs  BP (!) 142/83   Pulse 65   Temp 98.7 °F (37.1 °C) (Oral)   Resp 16   Ht 6' (1.829 m)   Wt 152 lb 3.2 oz (69 kg)   SpO2 97%   BMI 20.64 kg/m²     Patient instructed if they experience any of the above symptoms following today's visit, he/she is to notify the Physician or go to the Emergency Dept. Discharge instructions given to patient, Verbalizes understanding. Ambulated off unit per self in stable condition with all belongings.

## 2022-02-16 NOTE — PROGRESS NOTES
[facility] [address] [facility] [address], [address] [facility]  [address]  Dept: [phone]  Dept Fax: [phone]: [phone]    Visit Date:2/16/2022     Ginger Hernandez is a 45 y.o. male who presents today for:   Chief Complaint   Patient presents with    Follow-up     Adenocarcinoma, colon         HPI:     Mr. Malcolm Stapleton is a 53-year-old patient with newly diagnosed stage III colon cancer. He presents to the medical oncology clinic to discuss postsurgical management. His history of colon cancer goes back to September 2021 when he was seen by  Dr. Yenni Coppola due to unexplained weight loss of 10-15 lbs and rectal bleeding. Colonoscopy performed on September 1, 2021 demonstrated multiple polyps but a larger mass at the proximal transverse colon demonstrated the adenocarcinoma. He did not have signs of obstruction, no abdominal pain, no significant nausea or vomiting. Some previous episodes of nausea, GERD and epigastric discomfort. He had CT of the chest abdomen and pelvis on September 2, 2021,no evidence of metastatic disease. The patient met with Dr. Anthony Burgess and after discussing his surgical treatment options he proceeded with right hemicolectomy on September 16, 2021. Final pathology report showed:  A.  Right colon mass, hemicolectomy:    Invasive moderately differentiated colonic adenocarcinoma, pT2.    Margins are free of neoplasia.           Pericolonic lymph nodes (17): 2 out of 17 are positive for   metastatic colonic adenocarcinoma.             Appendix-no pathologic abnormality. Gaviota Keen base lymph nodes (2), resection:             Negative for malignancy (0/2). pT2 pN1b    He had uneventful post surgical course. Extensive history of colorectal disease on paternal side.     On November 9, 2021 the patient received first cycle of adjuvant chemotherapy with Xeloda  On November 24, 2021 he was admitted to the hospital with a 1 week history of intractable periumbilical abdominal pain of 9/10 intensity.  Associated nausea vomiting diarrhea and fever last few days. Diagnosed with acute enterocolitis, possible chemotherapy induced. IV Cipro & Flagyl during hospitalization. SBO vs ileus:General Surgery consulted. KUB showed possible SBO vs ileus. CT A/P showed worsening since previous. Managed medically with NGT. Advance diet slowly.  Possible fluid overload: Lasix have been given for swelling. On discharge from the hospital on December 4, 2021 the patient was placed on the following medications:  Bentyl 3 times daily,-Magic mouthwash 4 times daily as needed, -ferrous sulfate daily, Zofran every 8 hours as needed,-omeprazole,Compazine every 6 hours as needed, Reglan 2 times daily. After being discharged from the hospital the patient presented to chemotherapy suite on few occasions for IV hydration and potassium supplementation. He also had outpatient dietary consult. Current history on 02/16/2022:  Patient presents to the medical oncology clinic for cycle #5 of oxaliplatin and Xeloda. His cycle #4 was with oxaliplatin  110 mg/m²,  Xeloda 3 tablets p.o. twice daily for 2 weeks. Overall he tolerated it well. No diarrhea, no abdominal pain. The patient did develop some palmar and plantar erythema, no skin peeling. Reports peripheral neuropathy involving his feet that lasted for 5 days, it is resolved today,  the patient denies having abdominal pain, no diarrhea. His appetite is improved and he tolerates more versatile diet. He reports grade 1 fatigue. He denies having any peripheral neuropathy from oxaliplatin. However,  he has this constant gum pain that makes eating difficult. Percocet did help. Oral mucositis is resolved.   HPI   Past Medical History:   Diagnosis Date    Anemia     Colon cancer ([address] 09/2021      Past Surgical History:   Procedure Laterality Date    COLONOSCOPY  09/01/2021    Dr. Latonia Evans ERCP N/A 2/8/2022    ERCP DIAGNOSTIC found. Expiration Date: 06/30/2022 1 each 0    sucralfate (CARAFATE) 1 GM tablet take 1 tablet by mouth twice a day FIVE TO 60 MINS BEFORE LUNCH AND DINNER      ondansetron (ZOFRAN-ODT) 4 MG disintegrating tablet       ferrous sulfate (IRON 325) 325 (65 Fe) MG tablet Take 1 tablet by mouth daily (with breakfast) 90 tablet 1    omeprazole (PRILOSEC OTC) 20 MG tablet Take 20 mg by mouth daily       prochlorperazine (COMPAZINE) 5 MG tablet Take 1 tablet by mouth every 12 hours for 14 days Take 30 minutes before morning and afternoon dose of Xeloda 28 tablet 2    ondansetron (ZOFRAN-ODT) 4 MG disintegrating tablet Take 1 tablet by mouth 3 times daily as needed for Nausea or Vomiting 21 tablet 0    ondansetron (ZOFRAN) 4 MG tablet Take 1 tablet by mouth every 6 hours as needed for Nausea or Vomiting 30 tablet 1    Handicap Placard MISC by Does not apply route Diagnosis: colon cancer, stage IIIA. Currently completing chemotherapy   Expiration: 6 months 1 each 0    naloxegol (MOVANTIK) 12.5 MG TABS tablet Take 1 tablet by mouth every morning (Patient not taking: Reported on 12/15/2021) 30 tablet 0    dicyclomine (BENTYL) 10 MG capsule Take 2 capsules by mouth 3 times daily (Patient not taking: Reported on 2/16/2022) 120 capsule 0    Magic Mouthwash (MIRACLE MOUTHWASH) Swish and spit 5 mLs 4 times daily as needed for Irritation Mixture of 30mL diphenhydramine, 60mL maalox, 4g carafate as ratio (Patient not taking: Reported on 12/15/2021) 300 mL 0     No current facility-administered medications for this visit.      Facility-Administered Medications Ordered in Other Visits   Medication Dose Route Frequency Provider Last Rate Last Admin    dextrose 5 % solution   IntraVENous Once Graham Norton MD 20 mL/hr at 02/16/22 0948 New Bag at 02/16/22 0948    sodium chloride flush 0.9 % injection 5-40 mL  5-40 mL IntraVENous PRN Graham Norton MD   20 mL at 02/16/22 0836    heparin flush 100 UNIT/ML injection 500 Units  500 Units IntraCATHeter PRN Kory Reese MD        oxaliplatin (ELOXATIN) 175 mg in dextrose 5 % 250 mL chemo IVPB  90 mg/m2 (Order-Specific) IntraVENous Once Kory Reese .5 mL/hr at 02/16/22 1057 175 mg at 02/16/22 1057      No Known Allergies   Health Maintenance   Topic Date Due    Shingles Vaccine (2 of 2) 11/18/2021    Depression Screen  07/23/2022    Pneumococcal 0-64 years Vaccine (2 of 4 - PCV13) 07/23/2022    Colorectal Cancer Screen  09/01/2022    Low dose CT lung screening  09/02/2022    Lipid screen  07/23/2026    DTaP/Tdap/Td vaccine (2 - Td or Tdap) 07/23/2031    Flu vaccine  Completed    COVID-19 Vaccine  Completed    Hepatitis A vaccine  Aged Out    Hepatitis B vaccine  Aged Out    Hib vaccine  Aged Out    Meningococcal (ACWY) vaccine  Aged Out    Hepatitis C screen  Discontinued    HIV screen  Discontinued        Subjective:   Review of Systems   Constitutional: Negative for activity change, appetite change, fatigue and fever. HENT: Negative for congestion, dental problem, facial swelling, hearing loss, mouth sores, nosebleeds, sore throat, tinnitus and trouble swallowing. Eyes: Negative for discharge and visual disturbance. Respiratory: Negative for cough, chest tightness, shortness of breath and wheezing. Cardiovascular: Negative for chest pain, palpitations and leg swelling. Gastrointestinal: Negative for abdominal distention, abdominal pain, blood in stool, constipation, diarrhea, nausea, rectal pain and vomiting. Endocrine: Negative for cold intolerance, polydipsia and polyuria. Genitourinary: Negative for decreased urine volume, difficulty urinating, dysuria, flank pain, hematuria and urgency. Musculoskeletal: Negative for arthralgias, back pain, gait problem, joint swelling, myalgias and neck stiffness. Skin: Negative for color change, rash and wound.    Neurological: Negative for dizziness, tremors, seizures, speech difficulty, weakness, light-headedness, numbness and headaches. Hematological: Negative for adenopathy. Does not bruise/bleed easily. Psychiatric/Behavioral: Negative for confusion and sleep disturbance. The patient is not nervous/anxious. Objective:   Physical Exam  Vitals reviewed. Constitutional:       General: He is not in acute distress. Appearance: He is well-developed. HENT:      Head: Normocephalic. Mouth/Throat:      Pharynx: No oropharyngeal exudate. Eyes:      General: No scleral icterus. Right eye: No discharge. Left eye: No discharge. Pupils: Pupils are equal, round, and reactive to light. Neck:      Thyroid: No thyromegaly. Vascular: No JVD. Trachea: No tracheal deviation. Cardiovascular:      Rate and Rhythm: Normal rate. Heart sounds: Normal heart sounds. No murmur heard. No friction rub. No gallop. Pulmonary:      Effort: Pulmonary effort is normal. No respiratory distress. Breath sounds: Normal breath sounds. No stridor. No wheezing or rales. Chest:      Chest wall: No tenderness. Abdominal:      General: Bowel sounds are normal. There is no distension. Palpations: Abdomen is soft. There is no mass. Tenderness: There is no abdominal tenderness. There is no rebound. Musculoskeletal:         General: Normal range of motion. Cervical back: Normal range of motion and neck supple. Comments: Good range of motion in all four extremities. Lymphadenopathy:      Cervical: No cervical adenopathy. Skin:     General: Skin is warm. Findings: No erythema or rash. Neurological:      Mental Status: He is alert and oriented to person, place, and time. Cranial Nerves: No cranial nerve deficit. Motor: No abnormal muscle tone. Deep Tendon Reflexes: Reflexes are normal and symmetric. Psychiatric:         Behavior: Behavior normal.         Thought Content:  Thought content normal.         Judgment: Judgment normal.         /73 (Site: Left Upper Arm, Position: Sitting, Cuff Size: Medium Adult)   Pulse 68   Temp 98.5 °F (36.9 °C) (Oral)   Resp 16   Ht 6' (1.829 m)   Wt 152 lb 3.2 oz (69 kg)   SpO2 95%   BMI 20.64 kg/m²      ECOG status is 0    Imaging studies and labs:   CT of the abdomen and pelvis on September 2, 2021 showed:   1. Unremarkable CT abdomen and pelvis with IV contrast.    CT of the chest on September 2, 2021 showed:  1. No suspicious pulmonary nodules. 2. No evidence of metastatic disease. 3. No acute infiltrate or pleural effusion       CT ABDOMEN PELVIS W IV CONTRAST Additional Contrast? None    Result Date: 10/21/2021  PROCEDURE: CT ABDOMEN PELVIS W IV CONTRAST CLINICAL INFORMATION: Abdominal pain. COMPARISON: CT abdomen and pelvis 9/2/2021. TECHNIQUE: Axial 5 mm CT images were obtained through the abdomen and pelvis after the administration of 80  cc Isovue 370 intravenous contrast. Coronal and sagittal reconstructions were obtained. All CT scans at this facility use dose modulation, iterative reconstruction, and/or weight-based dosing when appropriate to reduce radiation dose to as low as reasonably achievable. FINDINGS: Lung bases: Atelectasis is noted at the lung bases. The heart size is normal. Liver/gallbladder/bilary tree: Thickening of the wall of the gallbladder near the fundus could represent adenomyomatosis. No radiopaque gallstones or biliary ductal dilatation is observed. No liver lesions are identified. Pancreas/spleen/adrenal glands: Normal size and attenuation. Kidneys/ureters/bladder: No renal calculus, hydronephrosis, or hydroureter is present. The urinary bladder is unremarkable. Gastrointestinal:  Postoperative changes related to right hemicolectomy with a surgical anastomosis in the right lower quadrant appears intact. There is question area of intussusception of small bowel loops within the transverse colon at the site of the anastomosis (series 2, image 45).  No bowel obstruction, free fluid, fluid collection, or free air is observed. Moderate retained fecal material is seen throughout the colon. Retroperitoneum/lymph nodes: The aorta is not dilated. No lymphadenopathy is present. Pelvis: The prostate gland is not enlarged. Musculoskeletal: The visualized skeletal structures appear intact. 1. Postoperative changes related to right hemicolectomy appear intact. There may be intussusception of the small bowel within the colon at the site of the surgical anastomosis (series 2, image 45) however there is no associated bowel obstruction, free fluid, fluid collection, or free air identified. Moderate retained fecal material is seen throughout the colon suggesting fecal stasis. Assessment for bowel wall thickening is limited without oral contrast. 2. 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 No radiopaque gallstones or biliary ductal dilatation identified. Correlate with liver function tests. Chronic findings are discussed. **This report has been created using voice recognition software. It may contain minor errors which are inherent in voice recognition technology. ** Final report electronically signed by Dr Dyana Matthews on 10/21/2021 10:02 AM      Lab Results   Component Value Date    WBC 5.8 02/16/2022    HGB 12.2 (L) 02/16/2022    HCT 35.6 (L) 02/16/2022    MCV 96 (H) 02/16/2022     02/16/2022       Chemistry        Component Value Date/Time     02/16/2022 0852     12/04/2021 0600    K 3.3 (L) 02/16/2022 0852    K 4.0 12/04/2021 0600    K 3.4 (L) 11/30/2021 0600     12/04/2021 0600    CO2 29 12/04/2021 0600    BUN 8 12/04/2021 0600    CREATININE 1.0 02/16/2022 0852    CREATININE 0.7 12/04/2021 0600        Component Value Date/Time    CALCIUM 7.7 (L) 12/04/2021 0600    ALKPHOS 147 (H) 01/26/2022 0908    AST 60 (H) 01/26/2022 0908     (H) 01/26/2022 0908    BILITOT 0.5 01/26/2022 0908          Assessment/Plan:   1. Stage III A colon cancer.   This is a 58-year-old patient with newly diagnosed stage IIIa colon cancer. He is status post right hemicolectomy colectomy with lymph node dissection. I had a lengthy discussion with the patient and his wife about post surgical management. Adjuvant chemotherapy is indicated for the patients with lymph node involvement. For patients who have undergone potentially curative resection of a colon cancer, the goal of postoperative chemotherapy is to eradicate micrometastases, thereby reducing the likelihood of disease recurrence and increasing the cure rate. The benefits of adjuvant chemotherapy for patients with node-positive disease are in the range of 30 % reduction in the risk of disease recurrence and a 20-30% reduction in mortality with modern chemotherapy. Adjuvant therapy for resected stage III colon cancer involves a six-month course of a combination of several chemotherapy drugs, which are given intravenously, in a specific order on specific days. However, based on the [facility] and NSABP C-07 trials and an analysis of the International Duration Evaluation of Adjuvant Chemotherapy (IDEA) collaboration (six separate randomized trials of six versus three months of adjuvant oxaliplatin-based therapy), we continue to suggest six months of therapy for individuals with high-risk cancers (T4N2). However, patients with high-risk cancers ramiro limiting adjuvant therapy to three months in patients with low-risk disease (T1-3N1) is valid option.  Orally active fluoropyrimidine,  Xeloda offers increased convenience. CAPOX is the preferred regimen if a 3-month course of adjuvant therapy is chosen. 2. Adjuvant chemotherapy with  CAPOX. Received  cycle #1 of CAPOX on 11/09/2021. Cycle was complicated by hospitalization for diffuse acute enterocolitis. Patient required almost 1 week hospitalization. He required electrolytes correction antibiotic treatment and hydration.   He also developed small bowel obstruction that was managed medically with NG tube placement. Patient reports that today he is significantly improved. He denies having any abdominal pain, no nausea no vomiting. He received cycle 2 on  12/15/2021. The dose of oxaliplatin was reduced from 130 mg/m² to 100 mg/m². The dose of Xeloda was reduced to 1000 mg p.o. twice daily for 14 days. He tolerated cycle #2 much better. Minimal nausea no vomiting no diarrhea no abdominal pain. Developed Palmar and plantar erythema with some skin peeling. Cycle #3 and 4 of XELOX, the dose of Oxaliplatin was increased to 110 mg/m², he continued Xeloda 3 tablets p.o. twice daily. Cycle #4 was reasonably well-tolerated,no diarrhea. The patient has very mild palmar plantar erythema, no skin peeling. He developed grade 1 peripheral neuropathy in his feet. Therefore with cycle #5 the dose of oxaliplatin is reduced to 90 mg/m². .  We will proceed with cycle #5 of XELOX today. Continue the same dose of Xeloda 1000 mg p.o. twice a day for 14 days  Chemotherapy orders were reviewed and signed. The patient was instructed to take Zofran half an hour before the evening and morning dose of Xeloda. For breakthrough nausea he received prescription for Phenergan  Patient was instructed to call us if he develops any diarrhea. For mild diarrhea he was instructed to start taking Lomotil. 3.  Hypokalemia. His potassium is 330 today. The patient is on oral potassium supplementation 20 mEq daily. 4. Gum and teeth pain. Might be related to oxaliplatin. Well controlled with Percocet        Diagnosis Orders   1. Adenocarcinoma, colon ([facility] [address]  CBC with Auto Differential    POC PANEL BMP W/IOCA    Hepatic Function Panel    CBC with Auto Differential    POC PANEL BMP W/IOCA    Hepatic Function Panel    oxyCODONE-acetaminophen (PERCOCET) 5-325 MG per tablet   2. Neuropathic pain  oxyCODONE-acetaminophen (PERCOCET) 5-325 MG per tablet   3. Encounter for chemotherapy management     4. S/P right colectomy     5.  Palmar erythema 6. Hypokalemia          Plan:   Return in about 3 weeks (around 3/9/2022). Orders Placed:   Orders Placed This Encounter   Procedures    CBC with Auto Differential     Standing Status:   Future     Number of Occurrences:   1     Standing Expiration Date:   2/16/2023    POC PANEL BMP W/IOCA     Standing Status:   Future     Number of Occurrences:   1     Standing Expiration Date:   2/16/2023    Hepatic Function Panel     Standing Status:   Future     Number of Occurrences:   1     Standing Expiration Date:   2/16/2023    CBC with Auto Differential     Standing Status:   Future     Standing Expiration Date:   2/16/2023    POC PANEL BMP W/IOCA     Standing Status:   Future     Standing Expiration Date:   2/16/2023    Hepatic Function Panel     Standing Status:   Future     Standing Expiration Date:   2/16/2023        Medications Prescribed:   Orders Placed This Encounter   Medications    capecitabine (XELODA) 500 MG chemo tablet     Sig: Take 3 tablets by mouth 2 times daily for 14 days     Dispense:  84 tablet     Refill:  0    oxyCODONE-acetaminophen (PERCOCET) 5-325 MG per tablet     Sig: Take 1 tablet by mouth every 8 hours as needed for Pain for up to 21 days. Dispense:  64 tablet     Refill:  0     Reduce doses taken as pain becomes manageable            Discussed use, benefit, and side effectsof prescribed medications. All patient questions answered. Pt voiced understanding. Instructed to continue current medications, diet and exercise. Patient agreed with treatment plan. Follow up as directed.     Electronically signed by Anjelica Niño MD on 11/3/21 at 8:15 AM EDT

## 2022-02-16 NOTE — ONCOLOGY
Chemotherapy Administration    Pre-assessment Data: Antineoplastic Agents  See toxicity flow sheet for assessment                                          [x]         Interventions:   Chemotherapy SQ injection given []   Taxol administered-VS per protocol []   Blood pressure meds held 12 hours prior to Rituxan/Ruxience []   Rituxan/Ruxience administered- VS and precautions per guidelines []   Emergency drugs available as appropriate [x]   Anaphylaxis assessment completed [x]   Pre-medications administered as ordered [x]   Blood return noted upon initiation of chemotherapy [x]   Blood return noted each 1-2ml of a vesicant medication if given IV push []   Navelbine, Vincristine and Velban given as a monitored wide open drip, blood return noted before during and after infusion.  []   Blood return noted each 2-3ml of a non-vesicant medication if given IV push []   Patient aware of potential Immunotherapy toxicities []   Monitor for signs / symptoms of hypersensitivity reaction [x]   Chemotherapy orders (drug/dose/rate) verified by 2 Chemo certified RNs [x]   Monitor IV site and blood return throughout the infusion of the medication [x]   Document IV site checks on the IV assessment form [x]   Document chemotherapy teaching on the Patient Education tab [x]   Document patient verbalizes understanding of medications being administered- Oxaliplatin [x]   If IV infiltration, see ONS Guidelines []   Other:      []

## 2022-02-16 NOTE — PATIENT INSTRUCTIONS
1. Proceed with cycle #5 of CapeOX. The dose of oxaliplatin is reduced to 90 mg/m² due to worsening peripheral neuropathy.   2.  Return to clinic in 3 weeks see me, for labs: CBC, BMP, LFTs and cycle #6 of Liseth Zapata ox

## 2022-02-20 ENCOUNTER — HOSPITAL ENCOUNTER (EMERGENCY)
Age: 52
Discharge: HOME | End: 2022-02-20
Payer: COMMERCIAL

## 2022-02-20 VITALS
RESPIRATION RATE: 19 BRPM | BODY MASS INDEX: 20.59 KG/M2 | SYSTOLIC BLOOD PRESSURE: 131 MMHG | DIASTOLIC BLOOD PRESSURE: 87 MMHG | WEIGHT: 152 LBS | HEIGHT: 72 IN | TEMPERATURE: 98.1 F | OXYGEN SATURATION: 98 % | HEART RATE: 74 BPM

## 2022-02-20 DIAGNOSIS — Z20.822 LAB TEST NEGATIVE FOR COVID-19 VIRUS: Primary | ICD-10-CM

## 2022-02-20 LAB — SARS-COV-2, NAAT: NOT  DETECTED

## 2022-02-20 PROCEDURE — 99283 EMERGENCY DEPT VISIT LOW MDM: CPT

## 2022-02-20 PROCEDURE — 87635 SARS-COV-2 COVID-19 AMP PRB: CPT

## 2022-02-21 ASSESSMENT — ENCOUNTER SYMPTOMS
COUGH: 0
NAUSEA: 0
VOMITING: 0
ABDOMINAL PAIN: 1
EYE REDNESS: 0
BACK PAIN: 0
CHEST TIGHTNESS: 0
RHINORRHEA: 0

## 2022-02-21 NOTE — ED TRIAGE NOTES
Patient presents to the ED with complaints of wanting a covid test. Patient states that he has a procedure in four days and he reports he needs a 72 hour negative covid test clearance before the procedure. VSS.

## 2022-02-21 NOTE — ED PROVIDER NOTES
[facility] Emergency Department    CHIEF COMPLAINT       Chief Complaint   Patient presents with    Covid Testing       Nurses Notes reviewed and I agree except as noted in the HPI. HISTORY OF PRESENT ILLNESS    Huy Li emmanuel 45 y.o. male who presents to the ED for pre procedural covid testing. He needs a test 72 hours before his procedure in [place]. Denies covid symptoms. HPI was provided by the patient    REVIEW OF SYSTEMS     Review of Systems   Constitutional: Negative for chills, fatigue and fever. HENT: Negative for congestion, ear discharge, ear pain, postnasal drip and rhinorrhea. Eyes: Negative for redness. Respiratory: Negative for cough and chest tightness. Cardiovascular: Negative for chest pain and leg swelling. Gastrointestinal: Positive for abdominal pain (chronic). Negative for nausea and vomiting. Genitourinary: Negative for difficulty urinating, dysuria, enuresis, flank pain and hematuria. Musculoskeletal: Negative for back pain and joint swelling. Skin: Negative for rash. Neurological: Negative for dizziness, light-headedness, numbness and headaches. Psychiatric/Behavioral: Negative for agitation, behavioral problems and confusion. All other systems negative except as noted. PAST MEDICAL HISTORY     Past Medical History:   Diagnosis Date    Anemia     Colon cancer ([facility] [address] 09/2021       SURGICALHISTORY      has a past surgical history that includes Inguinal hernia repair (Left, 2020); Colonoscopy (09/01/2021); hemicolectomy (Right, 9/16/2021); Port Surgery (N/A, 11/8/2021); and ERCP (N/A, 2/8/2022).     CURRENT MEDICATIONS       Discharge Medication List as of 2/20/2022  9:38 PM      CONTINUE these medications which have NOT CHANGED    Details   capecitabine (XELODA) 500 MG chemo tablet Take 3 tablets by mouth 2 times daily for 14 days, Disp-84 tablet, R-0Print      oxyCODONE-acetaminophen (PERCOCET) 5-325 MG per tablet Take 1 tablet by mouth every 8 hours as needed for Pain for up to 21 days. , Disp-64 tablet, R-0Normal      prochlorperazine (COMPAZINE) 5 MG tablet Take 1 tablet by mouth every 12 hours for 14 days Take 30 minutes before morning and afternoon dose of Xeloda, Disp-28 tablet, R-2Normal      !! ondansetron (ZOFRAN-ODT) 4 MG disintegrating tablet Take 1 tablet by mouth 3 times daily as needed for Nausea or Vomiting, Disp-21 tablet, R-0Normal      ondansetron (ZOFRAN) 4 MG tablet Take 1 tablet by mouth every 6 hours as needed for Nausea or Vomiting, Disp-30 tablet, R-1Normal      !! Handicap Placard [facility] Starting 2021, Disp-1 each, R-0, PrintDiagnosis: No primary diagnosis found. Expiration Date: 2022      !! [facility], Disp-1 each, R-0, PrintDiagnosis: colon cancer, stage IIIA. Currently completing chemotherapy  Expiration: 6 months      naloxegol (MOVANTIK) 12.5 MG TABS tablet Take 1 tablet by mouth every morning, Disp-30 tablet, R-0Print      dicyclomine (BENTYL) 10 MG capsule Take 2 capsules by mouth 3 times daily, Disp-120 capsule, R-0Print      Magic Mouthwash (MIRACLE MOUTHWASH) Swish and spit 5 mLs 4 times daily as needed for Irritation Mixture of 30mL diphenhydramine, 60mL maalox, 4g carafate as ratio, Disp-300 mL, R-0Print      sucralfate (CARAFATE) 1 GM tablet take 1 tablet by mouth twice a day FIVE TO 60 MINS BEFORE LUNCH AND DINNERHistorical Med      !! ondansetron (ZOFRAN-ODT) 4 MG disintegrating tablet Historical Med      ferrous sulfate (IRON 325) 325 (65 Fe) MG tablet Take 1 tablet by mouth daily (with breakfast), Disp-90 tablet, R-1Normal      omeprazole (PRILOSEC OTC) 20 MG tablet Take 20 mg by mouth daily Historical Med       !! - Potential duplicate medications found. Please discuss with provider. ALLERGIES     has No Known Allergies. FAMILY HISTORY     He indicated that his mother is alive. He indicated that his father is .  He indicated that his maternal grandmother is . He indicated that his maternal grandfather is . He indicated that his paternal grandmother is . He indicated that his paternal grandfather is . He indicated that his maternal aunt is . He indicated that his maternal uncle is . He indicated that his paternal aunt is . He indicated that his paternal uncle is . family history includes COPD in his mother; Cancer in his father, maternal aunt, maternal grandfather, maternal grandmother, maternal uncle, paternal aunt, paternal grandfather, paternal grandmother, and paternal uncle; Diabetes in his paternal grandmother; Heart Attack in his paternal aunt and paternal grandfather; High Blood Pressure in his maternal grandfather, maternal grandmother, paternal grandfather, and paternal grandmother; Kidney Disease in his paternal grandmother; Other in his mother; Stroke in his father and mother.     SOCIAL HISTORY       Social History     Socioeconomic History    Marital status: Single     Spouse name: Not on file    Number of children: Not on file    Years of education: Not on file    Highest education level: Not on file   Occupational History    Not on file   Tobacco Use    Smoking status: Former Smoker     Packs/day: 1.00     Years: 35.00     Pack years: 35.00     Types: Cigarettes     Quit date: 2020     Years since quittin.0    Smokeless tobacco: Never Used   Vaping Use    Vaping Use: Never used   Substance and Sexual Activity    Alcohol use: Not Currently    Drug use: No    Sexual activity: Not on file   Other Topics Concern    Not on file   Social History Narrative    Not on file     Social Determinants of Health     Financial Resource Strain:     Difficulty of Paying Living Expenses: Not on file   Food Insecurity:     Worried About Running Out of Food in the Last Year: Not on file    Johnnie of Food in the Last Year: Not on file   Transportation Needs:  Lack of Transportation (Medical): Not on file    Lack of Transportation (Non-Medical): Not on file   Physical Activity:     Days of Exercise per Week: Not on file    Minutes of Exercise per Session: Not on file   Stress:     Feeling of Stress : Not on file   Social Connections:     Frequency of Communication with Friends and Family: Not on file    Frequency of Social Gatherings with Friends and Family: Not on file    Attends Jew Services: Not on file    Active Member of [address] [place] [address] [employer] or Organizations: Not on file    Attends Club or Organization Meetings: Not on file    Marital Status: Not on file   Intimate Partner Violence:     Fear of Current or Ex-Partner: Not on file    Emotionally Abused: Not on file    Physically Abused: Not on file    Sexually Abused: Not on file   Housing Stability:     Unable to Pay for Housing in the Last Year: Not on file    Number of [place] in the Last Year: Not on file    Unstable Housing in the Last Year: Not on file       PHYSICAL EXAM     INITIAL VITALS:  height is 6' (1.829 m) and weight is 152 lb (68.9 kg). His oral temperature is 98.1 °F (36.7 °C). His blood pressure is 131/87 and his pulse is 74. His respiration is 19 and oxygen saturation is 98%. Physical Exam  Constitutional:       Appearance: Normal appearance. He is well-developed. He is not ill-appearing. HENT:      Head: Normocephalic and atraumatic. Nose: Nose normal.      Mouth/Throat:      Mouth: Mucous membranes are moist.      Pharynx: Oropharynx is clear. Eyes:      Conjunctiva/sclera: Conjunctivae normal.   Cardiovascular:      Rate and Rhythm: Normal rate. Pulses: Normal pulses. Pulmonary:      Effort: Pulmonary effort is normal.   Abdominal:      Palpations: Abdomen is soft. Musculoskeletal:         General: Normal range of motion. Cervical back: Normal range of motion. Skin:     General: Skin is warm and dry.       Capillary Refill: Capillary refill takes less than 2 seconds. Neurological:      General: No focal deficit present. Mental Status: He is alert and oriented to person, place, and time. Psychiatric:         Behavior: Behavior normal.         DIFFERENTIAL DIAGNOSIS:   COVID testing    DIAGNOSTIC RESULTS     EKG: All EKG's are interpreted by the Emergency Department Physician who eithersigns or Co-signs this chart in the absence of a cardiologist.        RADIOLOGY: non-plainfilm images(s) such as CT, Ultrasound and MRI are read by the radiologist.  Plain radiographic images are visualized and preliminarily interpreted by the emergency physician unless otherwise stated below. No orders to display         LABS:   Labs Reviewed   COVID-19, RAPID       EMERGENCY DEPARTMENT COURSE:   Vitals:    Vitals:    02/20/22 2046   BP: 131/87   Pulse: 74   Resp: 19   Temp: 98.1 °F (36.7 °C)   TempSrc: Oral   SpO2: 98%   Weight: 152 lb (68.9 kg)   Height: 6' (1.829 m)                            Internal Administration   First Dose COVID-19, J&J, PF, 0.5 mL  05/17/2021   Second Dose COVID-19, J&J, PF, 0.5 mL   12/13/2021       Last COVID Lab SARS-CoV-2 (no units)   Date Value   09/23/2021 Not Detected     SARS-CoV-2, NAAT (no units)   Date Value   02/20/2022 NOT  DETECTED            MDM    Patient was seen in the ER for COVID testing. The patient is educated on symptomatic support and exposure precautions. Patient's test is negative. They are advised on quarantine precautions and follow up care as well as return precautions. They are discharged home in stable condition. Medications - No data to display    Please note that the patient was evaluated during a pandemic. All efforts were made for HIPPA compliance as well as provision of appropriate care. Patient was seen independently by myself. The patient's final impression and disposition and plan was determined by myself.      Strict return precautions and follow up instructions were discussed with the patient prior to discharge, with which the patient agrees. Physical assessment findings, diagnostic testing(s) if applicable, and vital signs reviewed with patient/patient representative. Questions answered. Medications asdirected, including OTC medications for supportive care. Education provided on medications. Differential diagnosis(s) discussed with patient/patient representative. Home care/self care instructions reviewed withpatient/patient representative. Patient is to follow-up with family care provider in 2-3 days if no improvement. Patient is to go to the emergency department if symptoms worsen. Patient/patient representative isaware of care plan, questions answered, verbalizes understanding and is in agreement. CRITICAL CARE:   None    CONSULTS:  None    PROCEDURES:  None    FINAL IMPRESSION     1.  Lab test negative for COVID-19 virus          DISPOSITION/PLAN   DISPOSITION Decision To Discharge 02/20/2022 09:38:12 PM      PATIENT REFERREDTO:  Claudeen Lone, MD  [facility] [address] [facility]  [address] [phone]    Schedule an appointment as soon as possible for a visit in 2 days  For follow up      DISCHARGE MEDICATIONS:  Discharge Medication List as of 2/20/2022  9:38 PM          (Please note that portions of this note were completed with a voice recognition program.  Efforts were made to edit the dictations but occasionally words are mis-transcribed.)         LUCY Vázquez CNP, APRN - CNP  02/21/22 8005

## 2022-03-02 ENCOUNTER — PREP FOR PROCEDURE (OUTPATIENT)
Dept: SURGERY | Age: 52
End: 2022-03-02

## 2022-03-02 ENCOUNTER — OFFICE VISIT (OUTPATIENT)
Dept: SURGERY | Age: 52
End: 2022-03-02
Payer: COMMERCIAL

## 2022-03-02 ENCOUNTER — HOSPITAL ENCOUNTER (OUTPATIENT)
Dept: INFUSION THERAPY | Age: 52
Discharge: HOME | End: 2022-03-02
Payer: COMMERCIAL

## 2022-03-02 VITALS
TEMPERATURE: 97.2 F | SYSTOLIC BLOOD PRESSURE: 122 MMHG | HEIGHT: 72 IN | WEIGHT: 145 LBS | RESPIRATION RATE: 14 BRPM | DIASTOLIC BLOOD PRESSURE: 72 MMHG | OXYGEN SATURATION: 100 % | BODY MASS INDEX: 19.64 KG/M2 | HEART RATE: 79 BPM

## 2022-03-02 VITALS
SYSTOLIC BLOOD PRESSURE: 130 MMHG | DIASTOLIC BLOOD PRESSURE: 74 MMHG | HEART RATE: 63 BPM | OXYGEN SATURATION: 100 % | RESPIRATION RATE: 16 BRPM | TEMPERATURE: 98 F

## 2022-03-02 DIAGNOSIS — F10.21 HISTORY OF ALCOHOLISM (HCC): ICD-10-CM

## 2022-03-02 DIAGNOSIS — Z87.891 PERSONAL HISTORY OF TOBACCO USE: ICD-10-CM

## 2022-03-02 DIAGNOSIS — C18.4 CANCER OF TRANSVERSE COLON (HCC): ICD-10-CM

## 2022-03-02 DIAGNOSIS — K80.50 CHOLEDOCHOLITHIASIS: ICD-10-CM

## 2022-03-02 DIAGNOSIS — C18.9 ADENOCARCINOMA, COLON (HCC): Primary | ICD-10-CM

## 2022-03-02 DIAGNOSIS — K80.21 CALCULUS OF GALLBLADDER WITH BILIARY OBSTRUCTION BUT WITHOUT CHOLECYSTITIS: Primary | ICD-10-CM

## 2022-03-02 DIAGNOSIS — Z90.49 S/P RIGHT COLECTOMY: ICD-10-CM

## 2022-03-02 DIAGNOSIS — Z80.9 FAMILY HISTORY OF CANCER: ICD-10-CM

## 2022-03-02 LAB
ABSOLUTE IMMATURE GRANULOCYTE: 0.01 THOU/MM3 (ref 0–0.07)
BASINOPHIL, AUTOMATED: 1 % (ref 0–3)
BASOPHILS ABSOLUTE: 0 THOU/MM3 (ref 0–0.1)
BUN, WHOLE BLOOD: 10 MG/DL (ref 8–26)
CHLORIDE, WHOLE BLOOD: 104 MEQ/L (ref 98–109)
CREATININE, WHOLE BLOOD: 0.8 MG/DL (ref 0.5–1.2)
EOSINOPHILS ABSOLUTE: 0.1 THOU/MM3 (ref 0–0.4)
EOSINOPHILS RELATIVE PERCENT: 2 % (ref 0–4)
GFR, ESTIMATED: > 90 ML/MIN/1.73M2
GLUCOSE, WHOLE BLOOD: 112 MG/DL (ref 70–108)
HCT VFR BLD CALC: 35 % (ref 42–52)
HEMOGLOBIN: 12.4 GM/DL (ref 14–18)
IMMATURE GRANULOCYTES: 0 %
IONIZED CALCIUM, WHOLE BLOOD: 1.17 MMOL/L (ref 1.12–1.32)
LYMPHOCYTES # BLD: 16 % (ref 15–47)
LYMPHOCYTES ABSOLUTE: 1.1 THOU/MM3 (ref 1–4.8)
MCH RBC QN AUTO: 34.3 PG (ref 26–33)
MCHC RBC AUTO-ENTMCNC: 35.4 GM/DL (ref 32.2–35.5)
MCV RBC AUTO: 97 FL (ref 80–94)
MONOCYTES ABSOLUTE: 0.8 THOU/MM3 (ref 0.4–1.3)
MONOCYTES: 11 % (ref 0–12)
PDW BLD-RTO: 18.1 % (ref 11.5–14.5)
PLATELET # BLD: 185 THOU/MM3 (ref 130–400)
PMV BLD AUTO: 9.4 FL (ref 9.4–12.4)
POTASSIUM, WHOLE BLOOD: 3.1 MEQ/L (ref 3.5–4.9)
RBC # BLD: 3.62 MILL/MM3 (ref 4.7–6.1)
SEG NEUTROPHILS: 70 % (ref 43–75)
SEGMENTED NEUTROPHILS ABSOLUTE COUNT: 4.8 THOU/MM3 (ref 1.8–7.7)
SODIUM, WHOLE BLOOD: 143 MEQ/L (ref 138–146)
TOTAL CO2, WHOLE BLOOD: 28 MEQ/L (ref 23–33)
WBC # BLD: 6.9 THOU/MM3 (ref 4.8–10.8)

## 2022-03-02 PROCEDURE — 96365 THER/PROPH/DIAG IV INF INIT: CPT

## 2022-03-02 PROCEDURE — 85025 COMPLETE CBC W/AUTO DIFF WBC: CPT

## 2022-03-02 PROCEDURE — 96366 THER/PROPH/DIAG IV INF ADDON: CPT

## 2022-03-02 PROCEDURE — 6360000002 HC RX W HCPCS: Performed by: INTERNAL MEDICINE

## 2022-03-02 PROCEDURE — 99214 OFFICE O/P EST MOD 30 MIN: CPT | Performed by: SURGERY

## 2022-03-02 PROCEDURE — 2580000003 HC RX 258: Performed by: INTERNAL MEDICINE

## 2022-03-02 PROCEDURE — 80047 BASIC METABLC PNL IONIZED CA: CPT

## 2022-03-02 PROCEDURE — 36591 DRAW BLOOD OFF VENOUS DEVICE: CPT

## 2022-03-02 RX ORDER — HEPARIN SODIUM (PORCINE) LOCK FLUSH IV SOLN 100 UNIT/ML 100 UNIT/ML
500 SOLUTION INTRAVENOUS PRN
Status: CANCELLED | OUTPATIENT
Start: 2022-03-02

## 2022-03-02 RX ORDER — SODIUM CHLORIDE 9 MG/ML
25 INJECTION, SOLUTION INTRAVENOUS PRN
Status: CANCELLED | OUTPATIENT
Start: 2022-03-02

## 2022-03-02 RX ORDER — SODIUM CHLORIDE 0.9 % (FLUSH) 0.9 %
5-40 SYRINGE (ML) INJECTION PRN
Status: DISCONTINUED | OUTPATIENT
Start: 2022-03-02 | End: 2022-03-03 | Stop reason: HOSPADM

## 2022-03-02 RX ORDER — SODIUM CHLORIDE 9 MG/ML
INJECTION, SOLUTION INTRAVENOUS CONTINUOUS
Status: CANCELLED | OUTPATIENT
Start: 2022-03-02

## 2022-03-02 RX ORDER — SODIUM CHLORIDE 0.9 % (FLUSH) 0.9 %
5-40 SYRINGE (ML) INJECTION PRN
Status: CANCELLED | OUTPATIENT
Start: 2022-03-02

## 2022-03-02 RX ORDER — HEPARIN SODIUM (PORCINE) LOCK FLUSH IV SOLN 100 UNIT/ML 100 UNIT/ML
500 SOLUTION INTRAVENOUS PRN
Status: DISCONTINUED | OUTPATIENT
Start: 2022-03-02 | End: 2022-03-03 | Stop reason: HOSPADM

## 2022-03-02 RX ORDER — INDOCYANINE GREEN AND WATER 25 MG
2.5 KIT INJECTION ONCE
Status: CANCELLED | OUTPATIENT
Start: 2022-03-02 | End: 2022-03-02

## 2022-03-02 RX ADMIN — POTASSIUM CHLORIDE: 149 INJECTION, SOLUTION, CONCENTRATE INTRAVENOUS at 11:16

## 2022-03-02 RX ADMIN — SODIUM CHLORIDE, PRESERVATIVE FREE 10 ML: 5 INJECTION INTRAVENOUS at 10:23

## 2022-03-02 RX ADMIN — HEPARIN 500 UNITS: 100 SYRINGE at 13:23

## 2022-03-02 RX ADMIN — SODIUM CHLORIDE, PRESERVATIVE FREE 10 ML: 5 INJECTION INTRAVENOUS at 10:22

## 2022-03-02 ASSESSMENT — ENCOUNTER SYMPTOMS
ABDOMINAL DISTENTION: 0
WHEEZING: 0
COUGH: 0
RECTAL PAIN: 0
SINUS PRESSURE: 0
SORE THROAT: 0
BLOOD IN STOOL: 0
SHORTNESS OF BREATH: 0
DIARRHEA: 0
ANAL BLEEDING: 0
VOMITING: 0
CHOKING: 0
ABDOMINAL PAIN: 1
TROUBLE SWALLOWING: 0
EYE DISCHARGE: 0
PHOTOPHOBIA: 0
EYE ITCHING: 0
BACK PAIN: 0
FACIAL SWELLING: 0
NAUSEA: 0
APNEA: 0
RHINORRHEA: 0
CHEST TIGHTNESS: 0
STRIDOR: 0
CONSTIPATION: 0
ALLERGIC/IMMUNOLOGIC NEGATIVE: 1
EYE PAIN: 0
EYE REDNESS: 0
VOICE CHANGE: 0
COLOR CHANGE: 0

## 2022-03-02 NOTE — PLAN OF CARE
Problem: Infection - Central Venous Catheter-Associated Bloodstream Infection:  Goal: Will show no infection signs and symptoms  Description: Will show no infection signs and symptoms  Outcome: Met This Shift  Note: Mediport site with no redness or warmth. Skin over port site intact with no signs of breakdown noted. Patient verbalizes signs/symptoms of port infection and when to notify the physician. Intervention: Central line needs assessment  Note: Discuss port maintenance,infection prevention, sign of infection and when to call MD.      Problem: Musculor/Skeletal Functional Status  Goal: Absence of falls  Outcome: Met This Shift  Note: No falls occurred with visit today. Intervention: Fall precautions  Note: Discussed the need to use the call light for assistance when getting up to ambulate. Problem: Intellectual/Education/Knowledge Deficit  Goal: Teaching initiated upon admission  Outcome: Met This Shift  Note: Patient verbalizes understanding to verbal information given on fluid with additives,action and possible side effects. Aware to call MD if develop complications. Intervention: Verbal/written education provided  Note: Discussed indications for fluids hydration     Problem: Discharge Planning  Goal: Knowledge of discharge instructions  Description: Knowledge of discharge instructions  Outcome: Met This Shift  Note: Verbalize understanding of discharge instructions, follow up appointments, and when to call Physician. Intervention: Interaction with patient/family and care team  Note: Discuss understanding of discharge instructions, follow up appointments and when to call Physician. Care plan reviewed with patient. Patient verbalizes understanding of the plan of care and contributes to goal setting.

## 2022-03-02 NOTE — PROGRESS NOTES
Patient tolerated  Fluids hydration without any complications. Discharge instructions given to patient-verbalizes understanding. Ambulated off unit per self with belongings.

## 2022-03-02 NOTE — PROGRESS NOTES
Shelli Paiz (:  1970)     ASSESSMENT:  1. Cholelithiasis  2. Choledocholithiasis status post ERCP with stent insertion  3. Recent extended right colectomy secondary to adenocarcinoma of colon  4.  Episodic epigastric and right upper quadrant abdominal pain    PLAN:  1. Schedule [facility] for robotic possible open cholecystectomy. 2. He will undergo pre-operative clearance per anesthesia guidelines with risk factors listed under the past medical history diagnosis & problem list.  3. The risks, benefits and alternatives were discussed with David Valero including non-operative management. The pros and cons of robotic, laparoscopic and open techniques were discussed. All questions answered. He understands and wishes to proceed with surgical intervention. 4. Restrictions discussed with David Valero and he expresses understanding. 5. He is advised to call back directly if there are further questions/concerns, or if his symptoms worsen prior to surgery. 6.  Discussed timing of cholecystectomy as patient is finishing up chemotherapy but also planning to proceed with stent removal here in about a month. Patient is wishing to proceed with surgery here in the few weeks. Discussed increased risks. All questions answered. He wishes to proceed. 7.  Following up with oncology and GI as directed      SUBJECTIVE/OBJECTIVE:    Chief Complaint   Patient presents with    Surgical Consult     Est patient-last seen in the office on 10/18/0386-[id number] and stent placement at  on 2022     HPI  David Valero is a 60-year-old male who presents for evaluation of gallbladder disease requiring cholecystectomy. 2021 underwent robotic extended right colectomy due to adenocarcinoma the colon. Found to have positive lymph nodes requiring adjuvant chemotherapy which he is undergoing. Underwent right subclavian vein single-lumen tunneled port placement 2021.   He states he has had chronic gallbladder episodes for the past several years. Recently it became significant with epigastric and right upper quadrant abdominal pain. Ultrasound found to have biliary sludge/small stones. MRCP demonstrated dilated common bile duct with concern of stricture. Attempted ERCP by [facility] but required transfer to [facility] for ERCP and stent placement earlier this month. He states he is to remove the stent in about 4 weeks. Finishing chemotherapy. Planning to see oncology today due to feeling dehydrated. Was admitted during chemotherapy after surgery secondary to GI issues but this has improved. He states he is in need of gallbladder removal to prevent future episodes. He would like to get this done sooner than later if at all possible. Denies any generalized abdominal pain. Denies chest pain or shortness of breath. Tolerating diet. Denies any issues with bowel function. No hematochezia or melena. Review of Systems   Constitutional: Positive for activity change and appetite change. Negative for chills, diaphoresis, fatigue, fever and unexpected weight change. HENT: Negative for congestion, dental problem, drooling, ear discharge, ear pain, facial swelling, hearing loss, mouth sores, nosebleeds, postnasal drip, rhinorrhea, sinus pressure, sneezing, sore throat, tinnitus, trouble swallowing and voice change. Eyes: Negative for photophobia, pain, discharge, redness, itching and visual disturbance. Respiratory: Negative for apnea, cough, choking, chest tightness, shortness of breath, wheezing and stridor. Cardiovascular: Negative for chest pain, palpitations and leg swelling. Gastrointestinal: Positive for abdominal pain. Negative for abdominal distention, anal bleeding, blood in stool, constipation, diarrhea, nausea, rectal pain and vomiting. Endocrine: Negative.     Genitourinary: Negative for decreased urine volume, difficulty urinating, dysuria, enuresis, flank pain, frequency, genital sores, hematuria, penile discharge, penile pain, penile swelling, scrotal swelling, testicular pain and urgency. Musculoskeletal: Negative for arthralgias, back pain, gait problem, joint swelling, myalgias, neck pain and neck stiffness. Skin: Negative for color change, pallor, rash and wound. Allergic/Immunologic: Negative. Neurological: Negative for dizziness, tremors, seizures, syncope, facial asymmetry, speech difficulty, weakness, light-headedness, numbness and headaches. Hematological: Negative for adenopathy. Does not bruise/bleed easily. Psychiatric/Behavioral: Negative for agitation, behavioral problems, confusion, decreased concentration, dysphoric mood, hallucinations, self-injury, sleep disturbance and suicidal ideas. The patient is not nervous/anxious and is not hyperactive.         Past Medical History:   Diagnosis Date    Anemia     Colon cancer ([address] 09/2021       Past Surgical History:   Procedure Laterality Date    COLONOSCOPY  09/01/2021    Dr. Palmer Nearing ERCP N/A 02/08/2022    ERCP DIAGNOSTIC performed by Paolo Rainey MD at [facility]    ERCP  02/24/2022    with stent placement Dr. Ferris Drain Right 09/16/2021    ROBOT EXTENDED RIGHT COLECTOMY performed by Adrianne López MD at [facility] Left 2020    [facility] with mesh inguinal    PORT SURGERY N/A 11/08/2021    SINGLE LUMEN SMART PORT INSERTION performed by Adrianne López MD at [place] [facility]       Current Outpatient Medications   Medication Sig Dispense Refill    capecitabine (XELODA) 500 MG chemo tablet Take 3 tablets by mouth 2 times daily for 14 days 84 tablet 0    prochlorperazine (COMPAZINE) 5 MG tablet Take 1 tablet by mouth every 12 hours for 14 days Take 30 minutes before morning and afternoon dose of Xeloda 28 tablet 2    ondansetron (ZOFRAN) 4 MG tablet Take 1 tablet by mouth every 6 hours as needed for Nausea or Vomiting 30 tablet 1    sucralfate (CARAFATE) 1 GM tablet take 1 tablet by mouth twice a day FIVE TO 60 MINS BEFORE LUNCH AND DINNER      ferrous sulfate (IRON 325) 325 (65 Fe) MG tablet Take 1 tablet by mouth daily (with breakfast) 90 tablet 1    omeprazole (PRILOSEC OTC) 20 MG tablet Take 20 mg by mouth daily       oxyCODONE-acetaminophen (PERCOCET) 5-325 MG per tablet Take 1 tablet by mouth every 8 hours as needed for Pain for up to 21 days. (Patient not taking: Reported on 3/2/2022) 64 tablet 0    ondansetron (ZOFRAN-ODT) 4 MG disintegrating tablet Take 1 tablet by mouth 3 times daily as needed for Nausea or Vomiting (Patient not taking: Reported on 3/2/2022) 21 tablet 0    Handicap Placard MISC by Does not apply route Diagnosis: No primary diagnosis found. Expiration Date: 06/30/2022 (Patient not taking: Reported on 3/2/2022) 1 each 0    Handicap Placard MISC by Does not apply route Diagnosis: colon cancer, stage IIIA. Currently completing chemotherapy   Expiration: 6 months (Patient not taking: Reported on 3/2/2022) 1 each 0    naloxegol (MOVANTIK) 12.5 MG TABS tablet Take 1 tablet by mouth every morning (Patient not taking: Reported on 12/15/2021) 30 tablet 0    dicyclomine (BENTYL) 10 MG capsule Take 2 capsules by mouth 3 times daily (Patient not taking: Reported on 2/16/2022) 120 capsule 0    Magic Mouthwash (MIRACLE MOUTHWASH) Swish and spit 5 mLs 4 times daily as needed for Irritation Mixture of 30mL diphenhydramine, 60mL maalox, 4g carafate as ratio (Patient not taking: Reported on 12/15/2021) 300 mL 0    ondansetron (ZOFRAN-ODT) 4 MG disintegrating tablet  (Patient not taking: Reported on 3/2/2022)       No current facility-administered medications for this visit.      Facility-Administered Medications Ordered in Other Visits   Medication Dose Route Frequency Provider Last Rate Last Admin    sodium chloride flush 0.9 % injection 5-40 mL  5-40 mL IntraVENous PRN Kyle Sanders MD   10 mL at 03/02/22 1339  heparin flush 100 UNIT/ML injection 500 Units  500 Units IntraCATHeter PRN Anjelica Niño MD   500 Units at 22 1323       No Known Allergies    Family History   Problem Relation Age of Onset    Other Mother         Brain aneurysm    COPD Mother    [place] [employer] Stroke Mother     Cancer Father         lung mets brain and bone    Stroke Father     Cancer Maternal Grandmother         colon    High Blood Pressure Maternal Grandmother     Cancer Maternal Grandfather         colon     High Blood Pressure Maternal Grandfather     Kidney Disease Paternal Grandmother     Cancer Paternal Grandmother         breast     Diabetes Paternal Grandmother     High Blood Pressure Paternal Grandmother     Cancer Paternal Grandfather         lung-52    Heart Attack Paternal Grandfather     High Blood Pressure Paternal Grandfather     Cancer Paternal Aunt         colon    Heart Attack Paternal Aunt     Cancer Paternal Uncle         lung    Cancer Maternal Aunt         colon    Cancer Maternal Uncle         colon       Social History     Socioeconomic History    Marital status: Single     Spouse name: Not on file    Number of children: Not on file    Years of education: Not on file    Highest education level: Not on file   Occupational History    Not on file   Tobacco Use    Smoking status: Former Smoker     Packs/day: 1.00     Years: 35.00     Pack years: 35.00     Types: Cigarettes     Quit date: 2020     Years since quittin.1    Smokeless tobacco: Never Used   Vaping Use    Vaping Use: Never used   Substance and Sexual Activity    Alcohol use: Not Currently    Drug use: No    Sexual activity: Not on file   Other Topics Concern    Not on file   Social History Narrative    Not on file     Social Determinants of Health     Financial Resource Strain:     Difficulty of Paying Living Expenses: Not on file   Food Insecurity:     Worried About Running Out of Food in the Last Year: Not on file    Johnnie of Food in the Last Year: Not on file   Transportation Needs:     Lack of Transportation (Medical): Not on file    Lack of Transportation (Non-Medical): Not on file   Physical Activity:     Days of Exercise per Week: Not on file    Minutes of Exercise per Session: Not on file   Stress:     Feeling of Stress : Not on file   Social Connections:     Frequency of Communication with Friends and Family: Not on file    Frequency of Social Gatherings with Friends and Family: Not on file    Attends Ca[employer] Services: Not on file    Active Member of [address] [place] [address] or Organizations: Not on file    Attends Club or Organization Meetings: Not on file    Marital Status: Not on file   Intimate Partner Violence:     Fear of Current or Ex-Partner: Not on file    Emotionally Abused: Not on file    Physically Abused: Not on file    Sexually Abused: Not on file   Housing Stability:     Unable to Pay for Housing in the Last Year: Not on file    Number of [place] in the Last Year: Not on file    Unstable Housing in the Last Year: Not on file     Vitals:    03/02/22 0824   BP: 122/72   Site: Left Upper Arm   Position: Sitting   Cuff Size: Medium Adult   Pulse: 79   Resp: 14   Temp: 97.2 °F (36.2 °C)   TempSrc: Tympanic   SpO2: 100%   Weight: 145 lb (65.8 kg)   Height: 6' (1.829 m)     Body mass index is 19.67 kg/m². Wt Readings from Last 3 Encounters:   03/02/22 145 lb (65.8 kg)   02/20/22 152 lb (68.9 kg)   02/16/22 152 lb 3.2 oz (69 kg)     Physical Exam  Vitals reviewed. Constitutional:       General: He is not in acute distress. Appearance: He is well-developed. He is not diaphoretic. HENT:      Head: Normocephalic and atraumatic. Right Ear: External ear normal.      Left Ear: External ear normal.      Nose: Nose normal.   Eyes:      General: No scleral icterus. Right eye: No discharge. Left eye: No discharge.       Conjunctiva/sclera: Conjunctivae normal.   Cardiovascular:      Rate and Rhythm: Normal rate and regular rhythm. Heart sounds: Normal heart sounds. Pulmonary:      Effort: Pulmonary effort is normal. No respiratory distress. Breath sounds: Normal breath sounds. No wheezing or rales. Chest:      Chest wall: No tenderness. Abdominal:      General: Bowel sounds are normal. There is no distension. Palpations: Abdomen is soft. There is no mass. Tenderness: There is abdominal tenderness in the right upper quadrant and epigastric area. There is no guarding or rebound. Musculoskeletal:         General: No tenderness. Normal range of motion. Cervical back: Normal range of motion and neck supple. Skin:     General: Skin is warm and dry. Coloration: Skin is not pale. Findings: No erythema or rash. Neurological:      Mental Status: He is alert and oriented to person, place, and time. Cranial Nerves: No cranial nerve deficit. Psychiatric:         Behavior: Behavior normal.         Thought Content:  Thought content normal.         Judgment: Judgment normal.       Lab Results   Component Value Date    WBC 6.9 03/02/2022    HGB 12.4 (L) 03/02/2022    HCT 35.0 (L) 03/02/2022    MCV 97 (H) 03/02/2022     03/02/2022     Lab Results   Component Value Date     03/02/2022    K 3.1 (L) 03/02/2022     12/04/2021    CO2 29 12/04/2021     Lab Results   Component Value Date    CREATININE 0.8 03/02/2022     Lab Results   Component Value Date     (H) 02/16/2022     (H) 02/16/2022    ALKPHOS 273 (H) 02/16/2022    BILITOT 0.9 02/16/2022     Lab Results   Component Value Date    LIPASE 23.3 11/29/2021       Patient Active Problem List   Diagnosis    Personal history of tobacco use    History of alcoholism ([address]    Weight loss    Bradycardia    Heartburn    Varicose veins of both lower extremities    Family history of cancer    Adenocarcinoma, colon ([address]    Cancer of transverse colon ([address]    S/P right colectomy    Hypokalemia    Diarrhea due to drug    Enterocolitis    Severe protein-calorie malnutrition (HCC)     Narrative   PROCEDURE: US GALLBLADDER RUQ       CLINICAL INFORMATION: epigastric pain, gallbladder wall thickening, CBD dilation       TECHNIQUE: Multiple sonographic images of the upper abdomen were obtained with specific attention given to the gallbladder. A few images of the liver, pancreas, and right kidney were also obtained.       FINDINGS:        Liver - L= 18.7 cm    Gallbladder - 11.0 x 2.0 x 2.4 cm    Gallbladder Wall - 0.4 cm    Common Duct - 1.3 cm    Corea's Sign: negative           Liver, pancreas, right kidney: Limited visualization of the pancreas due to overlying bowel gas. There are several dilated intrahepatic biliary radicles. Visualized portions of right kidney unremarkable.       Common bile duct: Mildly dilated common duct.       Gallbladder : Moderate gallbladder wall thickening. The wall is not edematous,. Moderate amount of sludge in the gallbladder and several small calcifications in the gallbladder wall. . No gallstones are seen.               Impression   1. Biliary sludge. Thickened gallbladder wall. 2. Markedly dilated common duct and dilated intrahepatic radicles, consistent with distal obstruction.     3. MRCP might be considered for further evaluation.               **This report has been created using voice recognition software.  It may contain minor errors which are inherent in voice recognition technology. **       Final report electronically signed by Dr. Eveline Nboles on 11/29/2021 7:34 PM     Narrative   MR MRCP with and without gadolinium       Comparison: None       Findings:   No intrahepatic biliary ductal dilatation. Common bile duct dilated up to 1.4 cm with focal tapering near the    ampulla. No intraductal filling defects. Pancreatic duct is not dilated.       Distended gallbladder with intraluminal stones.    No gallbladder wall thickening/edema.       The liver enhances homogeneously. The spleen, pancreas, and adrenal glands are unremarkable. Both kidneys enhance symmetrically without hydronephrosis.       Several partially visualized small bowel loops appear dilated. Trace abdominal ascites maybe reactive. No enlarged abdominal lymph nodes.       Small bilateral pleural effusions with basilar atelectasis. No abnormal bone marrow signal.           Impression   1. Dilatation of the common bile duct at 1.4 cm without intraductal    filling defect. Tapering in the distal common bile duct with underlying    stricture or stenosis not excluded. No intrahepatic biliary ductal or    pancreatic ductal dilatation. 2. Cholelithiasis without gallbladder wall thickening/edema. 3. Several small bowel loops appear dilated. Partial small bowel    obstruction not excluded. 4. Small bilateral pleural effusions and trace abdominal ascites likely    reactive.       This document has been electronically signed by: Shaylee Adams MD on    2021 09:53 [place] [address]                                 OPERATIVE REPORT     PATIENT NAME: Faiza Mead                :        1970  MED REC NO:   [id number]                           ROOM:  ACCOUNT NO:   [de-identified]                           ADMIT DATE: 2022  PROVIDER:     Patel Vasques M.D.     DATE OF PROCEDURE:  2022     PROCEDURE:  ERCP plus pancreatic stent placement.     INDICATION FOR THE PROCEDURE:  The patient with a history of CBD  stricture, dilated common bile duct on the MRCP, 1.4 cm common bile duct  with abnormal liver panel, history implying possible gallbladder disease  with biliary colic attacks. We are doing further exploration of common  bile duct and possible stricture, benign versus malignant.   The patient  also having history of colon cancer and had undergone right  hemicolectomy with two lymph nodes positive and is undergoing  chemotherapy at the moment. Following with Dr. Julieta Torres. See the preop  note for rest of clinicals.     SURGEON:  Onel Jacques MD     ASA CLASSIFICATION:  III.     MEDICATIONS:  General anesthesia.     BIOPSY:  None.     PHOTOGRAPHS:  Yes.     BLOOD LOSS:  Less than 5 mL.     DESCRIPTION OF PROCEDURE:  Informed consent was obtained after  explaining risks and benefits of the procedure, possible complications  including bleeding, reaction to medicine, bleeding requiring  transfusion, perforation requiring surgery were discussed with the  patient and wife. Pancreatitis which carries nearly 17% risk was  discussed. Also aspiration, inability to intubate common bile duct,  hypotension, and sepsis were discussed. The patient did get antibiotic. Before the procedure also we did give Indocin suppository 30 minutes  before procedure to try to reduce the risk of pancreatitis and  afterwards, the patient was intubated and put in prone position. Therapeutic ERCP scope was advanced through oropharynx, esophagus,  stomach into the ampulla. Ampulla is very flat, was recognized, partly  hiding underneath the fold. The sphincterotome was used to intubate the  common bile duct, but I did go in the pancreatic duct and multiple  passes were done and each time the wire would go inside the pancreatic  duct, a small dye was injected which showed his pancreatic duct and a  small precut was made to facilitate entry into bile duct, but after a  few attempts, I found that probably the septum edema had caused closure  of the distal common bile duct or probably stricture was too tight and  procedure was abandoned at that point. I did put in pancreatic stent to  reduce the risk for pancreatitis. The patient tolerated the procedure  well.     IMPRESSION:  1. Inability to intubate common bile duct with multiple attempts.   2.  Repeated pancreatic duct cannulation and stent was left behind to  reduce the risk of pancreatitis.     RECOMMENDATIONS:  The patient still has very high risk of pancreatitis  and we will be watching the patient, and if the patient does not have  much pain, we can send the patient home and we will bring the patient  back in next week. We will set up a meeting with Silvia Calderon and we  will check x-ray at that time to see if pancreatic stent has gone out. If not, we may have to do EGD to remove it and we will think about  further evaluation at tertiary center like [facility] M.D. Media Information                                                               An electronic signature was used to authenticate this note.     --Ferd Severance, MD

## 2022-03-04 NOTE — H&P
Martin Corea (:  1970)      ASSESSMENT:  1. Cholelithiasis  2. Choledocholithiasis status post ERCP with stent insertion  3. Recent extended right colectomy secondary to adenocarcinoma of colon  4.  Episodic epigastric and right upper quadrant abdominal pain     PLAN:  1. Schedule [facility] for robotic possible open cholecystectomy. 2. He will undergo pre-operative clearance per anesthesia guidelines with risk factors listed under the past medical history diagnosis & problem list.  3. The risks, benefits and alternatives were discussed with Catarino Lorenzo including non-operative management. The pros and cons of robotic, laparoscopic and open techniques were discussed. All questions answered. He understands and wishes to proceed with surgical intervention. 4. Restrictions discussed with Catarino Lorenzo and he expresses understanding. 5. He is advised to call back directly if there are further questions/concerns, or if his symptoms worsen prior to surgery. 6.  Discussed timing of cholecystectomy as patient is finishing up chemotherapy but also planning to proceed with stent removal here in about a month. Patient is wishing to proceed with surgery here in the few weeks. Discussed increased risks. All questions answered. He wishes to proceed. 7.  Following up with oncology and GI as directed        SUBJECTIVE/OBJECTIVE:          Chief Complaint   Patient presents with    Surgical Consult       Est patient-last seen in the office on 10/18/5036-[id number] and stent placement at  on 2022      HPI  Catarino Lorenzo is a 50-year-old male who presents for evaluation of gallbladder disease requiring cholecystectomy. 2021 underwent robotic extended right colectomy due to adenocarcinoma the colon. Found to have positive lymph nodes requiring adjuvant chemotherapy which he is undergoing. Underwent right subclavian vein single-lumen tunneled port placement 2021.   He states he has had chronic gallbladder episodes for the past several years. Recently it became significant with epigastric and right upper quadrant abdominal pain. Ultrasound found to have biliary sludge/small stones. MRCP demonstrated dilated common bile duct with concern of stricture. Attempted ERCP by Dr. Xavi Ruvalcaba but required transfer to [facility] for ERCP and stent placement earlier this month. He states he is to remove the stent in about 4 weeks. Finishing chemotherapy. Planning to see oncology today due to feeling dehydrated. Was admitted during chemotherapy after surgery secondary to GI issues but this has improved. He states he is in need of gallbladder removal to prevent future episodes. He would like to get this done sooner than later if at all possible. Denies any generalized abdominal pain. Denies chest pain or shortness of breath. Tolerating diet. Denies any issues with bowel function. No hematochezia or melena.     Review of Systems   Constitutional: Positive for activity change and appetite change. Negative for chills, diaphoresis, fatigue, fever and unexpected weight change. HENT: Negative for congestion, dental problem, drooling, ear discharge, ear pain, facial swelling, hearing loss, mouth sores, nosebleeds, postnasal drip, rhinorrhea, sinus pressure, sneezing, sore throat, tinnitus, trouble swallowing and voice change. Eyes: Negative for photophobia, pain, discharge, redness, itching and visual disturbance. Respiratory: Negative for apnea, cough, choking, chest tightness, shortness of breath, wheezing and stridor. Cardiovascular: Negative for chest pain, palpitations and leg swelling. Gastrointestinal: Positive for abdominal pain. Negative for abdominal distention, anal bleeding, blood in stool, constipation, diarrhea, nausea, rectal pain and vomiting. Endocrine: Negative.     Genitourinary: Negative for decreased urine volume, difficulty urinating, dysuria, enuresis, flank pain, frequency, genital sores, hematuria, penile discharge, penile pain, penile swelling, scrotal swelling, testicular pain and urgency. Musculoskeletal: Negative for arthralgias, back pain, gait problem, joint swelling, myalgias, neck pain and neck stiffness. Skin: Negative for color change, pallor, rash and wound. Allergic/Immunologic: Negative. Neurological: Negative for dizziness, tremors, seizures, syncope, facial asymmetry, speech difficulty, weakness, light-headedness, numbness and headaches. Hematological: Negative for adenopathy. Does not bruise/bleed easily. Psychiatric/Behavioral: Negative for agitation, behavioral problems, confusion, decreased concentration, dysphoric mood, hallucinations, self-injury, sleep disturbance and suicidal ideas.  The patient is not nervous/anxious and is not hyperactive.          Past Medical History        Past Medical History:   Diagnosis Date    Anemia      Colon cancer ([address] 09/2021            Past Surgical History         Past Surgical History:   Procedure Laterality Date    COLONOSCOPY   09/01/2021     Dr. Rebekah Dacosta ERCP N/A 02/08/2022     ERCP DIAGNOSTIC performed by Vlae Santiago MD at [facility]    ERCP   02/24/2022     with stent placement Dr. Rene Carrasco Right 09/16/2021     ROBOT EXTENDED RIGHT COLECTOMY performed by Debby Euceda MD at [address] [place] [address] with mesh inguinal    PORT SURGERY N/A 11/08/2021     SINGLE LUMEN SMART PORT INSERTION performed by Debby Euceda MD at [facility]            Current Facility-Administered Medications          Current Outpatient Medications   Medication Sig Dispense Refill    capecitabine (XELODA) 500 MG chemo tablet Take 3 tablets by mouth 2 times daily for 14 days 84 tablet 0    prochlorperazine (COMPAZINE) 5 MG tablet Take 1 tablet by mouth every 12 hours for 14 days Take 30 minutes before morning and afternoon dose of Xeloda 28 tablet 2    ondansetron (ZOFRAN) 4 MG tablet Take 1 tablet by mouth every 6 hours as needed for Nausea or Vomiting 30 tablet 1    sucralfate (CARAFATE) 1 GM tablet take 1 tablet by mouth twice a day FIVE TO 60 MINS BEFORE LUNCH AND DINNER        ferrous sulfate (IRON 325) 325 (65 Fe) MG tablet Take 1 tablet by mouth daily (with breakfast) 90 tablet 1    omeprazole (PRILOSEC OTC) 20 MG tablet Take 20 mg by mouth daily         oxyCODONE-acetaminophen (PERCOCET) 5-325 MG per tablet Take 1 tablet by mouth every 8 hours as needed for Pain for up to 21 days. (Patient not taking: Reported on 3/2/2022) 64 tablet 0    ondansetron (ZOFRAN-ODT) 4 MG disintegrating tablet Take 1 tablet by mouth 3 times daily as needed for Nausea or Vomiting (Patient not taking: Reported on 3/2/2022) 21 tablet 0    Handicap Placard MISC by Does not apply route Diagnosis: No primary diagnosis found. Expiration Date: 06/30/2022 (Patient not taking: Reported on 3/2/2022) 1 each 0    Handicap Placard MISC by Does not apply route Diagnosis: colon cancer, stage IIIA.  Currently completing chemotherapy   Expiration: 6 months (Patient not taking: Reported on 3/2/2022) 1 each 0    naloxegol (MOVANTIK) 12.5 MG TABS tablet Take 1 tablet by mouth every morning (Patient not taking: Reported on 12/15/2021) 30 tablet 0    dicyclomine (BENTYL) 10 MG capsule Take 2 capsules by mouth 3 times daily (Patient not taking: Reported on 2/16/2022) 120 capsule 0    Magic Mouthwash (MIRACLE MOUTHWASH) Swish and spit 5 mLs 4 times daily as needed for Irritation Mixture of 30mL diphenhydramine, 60mL maalox, 4g carafate as ratio (Patient not taking: Reported on 12/15/2021) 300 mL 0    ondansetron (ZOFRAN-ODT) 4 MG disintegrating tablet  (Patient not taking: Reported on 3/2/2022)          No current facility-administered medications for this visit.                Facility-Administered Medications Ordered in Other Visits   Medication Dose Route Frequency Price (Do Not Change): 0.00 Provider Last Rate Last Admin    sodium chloride flush 0.9 % injection 5-40 mL  5-40 mL IntraVENous PRN Kenisha Post MD   10 mL at 22 1023    heparin flush 100 UNIT/ML injection 500 Units  500 Units IntraCATHeter PRN Kenisha Post MD   500 Units at 22 1323            No Known Allergies     Family History         Family History   Problem Relation Age of Onset    Other Mother           Brain aneurysm    COPD Mother      Stroke Mother      Cancer Father           lung mets brain and bone    Stroke Father      Cancer Maternal Grandmother           colon    High Blood Pressure Maternal Grandmother      Cancer Maternal Grandfather           colon     High Blood Pressure Maternal Grandfather      Kidney Disease Paternal Grandmother      Cancer Paternal Grandmother           breast     Diabetes Paternal Grandmother      High Blood Pressure Paternal Grandmother      Cancer Paternal Grandfather           lung-52    Heart Attack Paternal Grandfather      High Blood Pressure Paternal Grandfather      Cancer Paternal Aunt           colon    Heart Attack Paternal Aunt      Cancer Paternal Uncle           lung    Cancer Maternal Aunt           colon    Cancer Maternal Uncle           colon            Social History               Socioeconomic History    Marital status: Single       Spouse name: Not on file    Number of children: Not on file    Years of education: Not on file    Highest education level: Not on file   Occupational History    Not on file   Tobacco Use    Smoking status: Former Smoker       Packs/day: 1.00       Years: 35.00       Pack years: 35.00       Types: Cigarettes       Quit date: 2020       Years since quittin.1    Smokeless tobacco: Never Used   Vaping Use    Vaping Use: Never used   Substance and Sexual Activity    Alcohol use: Not Currently    Drug use: No    Sexual activity: Not on file   Other Topics Concern    Not on file   Social History Narrative Instructions: This plan will send the code FBSE to the PM system.  DO NOT or CHANGE the price.  Not on file      Social Determinants of Health          Financial Resource Strain:     Difficulty of Paying Living Expenses: Not on file   Food Insecurity:     Worried About Running Out of Food in the Last Year: Not on file    Johnnie of Food in the Last Year: Not on file   Transportation Needs:     Lack of Transportation (Medical): Not on file    Lack of Transportation (Non-Medical): Not on file   Physical Activity:     Days of Exercise per Week: Not on file    Minutes of Exercise per Session: Not on file   Stress:     Feeling of Stress : Not on file   Social Connections:     Frequency of Communication with Friends and Family: Not on file    Frequency of Social Gatherings with Friends and Family: Not on file    Attends [employer] Services: Not on file    Active Member of Clubs or Organizations: Not on file    Attends Club or Organization Meetings: Not on file    Marital Status: Not on file   Intimate Partner Violence:     Fear of Current or Ex-Partner: Not on file    Emotionally Abused: Not on file    Physically Abused: Not on file    Sexually Abused: Not on file   Housing Stability:     Unable to Pay for Housing in the Last Year: Not on file    Number of [place] in the Last Year: Not on file    Unstable Housing in the Last Year: Not on file         Vitals       Vitals:     03/02/22 0824   BP: 122/72   Site: Left Upper Arm   Position: Sitting   Cuff Size: Medium Adult   Pulse: 79   Resp: 14   Temp: 97.2 °F (36.2 °C)   TempSrc: Tympanic   SpO2: 100%   Weight: 145 lb (65.8 kg)   Height: 6' (1.829 m)         Body mass index is 19.67 kg/m².         Wt Readings from Last 3 Encounters:   03/02/22 145 lb (65.8 kg)   02/20/22 152 lb (68.9 kg)   02/16/22 152 lb 3.2 oz (69 kg)      Physical Exam  Vitals reviewed. Constitutional:       General: He is not in acute distress. Appearance: He is well-developed. He is not diaphoretic. HENT:      Head: Normocephalic and atraumatic.       Right Detail Level: Simple Patient Active Problem List   Diagnosis    Personal history of tobacco use    History of [facility]ism ([facility]    Weight loss    Bradycardia    Heartburn    Varicose veins of both lower extremities    Family history of cancer    Adenocarcinoma, colon ([facility]    Cancer of transverse colon ([facility]    S/P right colectomy    Hypokalemia    Diarrhea due to drug    Enterocolitis    Severe protein-calorie malnutrition (HCC)      Narrative   PROCEDURE: US GALLBLADDER RUQ       CLINICAL INFORMATION: epigastric pain, gallbladder wall thickening, CBD dilation       TECHNIQUE: Multiple sonographic images of the upper abdomen were obtained with specific attention given to the gallbladder. A few images of the liver, pancreas, and right kidney were also obtained.       FINDINGS:        Liver - L= 18.7 cm    Gallbladder - 11.0 x 2.0 x 2.4 cm    Gallbladder Wall - 0.4 cm    Common Duct - 1.3 cm    Corea's Sign: negative           Liver, pancreas, right kidney: Limited visualization of the pancreas due to overlying bowel gas. There are several dilated intrahepatic biliary radicles. Visualized portions of right kidney unremarkable.       Common bile duct: Mildly dilated common duct.       Gallbladder : Moderate gallbladder wall thickening. The wall is not edematous,. Moderate amount of sludge in the gallbladder and several small calcifications in the gallbladder wall. . No gallstones are seen.               Impression   1. Biliary sludge. Thickened gallbladder wall. 2. Markedly dilated common duct and dilated intrahepatic radicles, consistent with distal obstruction.     3. MRCP might be considered for further evaluation.               **This report has been created using voice recognition software.  It may contain minor errors which are inherent in voice recognition technology. **       Final report electronically signed by Dr. Nicko Richards on 11/29/2021 7:34 PM      Narrative   MR MRCP with and without gadolinium     bile duct  with abnormal liver panel, history implying possible gallbladder disease  with biliary colic attacks.  We are doing further exploration of common  bile duct and possible stricture, benign versus malignant.  The patient  also having history of colon cancer and had undergone right  hemicolectomy with two lymph nodes positive and is undergoing  chemotherapy at the moment.  Following with Dr. Anh Van the preop  note for rest of clinicals.     SURGEON: Savanah Rodriguez MD     ASA CLASSIFICATION:  III.     MEDICATIONS:  General anesthesia.     BIOPSY:  None.     PHOTOGRAPHS:  Yes.     BLOOD LOSS:  Less than 5 mL.     DESCRIPTION OF PROCEDURE:  Informed consent was obtained after  explaining risks and benefits of the procedure, possible complications  including bleeding, reaction to medicine, bleeding requiring  transfusion, perforation requiring surgery were discussed with the  patient and wife.  Pancreatitis which carries nearly 17% risk was  discussed.  Also aspiration, inability to intubate common bile duct,  hypotension, and sepsis were discussed.  The patient did get antibiotic. Before the procedure also we did give Indocin suppository 30 minutes  before procedure to try to reduce the risk of pancreatitis and  afterwards, the patient was intubated and put in prone position.    Therapeutic ERCP scope was advanced through oropharynx, esophagus,  stomach into the ampulla.  Ampulla is very flat, was recognized, partly  hiding underneath the fold.  The sphincterotome was used to intubate the  common bile duct, but I did go in the pancreatic duct and multiple  passes were done and each time the wire would go inside the pancreatic  duct, a small dye was injected which showed his pancreatic duct and a  small precut was made to facilitate entry into bile duct, but after a  few attempts, I found that probably the septum edema had caused closure  of the distal common bile duct or probably stricture was too tight and  procedure was abandoned at that point. Rupesh Clancy did put in pancreatic stent to  reduce the risk for pancreatitis.  The patient tolerated the procedure  well.     IMPRESSION:  1.  Inability to intubate common bile duct with multiple attempts. 2.  Repeated pancreatic duct cannulation and stent was left behind to  reduce the risk of pancreatitis.     RECOMMENDATIONS:  The patient still has very high risk of pancreatitis  and we will be watching the patient, and if the patient does not have  much pain, we can send the patient home and we will bring the patient  back in next week.  We will set up a meeting with Sarita Atkins and we  will check x-ray at that time to see if pancreatic stent has gone out.    If not, we may have to do EGD to remove it and we will think about  further evaluation at tertiary center like [facility] Peña M.D.                                                                       Media Information                                                                                      An electronic signature was used to authenticate this note.     --Geetha Cardoso MD

## 2022-03-07 ENCOUNTER — TELEPHONE (OUTPATIENT)
Dept: SURGERY | Age: 52
End: 2022-03-07

## 2022-03-07 ENCOUNTER — TELEPHONE (OUTPATIENT)
Dept: ONCOLOGY | Age: 52
End: 2022-03-07

## 2022-03-07 NOTE — TELEPHONE ENCOUNTER
Dr Halle Mills spoke with Dr Sammi Nicholson. We will be cancelling his appointment and treatment for 3/9/22 and reschedule him for both on 3/23/22. Dane and Kin Adarsh notified. Patient notified and voiced understanding.

## 2022-03-07 NOTE — TELEPHONE ENCOUNTER
Patient called and left message that when he was here last week for fluids, he was instructed per Dr Rubio Sat to cancel his gallbladder surgery and stent removal-can not have done while receiving treatment. He did cancel surgery, then was called by Dr Mame Nichols office and was told the stent needs to come out because it is temporary. He is rescheduled for surgery tomorrow. Scheduled to come her Wednesday 3/9/22 for treatment. Can he still come in Wednesday, and if not how long until he can come back in for treatment. Please call patient and advise. [phone].

## 2022-03-07 NOTE — TELEPHONE ENCOUNTER
Due to a surgery cancellation patient's surgery moved up to 8:30am with an arrival of 6:30am with Dr. Sang Davis on 03-

## 2022-03-08 ENCOUNTER — ANESTHESIA EVENT (OUTPATIENT)
Dept: OPERATING ROOM | Age: 52
End: 2022-03-08
Payer: COMMERCIAL

## 2022-03-08 ENCOUNTER — HOSPITAL ENCOUNTER (OUTPATIENT)
Age: 52
Setting detail: OUTPATIENT SURGERY
Discharge: HOME | End: 2022-03-08
Attending: SURGERY | Admitting: SURGERY
Payer: COMMERCIAL

## 2022-03-08 ENCOUNTER — ANESTHESIA (OUTPATIENT)
Dept: OPERATING ROOM | Age: 52
End: 2022-03-08
Payer: COMMERCIAL

## 2022-03-08 VITALS
HEART RATE: 83 BPM | WEIGHT: 143.8 LBS | TEMPERATURE: 96.5 F | HEIGHT: 72 IN | SYSTOLIC BLOOD PRESSURE: 140 MMHG | BODY MASS INDEX: 19.48 KG/M2 | DIASTOLIC BLOOD PRESSURE: 86 MMHG | OXYGEN SATURATION: 96 % | RESPIRATION RATE: 16 BRPM

## 2022-03-08 VITALS — TEMPERATURE: 96.3 F | SYSTOLIC BLOOD PRESSURE: 104 MMHG | OXYGEN SATURATION: 100 % | DIASTOLIC BLOOD PRESSURE: 57 MMHG

## 2022-03-08 DIAGNOSIS — K80.11 CHRONIC CHOLECYSTITIS DUE TO GALLBLADDER CALCULUS WITH OBSTRUCTION: Primary | ICD-10-CM

## 2022-03-08 PROCEDURE — 2709999900 HC NON-CHARGEABLE SUPPLY: Performed by: SURGERY

## 2022-03-08 PROCEDURE — 3600000009 HC SURGERY ROBOT BASE: Performed by: SURGERY

## 2022-03-08 PROCEDURE — 7100000011 HC PHASE II RECOVERY - ADDTL 15 MIN: Performed by: SURGERY

## 2022-03-08 PROCEDURE — 2500000003 HC RX 250 WO HCPCS

## 2022-03-08 PROCEDURE — 7100000001 HC PACU RECOVERY - ADDTL 15 MIN: Performed by: SURGERY

## 2022-03-08 PROCEDURE — 3600000019 HC SURGERY ROBOT ADDTL 15MIN: Performed by: SURGERY

## 2022-03-08 PROCEDURE — 2500000003 HC RX 250 WO HCPCS: Performed by: SURGERY

## 2022-03-08 PROCEDURE — 6360000002 HC RX W HCPCS: Performed by: NURSE ANESTHETIST, CERTIFIED REGISTERED

## 2022-03-08 PROCEDURE — 2500000003 HC RX 250 WO HCPCS: Performed by: NURSE ANESTHETIST, CERTIFIED REGISTERED

## 2022-03-08 PROCEDURE — 47562 LAPAROSCOPIC CHOLECYSTECTOMY: CPT | Performed by: SURGERY

## 2022-03-08 PROCEDURE — 2580000003 HC RX 258

## 2022-03-08 PROCEDURE — 7100000000 HC PACU RECOVERY - FIRST 15 MIN: Performed by: SURGERY

## 2022-03-08 PROCEDURE — 6360000002 HC RX W HCPCS

## 2022-03-08 PROCEDURE — 3700000001 HC ADD 15 MINUTES (ANESTHESIA): Performed by: SURGERY

## 2022-03-08 PROCEDURE — 7100000010 HC PHASE II RECOVERY - FIRST 15 MIN: Performed by: SURGERY

## 2022-03-08 PROCEDURE — S2900 ROBOTIC SURGICAL SYSTEM: HCPCS | Performed by: SURGERY

## 2022-03-08 PROCEDURE — 88304 TISSUE EXAM BY PATHOLOGIST: CPT

## 2022-03-08 PROCEDURE — 3700000000 HC ANESTHESIA ATTENDED CARE: Performed by: SURGERY

## 2022-03-08 PROCEDURE — 6370000000 HC RX 637 (ALT 250 FOR IP): Performed by: SURGERY

## 2022-03-08 RX ORDER — MORPHINE SULFATE 2 MG/ML
2 INJECTION, SOLUTION INTRAMUSCULAR; INTRAVENOUS
Status: DISCONTINUED | OUTPATIENT
Start: 2022-03-08 | End: 2022-03-08 | Stop reason: HOSPADM

## 2022-03-08 RX ORDER — MIDAZOLAM HYDROCHLORIDE 1 MG/ML
INJECTION INTRAMUSCULAR; INTRAVENOUS PRN
Status: DISCONTINUED | OUTPATIENT
Start: 2022-03-08 | End: 2022-03-08 | Stop reason: SDUPTHER

## 2022-03-08 RX ORDER — KETOROLAC TROMETHAMINE 30 MG/ML
INJECTION, SOLUTION INTRAMUSCULAR; INTRAVENOUS
Status: COMPLETED
Start: 2022-03-08 | End: 2022-03-08

## 2022-03-08 RX ORDER — LIDOCAINE HCL/PF 100 MG/5ML
SYRINGE (ML) INJECTION PRN
Status: DISCONTINUED | OUTPATIENT
Start: 2022-03-08 | End: 2022-03-08 | Stop reason: SDUPTHER

## 2022-03-08 RX ORDER — OXYCODONE HYDROCHLORIDE 5 MG/1
5 TABLET ORAL EVERY 4 HOURS PRN
Status: DISCONTINUED | OUTPATIENT
Start: 2022-03-08 | End: 2022-03-08 | Stop reason: HOSPADM

## 2022-03-08 RX ORDER — OXYCODONE HYDROCHLORIDE 5 MG/1
10 TABLET ORAL EVERY 4 HOURS PRN
Status: DISCONTINUED | OUTPATIENT
Start: 2022-03-08 | End: 2022-03-08 | Stop reason: HOSPADM

## 2022-03-08 RX ORDER — HEPARIN SODIUM (PORCINE) LOCK FLUSH IV SOLN 100 UNIT/ML 100 UNIT/ML
500 SOLUTION INTRAVENOUS PRN
Status: DISCONTINUED | OUTPATIENT
Start: 2022-03-08 | End: 2022-03-08 | Stop reason: HOSPADM

## 2022-03-08 RX ORDER — ONDANSETRON 4 MG/1
4 TABLET, ORALLY DISINTEGRATING ORAL EVERY 8 HOURS PRN
Status: DISCONTINUED | OUTPATIENT
Start: 2022-03-08 | End: 2022-03-08 | Stop reason: HOSPADM

## 2022-03-08 RX ORDER — SODIUM CHLORIDE 9 MG/ML
25 INJECTION, SOLUTION INTRAVENOUS PRN
Status: DISCONTINUED | OUTPATIENT
Start: 2022-03-08 | End: 2022-03-08 | Stop reason: HOSPADM

## 2022-03-08 RX ORDER — HYDROCODONE BITARTRATE AND ACETAMINOPHEN 5; 325 MG/1; MG/1
1-2 TABLET ORAL EVERY 6 HOURS PRN
Qty: 20 TABLET | Refills: 0 | Status: SHIPPED | OUTPATIENT
Start: 2022-03-08 | End: 2022-03-28 | Stop reason: SDUPTHER

## 2022-03-08 RX ORDER — ONDANSETRON 2 MG/ML
4 INJECTION INTRAMUSCULAR; INTRAVENOUS EVERY 6 HOURS PRN
Status: DISCONTINUED | OUTPATIENT
Start: 2022-03-08 | End: 2022-03-08 | Stop reason: HOSPADM

## 2022-03-08 RX ORDER — INDOCYANINE GREEN AND WATER 25 MG
3.75 KIT INJECTION ONCE
Status: COMPLETED | OUTPATIENT
Start: 2022-03-08 | End: 2022-03-08

## 2022-03-08 RX ORDER — FENTANYL CITRATE 50 UG/ML
INJECTION, SOLUTION INTRAMUSCULAR; INTRAVENOUS PRN
Status: DISCONTINUED | OUTPATIENT
Start: 2022-03-08 | End: 2022-03-08 | Stop reason: SDUPTHER

## 2022-03-08 RX ORDER — SODIUM CHLORIDE 0.9 % (FLUSH) 0.9 %
5-40 SYRINGE (ML) INJECTION PRN
Status: DISCONTINUED | OUTPATIENT
Start: 2022-03-08 | End: 2022-03-08 | Stop reason: HOSPADM

## 2022-03-08 RX ORDER — CAPECITABINE 500 MG/1
1250 TABLET, FILM COATED ORAL 2 TIMES DAILY
COMMUNITY
End: 2022-04-21 | Stop reason: SDUPTHER

## 2022-03-08 RX ORDER — KETOROLAC TROMETHAMINE 10 MG/1
10 TABLET, FILM COATED ORAL EVERY 8 HOURS PRN
Qty: 15 TABLET | Refills: 0 | Status: SHIPPED | OUTPATIENT
Start: 2022-03-08 | End: 2022-03-28 | Stop reason: ALTCHOICE

## 2022-03-08 RX ORDER — KETOROLAC TROMETHAMINE 30 MG/ML
INJECTION, SOLUTION INTRAMUSCULAR; INTRAVENOUS PRN
Status: DISCONTINUED | OUTPATIENT
Start: 2022-03-08 | End: 2022-03-08 | Stop reason: SDUPTHER

## 2022-03-08 RX ORDER — DEXAMETHASONE SODIUM PHOSPHATE 10 MG/ML
INJECTION, EMULSION INTRAMUSCULAR; INTRAVENOUS PRN
Status: DISCONTINUED | OUTPATIENT
Start: 2022-03-08 | End: 2022-03-08 | Stop reason: SDUPTHER

## 2022-03-08 RX ORDER — SODIUM CHLORIDE 0.9 % (FLUSH) 0.9 %
5-40 SYRINGE (ML) INJECTION EVERY 12 HOURS SCHEDULED
Status: DISCONTINUED | OUTPATIENT
Start: 2022-03-08 | End: 2022-03-08 | Stop reason: HOSPADM

## 2022-03-08 RX ORDER — ROCURONIUM BROMIDE 10 MG/ML
INJECTION, SOLUTION INTRAVENOUS PRN
Status: DISCONTINUED | OUTPATIENT
Start: 2022-03-08 | End: 2022-03-08 | Stop reason: SDUPTHER

## 2022-03-08 RX ORDER — PROPOFOL 10 MG/ML
INJECTION, EMULSION INTRAVENOUS PRN
Status: DISCONTINUED | OUTPATIENT
Start: 2022-03-08 | End: 2022-03-08 | Stop reason: SDUPTHER

## 2022-03-08 RX ORDER — BUPIVACAINE HYDROCHLORIDE 5 MG/ML
INJECTION, SOLUTION EPIDURAL; INTRACAUDAL PRN
Status: DISCONTINUED | OUTPATIENT
Start: 2022-03-08 | End: 2022-03-08 | Stop reason: ALTCHOICE

## 2022-03-08 RX ORDER — ONDANSETRON 2 MG/ML
INJECTION INTRAMUSCULAR; INTRAVENOUS PRN
Status: DISCONTINUED | OUTPATIENT
Start: 2022-03-08 | End: 2022-03-08 | Stop reason: SDUPTHER

## 2022-03-08 RX ORDER — MORPHINE SULFATE 2 MG/ML
4 INJECTION, SOLUTION INTRAMUSCULAR; INTRAVENOUS
Status: DISCONTINUED | OUTPATIENT
Start: 2022-03-08 | End: 2022-03-08 | Stop reason: HOSPADM

## 2022-03-08 RX ORDER — SODIUM CHLORIDE 9 MG/ML
INJECTION, SOLUTION INTRAVENOUS CONTINUOUS
Status: DISCONTINUED | OUTPATIENT
Start: 2022-03-08 | End: 2022-03-08 | Stop reason: HOSPADM

## 2022-03-08 RX ADMIN — HYDROMORPHONE HYDROCHLORIDE 1 MG: 1 INJECTION, SOLUTION INTRAMUSCULAR; INTRAVENOUS; SUBCUTANEOUS at 09:55

## 2022-03-08 RX ADMIN — HYDROMORPHONE HYDROCHLORIDE 1 MG: 1 INJECTION, SOLUTION INTRAMUSCULAR; INTRAVENOUS; SUBCUTANEOUS at 09:50

## 2022-03-08 RX ADMIN — Medication 1 MG: at 09:50

## 2022-03-08 RX ADMIN — OXYCODONE 10 MG: 5 TABLET ORAL at 10:15

## 2022-03-08 RX ADMIN — KETOROLAC TROMETHAMINE 30 MG: 30 INJECTION, SOLUTION INTRAMUSCULAR; INTRAVENOUS at 09:50

## 2022-03-08 RX ADMIN — CEFOXITIN 2000 MG: 2 INJECTION, POWDER, FOR SOLUTION INTRAVENOUS at 08:47

## 2022-03-08 RX ADMIN — DEXAMETHASONE SODIUM PHOSPHATE 4 MG: 10 INJECTION, EMULSION INTRAMUSCULAR; INTRAVENOUS at 08:59

## 2022-03-08 RX ADMIN — SODIUM CHLORIDE: 9 INJECTION, SOLUTION INTRAVENOUS at 07:48

## 2022-03-08 RX ADMIN — FENTANYL CITRATE 100 MCG: 50 INJECTION, SOLUTION INTRAMUSCULAR; INTRAVENOUS at 09:03

## 2022-03-08 RX ADMIN — FENTANYL CITRATE 100 MCG: 50 INJECTION, SOLUTION INTRAMUSCULAR; INTRAVENOUS at 08:40

## 2022-03-08 RX ADMIN — ONDANSETRON HYDROCHLORIDE 4 MG: 4 INJECTION, SOLUTION INTRAMUSCULAR; INTRAVENOUS at 08:59

## 2022-03-08 RX ADMIN — ROCURONIUM BROMIDE 50 MG: 10 INJECTION INTRAVENOUS at 08:40

## 2022-03-08 RX ADMIN — MIDAZOLAM 2 MG: 1 INJECTION INTRAMUSCULAR; INTRAVENOUS at 08:30

## 2022-03-08 RX ADMIN — Medication 1 MG: at 09:55

## 2022-03-08 RX ADMIN — Medication 100 MG: at 08:40

## 2022-03-08 RX ADMIN — SUGAMMADEX 200 MG: 100 INJECTION, SOLUTION INTRAVENOUS at 09:32

## 2022-03-08 RX ADMIN — INDOCYANINE GREEN AND WATER 3.75 MG: KIT at 07:48

## 2022-03-08 RX ADMIN — PROPOFOL 200 MG: 10 INJECTION, EMULSION INTRAVENOUS at 08:40

## 2022-03-08 ASSESSMENT — PULMONARY FUNCTION TESTS
PIF_VALUE: 19
PIF_VALUE: 1
PIF_VALUE: 20
PIF_VALUE: 2
PIF_VALUE: 15
PIF_VALUE: 19
PIF_VALUE: 19
PIF_VALUE: 23
PIF_VALUE: 20
PIF_VALUE: 19
PIF_VALUE: 19
PIF_VALUE: 11
PIF_VALUE: 20
PIF_VALUE: 34
PIF_VALUE: 1
PIF_VALUE: 11
PIF_VALUE: 15
PIF_VALUE: 0
PIF_VALUE: 10
PIF_VALUE: 20
PIF_VALUE: 2
PIF_VALUE: 10
PIF_VALUE: 18
PIF_VALUE: 26
PIF_VALUE: 2
PIF_VALUE: 20
PIF_VALUE: 12
PIF_VALUE: 10
PIF_VALUE: 20
PIF_VALUE: 20
PIF_VALUE: 1
PIF_VALUE: 1
PIF_VALUE: 0
PIF_VALUE: 3
PIF_VALUE: 15
PIF_VALUE: 20
PIF_VALUE: 10
PIF_VALUE: 2
PIF_VALUE: 15
PIF_VALUE: 16
PIF_VALUE: 19
PIF_VALUE: 2
PIF_VALUE: 2
PIF_VALUE: 12
PIF_VALUE: 15
PIF_VALUE: 20
PIF_VALUE: 0
PIF_VALUE: 15
PIF_VALUE: 2
PIF_VALUE: 20
PIF_VALUE: 14
PIF_VALUE: 20
PIF_VALUE: 15
PIF_VALUE: 20
PIF_VALUE: 10
PIF_VALUE: 11
PIF_VALUE: 20
PIF_VALUE: 26
PIF_VALUE: 15
PIF_VALUE: 13
PIF_VALUE: 15
PIF_VALUE: 12
PIF_VALUE: 13
PIF_VALUE: 19
PIF_VALUE: 20
PIF_VALUE: 15
PIF_VALUE: 20
PIF_VALUE: 20
PIF_VALUE: 15
PIF_VALUE: 11
PIF_VALUE: 20
PIF_VALUE: 20

## 2022-03-08 ASSESSMENT — PAIN SCALES - GENERAL
PAINLEVEL_OUTOF10: 7
PAINLEVEL_OUTOF10: 7
PAINLEVEL_OUTOF10: 5
PAINLEVEL_OUTOF10: 9
PAINLEVEL_OUTOF10: 7

## 2022-03-08 ASSESSMENT — PAIN DESCRIPTION - PAIN TYPE
TYPE: ACUTE PAIN;CHRONIC PAIN;SURGICAL PAIN
TYPE: SURGICAL PAIN

## 2022-03-08 ASSESSMENT — PAIN DESCRIPTION - LOCATION
LOCATION: ABDOMEN
LOCATION: ABDOMEN

## 2022-03-08 ASSESSMENT — PAIN DESCRIPTION - FREQUENCY: FREQUENCY: CONTINUOUS

## 2022-03-08 ASSESSMENT — PAIN DESCRIPTION - DESCRIPTORS: DESCRIPTORS: ACHING;NAGGING

## 2022-03-08 NOTE — INTERVAL H&P NOTE
Update History & Physical    The patient's History and Physical was reviewed with the patient and I examined the patient. There was no change. The surgical site was confirmed by the patient and me. Plan: The risks, benefits, expected outcome, and alternative to the recommended procedure have been discussed with the patient. Patient understands and wants to proceed with the procedure. The patient was counseled at length about the risks of giancarlo Covid-19 during their perioperative period and any recovery window from their procedure. The patient was made aware that giancarlo Covid-19  may worsen their prognosis for recovering from their procedure  and lend to a higher morbidity and/or mortality risk. All material risks, benefits, and reasonable alternatives including postponing the procedure were discussed. The patient does wish to proceed with the procedure at this time.     Electronically signed by Lindsey Preston MD on 3/8/2022 at 8:25 AM

## 2022-03-08 NOTE — PROGRESS NOTES
Continue: Artificial Tears (janeen/min) (white petrolatum-mineral oil): ointment: 83-15% Pt has met discharge criteria and states she is ready for discharge to home. IV removed, gauze and tape applied. Dressed in own clothes and personal belongings gathered. Discharge instructions (with opioid medication education information) given to pt and family; pt and family verbalized understanding of discharge instructions, 2 prescriptions and follow up appointments. Pt transported to discharge lobby by [facility] staff.

## 2022-03-08 NOTE — PROGRESS NOTES
Received report from PACU. Patient in bed with complaints of pain. Tolerated muffin and juice while waiting on next dose of pain medication.

## 2022-03-08 NOTE — ANESTHESIA PRE PROCEDURE
Department of Anesthesiology  Preprocedure Note       Name:  Chitra Monahan   Age:  45 y.o.  :  1970                                          MRN:  [id number]         Date:  3/8/2022      Surgeon: Harper Bhatti):  Tom Gant MD    Procedure: Procedure(s):  ROBOTIC LAPAROSCOPIC CHOLECYSTECTOMY POSSIBLE OPEN    Medications prior to admission:   Prior to Admission medications    Medication Sig Start Date End Date Taking? Authorizing Provider   oxyCODONE-acetaminophen (PERCOCET) 5-325 MG per tablet Take 1 tablet by mouth every 8 hours as needed for Pain for up to 21 days. Patient not taking: Reported on 3/2/2022 2/16/22 3/9/22  Mando Qureshi MD   prochlorperazine (COMPAZINE) 5 MG tablet Take 1 tablet by mouth every 12 hours for 14 days Take 30 minutes before morning and afternoon dose of Xeloda 1/28/22 3/2/22  Mando Qureshi MD   ondansetron (ZOFRAN-ODT) 4 MG disintegrating tablet Take 1 tablet by mouth 3 times daily as needed for Nausea or Vomiting  Patient not taking: Reported on 3/2/2022 1/24/22   Alvera Pellant, APRN - CNP   ondansetron (ZOFRAN) 4 MG tablet Take 1 tablet by mouth every 6 hours as needed for Nausea or Vomiting 22   MD Qing Hanson by Does not apply route Diagnosis: No primary diagnosis found. Expiration Date: 2022  Patient not taking: Reported on 3/2/2022 12/7/21   MD Qing Hanson by Does not apply route Diagnosis: colon cancer, stage IIIA.  Currently completing chemotherapy   Expiration: 6 months  Patient not taking: Reported on 3/2/2022 12/7/21   Selina Conley PA-C   naloxegol (MOVANTIK) 12.5 MG TABS tablet Take 1 tablet by mouth every morning  Patient not taking: Reported on 12/15/2021 12/5/21   Caitlin Caceres DO   dicyclomine (BENTYL) 10 MG capsule Take 2 capsules by mouth 3 times daily  Patient not taking: Reported on 2022   Caitlin Caceres DO   Magic Mouthwash (MIRACLE MOUTHWASH) Swish and spit 5 mLs 4 times daily as needed for Irritation Mixture of 30mL diphenhydramine, 60mL maalox, 4g carafate as ratio  Patient not taking: Reported on 12/15/2021 12/4/21   Chacha Mistry DO   sucralfate (CARAFATE) 1 GM tablet take 1 tablet by mouth twice a day FIVE TO 60 MINS BEFORE LUNCH AND DINNER 9/30/21   Historical Provider, MD   ondansetron (ZOFRAN-ODT) 4 MG disintegrating tablet  9/20/21   Historical Provider, MD   ferrous sulfate (IRON 325) 325 (65 Fe) MG tablet Take 1 tablet by mouth daily (with breakfast) 9/23/21   Marcie Odonnell MD   omeprazole (PRILOSEC OTC) 20 MG tablet Take 20 mg by mouth daily     Historical Provider, MD       Current medications:    No current facility-administered medications for this encounter.        Allergies:  No Known Allergies    Problem List:    Patient Active Problem List   Diagnosis Code    Personal history of tobacco use Z87.891    History of alcoholism ([facility] [address] F10.21    Weight loss R63.4    Bradycardia R00.1    Heartburn R12    Varicose veins of both lower extremities I83.93    Family history of cancer Z80.9    Adenocarcinoma, colon ([address] C18.9    Cancer of transverse colon ([address] C18.4    S/P right colectomy Z90.49    Hypokalemia E87.6    Diarrhea due to drug K52.1    Enterocolitis K52.9    Severe protein-calorie malnutrition ([address] E43       Past Medical History:        Diagnosis Date    Anemia     Colon cancer ([facility] [address] 09/2021       Past Surgical History:        Procedure Laterality Date    COLONOSCOPY  09/01/2021    Dr. Domi Boyer ERCP N/A 02/08/2022    ERCP DIAGNOSTIC performed by Naomy Cruz MD at [facility]    ERCP  02/24/2022    with stent placement Dr. Jamel Olivera Right 09/16/2021    ROBOT EXTENDED RIGHT COLECTOMY performed by Roxana Flores MD at [address] Left 2020    [address] [place] [address] with mesh inguinal    PORT SURGERY N/A 11/08/2021    SINGLE LUMEN SMART PORT INSERTION performed by Alicia Timmons Bean Shepherd MD at [place] [address] History:    Social History     Tobacco Use    Smoking status: Former Smoker     Packs/day: 1.00     Years: 35.00     Pack years: 35.00     Types: Cigarettes     Quit date: 2020     Years since quittin.1    Smokeless tobacco: Never Used   Substance Use Topics    Alcohol use: Not Currently                                Counseling given: Not Answered      Vital Signs (Current): There were no vitals filed for this visit. BP Readings from Last 3 Encounters:   22 130/74   22 122/72   22 131/87       NPO Status:                                                                                 BMI:   Wt Readings from Last 3 Encounters:   22 145 lb (65.8 kg)   22 152 lb (68.9 kg)   22 152 lb 3.2 oz (69 kg)     There is no height or weight on file to calculate BMI.    CBC:   Lab Results   Component Value Date    WBC 6.9 2022    RBC 3.62 2022    HGB 12.4 2022    HCT 35.0 2022    MCV 97 2022    RDW 18.1 2022     2022       CMP:   Lab Results   Component Value Date     2022     2021    K 3.1 2022    K 4.0 2021    K 3.4 2021     2021    CO2 29 2021    BUN 8 2021    CREATININE 0.8 2022    CREATININE 0.7 2021    LABGLOM >90 2021    GLUCOSE 87 2021    PROT 6.5 2022    CALCIUM 7.7 2021    BILITOT 0.9 2022    ALKPHOS 273 2022     2022     2022       POC Tests: No results for input(s): POCGLU, POCNA, POCK, POCCL, POCBUN, POCHEMO, POCHCT in the last 72 hours.     Coags:   Lab Results   Component Value Date    INR 0.99 2022    APTT 30.4 2021       HCG (If Applicable): No results found for: PREGTESTUR, PREGSERUM, HCG, HCGQUANT     ABGs: No results found for: PHART, PO2ART, UFE3TWD, JRR2HKR, BEART, G9NNWHVL     Type & Screen (If Applicable):  Lab Results   Component Value Date    LABRH POS 09/16/2021       Drug/Infectious Status (If Applicable):  Lab Results   Component Value Date    HEPCAB Negative 01/25/2022       COVID-19 Screening (If Applicable):   Lab Results   Component Value Date    COVID19 NOT  DETECTED 02/20/2022    COVID19 Not Detected 09/23/2021           Anesthesia Evaluation   no history of anesthetic complications:   Airway: Mallampati: II  TM distance: <3 FB   Neck ROM: full  Mouth opening: > = 3 FB Dental: normal exam         Pulmonary:normal exam        (-) COPD and asthma                           Cardiovascular:Negative CV ROS  Exercise tolerance: good (>4 METS),       (-) hypertension, past MI and CAD                Neuro/Psych:      (-) seizures and CVA           GI/Hepatic/Renal:        (-) GERD, liver disease and no renal disease       Endo/Other:    (+) malignancy/cancer (colon ca). (-) diabetes mellitus, hypothyroidism, hyperthyroidism               Abdominal:             Vascular:     - DVT. Other Findings:           Anesthesia Plan      general     ASA 2     (PIV. Additional access can be obtained after induction if needed. Standard ASA monitors. IV/PO opioids and other adjuncts as needed for pain control. PACU post op for recovery. Possible anesthetics complications were discussed with the patient, including but not limited to: PONV, damage to the airway and surrounding structures (teeth, lips, gums, tongue, etc.), adverse reactions to medicine, cardiac complications (MI, CHF, arrhythmias, etc.), respiratory complications (post-op ventilation, respiratory failure, etc.), neurologic complications (nerve damage, stroke, seizure), and death. The patient was given the opportunity to ask questions and all questions were answered to the patient's satisfaction. The patient is in agreement with the anesthetic plan.  )  Induction: intravenous.       Anesthetic plan and risks discussed with patient and spouse. Plan discussed with CRNA.                   Brandan El,    3/8/2022

## 2022-03-08 NOTE — BRIEF OP NOTE
Brief Postoperative Note      Patient: Wanda Villanueva  YOB: 1970  MRN: [id number]    Date of Procedure: 3/8/2022    Pre-Op Diagnosis: Chronic calculus cholecystitis; history of choledocholithiasis status post ERCP with stent placement    Post-Op Diagnosis: Same       Procedure(s):  ROBOTIC LAPAROSCOPIC CHOLECYSTECTOMY    Surgeon(s):  Sally Villatoro MD    Assistant:  First Assistant: Basia Pino RN    Anesthesia: General/local    Estimated Blood Loss (mL): 10 ml    Complications: None    Specimens:   ID Type Source Tests Collected by Time Destination   A : Gallbladder Tissue Gallbladder SURGICAL PATHOLOGY Sally Villatoro MD 3/8/2022 5472        Implants:  * No implants in log *      Drains: * No LDAs found *    Findings: as above - see op note for details    Electronically signed by Sally Villatoro MD on 3/8/2022 at 9:29 AM

## 2022-03-08 NOTE — PROGRESS NOTES
948 Awake and oriented on arrival to PACU with # 9 ABD pain   950 medicated with Dilaudid 1 mg IV  955 no change in pain , medicated with Dilaudid 1 mg  1000 pt states pain easing up slightly  1015 pain a # 7 , medicated with Roxicodone 10 mg DEMARCUS  1025 Pt states pain tolerable   1030 Meets criteria for discharge , transported to [facility]

## 2022-03-08 NOTE — OP NOTE
[address] [place] [address]                                OPERATIVE REPORT    PATIENT NAME: Shady Cooper                :        1970  MED REC NO:   [id number]                           ROOM:  ACCOUNT NO:   [de-identified]                           ADMIT DATE: 2022  PROVIDER:     BRADY Blunt Mourning OF PROCEDURE:  2022    PREOPERATIVE DIAGNOSES:  1. Chronic calculous cholecystitis. 2.  Choledocholithiasis, status post ERCP with stent placement. POSTOPERATIVE DIAGNOSES:  1. Chronic calculous cholecystitis. 2.  Choledocholithiasis, status post ERCP with stent placement. PROCEDURE:  Robotic cholecystectomy. SURGEON:  Ayaka Wadsworth MD    ASSISTANT:  Zack Kaur. [facility]    ANESTHESIA:  General/local.    ESTIMATED BLOOD LOSS:  10 mL. DRAINS:  None. COMPLICATIONS:  None    DISPOSITION:  Stable to the recovery room. INDICATIONS:  The patient is a 53-year-old male who I saw in the office  secondary to gallbladder disease. Found to have chronic calculous  cholecystitis with choledocholithiasis and had been status post ERCP  with stent insertion. Both operative and nonoperative intervention  plans were discussed. Risks of surgery were further discussed. Some of  the risks included but were not limited to bleeding, infection, the need  for reoperation, severe chronic postoperative pain or numbness, major  vascular or nerve injury, cardiopulmonary complications, anesthetic  complications, seroma, hematoma formation, wound breakdown, bile leak,  bile duct injury, biloma formation, chronic pain and death. After all  of the questions were answered in their entirety and the patient was  completely aware of the current situation, he elected to proceed with  surgery.     DESCRIPTION OF PROCEDURE:  After informed consent was signed and placed  on the chart, the patient was taken back to the operating room and  placed supine on the operating room table. General anesthesia was  induced. He tolerated this well throughout the case. All pressure  points were padded. He was on preoperative antibiotics. Bilateral  lower extremity sequential compression devices were placed prior to the  incision. His abdomen and pelvis were prepped and draped in the usual  sterile standard fashion. A time-out occurred prior to the operation  which not only identified the patient but also the planned procedure to  be performed. At the end of the time-out, there was no questions or  concerns. I began the operation by making a small transverse incision  above the umbilicus with a 15 blade scalpel. Fascia elevated and the  Veress needle inserted. Intra-abdominal cavity was then insufflated to  a pressure of approximately 15 mmHg with carbon dioxide gas. The  patient tolerated the insufflation well. A 12 mm trocar placed. Laparoscope inserted. Upon initial evaluation, there was no hollow  viscus, solid organ or major vascular injury with the Veress needle  insertion at the first trocar placement. Two other 8 mm trocars were  placed in a standard location under direct vision. A 5 mm was then  placed in a right upper lateral quadrant. He was then placed in reverse  Trendelenburg with the right side elevated. Robot was brought in and  docked. Instruments placed under direct vision. Once everything was  aligned and in order and I then unscrubbed and went back to the console. I began the operation by first grasping the fundus of the gallbladder  and elevating this per the anterior abdominal wall. Anterior wall was  all freed up using electrocautery off of the hook to get the adhesions  down which allow then the fundus to be elevated above the dome of the  liver. Gallbladder appeared to be severely chronically diseased, but no  acute disease at this time.   The infundibulum was then able to be  grasped and retracted down to the right lower quadrant to better expose  the Calot's triangle. The inflammatory tissues overlying the  infundibulum were then incised and eventually, the cystic artery and  duct were each identified. They were each then circumferentially  dissected free until the critical view was obtained. At that time, they  were each then doubly clipped and divided close to the gallbladder. Indocyanine green dye was used to confirm anatomy. The gallbladder was  then dissected away from the liver bed using electrocautery. Irrigation. Irrigant return was clear. Clips appeared to be intact. No oozing of bile or blood from cystic duct and artery stumps nor from  the liver bed. Gallbladder was then placed into an endoscopic retrieval  bag. Instruments removed. Robot undocked. I then scrubbed back into  the case. Gallbladder and the EndoCatch bag was then removed and then  gallbladder sent to Pathology for permanent. Large trocar site was  closed with the fascia with Vicryl suture on a Storz suture passer. At  the completion of this, there were no fascial defects. The patient  tolerated desufflation well. Hemostasis was adequate. Skin was  reapproximated at all the incisional sites with 4-0 Vicryl in a  subcuticular fashion. Closed incisions were then cleaned, dried, and  Steri-Strips applied. Dry sterile dressings applied. Sponge, needle,  and instrumentation count was correct at the end of the procedure. The  patient tolerated the procedure well with no apparent complications and  only about 10 mL of blood loss. He was able to be brought out of  general anesthesia and transferred to the postanesthesia care unit in  stable condition.           Sharee Sales M.D.    D: 03/08/2022 9:29:50       T: 03/08/2022 13:29:14     [email or web address]  Job#: [id number]     Doc#: [id number]    CC:

## 2022-03-08 NOTE — INTERVAL H&P NOTE
Update History & Physical    The patient's History and Physical was reviewed with the patient and I examined the patient. There was no change. The surgical site was confirmed by the patient and me. Plan: The risks, benefits, expected outcome, and alternative to the recommended procedure have been discussed with the patient. Patient understands and wants to proceed with the procedure. The patient was counseled at length about the risks of giancarlo Covid-19 during their perioperative period and any recovery window from their procedure. The patient was made aware that giancarlo Covid-19  may worsen their prognosis for recovering from their procedure  and lend to a higher morbidity and/or mortality risk. All material risks, benefits, and reasonable alternatives including postponing the procedure were discussed. The patient does wish to proceed with the procedure at this time.     Electronically signed by Kristen Rodas MD on 3/8/2022 at 7:26 AM

## 2022-03-08 NOTE — PROGRESS NOTES
Patient admitted to [facility] room 8 with wife at bedside. Bed in low position side rails up call light in reach. Patient denies questions at this time.

## 2022-03-09 ENCOUNTER — HOSPITAL ENCOUNTER (OUTPATIENT)
Dept: INFUSION THERAPY | Age: 52
End: 2022-03-09

## 2022-03-09 ENCOUNTER — TELEPHONE (OUTPATIENT)
Dept: SURGERY | Age: 52
End: 2022-03-09

## 2022-03-09 NOTE — ANESTHESIA POSTPROCEDURE EVALUATION
Department of Anesthesiology  Postprocedure Note    Patient: Jessa Goddard  MRN: [id number]  YOB: 1970  Date of evaluation: 3/9/2022  Time:  6:47 AM     Procedure Summary     Date: 03/08/22 Room / Location: [address] [facility]    Anesthesia Start: 6068 Anesthesia Stop: 1010    Procedure: ROBOTIC LAPAROSCOPIC CHOLECYSTECTOMY (N/A ) Diagnosis: (Cholelithiasis)    Surgeons: Jolene Tena MD Responsible Provider: Tio Butcher DO    Anesthesia Type: general ASA Status: 2          Anesthesia Type: general    Maurice Phase I: Maurice Score: 9    Maurice Phase II: Maurice Score: 10    Last vitals: Reviewed and per EMR flowsheets.        Anesthesia Post Evaluation    Patient location during evaluation: PACU  Patient participation: complete - patient participated  Level of consciousness: awake and alert  Airway patency: patent  Nausea & Vomiting: no vomiting and no nausea  Complications: no  Cardiovascular status: hemodynamically stable  Respiratory status: acceptable  Hydration status: stable

## 2022-03-10 ENCOUNTER — CARE COORDINATION (OUTPATIENT)
Dept: CARE COORDINATION | Age: 52
End: 2022-03-10

## 2022-03-10 NOTE — CARE COORDINATION
Patient called for Care Coordination enrollment s/p recent list referral for assistance with the management of his healthcare needs. Care Coordination program was briefly reviewed with patient. Patient shared he is s/p recent procedure at  and recent glalbladder removal and he is recovering well. Patient reported he continues to receive chemo for his cancer but is on slight respite at this time d/t recent surgery. Patient shared he remains active and independent with all ADLs and he denied any current resource needs. Patient declines Care Coordination enrollment at this time and he was encouraged to call if he changes his mind. PCP updated.   No further f/u planned per patient's request.

## 2022-03-23 ENCOUNTER — OFFICE VISIT (OUTPATIENT)
Dept: ONCOLOGY | Age: 52
End: 2022-03-23
Payer: COMMERCIAL

## 2022-03-23 ENCOUNTER — HOSPITAL ENCOUNTER (OUTPATIENT)
Dept: INFUSION THERAPY | Age: 52
Discharge: HOME | End: 2022-03-23
Payer: COMMERCIAL

## 2022-03-23 VITALS
HEART RATE: 78 BPM | DIASTOLIC BLOOD PRESSURE: 83 MMHG | TEMPERATURE: 97.9 F | OXYGEN SATURATION: 99 % | WEIGHT: 151.8 LBS | RESPIRATION RATE: 16 BRPM | BODY MASS INDEX: 20.56 KG/M2 | SYSTOLIC BLOOD PRESSURE: 138 MMHG | HEIGHT: 72 IN

## 2022-03-23 VITALS
RESPIRATION RATE: 16 BRPM | SYSTOLIC BLOOD PRESSURE: 134 MMHG | HEART RATE: 70 BPM | WEIGHT: 151.8 LBS | DIASTOLIC BLOOD PRESSURE: 84 MMHG | OXYGEN SATURATION: 97 % | TEMPERATURE: 98.2 F | HEIGHT: 72 IN | BODY MASS INDEX: 20.56 KG/M2

## 2022-03-23 DIAGNOSIS — K52.1 DIARRHEA DUE TO DRUG: ICD-10-CM

## 2022-03-23 DIAGNOSIS — M79.2 NEUROPATHIC PAIN: ICD-10-CM

## 2022-03-23 DIAGNOSIS — Z51.11 ENCOUNTER FOR CHEMOTHERAPY MANAGEMENT: ICD-10-CM

## 2022-03-23 DIAGNOSIS — E87.6 HYPOKALEMIA: ICD-10-CM

## 2022-03-23 DIAGNOSIS — Z80.9 FAMILY HISTORY OF CANCER: ICD-10-CM

## 2022-03-23 DIAGNOSIS — C18.4 CANCER OF TRANSVERSE COLON (HCC): ICD-10-CM

## 2022-03-23 DIAGNOSIS — F10.21 HISTORY OF ALCOHOLISM (HCC): ICD-10-CM

## 2022-03-23 DIAGNOSIS — Z90.49 S/P RIGHT COLECTOMY: Primary | ICD-10-CM

## 2022-03-23 DIAGNOSIS — C18.9 ADENOCARCINOMA, COLON (HCC): Primary | ICD-10-CM

## 2022-03-23 DIAGNOSIS — C18.9 ADENOCARCINOMA, COLON (HCC): ICD-10-CM

## 2022-03-23 DIAGNOSIS — Z90.49 S/P RIGHT COLECTOMY: ICD-10-CM

## 2022-03-23 DIAGNOSIS — L53.8 PALMAR ERYTHEMA: ICD-10-CM

## 2022-03-23 DIAGNOSIS — Z87.891 PERSONAL HISTORY OF TOBACCO USE: ICD-10-CM

## 2022-03-23 LAB
ABSOLUTE IMMATURE GRANULOCYTE: 0.01 THOU/MM3 (ref 0–0.07)
ALBUMIN SERPL-MCNC: 4.3 G/DL (ref 3.5–5.1)
ALP BLD-CCNC: 167 U/L (ref 38–126)
ALT SERPL-CCNC: 43 U/L (ref 11–66)
AST SERPL-CCNC: 31 U/L (ref 5–40)
BASINOPHIL, AUTOMATED: 1 % (ref 0–3)
BASOPHILS ABSOLUTE: 0.1 THOU/MM3 (ref 0–0.1)
BILIRUB SERPL-MCNC: 0.5 MG/DL (ref 0.3–1.2)
BILIRUBIN DIRECT: < 0.2 MG/DL (ref 0–0.3)
BUN, WHOLE BLOOD: 16 MG/DL (ref 8–26)
CHLORIDE, WHOLE BLOOD: 107 MEQ/L (ref 98–109)
CREATININE, WHOLE BLOOD: 1 MG/DL (ref 0.5–1.2)
EOSINOPHILS ABSOLUTE: 0.1 THOU/MM3 (ref 0–0.4)
EOSINOPHILS RELATIVE PERCENT: 1 % (ref 0–4)
GFR, ESTIMATED: 83 ML/MIN/1.73M2
GLUCOSE, WHOLE BLOOD: 141 MG/DL (ref 70–108)
HCT VFR BLD CALC: 40.2 % (ref 42–52)
HEMOGLOBIN: 13.8 GM/DL (ref 14–18)
IMMATURE GRANULOCYTES: 0 %
IONIZED CALCIUM, WHOLE BLOOD: 1.19 MMOL/L (ref 1.12–1.32)
LYMPHOCYTES # BLD: 13 % (ref 15–47)
LYMPHOCYTES ABSOLUTE: 1.3 THOU/MM3 (ref 1–4.8)
MCH RBC QN AUTO: 34.2 PG (ref 26–33)
MCHC RBC AUTO-ENTMCNC: 34.3 GM/DL (ref 32.2–35.5)
MCV RBC AUTO: 100 FL (ref 80–94)
MONOCYTES ABSOLUTE: 0.7 THOU/MM3 (ref 0.4–1.3)
MONOCYTES: 7 % (ref 0–12)
PDW BLD-RTO: 14.9 % (ref 11.5–14.5)
PLATELET # BLD: 195 THOU/MM3 (ref 130–400)
PMV BLD AUTO: 9.9 FL (ref 9.4–12.4)
POTASSIUM, WHOLE BLOOD: 3.7 MEQ/L (ref 3.5–4.9)
RBC # BLD: 4.04 MILL/MM3 (ref 4.7–6.1)
SEG NEUTROPHILS: 77 % (ref 43–75)
SEGMENTED NEUTROPHILS ABSOLUTE COUNT: 7.4 THOU/MM3 (ref 1.8–7.7)
SODIUM, WHOLE BLOOD: 142 MEQ/L (ref 138–146)
TOTAL CO2, WHOLE BLOOD: 25 MEQ/L (ref 23–33)
TOTAL PROTEIN: 6.6 G/DL (ref 6.1–8)
WBC # BLD: 9.5 THOU/MM3 (ref 4.8–10.8)

## 2022-03-23 PROCEDURE — 99211 OFF/OP EST MAY X REQ PHY/QHP: CPT

## 2022-03-23 PROCEDURE — 99215 OFFICE O/P EST HI 40 MIN: CPT | Performed by: INTERNAL MEDICINE

## 2022-03-23 PROCEDURE — 36591 DRAW BLOOD OFF VENOUS DEVICE: CPT

## 2022-03-23 PROCEDURE — 85025 COMPLETE CBC W/AUTO DIFF WBC: CPT

## 2022-03-23 PROCEDURE — 2580000003 HC RX 258: Performed by: INTERNAL MEDICINE

## 2022-03-23 PROCEDURE — 96413 CHEMO IV INFUSION 1 HR: CPT

## 2022-03-23 PROCEDURE — 96367 TX/PROPH/DG ADDL SEQ IV INF: CPT

## 2022-03-23 PROCEDURE — 80047 BASIC METABLC PNL IONIZED CA: CPT

## 2022-03-23 PROCEDURE — 80076 HEPATIC FUNCTION PANEL: CPT

## 2022-03-23 PROCEDURE — 96415 CHEMO IV INFUSION ADDL HR: CPT

## 2022-03-23 PROCEDURE — 6360000002 HC RX W HCPCS: Performed by: INTERNAL MEDICINE

## 2022-03-23 RX ORDER — HEPARIN SODIUM (PORCINE) LOCK FLUSH IV SOLN 100 UNIT/ML 100 UNIT/ML
500 SOLUTION INTRAVENOUS PRN
Status: CANCELLED | OUTPATIENT
Start: 2022-03-23

## 2022-03-23 RX ORDER — DEXTROSE MONOHYDRATE 50 MG/ML
INJECTION, SOLUTION INTRAVENOUS ONCE
Status: CANCELLED | OUTPATIENT
Start: 2022-03-23 | End: 2022-03-23

## 2022-03-23 RX ORDER — SODIUM CHLORIDE 9 MG/ML
25 INJECTION, SOLUTION INTRAVENOUS PRN
Status: CANCELLED | OUTPATIENT
Start: 2022-03-23

## 2022-03-23 RX ORDER — SODIUM CHLORIDE 0.9 % (FLUSH) 0.9 %
5-40 SYRINGE (ML) INJECTION PRN
Status: DISCONTINUED | OUTPATIENT
Start: 2022-03-23 | End: 2022-03-24 | Stop reason: HOSPADM

## 2022-03-23 RX ORDER — ONDANSETRON 4 MG/1
4 TABLET, ORALLY DISINTEGRATING ORAL EVERY 8 HOURS PRN
Qty: 90 TABLET | Refills: 0 | Status: SHIPPED | OUTPATIENT
Start: 2022-03-23 | End: 2022-03-28 | Stop reason: ALTCHOICE

## 2022-03-23 RX ORDER — SODIUM CHLORIDE 0.9 % (FLUSH) 0.9 %
5-40 SYRINGE (ML) INJECTION PRN
Status: CANCELLED | OUTPATIENT
Start: 2022-03-23

## 2022-03-23 RX ORDER — PROCHLORPERAZINE MALEATE 5 MG/1
5 TABLET ORAL EVERY 12 HOURS
Qty: 28 TABLET | Refills: 2 | Status: SHIPPED | OUTPATIENT
Start: 2022-03-23 | End: 2022-06-01 | Stop reason: ALTCHOICE

## 2022-03-23 RX ORDER — METHYLPREDNISOLONE SODIUM SUCCINATE 125 MG/2ML
125 INJECTION, POWDER, LYOPHILIZED, FOR SOLUTION INTRAMUSCULAR; INTRAVENOUS ONCE
Status: CANCELLED | OUTPATIENT
Start: 2022-03-23 | End: 2022-03-23

## 2022-03-23 RX ORDER — SODIUM CHLORIDE 9 MG/ML
INJECTION, SOLUTION INTRAVENOUS CONTINUOUS
Status: CANCELLED | OUTPATIENT
Start: 2022-03-23

## 2022-03-23 RX ORDER — HEPARIN SODIUM (PORCINE) LOCK FLUSH IV SOLN 100 UNIT/ML 100 UNIT/ML
500 SOLUTION INTRAVENOUS PRN
Status: DISCONTINUED | OUTPATIENT
Start: 2022-03-23 | End: 2022-03-24 | Stop reason: HOSPADM

## 2022-03-23 RX ORDER — DIPHENHYDRAMINE HYDROCHLORIDE 50 MG/ML
50 INJECTION INTRAMUSCULAR; INTRAVENOUS ONCE
Status: CANCELLED | OUTPATIENT
Start: 2022-03-23 | End: 2022-03-23

## 2022-03-23 RX ORDER — DEXTROSE MONOHYDRATE 50 MG/ML
INJECTION, SOLUTION INTRAVENOUS ONCE
Status: COMPLETED | OUTPATIENT
Start: 2022-03-23 | End: 2022-03-23

## 2022-03-23 RX ORDER — EPINEPHRINE 1 MG/ML
0.3 INJECTION, SOLUTION, CONCENTRATE INTRAVENOUS PRN
Status: CANCELLED | OUTPATIENT
Start: 2022-03-23

## 2022-03-23 RX ADMIN — DEXAMETHASONE SODIUM PHOSPHATE 12 MG: 4 INJECTION, SOLUTION INTRAMUSCULAR; INTRAVENOUS at 09:54

## 2022-03-23 RX ADMIN — SODIUM CHLORIDE, PRESERVATIVE FREE 10 ML: 5 INJECTION INTRAVENOUS at 13:20

## 2022-03-23 RX ADMIN — SODIUM CHLORIDE, PRESERVATIVE FREE 10 ML: 5 INJECTION INTRAVENOUS at 08:25

## 2022-03-23 RX ADMIN — OXALIPLATIN 175 MG: 5 INJECTION, SOLUTION INTRAVENOUS at 10:55

## 2022-03-23 RX ADMIN — SODIUM CHLORIDE, PRESERVATIVE FREE 20 ML: 5 INJECTION INTRAVENOUS at 08:26

## 2022-03-23 RX ADMIN — FOSAPREPITANT 150 MG: 150 INJECTION, POWDER, LYOPHILIZED, FOR SOLUTION INTRAVENOUS at 10:16

## 2022-03-23 RX ADMIN — HEPARIN 500 UNITS: 100 SYRINGE at 13:20

## 2022-03-23 RX ADMIN — DEXTROSE MONOHYDRATE: 50 INJECTION, SOLUTION INTRAVENOUS at 09:53

## 2022-03-23 ASSESSMENT — ENCOUNTER SYMPTOMS
DIARRHEA: 0
VOMITING: 0
CONSTIPATION: 0
TROUBLE SWALLOWING: 0
NAUSEA: 0
SORE THROAT: 0
EYE DISCHARGE: 0
BLOOD IN STOOL: 0
CHEST TIGHTNESS: 0
COLOR CHANGE: 0
SHORTNESS OF BREATH: 0
ABDOMINAL PAIN: 0
COUGH: 0
RECTAL PAIN: 0
FACIAL SWELLING: 0
WHEEZING: 0
ABDOMINAL DISTENTION: 0
BACK PAIN: 0

## 2022-03-23 NOTE — PROGRESS NOTES
Patient assessed for the following post chemotherapy:    Dizziness   No  Lightheadedness  No      Acute nausea/vomiting No  Headache   No  Chest pain/pressure  No  Rash/itching   No  Shortness of breath  No    Patient kept for 20 minutes observation post infusion chemotherapy. Patient tolerated chemotherapy treatment Oxaliplatin without any complications. Last vital signs:   /84   Pulse 70   Temp 98.2 °F (36.8 °C) (Oral)   Resp 16   Ht 6' (1.829 m)   Wt 151 lb 12.8 oz (68.9 kg)   SpO2 97%   BMI 20.59 kg/m²     Physician instructed patient:  1. Proceed with Oxaliplatin treatment today. We will continue Xeloda 3 tablets       p.o. twice daily for 2 weeks on 1 week off  2. RTC to see me for labs: CBC, BMP, LFTs and cycle #7 of Xeloda and      oxaliplatin in 3 weeks    Patient instructed if experience any of the above symptoms following today's infusion, he is to notify MD immediately or go to the emergency department. Discharge instructions given to patient. Verbalizes understanding. Ambulated off unit per self, with belongings.

## 2022-03-23 NOTE — PLAN OF CARE
Problem: Infection - Central Venous Catheter-Associated Bloodstream Infection:  Goal: Will show no infection signs and symptoms  Description: Will show no infection signs and symptoms  Outcome: Met This Shift  Note: Mediport site with no redness or warmth. Skin over port site intact with no signs of breakdown noted. Patient verbalizes signs/symptoms of port infection and when to notify the physician. Intervention: Central line needs assessment  Description: Central line needs assessment  Note: Discuss port maintenance, infection prevention, signs and when to call Dr      Problem: Musculor/Skeletal Functional Status  Goal: Absence of falls  Outcome: Met This Shift  Note: Patient free from falls while in O.P. Oncology. Intervention: Fall precautions  Note: Patient assessed for fall risk on admission to [address] [place] [address]. Fall band placed on patient. Discussed the need to use the call light for assistance prior to getting up out of chair/bed. Problem: Intellectual/Education/Knowledge Deficit  Goal: Teaching initiated upon admission  Outcome: Met This Shift  Note: Patient verbalizes understanding to verbal information given on Oxaliplatin ,action and possible side effects. Aware to call MD if develop complications.     Intervention: Verbal/written education provided  Note: Chemotherapy Teaching     What is Chemotherapy   Drug action [x]   Method of Administration [x]   Handouts given []     Side Effects  Nausea/vomiting [x]   Diarrhea [x]   Fatigue [x]   Signs / Symptoms of infection [x]   Neutropenia [x]   Thrombocytopenia [x]   Alopecia [x]   neuropathy [x]   Sierra diet &  the importance of fluids [x]       Micellaneous  Importance of nutrition [x]   Importance of oral hygiene [x]   When to call the MD [x]   Monitoring labs [x]   Use of supportive services []     Explanation of Drug Regimen / Frequency  Oxaliplatin:  D1C6     Comments  Verbalized understanding to drug,action,side effects and when to call MD Problem: Discharge Planning  Goal: Knowledge of discharge instructions  Description: Knowledge of discharge instructions  Outcome: Met This Shift  Note: Verbalized understanding of discharge instructions, follow-up appointments, and when to call the physician. Intervention: Interaction with patient/family and care team  Note: Discuss understanding of discharge instructions,follow-up appointments, and when to call the physician. Care plan reviewed with patient. Patient verbalize understanding of the plan of care and contribute to goal setting. Patent

## 2022-03-23 NOTE — PROGRESS NOTES
[facility] [address] [facility] [address], [address] [facility] [address]  [facility]  Dept: [phone]  Dept Fax: [phone]: [phone]    Visit Date:3/23/2022     Sudhakar Almaraz is a 45 y.o. male who presents today for:   Chief Complaint   Patient presents with    Follow-up     Adenocarcinoma, colon (        HPI:     Mr. Carmela Zamorano is a 59-year-old patient with  stage III colon cancer. He is currently undergoing adjuvant chemotherapy. His history of colon cancer goes back to September 2021 when he was seen by  Dr. Chelsea Mathews due to unexplained weight loss of 10-15 lbs and rectal bleeding. Colonoscopy performed on September 1, 2021 demonstrated multiple polyps but a larger mass at the proximal transverse colon demonstrated the adenocarcinoma. He did not have signs of obstruction, no abdominal pain, no significant nausea or vomiting. Some previous episodes of nausea, GERD and epigastric discomfort. He had CT of the chest abdomen and pelvis on September 2, 2021,no evidence of metastatic disease. The patient met with Dr. Veronique Sanchez and after discussing his surgical treatment options he proceeded with right hemicolectomy on September 16, 2021. Final pathology report showed:  A. Right colon mass, hemicolectomy:    Invasive moderately differentiated colonic adenocarcinoma, pT2. Margins are free of neoplasia. Pericolonic lymph nodes (17): 2 out of 17 are positive for   metastatic colonic adenocarcinoma. Appendix-no pathologic abnormality. B. Ileocolonic base lymph nodes (2), resection:             Negative for malignancy (0/2). pT2 pN1b    He had uneventful post surgical course. Extensive history of colorectal disease on paternal side.     On November 9, 2021 the patient received first cycle of adjuvant chemotherapy with Xeloda  On November 24, 2021 he was admitted to the hospital with a 1 week history of intractable periumbilical abdominal pain of 9/10 intensity. Associated nausea vomiting diarrhea and fever last few days. Diagnosed with acute enterocolitis, possible chemotherapy induced. IV Cipro & Flagyl during hospitalization. SBO vs ileus:General Surgery consulted. KUB showed possible SBO vs ileus. CT A/P showed worsening since previous. Managed medically with NGT. Advance diet slowly. Possible fluid overload: Lasix have been given for swelling. On discharge from the hospital on December 4, 2021 the patient was placed on the following medications:  Bentyl 3 times daily,-Magic mouthwash 4 times daily as needed, -ferrous sulfate daily, Zofran every 8 hours as needed,-omeprazole,Compazine every 6 hours as needed, Reglan 2 times daily. After being discharged from the hospital the patient presented to chemotherapy suite on few occasions for IV hydration and potassium supplementation. He also had outpatient dietary consult. Current history on 03/23/2022:  Patient presents to the medical oncology clinic for cycle #6 of oxaliplatin and Xeloda. His cycle #5 was with oxaliplatin 90 mg/m²,  Xeloda 3 tablets p.o. two daily for 2 weeks. Side effects from last cycle were extreme dental pain/cold intolerance. He also developed hand-and-foot syndrome. Mainly involving his hands. No neuropathy. On March 8, 2022 he underwent laparoscopic Cholecystectomy, he has recovered from surgery quickly and nicely. The stent is being removed this Friday. Denies fever, chills, nausea, vomiting, diarrhea, rashes, chest pain, or cough.      HPI   Past Medical History:   Diagnosis Date    Anemia     Colon cancer ([facility] [address] 09/2021      Past Surgical History:   Procedure Laterality Date    CHOLECYSTECTOMY, LAPAROSCOPIC N/A 3/8/2022    ROBOTIC LAPAROSCOPIC CHOLECYSTECTOMY performed by Rosario Velez MD at [address] [place] [address]  09/01/2021    Dr. Brewer Dear ERCP N/A 02/08/2022    ERCP DIAGNOSTIC performed by Bib Hill MD at [facility] End[facility]    ERCP  02/24/2022    with stent placement Dr. Tiff Horner Right 2021    ROBOT EXTENDED RIGHT COLECTOMY performed by Rosanne Franklin MD at [facility]  Left Jeb Estevez with mesh inguinal    PORT SURGERY N/A 2021    SINGLE LUMEN SMART PORT INSERTION performed by Rosanne Franklin MD at [facility] History   Problem Relation Age of Onset   [facility] Other Mother         Brain aneurysm    COPD Mother    [facility] Stroke Mother     Cancer Father         lung mets brain and bone    Stroke Father     Cancer Maternal Grandmother         colon    High Blood Pressure Maternal Grandmother     Cancer Maternal Grandfather         colon     High Blood Pressure Maternal Grandfather     Kidney Disease Paternal Grandmother     Cancer Paternal Grandmother         breast     Diabetes Paternal Grandmother     High Blood Pressure Paternal Grandmother     Cancer Paternal Grandfather         lung-52    Heart Attack Paternal Grandfather     High Blood Pressure Paternal Grandfather     Cancer Paternal Aunt         colon    Heart Attack Paternal Aunt     Cancer Paternal Uncle         lung    Cancer Maternal Aunt         colon    Cancer Maternal Uncle         colon      Social History     Tobacco Use    Smoking status: Former Smoker     Packs/day: 1.00     Years: 35.00     Pack years: 35.00     Types: Cigarettes     Quit date: 2020     Years since quittin.1    Smokeless tobacco: Never Used   Substance Use Topics    Alcohol use: Not Currently      Current Outpatient Medications   Medication Sig Dispense Refill    prochlorperazine (COMPAZINE) 5 MG tablet Take 1 tablet by mouth every 12 hours for 14 days Take 30 minutes before morning and afternoon dose of Xeloda 28 tablet 2    ondansetron (ZOFRAN-ODT) 4 MG disintegrating tablet Place 1 tablet under the tongue every 8 hours as needed for Nausea or Vomiting 90 tablet 0    capecitabine (XELODA) 500 MG chemo tablet Take 1,250 mg/m2 by mouth 2 times daily      ketorolac (TORADOL) 10 MG tablet Take 1 tablet by mouth every 8 hours as needed for Pain 15 tablet 0    ondansetron (ZOFRAN) 4 MG tablet Take 1 tablet by mouth every 6 hours as needed for Nausea or Vomiting 30 tablet 1    Handicap Placard MISC by Does not apply route Diagnosis: No primary diagnosis found. Expiration Date: 06/30/2022 1 each 0    Handicap Placard MISC by Does not apply route Diagnosis: colon cancer, stage IIIA. Currently completing chemotherapy   Expiration: 6 months 1 each 0    sucralfate (CARAFATE) 1 GM tablet take 1 tablet by mouth twice a day FIVE TO 60 MINS BEFORE LUNCH AND DINNER      ferrous sulfate (IRON 325) 325 (65 Fe) MG tablet Take 1 tablet by mouth daily (with breakfast) 90 tablet 1    omeprazole (PRILOSEC OTC) 20 MG tablet Take 20 mg by mouth daily       ondansetron (ZOFRAN-ODT) 4 MG disintegrating tablet Take 1 tablet by mouth 3 times daily as needed for Nausea or Vomiting (Patient not taking: Reported on 3/2/2022) 21 tablet 0    naloxegol (MOVANTIK) 12.5 MG TABS tablet Take 1 tablet by mouth every morning (Patient not taking: Reported on 12/15/2021) 30 tablet 0    dicyclomine (BENTYL) 10 MG capsule Take 2 capsules by mouth 3 times daily (Patient not taking: Reported on 2/16/2022) 120 capsule 0    Magic Mouthwash (MIRACLE MOUTHWASH) Swish and spit 5 mLs 4 times daily as needed for Irritation Mixture of 30mL diphenhydramine, 60mL maalox, 4g carafate as ratio (Patient not taking: Reported on 12/15/2021) 300 mL 0     No current facility-administered medications for this visit.       No Known Allergies   Health Maintenance   Topic Date Due    Shingles Vaccine (2 of 2) 11/18/2021    Depression Screen  07/23/2022    Pneumococcal 0-64 years Vaccine (2 of 4 - PCV13) 07/23/2022    Colorectal Cancer Screen  09/01/2022    Low dose CT lung screening  09/02/2022    Lipid screen  07/23/2026    DTaP/Tdap/Td vaccine (2 - Td or Tdap) 07/23/2031    Flu vaccine  Completed    COVID-19 Vaccine  Completed    Hepatitis A vaccine  Aged Out    Hepatitis B vaccine  Aged Out    Hib vaccine  Aged Out    Meningococcal (ACWY) vaccine  Aged Out    Hepatitis C screen  Discontinued    HIV screen  Discontinued        Subjective:   Review of Systems   Constitutional: Negative for activity change, appetite change, fatigue and fever. HENT: Positive for dental problem. Negative for congestion, facial swelling, hearing loss, mouth sores, nosebleeds, sore throat, tinnitus and trouble swallowing. Eyes: Negative for discharge and visual disturbance. Respiratory: Negative for cough, chest tightness, shortness of breath and wheezing. Cardiovascular: Negative for chest pain, palpitations and leg swelling. Gastrointestinal: Negative for abdominal distention, abdominal pain, blood in stool, constipation, diarrhea, nausea, rectal pain and vomiting. Endocrine: Positive for cold intolerance. Negative for polydipsia and polyuria. Genitourinary: Negative for decreased urine volume, difficulty urinating, dysuria, flank pain, hematuria and urgency. Musculoskeletal: Negative for arthralgias, back pain, gait problem, joint swelling, myalgias and neck stiffness. Skin: Negative for color change, rash and wound. Neurological: Negative for dizziness, tremors, seizures, speech difficulty, weakness, light-headedness, numbness and headaches. Hematological: Negative for adenopathy. Does not bruise/bleed easily. Psychiatric/Behavioral: Negative for confusion and sleep disturbance. The patient is not nervous/anxious. Objective:   Physical Exam  Vitals reviewed. Constitutional:       General: He is not in acute distress. Appearance: He is well-developed. HENT:      Head: Normocephalic. Mouth/Throat:      Pharynx: No oropharyngeal exudate. Eyes:      General: No scleral icterus. Right eye: No discharge.          Left eye: No discharge. Pupils: Pupils are equal, round, and reactive to light. Neck:      Thyroid: No thyromegaly. Vascular: No JVD. Trachea: No tracheal deviation. Cardiovascular:      Rate and Rhythm: Normal rate. Heart sounds: Normal heart sounds. No murmur heard. No friction rub. No gallop. Pulmonary:      Effort: Pulmonary effort is normal. No respiratory distress. Breath sounds: Normal breath sounds. No stridor. No wheezing or rales. Chest:      Chest wall: No tenderness. Abdominal:      General: Bowel sounds are normal. There is no distension. Palpations: Abdomen is soft. There is no mass. Tenderness: There is no abdominal tenderness. There is no rebound. Musculoskeletal:         General: Normal range of motion. Cervical back: Normal range of motion and neck supple. Comments: Good range of motion in all four extremities. Lymphadenopathy:      Cervical: No cervical adenopathy. Skin:     General: Skin is warm. Findings: No erythema or rash. Neurological:      Mental Status: He is alert and oriented to person, place, and time. Cranial Nerves: No cranial nerve deficit. Motor: No abnormal muscle tone. Deep Tendon Reflexes: Reflexes are normal and symmetric. Psychiatric:         Behavior: Behavior normal.         Thought Content: Thought content normal.         Judgment: Judgment normal.         /83 (Site: Left Upper Arm, Position: Sitting, Cuff Size: Medium Adult)   Pulse 78   Temp 97.9 °F (36.6 °C) (Oral)   Resp 16   Ht 6' (1.829 m)   Wt 151 lb 12.8 oz (68.9 kg)   SpO2 99%   BMI 20.59 kg/m²      ECOG status is 0    Imaging studies and labs:   CT of the abdomen and pelvis on September 2, 2021 showed:   1. Unremarkable CT abdomen and pelvis with IV contrast.    CT of the chest on September 2, 2021 showed:  1. No suspicious pulmonary nodules. 2. No evidence of metastatic disease.    3. No acute infiltrate or pleural effusion CT ABDOMEN PELVIS W IV CONTRAST Additional Contrast? None    Result Date: 10/21/2021  PROCEDURE: CT ABDOMEN PELVIS W IV CONTRAST CLINICAL INFORMATION: Abdominal pain. COMPARISON: CT abdomen and pelvis 9/2/2021. TECHNIQUE: Axial 5 mm CT images were obtained through the abdomen and pelvis after the administration of 80  cc Isovue 370 intravenous contrast. Coronal and sagittal reconstructions were obtained. All CT scans at this facility use dose modulation, iterative reconstruction, and/or weight-based dosing when appropriate to reduce radiation dose to as low as reasonably achievable. FINDINGS: Lung bases: Atelectasis is noted at the lung bases. The heart size is normal. Liver/gallbladder/bilary tree: Thickening of the wall of the gallbladder near the fundus could represent adenomyomatosis. No radiopaque gallstones or biliary ductal dilatation is observed. No liver lesions are identified. Pancreas/spleen/adrenal glands: Normal size and attenuation. Kidneys/ureters/bladder: No renal calculus, hydronephrosis, or hydroureter is present. The urinary bladder is unremarkable. Gastrointestinal:  Postoperative changes related to right hemicolectomy with a surgical anastomosis in the right lower quadrant appears intact. There is question area of intussusception of small bowel loops within the transverse colon at the site of the anastomosis (series 2, image 45). No bowel obstruction, free fluid, fluid collection, or free air is observed. Moderate retained fecal material is seen throughout the colon. Retroperitoneum/lymph nodes: The aorta is not dilated. No lymphadenopathy is present. Pelvis: The prostate gland is not enlarged. Musculoskeletal: The visualized skeletal structures appear intact. 1. Postoperative changes related to right hemicolectomy appear intact.  There may be intussusception of the small bowel within the colon at the site of the surgical anastomosis (series 2, image 45) however there is no associated bowel obstruction, free fluid, fluid collection, or free air identified. Moderate retained fecal material is seen throughout the colon suggesting fecal stasis. Assessment for bowel wall thickening is limited without oral contrast. 2. 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 No radiopaque gallstones or biliary ductal dilatation identified. Correlate with liver function tests. Chronic findings are discussed. **This report has been created using voice recognition software. It may contain minor errors which are inherent in voice recognition technology. ** Final report electronically signed by Dr Cyndi Agarwal on 10/21/2021 10:02 AM      Lab Results   Component Value Date    WBC 9.5 03/23/2022    HGB 13.8 (L) 03/23/2022    HCT 40.2 (L) 03/23/2022     (H) 03/23/2022     03/23/2022       Chemistry        Component Value Date/Time     03/23/2022 0829     12/04/2021 0600    K 3.7 03/23/2022 0829    K 4.0 12/04/2021 0600    K 3.4 (L) 11/30/2021 0600     12/04/2021 0600    CO2 29 12/04/2021 0600    BUN 8 12/04/2021 0600    CREATININE 1.0 03/23/2022 0829    CREATININE 0.7 12/04/2021 0600        Component Value Date/Time    CALCIUM 7.7 (L) 12/04/2021 0600    ALKPHOS 167 (H) 03/23/2022 0828    AST 31 03/23/2022 0828    ALT 43 03/23/2022 0828    BILITOT 0.5 03/23/2022 0828          Assessment/Plan:   1. Stage III A colon cancer. This is a 76-year-old patient with newly diagnosed stage IIIa colon cancer. He is status post right hemicolectomy colectomy with lymph node dissection. I had a lengthy discussion with the patient and his wife about post surgical management. Adjuvant chemotherapy is indicated for the patients with lymph node involvement. For patients who have undergone potentially curative resection of a colon cancer, the goal of postoperative chemotherapy is to eradicate micrometastases, thereby reducing the likelihood of disease recurrence and increasing the cure rate.  The benefits of adjuvant chemotherapy for patients with node-positive disease are in the range of 30 % reduction in the risk of disease recurrence and a 20-30% reduction in mortality with modern chemotherapy. Adjuvant therapy for resected stage III colon cancer involves a six-month course of a combination of several chemotherapy drugs, which are given intravenously, in a specific order on specific days. However, based on the [employer] and NSABP C-07 trials and an analysis of the International Duration Evaluation of Adjuvant Chemotherapy (IDEA) collaboration (six separate randomized trials of six versus three months of adjuvant oxaliplatin-based therapy), we continue to suggest six months of therapy for individuals with high-risk cancers (T4N2). However, patients with high-risk cancers ramiro limiting adjuvant therapy to three months in patients with low-risk disease (T1-3N1) is valid option. Orally active fluoropyrimidine,  Xeloda offers increased convenience. CAPOX is the preferred regimen if a 3-month course of adjuvant therapy is chosen. 2. Adjuvant chemotherapy with CAPOX. Received  cycle #1 of CAPOX on 11/09/2021. Cycle was complicated by hospitalization for diffuse acute enterocolitis. Patient required almost 1 week hospitalization. He required electrolytes correction antibiotic treatment and hydration. He also developed small bowel obstruction that was managed medically with NG tube placement. Patient reports that today he is significantly improved. He denies having any abdominal pain, no nausea no vomiting. He received cycle 2 on  12/15/2021. The dose of oxaliplatin was reduced from 130 mg/m² to 100 mg/m². The dose of Xeloda was reduced to 1000 mg p.o. twice daily for 14 days. He tolerated cycle #2 much better. Minimal nausea no vomiting no diarrhea no abdominal pain. Developed Palmar and plantar erythema with some skin peeling.  Cycle #3 and 4 of XELOX, the dose of Oxaliplatin was increased to 110 mg/m², he continued Xeloda 3 tablets p.o. twice daily. Cycle #4 was reasonably well-tolerated,no diarrhea. The patient has very mild palmar plantar erythema, no skin peeling. He developed grade 1 peripheral neuropathy in his feet. Therefore with cycle #5 the dose of oxaliplatin was reduced to 90 mg/m². .  We will proceed with cycle #6 of XELOX today. Continue the same dose of Xeloda 1000 mg p.o. twice a day for 14 days. Chemotherapy orders were reviewed and signed. The patient was instructed to take Zofran half an hour before the evening and morning dose of Xeloda. For breakthrough nausea he received prescription for Phenergan  Patient was instructed to call us if he develops any diarrhea. For mild diarrhea he was instructed to start taking Lomotil. 3.  Hypokalemia. His potassium is 3.6 today. The patient is on oral potassium supplementation 20 mEq daily. 4. Gum and teeth pain. Might be related to oxaliplatin. Well controlled with Percocet    Giving Letter today stating patient should remain out of work for the next 7 weeks. Diagnosis Orders   1. Adenocarcinoma, colon ([facility] [address]     2. Neuropathic pain     3. Encounter for chemotherapy management     4. S/P right colectomy     5. Palmar erythema     6. Hypokalemia     7. Diarrhea due to drug          Plan:   Return in about 3 weeks (around 4/13/2022). Orders Placed:   No orders of the defined types were placed in this encounter. Medications Prescribed:   Orders Placed This Encounter   Medications    prochlorperazine (COMPAZINE) 5 MG tablet     Sig: Take 1 tablet by mouth every 12 hours for 14 days Take 30 minutes before morning and afternoon dose of Xeloda     Dispense:  28 tablet     Refill:  2    ondansetron (ZOFRAN-ODT) 4 MG disintegrating tablet     Sig: Place 1 tablet under the tongue every 8 hours as needed for Nausea or Vomiting     Dispense:  90 tablet     Refill:  0            Attending addendum on 03/23/2022:   I have seen examined and interviewed the patient independently of the medical resident Dr. Marylene Enter. I reviewed and agree with history of present illness, past medical history, review of systems and physical examination findings. I reviewed the patient's labs and imaging studies. Assessment and plan was discussed and formulated with me and I agree. Eileen Faye is a 50-year-old patient who is currently receiving adjuvant chemotherapy for stage III colon cancer today we will proceed with cycle 6 of XELOX. He developed a mildly hand-and-foot syndrome. There is still a little bit of skin peeling. The patient believes that he can tolerate current dose of Xeloda 1500 mg p.o. twice daily  Chemotherapy orders were reviewed and signed.

## 2022-03-23 NOTE — PATIENT INSTRUCTIONS
1. Proceed with Oxaliplatin treatment today. We will continue Xeloda 3 tablets p.o. twice daily for 2 weeks on 1 week off  2.   RTC to see me for labs: CBC, BMP, LFTs and cycle #7 of Xeloda and oxaliplatin in 3 weeks

## 2022-03-25 ENCOUNTER — HOSPITAL ENCOUNTER (OUTPATIENT)
Dept: INFUSION THERAPY | Age: 52
End: 2022-03-25

## 2022-03-28 ENCOUNTER — TELEPHONE (OUTPATIENT)
Dept: ONCOLOGY | Age: 52
End: 2022-03-28

## 2022-03-28 ENCOUNTER — OFFICE VISIT (OUTPATIENT)
Dept: SURGERY | Age: 52
End: 2022-03-28

## 2022-03-28 VITALS
DIASTOLIC BLOOD PRESSURE: 60 MMHG | HEART RATE: 62 BPM | RESPIRATION RATE: 18 BRPM | SYSTOLIC BLOOD PRESSURE: 110 MMHG | WEIGHT: 148.1 LBS | TEMPERATURE: 96.3 F | BODY MASS INDEX: 20.06 KG/M2 | OXYGEN SATURATION: 98 % | HEIGHT: 72 IN

## 2022-03-28 DIAGNOSIS — K80.11 CHRONIC CHOLECYSTITIS DUE TO GALLBLADDER CALCULUS WITH OBSTRUCTION: ICD-10-CM

## 2022-03-28 DIAGNOSIS — Z90.49 S/P LAPAROSCOPIC CHOLECYSTECTOMY: Primary | ICD-10-CM

## 2022-03-28 PROCEDURE — 99024 POSTOP FOLLOW-UP VISIT: CPT | Performed by: NURSE PRACTITIONER

## 2022-03-28 RX ORDER — HYDROCODONE BITARTRATE AND ACETAMINOPHEN 5; 325 MG/1; MG/1
1-2 TABLET ORAL EVERY 6 HOURS PRN
Qty: 60 TABLET | Refills: 0 | Status: SHIPPED | OUTPATIENT
Start: 2022-03-28 | End: 2022-03-31

## 2022-03-28 ASSESSMENT — ENCOUNTER SYMPTOMS
SINUS PRESSURE: 0
CHEST TIGHTNESS: 0
NAUSEA: 0
EYE PAIN: 0
CHOKING: 0
BACK PAIN: 0
RHINORRHEA: 0
SORE THROAT: 0
EYE ITCHING: 0
APNEA: 0
ANAL BLEEDING: 0
EYE DISCHARGE: 0
SHORTNESS OF BREATH: 0
VOMITING: 0
CONSTIPATION: 0
DIARRHEA: 0
WHEEZING: 0
PHOTOPHOBIA: 0
COLOR CHANGE: 0
ABDOMINAL PAIN: 0
COUGH: 0
ABDOMINAL DISTENTION: 0
ALLERGIC/IMMUNOLOGIC NEGATIVE: 1
BLOOD IN STOOL: 0

## 2022-03-28 NOTE — PROGRESS NOTES
[address] [place] [address] Dr Gallardo[address] [facility] [address]  Dept: [phone]  Dept Fax: [phone]  Loc: [phone]    Visit Date: 3/28/2022    Jareth Horn is a 45 y.o. male who presents today for:  Chief Complaint   Patient presents with    Post-Op Check     s/p Robotic cholecystectomy-3/9/2022       HPI:     HPI    Danielle Kelley is a 52-year old male patient who presents today for follow-up status post robotic cholecystectomy 3 weeks ago. Currently receiving chemotherapy for colon cancer. Presents alone. Doing well with no complaints from a surgical standpoint. Off narcotics. Denies any incisional soreness. States he really had no pain after surgery. Was up working the next day. No GERD or epigastric pain. No bloating. Tolerating regular diet without any nausea or vomiting. Has not really tried any spicy or fried foods yet. Denies any issues with constipation or diarrhea since surgery. Incisions are healing well without any signs of infection. No fevers or chills. Urinating without difficulties. Denies any shortness of breath or chest pain. Tolerating increased activity well. 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 Had stent removed per GI without issues.     Past Medical History:   Diagnosis Date    Anemia     Colon cancer ([address] 09/2021      Past Surgical History:   Procedure Laterality Date    CHOLECYSTECTOMY, LAPAROSCOPIC N/A 3/8/2022    ROBOTIC LAPAROSCOPIC CHOLECYSTECTOMY performed by Jose Pizarro MD at [facility]  09/01/2021    Dr. Agnieszka Alves ERCP N/A 02/08/2022    ERCP DIAGNOSTIC performed by Michell Fishman MD at [facility]    ERCP  02/24/2022    with stent placement Dr. Ruma Aguilar Right 09/16/2021    ROBOT EXTENDED RIGHT COLECTOMY performed by Jose Pizarro MD at [address] [place] [address] [facility] with mesh inguinal    PORT SURGERY N/A 2021    SINGLE LUMEN SMART PORT INSERTION performed by Jatin Fernandez MD at [facility] History   Problem Relation Age of Onset   [facility] Other Mother         Brain aneurysm    COPD Mother    [facility] Stroke Mother     Cancer Father         lung mets brain and bone    Stroke Father     Cancer Maternal Grandmother         colon    High Blood Pressure Maternal Grandmother     Cancer Maternal Grandfather         colon     High Blood Pressure Maternal Grandfather     Kidney Disease Paternal Grandmother     Cancer Paternal Grandmother         breast     Diabetes Paternal Grandmother     High Blood Pressure Paternal Grandmother     Cancer Paternal Grandfather         lung-52    Heart Attack Paternal Grandfather     High Blood Pressure Paternal Grandfather     Cancer Paternal Aunt         colon    Heart Attack Paternal Aunt     Cancer Paternal Uncle         lung    Cancer Maternal Aunt         colon    Cancer Maternal Uncle         colon       Social History     Tobacco Use    Smoking status: Former Smoker     Packs/day: 1.00     Years: 35.00     Pack years: 35.00     Types: Cigarettes     Quit date: 2020     Years since quittin.1    Smokeless tobacco: Never Used   Substance Use Topics    Alcohol use: Not Currently      Current Outpatient Medications   Medication Sig Dispense Refill    prochlorperazine (COMPAZINE) 5 MG tablet Take 1 tablet by mouth every 12 hours for 14 days Take 30 minutes before morning and afternoon dose of Xeloda 28 tablet 2    capecitabine (XELODA) 500 MG chemo tablet Take 1,250 mg/m2 by mouth 2 times daily      Handicap Placard MISC by Does not apply route Diagnosis: No primary diagnosis found. Expiration Date: 2022 1 each 0    Handicap Placard MISC by Does not apply route Diagnosis: colon cancer, stage IIIA.  Currently completing chemotherapy   Expiration: 6 months 1 each 0    sucralfate (CARAFATE) 1 GM tablet take 1 tablet by mouth twice a day FIVE TO 60 MINS BEFORE LUNCH AND DINNER      ferrous sulfate (IRON 325) 325 (65 Fe) MG tablet Take 1 tablet by mouth daily (with breakfast) 90 tablet 1    omeprazole (PRILOSEC OTC) 20 MG tablet Take 20 mg by mouth daily       HYDROcodone-acetaminophen (NORCO) 5-325 MG per tablet Take 1-2 tablets by mouth every 6 hours as needed for Pain for up to 21 doses. 60 tablet 0    ondansetron (ZOFRAN) 4 MG tablet Take 1 tablet by mouth every 6 hours as needed for Nausea or Vomiting (Patient not taking: Reported on 3/28/2022) 30 tablet 1    naloxegol (MOVANTIK) 12.5 MG TABS tablet Take 1 tablet by mouth every morning (Patient not taking: Reported on 12/15/2021) 30 tablet 0    dicyclomine (BENTYL) 10 MG capsule Take 2 capsules by mouth 3 times daily (Patient not taking: Reported on 2/16/2022) 120 capsule 0    Magic Mouthwash (MIRACLE MOUTHWASH) Swish and spit 5 mLs 4 times daily as needed for Irritation Mixture of 30mL diphenhydramine, 60mL maalox, 4g carafate as ratio (Patient not taking: Reported on 12/15/2021) 300 mL 0     No current facility-administered medications for this visit. No Known Allergies    Subjective:     Review of Systems   Constitutional: Negative for activity change, appetite change, chills, diaphoresis, fatigue, fever and unexpected weight change. HENT: Negative for congestion, dental problem, hearing loss, rhinorrhea, sinus pressure and sore throat. Eyes: Negative for photophobia, pain, discharge, itching and visual disturbance. Respiratory: Negative for apnea, cough, choking, chest tightness, shortness of breath and wheezing. Cardiovascular: Negative for chest pain, palpitations and leg swelling. Gastrointestinal: Negative for abdominal distention, abdominal pain, anal bleeding, blood in stool, constipation, diarrhea, nausea and vomiting. Endocrine: Negative.     Genitourinary: Negative for decreased urine volume, difficulty urinating, dysuria, frequency and urgency. Musculoskeletal: Negative for arthralgias, back pain, gait problem, joint swelling, myalgias and neck pain. Skin: Negative for color change, pallor, rash and wound. Allergic/Immunologic: Negative. Neurological: Negative for dizziness, tremors, weakness, numbness and headaches. Hematological: Negative. Psychiatric/Behavioral: Negative. Objective:   /60 (Site: Left Upper Arm, Position: Sitting, Cuff Size: Medium Adult)   Pulse 62   Temp 96.3 °F (35.7 °C) (Temporal)   Resp 18   Ht 6' (1.829 m)   Wt 148 lb 1.6 oz (67.2 kg)   SpO2 98%   BMI 20.09 kg/m²     Physical Exam  Vitals reviewed. Constitutional:       General: He is not in acute distress. Appearance: Normal appearance. He is well-developed. He is not ill-appearing or toxic-appearing. HENT:      Head: Normocephalic and atraumatic. Right Ear: Hearing and external ear normal.      Left Ear: Hearing and external ear normal.      Nose: Nose normal.      Mouth/Throat:      Mouth: Mucous membranes are not pale, not dry and not cyanotic. Eyes:      General: Lids are normal.   Neck:      Trachea: Trachea and phonation normal.   Cardiovascular:      Rate and Rhythm: Normal rate and regular rhythm. Pulses: Normal pulses. Heart sounds: S1 normal and S2 normal.   Pulmonary:      Effort: Pulmonary effort is normal. No tachypnea, bradypnea, accessory muscle usage or respiratory distress. Breath sounds: Normal breath sounds. No decreased breath sounds, wheezing or rales. Chest:      Chest wall: No tenderness. Abdominal:      General: Bowel sounds are normal. There is no distension. Palpations: Abdomen is soft. There is no mass. Tenderness: There is no abdominal tenderness. Musculoskeletal:         General: No tenderness. Normal range of motion. Cervical back: Normal range of motion and neck supple.    Skin:     General: Skin is warm and dry.      Findings: No abrasion, bruising, burn, ecchymosis, erythema, laceration, lesion or rash. Neurological:      Mental Status: He is alert and oriented to person, place, and time. Motor: No tremor, atrophy or abnormal muscle tone. Coordination: Coordination normal.      Gait: Gait normal.      Deep Tendon Reflexes: Reflexes are normal and symmetric. Psychiatric:         Speech: Speech normal.         Behavior: Behavior normal.         Thought Content: Thought content normal.         Lab Results   Component Value Date    WBC 9.5 03/23/2022    HGB 13.8 (L) 03/23/2022    HCT 40.2 (L) 03/23/2022     (H) 03/23/2022     03/23/2022     Lab Results   Component Value Date     03/23/2022     12/04/2021    K 3.7 03/23/2022    K 4.0 12/04/2021    K 3.4 11/30/2021     12/04/2021    CO2 29 12/04/2021    BUN 8 12/04/2021    CREATININE 1.0 03/23/2022    CREATININE 0.7 12/04/2021    GLUCOSE 87 12/04/2021    CALCIUM 7.7 12/04/2021          PATHOLOGY REPORT     Clinical Information: CHOLELITHIASIS     FINAL DIAGNOSIS:   Gallbladder, cholecystectomy:             Acute and chronic cholecystitis.           Cholelithiasis.           Diverticula. Specimen:   GALLBLADDER       Gross Examination:   The container is labeled Lilian Mott, yaa.  Received in   formalin is a gallbladder measuring 9 cm in length x 2.5 cm in   diameter.  The surface is red-tan, smooth, and glistening.  The wall is   intact.  No lymph nodes are identified.  The lumen contains suppurative   exudate.  There are several small yellow stones, the largest about 0.5   cm.  The wall measures about 0.5 cm in average thickness and the   mucosal surface is pink-tan and velvety.  There are several diverticula   at the distal end.  The cystic duct is patent.  Representative sections   including the proximal resection line, which is designated by a   vertical knife capo are submitted.  1 ss.  ALP/DKR:[id number]     Microscopic Examination:   Microscopic examination was performed. Patient Active Problem List   Diagnosis    Personal history of tobacco use    History of [facility]ism [facility]    Weight loss    Bradycardia    Heartburn    Varicose veins of both lower extremities    Family history of cancer    Adenocarcinoma, colon ([facility]    Cancer of transverse colon ([facility]    S/P right colectomy    Hypokalemia    Diarrhea due to drug    Enterocolitis    Severe protein-calorie malnutrition (HCC)    Chronic cholecystitis due to gallbladder calculus with obstruction     Assessment:     1. Status post robotic cholecystectomy  2. Recent extended right colectomy secondary to adenocarcinoma of colon    Plan:     1. Abdomen benign. Incisions are healing well without signs of infection. Continue wound care as directed. 2. Pathology reviewed with patient. No questions. 3. Appetite doing well. Continue diet as tolerated. Avoid food triggers with dental sensitivity secondary to chemotherapy. 4. Bowel function doing well  5. Never took any narcotics from our standpoint  6. Lifting/activity restrictions discussed with patient. Questions answered. Increase as tolerated. 7. Follow up as needed. Signs and symptoms reviewed with patient that would be concerning and need him to return to office for re-evaluation. Patient states he will call if he has questions or concerns.   8. Follow up with oncology and chemotherapy treatments as directed      Electronically signed by LUCY Owens CNP on 3/28/2022 at 12:57 PM

## 2022-03-28 NOTE — TELEPHONE ENCOUNTER
Patient called and left message in regards to the dental pain/cold intolerance he has been experiencing. Had a lot of pain over the weekend and states he used the majority of his medication. Wanting to know if it is safe for him to take Ibuprofen or if there is something else  you would suggest? Please advise patient at [phone].

## 2022-04-13 ENCOUNTER — OFFICE VISIT (OUTPATIENT)
Dept: ONCOLOGY | Age: 52
End: 2022-04-13
Payer: COMMERCIAL

## 2022-04-13 ENCOUNTER — HOSPITAL ENCOUNTER (OUTPATIENT)
Dept: INFUSION THERAPY | Age: 52
Discharge: HOME | End: 2022-04-13
Payer: COMMERCIAL

## 2022-04-13 VITALS
WEIGHT: 157.8 LBS | RESPIRATION RATE: 16 BRPM | HEIGHT: 72 IN | HEART RATE: 67 BPM | TEMPERATURE: 97.8 F | DIASTOLIC BLOOD PRESSURE: 84 MMHG | OXYGEN SATURATION: 97 % | BODY MASS INDEX: 21.37 KG/M2 | SYSTOLIC BLOOD PRESSURE: 145 MMHG

## 2022-04-13 VITALS
DIASTOLIC BLOOD PRESSURE: 84 MMHG | HEART RATE: 73 BPM | RESPIRATION RATE: 16 BRPM | WEIGHT: 157.8 LBS | OXYGEN SATURATION: 98 % | TEMPERATURE: 98.1 F | BODY MASS INDEX: 21.37 KG/M2 | SYSTOLIC BLOOD PRESSURE: 135 MMHG | HEIGHT: 72 IN

## 2022-04-13 DIAGNOSIS — C18.4 CANCER OF TRANSVERSE COLON (HCC): ICD-10-CM

## 2022-04-13 DIAGNOSIS — K52.1 DIARRHEA DUE TO DRUG: ICD-10-CM

## 2022-04-13 DIAGNOSIS — C18.9 ADENOCARCINOMA, COLON (HCC): Primary | ICD-10-CM

## 2022-04-13 DIAGNOSIS — L53.8 PALMAR ERYTHEMA: ICD-10-CM

## 2022-04-13 DIAGNOSIS — F10.21 HISTORY OF ALCOHOLISM (HCC): ICD-10-CM

## 2022-04-13 DIAGNOSIS — Z80.9 FAMILY HISTORY OF CANCER: ICD-10-CM

## 2022-04-13 DIAGNOSIS — Z51.11 ENCOUNTER FOR CHEMOTHERAPY MANAGEMENT: ICD-10-CM

## 2022-04-13 DIAGNOSIS — Z90.49 S/P RIGHT COLECTOMY: ICD-10-CM

## 2022-04-13 DIAGNOSIS — E87.6 HYPOKALEMIA: ICD-10-CM

## 2022-04-13 DIAGNOSIS — Z87.891 PERSONAL HISTORY OF TOBACCO USE: ICD-10-CM

## 2022-04-13 LAB
ABSOLUTE IMMATURE GRANULOCYTE: 0.01 THOU/MM3 (ref 0–0.07)
ALBUMIN SERPL-MCNC: 3.9 G/DL (ref 3.5–5.1)
ALP BLD-CCNC: 134 U/L (ref 38–126)
ALT SERPL-CCNC: 49 U/L (ref 11–66)
AST SERPL-CCNC: 38 U/L (ref 5–40)
BASINOPHIL, AUTOMATED: 1 % (ref 0–3)
BASOPHILS ABSOLUTE: 0 THOU/MM3 (ref 0–0.1)
BILIRUB SERPL-MCNC: 0.8 MG/DL (ref 0.3–1.2)
BILIRUBIN DIRECT: < 0.2 MG/DL (ref 0–0.3)
BUN, WHOLE BLOOD: 10 MG/DL (ref 8–26)
CHLORIDE, WHOLE BLOOD: 109 MEQ/L (ref 98–109)
CREATININE, WHOLE BLOOD: 0.8 MG/DL (ref 0.5–1.2)
EOSINOPHILS ABSOLUTE: 0.1 THOU/MM3 (ref 0–0.4)
EOSINOPHILS RELATIVE PERCENT: 2 % (ref 0–4)
GFR, ESTIMATED: > 90 ML/MIN/1.73M2
GLUCOSE, WHOLE BLOOD: 125 MG/DL (ref 70–108)
HCT VFR BLD CALC: 37.4 % (ref 42–52)
HEMOGLOBIN: 13 GM/DL (ref 14–18)
IMMATURE GRANULOCYTES: 0 %
IONIZED CALCIUM, WHOLE BLOOD: 1.18 MMOL/L (ref 1.12–1.32)
LYMPHOCYTES # BLD: 25 % (ref 15–47)
LYMPHOCYTES ABSOLUTE: 1.3 THOU/MM3 (ref 1–4.8)
MCH RBC QN AUTO: 34.2 PG (ref 26–33)
MCHC RBC AUTO-ENTMCNC: 34.8 GM/DL (ref 32.2–35.5)
MCV RBC AUTO: 98 FL (ref 80–94)
MONOCYTES ABSOLUTE: 0.6 THOU/MM3 (ref 0.4–1.3)
MONOCYTES: 11 % (ref 0–12)
PDW BLD-RTO: 15.6 % (ref 11.5–14.5)
PLATELET # BLD: 183 THOU/MM3 (ref 130–400)
PMV BLD AUTO: 9.8 FL (ref 9.4–12.4)
POTASSIUM, WHOLE BLOOD: 3.4 MEQ/L (ref 3.5–4.9)
RBC # BLD: 3.8 MILL/MM3 (ref 4.7–6.1)
SEG NEUTROPHILS: 61 % (ref 43–75)
SEGMENTED NEUTROPHILS ABSOLUTE COUNT: 3.1 THOU/MM3 (ref 1.8–7.7)
SODIUM, WHOLE BLOOD: 145 MEQ/L (ref 138–146)
TOTAL CO2, WHOLE BLOOD: 26 MEQ/L (ref 23–33)
TOTAL PROTEIN: 6.2 G/DL (ref 6.1–8)
WBC # BLD: 5.1 THOU/MM3 (ref 4.8–10.8)

## 2022-04-13 PROCEDURE — 36591 DRAW BLOOD OFF VENOUS DEVICE: CPT

## 2022-04-13 PROCEDURE — 99211 OFF/OP EST MAY X REQ PHY/QHP: CPT

## 2022-04-13 PROCEDURE — 96413 CHEMO IV INFUSION 1 HR: CPT

## 2022-04-13 PROCEDURE — 96415 CHEMO IV INFUSION ADDL HR: CPT

## 2022-04-13 PROCEDURE — 80047 BASIC METABLC PNL IONIZED CA: CPT

## 2022-04-13 PROCEDURE — 85025 COMPLETE CBC W/AUTO DIFF WBC: CPT

## 2022-04-13 PROCEDURE — 80076 HEPATIC FUNCTION PANEL: CPT

## 2022-04-13 PROCEDURE — 96367 TX/PROPH/DG ADDL SEQ IV INF: CPT

## 2022-04-13 PROCEDURE — 2580000003 HC RX 258: Performed by: INTERNAL MEDICINE

## 2022-04-13 PROCEDURE — 6360000002 HC RX W HCPCS: Performed by: INTERNAL MEDICINE

## 2022-04-13 PROCEDURE — 99214 OFFICE O/P EST MOD 30 MIN: CPT | Performed by: INTERNAL MEDICINE

## 2022-04-13 RX ORDER — POTASSIUM CHLORIDE 20 MEQ/1
20 TABLET, EXTENDED RELEASE ORAL DAILY
Qty: 5 TABLET | Refills: 1 | Status: SHIPPED | OUTPATIENT
Start: 2022-04-13 | End: 2022-06-01 | Stop reason: ALTCHOICE

## 2022-04-13 RX ORDER — SODIUM CHLORIDE 9 MG/ML
INJECTION, SOLUTION INTRAVENOUS CONTINUOUS
Status: CANCELLED | OUTPATIENT
Start: 2022-04-13

## 2022-04-13 RX ORDER — SODIUM CHLORIDE 0.9 % (FLUSH) 0.9 %
5-40 SYRINGE (ML) INJECTION PRN
Status: DISCONTINUED | OUTPATIENT
Start: 2022-04-13 | End: 2022-04-14 | Stop reason: HOSPADM

## 2022-04-13 RX ORDER — EPINEPHRINE 1 MG/ML
0.3 INJECTION, SOLUTION, CONCENTRATE INTRAVENOUS PRN
Status: CANCELLED | OUTPATIENT
Start: 2022-04-13

## 2022-04-13 RX ORDER — HEPARIN SODIUM (PORCINE) LOCK FLUSH IV SOLN 100 UNIT/ML 100 UNIT/ML
500 SOLUTION INTRAVENOUS PRN
Status: CANCELLED | OUTPATIENT
Start: 2022-04-13

## 2022-04-13 RX ORDER — METHYLPREDNISOLONE SODIUM SUCCINATE 125 MG/2ML
125 INJECTION, POWDER, LYOPHILIZED, FOR SOLUTION INTRAMUSCULAR; INTRAVENOUS ONCE
Status: CANCELLED | OUTPATIENT
Start: 2022-04-13 | End: 2022-04-13

## 2022-04-13 RX ORDER — DEXTROSE MONOHYDRATE 50 MG/ML
INJECTION, SOLUTION INTRAVENOUS ONCE
Status: COMPLETED | OUTPATIENT
Start: 2022-04-13 | End: 2022-04-13

## 2022-04-13 RX ORDER — HEPARIN SODIUM (PORCINE) LOCK FLUSH IV SOLN 100 UNIT/ML 100 UNIT/ML
500 SOLUTION INTRAVENOUS PRN
Status: DISCONTINUED | OUTPATIENT
Start: 2022-04-13 | End: 2022-04-14 | Stop reason: HOSPADM

## 2022-04-13 RX ORDER — SODIUM CHLORIDE 9 MG/ML
25 INJECTION, SOLUTION INTRAVENOUS PRN
Status: CANCELLED | OUTPATIENT
Start: 2022-04-13

## 2022-04-13 RX ORDER — SODIUM CHLORIDE 0.9 % (FLUSH) 0.9 %
5-40 SYRINGE (ML) INJECTION PRN
Status: CANCELLED | OUTPATIENT
Start: 2022-04-13

## 2022-04-13 RX ORDER — DIPHENHYDRAMINE HYDROCHLORIDE 50 MG/ML
50 INJECTION INTRAMUSCULAR; INTRAVENOUS ONCE
Status: CANCELLED | OUTPATIENT
Start: 2022-04-13 | End: 2022-04-13

## 2022-04-13 RX ORDER — DEXTROSE MONOHYDRATE 50 MG/ML
INJECTION, SOLUTION INTRAVENOUS ONCE
Status: CANCELLED | OUTPATIENT
Start: 2022-04-13 | End: 2022-04-13

## 2022-04-13 RX ADMIN — DEXAMETHASONE SODIUM PHOSPHATE 12 MG: 4 INJECTION, SOLUTION INTRAMUSCULAR; INTRAVENOUS at 09:48

## 2022-04-13 RX ADMIN — FOSAPREPITANT 150 MG: 150 INJECTION, POWDER, LYOPHILIZED, FOR SOLUTION INTRAVENOUS at 10:05

## 2022-04-13 RX ADMIN — SODIUM CHLORIDE, PRESERVATIVE FREE 10 ML: 5 INJECTION INTRAVENOUS at 08:35

## 2022-04-13 RX ADMIN — DEXTROSE MONOHYDRATE: 50 INJECTION, SOLUTION INTRAVENOUS at 09:45

## 2022-04-13 RX ADMIN — OXALIPLATIN 175 MG: 5 INJECTION, SOLUTION, CONCENTRATE INTRAVENOUS at 10:44

## 2022-04-13 RX ADMIN — Medication 500 UNITS: at 13:07

## 2022-04-13 RX ADMIN — SODIUM CHLORIDE, PRESERVATIVE FREE 20 ML: 5 INJECTION INTRAVENOUS at 13:07

## 2022-04-13 ASSESSMENT — ENCOUNTER SYMPTOMS
VOMITING: 0
CHEST TIGHTNESS: 0
ABDOMINAL DISTENTION: 0
RECTAL PAIN: 0
WHEEZING: 0
TROUBLE SWALLOWING: 0
CONSTIPATION: 0
BACK PAIN: 0
COLOR CHANGE: 0
EYE DISCHARGE: 0
BLOOD IN STOOL: 0
NAUSEA: 0
SORE THROAT: 0
DIARRHEA: 0
COUGH: 0
ABDOMINAL PAIN: 0
FACIAL SWELLING: 0
SHORTNESS OF BREATH: 0

## 2022-04-13 NOTE — ONCOLOGY
Chemotherapy Administration    Pre-assessment Data: Antineoplastic Agents  See toxicity flow sheet for assessment                                          [x]         Interventions:   Chemotherapy SQ injection given []   Taxol administered-VS per protocol []   Blood pressure meds held 12 hours prior to Rituxan/Ruxience []   Rituxan/Ruxience administered- VS and precautions per guidelines []   Emergency drugs available as appropriate [x]   Anaphylaxis assessment completed [x]   Pre-medications administered as ordered [x]   Blood return noted upon initiation of chemotherapy [x]   Blood return noted each 1-2ml of a vesicant medication if given IV push []   Navelbine, Vincristine and Velban given as a monitored wide open drip, blood return noted before during and after infusion.  []   Blood return noted each 2-3ml of a non-vesicant medication if given IV push []   Patient aware of potential Immunotherapy toxicities []   Monitor for signs / symptoms of hypersensitivity reaction [x]   Chemotherapy orders (drug/dose/rate) verified by 2 Chemo certified RNs [x]   Monitor IV site and blood return throughout the infusion of the medication [x]   Document IV site checks on the IV assessment form [x]   Document chemotherapy teaching on the Patient Education tab [x]   Document patient verbalizes understanding of medications being administered [x]   If IV infiltration, see ONS Guidelines []   Other:      []

## 2022-04-13 NOTE — PROGRESS NOTES
[facility] [address] [facility], [address] [facility] [address]  [place] [facility]  Dept: [phone]  Dept Fax: [phone]: [phone]    Visit Date:4/13/2022     Mckenna Oro is a 45 y.o. male who presents today for:   Chief Complaint   Patient presents with    Follow-up     Adenocarcinoma, colon         HPI:     Mr. Aline Shelton is a 62-year-old patient with  stage III colon cancer. He is currently undergoing adjuvant chemotherapy. His history of colon cancer goes back to September 2021 when he was seen by  Dr. Meghann Gill due to unexplained weight loss of 10-15 lbs and rectal bleeding. Colonoscopy performed on September 1, 2021 demonstrated multiple polyps but a larger mass at the proximal transverse colon demonstrated the adenocarcinoma. He did not have signs of obstruction, no abdominal pain, no significant nausea or vomiting. Some previous episodes of nausea, GERD and epigastric discomfort. He had CT of the chest abdomen and pelvis on September 2, 2021,no evidence of metastatic disease. The patient met with Dr. Erna Larkin and after discussing his surgical treatment options he proceeded with right hemicolectomy on September 16, 2021. Final pathology report showed:  A. Right colon mass, hemicolectomy:    Invasive moderately differentiated colonic adenocarcinoma, pT2. Margins are free of neoplasia. Pericolonic lymph nodes (17): 2 out of 17 are positive for   metastatic colonic adenocarcinoma. Appendix-no pathologic abnormality. B. Ileocolonic base lymph nodes (2), resection:             Negative for malignancy (0/2). pT2 pN1b    He had uneventful post surgical course. Extensive history of colorectal disease on paternal side. On November 9, 2021 the patient received first cycle of adjuvant chemotherapy with Xeloda  On November 24, 2021 he was admitted to the hospital with a 1 week history of intractable periumbilical abdominal pain of 9/10 intensity. Associated nausea vomiting diarrhea and fever last few days. Diagnosed with acute enterocolitis, possible chemotherapy induced. IV Cipro & Flagyl during hospitalization. SBO vs ileus:General Surgery consulted. KUB showed possible SBO vs ileus. CT A/P showed worsening since previous. Managed medically with NGT. Advance diet slowly. Possible fluid overload: Lasix have been given for swelling. On discharge from the hospital on December 4, 2021 the patient was placed on the following medications:  Bentyl 3 times daily,-Magic mouthwash 4 times daily as needed, -ferrous sulfate daily, Zofran every 8 hours as needed,-omeprazole,Compazine every 6 hours as needed, Reglan 2 times daily. After being discharged from the hospital the patient presented to chemotherapy suite on few occasions for IV hydration and potassium supplementation. He also had outpatient dietary consult. Current history on 04/13/2022:  Patient presents to the medical oncology clinic for cycle #7 of oxaliplatin and Xeloda. His cycle #6 was with oxaliplatin 90 mg/m²,  Xeloda 3 tablets p.o. two daily for 2 weeks. Continues having stable dental pain/cold intolerance. He developed mild skin redness and peeling. No skin cracks. Mainly involving his hands. No neuropathy. Denies fever, chills, nausea, vomiting, diarrhea, rashes, chest pain, or cough.      HPI   Past Medical History:   Diagnosis Date    Anemia     Colon cancer ([address] 09/2021      Past Surgical History:   Procedure Laterality Date    CHOLECYSTECTOMY, LAPAROSCOPIC N/A 3/8/2022    ROBOTIC LAPAROSCOPIC CHOLECYSTECTOMY performed by Pasquale Terrazas MD at [address] [place] [address]  09/01/2021    Dr. Jazmyn Calero ERCP N/A 02/08/2022    ERCP DIAGNOSTIC performed by Livan Steele MD at [facility]    ERCP  02/24/2022    with stent placement Dr. Blayne Lazaro Right 09/16/2021    ROBOT EXTENDED RIGHT COLECTOMY performed by Pasquale Terrazas MD at [facility] REPAIR Left     [facility] with mesh inguinal    PORT SURGERY N/A 2021    SINGLE LUMEN SMART PORT INSERTION performed by Roxi Flores MD at [facility] History   Problem Relation Age of Onset   Mariza Young Other Mother         Brain aneurysm    COPD Mother    Mariza Young Stroke Mother     Cancer Father         lung mets brain and bone    Stroke Father     Cancer Maternal Grandmother         colon    High Blood Pressure Maternal Grandmother     Cancer Maternal Grandfather         colon     High Blood Pressure Maternal Grandfather     Kidney Disease Paternal Grandmother     Cancer Paternal Grandmother         breast     Diabetes Paternal Grandmother     High Blood Pressure Paternal Grandmother     Cancer Paternal Grandfather         lung-52    Heart Attack Paternal Grandfather     High Blood Pressure Paternal Grandfather     Cancer Paternal Aunt         colon    Heart Attack Paternal Aunt     Cancer Paternal Uncle         lung    Cancer Maternal Aunt         colon    Cancer Maternal Uncle         colon      Social History     Tobacco Use    Smoking status: Former Smoker     Packs/day: 1.00     Years: 35.00     Pack years: 35.00     Types: Cigarettes     Quit date: 2020     Years since quittin.2    Smokeless tobacco: Never Used   Substance Use Topics    Alcohol use: Not Currently      Current Outpatient Medications   Medication Sig Dispense Refill    potassium chloride (KLOR-CON M) 20 MEQ extended release tablet Take 1 tablet by mouth daily for 5 days 5 tablet 1    prochlorperazine (COMPAZINE) 5 MG tablet Take 1 tablet by mouth every 12 hours for 14 days Take 30 minutes before morning and afternoon dose of Xeloda 28 tablet 2    capecitabine (XELODA) 500 MG chemo tablet Take 1,250 mg/m2 by mouth 2 times daily      Handicap Placard MISC by Does not apply route Diagnosis: No primary diagnosis found.   Expiration Date: 2022 1 each 0    Handicap Placard MISC by Does not apply route Diagnosis: colon cancer, stage IIIA. Currently completing chemotherapy   Expiration: 6 months 1 each 0    sucralfate (CARAFATE) 1 GM tablet take 1 tablet by mouth twice a day FIVE TO 60 MINS BEFORE LUNCH AND DINNER      ferrous sulfate (IRON 325) 325 (65 Fe) MG tablet Take 1 tablet by mouth daily (with breakfast) 90 tablet 1    omeprazole (PRILOSEC OTC) 20 MG tablet Take 20 mg by mouth daily       ondansetron (ZOFRAN) 4 MG tablet Take 1 tablet by mouth every 6 hours as needed for Nausea or Vomiting (Patient not taking: Reported on 3/28/2022) 30 tablet 1    naloxegol (MOVANTIK) 12.5 MG TABS tablet Take 1 tablet by mouth every morning (Patient not taking: Reported on 4/13/2022) 30 tablet 0    dicyclomine (BENTYL) 10 MG capsule Take 2 capsules by mouth 3 times daily (Patient not taking: Reported on 2/16/2022) 120 capsule 0    Magic Mouthwash (MIRACLE MOUTHWASH) Swish and spit 5 mLs 4 times daily as needed for Irritation Mixture of 30mL diphenhydramine, 60mL maalox, 4g carafate as ratio (Patient not taking: Reported on 12/15/2021) 300 mL 0     No current facility-administered medications for this visit.      Facility-Administered Medications Ordered in Other Visits   Medication Dose Route Frequency Provider Last Rate Last Admin    sodium chloride flush 0.9 % injection 5-40 mL  5-40 mL IntraVENous PRN Cali Henderson MD   20 mL at 04/13/22 1307    heparin flush 100 UNIT/ML injection 500 Units  500 Units IntraCATHeter PRN Cali Henderson MD   500 Units at 04/13/22 1307      No Known Allergies   Health Maintenance   Topic Date Due    Shingles Vaccine (2 of 2) 11/18/2021    COVID-19 Vaccine (3 - Hesham risk 3-dose series) 02/07/2022    Depression Screen  07/23/2022    Pneumococcal 0-64 years Vaccine (2 - PCV) 07/23/2022    Colorectal Cancer Screen  09/01/2022    Low dose CT lung screening  09/02/2022    Lipid screen  07/23/2026    DTaP/Tdap/Td vaccine (2 - Td or Tdap) 07/23/2031    Flu vaccine  Completed    Hepatitis A vaccine  Aged Out    Hepatitis B vaccine  Aged Out    Hib vaccine  Aged Out    Meningococcal (ACWY) vaccine  Aged Out    Hepatitis C screen  Discontinued    HIV screen  Discontinued        Subjective:   Review of Systems   Constitutional: Negative for activity change, appetite change, fatigue and fever. HENT: Positive for dental problem. Negative for congestion, facial swelling, hearing loss, mouth sores, nosebleeds, sore throat, tinnitus and trouble swallowing. Eyes: Negative for discharge and visual disturbance. Respiratory: Negative for cough, chest tightness, shortness of breath and wheezing. Cardiovascular: Negative for chest pain, palpitations and leg swelling. Gastrointestinal: Negative for abdominal distention, abdominal pain, blood in stool, constipation, diarrhea, nausea, rectal pain and vomiting. Endocrine: Positive for cold intolerance. Negative for polydipsia and polyuria. Genitourinary: Negative for decreased urine volume, difficulty urinating, dysuria, flank pain, hematuria and urgency. Musculoskeletal: Negative for arthralgias, back pain, gait problem, joint swelling, myalgias and neck stiffness. Skin: Negative for color change, rash and wound. Neurological: Negative for dizziness, tremors, seizures, speech difficulty, weakness, light-headedness, numbness and headaches. Hematological: Negative for adenopathy. Does not bruise/bleed easily. Psychiatric/Behavioral: Negative for confusion and sleep disturbance. The patient is not nervous/anxious. Objective:   Physical Exam  Vitals reviewed. Constitutional:       General: He is not in acute distress. Appearance: He is well-developed. HENT:      Head: Normocephalic. Mouth/Throat:      Pharynx: No oropharyngeal exudate. Eyes:      General: No scleral icterus. Right eye: No discharge. Left eye: No discharge.       Pupils: Pupils are equal, round, and reactive to light. Neck:      Thyroid: No thyromegaly. Vascular: No JVD. Trachea: No tracheal deviation. Cardiovascular:      Rate and Rhythm: Normal rate. Heart sounds: Normal heart sounds. No murmur heard. No friction rub. No gallop. Pulmonary:      Effort: Pulmonary effort is normal. No respiratory distress. Breath sounds: Normal breath sounds. No stridor. No wheezing or rales. Chest:      Chest wall: No tenderness. Abdominal:      General: Bowel sounds are normal. There is no distension. Palpations: Abdomen is soft. There is no mass. Tenderness: There is no abdominal tenderness. There is no rebound. Musculoskeletal:         General: Normal range of motion. Cervical back: Normal range of motion and neck supple. Comments: Good range of motion in all four extremities. Lymphadenopathy:      Cervical: No cervical adenopathy. Skin:     General: Skin is warm. Findings: No erythema or rash. Neurological:      Mental Status: He is alert and oriented to person, place, and time. Cranial Nerves: No cranial nerve deficit. Motor: No abnormal muscle tone. Deep Tendon Reflexes: Reflexes are normal and symmetric. Psychiatric:         Behavior: Behavior normal.         Thought Content: Thought content normal.         Judgment: Judgment normal.         /84 (Site: Left Upper Arm, Position: Sitting, Cuff Size: Medium Adult)   Pulse 73   Temp 98.1 °F (36.7 °C) (Oral)   Resp 16   Ht 6' (1.829 m)   Wt 157 lb 12.8 oz (71.6 kg)   SpO2 98%   BMI 21.40 kg/m²      ECOG status is 0    Imaging studies and labs:   CT of the abdomen and pelvis on September 2, 2021 showed:   1. Unremarkable CT abdomen and pelvis with IV contrast.    CT of the chest on September 2, 2021 showed:  1. No suspicious pulmonary nodules. 2. No evidence of metastatic disease.    3. No acute infiltrate or pleural effusion       CT ABDOMEN PELVIS W IV CONTRAST Additional Contrast? None    Result Date: 10/21/2021  PROCEDURE: CT ABDOMEN PELVIS W IV CONTRAST CLINICAL INFORMATION: Abdominal pain. COMPARISON: CT abdomen and pelvis 9/2/2021. TECHNIQUE: Axial 5 mm CT images were obtained through the abdomen and pelvis after the administration of 80  cc Isovue 370 intravenous contrast. Coronal and sagittal reconstructions were obtained. All CT scans at this facility use dose modulation, iterative reconstruction, and/or weight-based dosing when appropriate to reduce radiation dose to as low as reasonably achievable. FINDINGS: Lung bases: Atelectasis is noted at the lung bases. The heart size is normal. Liver/gallbladder/bilary tree: Thickening of the wall of the gallbladder near the fundus could represent adenomyomatosis. No radiopaque gallstones or biliary ductal dilatation is observed. No liver lesions are identified. Pancreas/spleen/adrenal glands: Normal size and attenuation. Kidneys/ureters/bladder: No renal calculus, hydronephrosis, or hydroureter is present. The urinary bladder is unremarkable. Gastrointestinal:  Postoperative changes related to right hemicolectomy with a surgical anastomosis in the right lower quadrant appears intact. There is question area of intussusception of small bowel loops within the transverse colon at the site of the anastomosis (series 2, image 45). No bowel obstruction, free fluid, fluid collection, or free air is observed. Moderate retained fecal material is seen throughout the colon. Retroperitoneum/lymph nodes: The aorta is not dilated. No lymphadenopathy is present. Pelvis: The prostate gland is not enlarged. Musculoskeletal: The visualized skeletal structures appear intact. 1. Postoperative changes related to right hemicolectomy appear intact.  There may be intussusception of the small bowel within the colon at the site of the surgical anastomosis (series 2, image 45) however there is no associated bowel obstruction, free fluid, fluid collection, or free air identified. Moderate retained fecal material is seen throughout the colon suggesting fecal stasis. Assessment for bowel wall thickening is limited without oral contrast. 2. 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 No radiopaque gallstones or biliary ductal dilatation identified. Correlate with liver function tests. Chronic findings are discussed. **This report has been created using voice recognition software. It may contain minor errors which are inherent in voice recognition technology. ** Final report electronically signed by Dr Wilberto Wilkerson on 10/21/2021 10:02 AM      Lab Results   Component Value Date    WBC 5.1 04/13/2022    HGB 13.0 (L) 04/13/2022    HCT 37.4 (L) 04/13/2022    MCV 98 (H) 04/13/2022     04/13/2022       Chemistry        Component Value Date/Time     04/13/2022 0842     12/04/2021 0600    K 3.4 (L) 04/13/2022 0842    K 4.0 12/04/2021 0600    K 3.4 (L) 11/30/2021 0600     12/04/2021 0600    CO2 29 12/04/2021 0600    BUN 8 12/04/2021 0600    CREATININE 0.8 04/13/2022 0842    CREATININE 0.7 12/04/2021 0600        Component Value Date/Time    CALCIUM 7.7 (L) 12/04/2021 0600    ALKPHOS 134 (H) 04/13/2022 0842    AST 38 04/13/2022 0842    ALT 49 04/13/2022 0842    BILITOT 0.8 04/13/2022 0842          Assessment/Plan:   1. Stage III A colon cancer. This is a 74-year-old patient with newly diagnosed stage IIIa colon cancer. He is status post right hemicolectomy colectomy with lymph node dissection. I had a lengthy discussion with the patient and his wife about post surgical management. Adjuvant chemotherapy is indicated for the patients with lymph node involvement. For patients who have undergone potentially curative resection of a colon cancer, the goal of postoperative chemotherapy is to eradicate micrometastases, thereby reducing the likelihood of disease recurrence and increasing the cure rate.  The benefits of adjuvant chemotherapy for patients with node-positive disease are in the range of 30 % reduction in the risk of disease recurrence and a 20-30% reduction in mortality with modern chemotherapy. Adjuvant therapy for resected stage III colon cancer involves a six-month course of a combination of several chemotherapy drugs, which are given intravenously, in a specific order on specific days. However, based on the [facility] and NSABP C-07 trials and an analysis of the International Duration Evaluation of Adjuvant Chemotherapy (IDEA) collaboration (six separate randomized trials of six versus three months of adjuvant oxaliplatin-based therapy), we continue to suggest six months of therapy for individuals with high-risk cancers (T4N2). However, patients with high-risk cancers ramior limiting adjuvant therapy to three months in patients with low-risk disease (T1-3N1) is valid option. Orally active fluoropyrimidine,  Xeloda offers increased convenience. CAPOX is the preferred regimen if a 3-month course of adjuvant therapy is chosen. 2. Adjuvant chemotherapy with CAPOX. Received  cycle #1 of CAPOX on 11/09/2021. Cycle was complicated by hospitalization for diffuse acute enterocolitis. Patient required almost 1 week hospitalization. He required electrolytes correction antibiotic treatment and hydration. He also developed small bowel obstruction that was managed medically with NG tube placement. Patient reports that today he is significantly improved. He denies having any abdominal pain, no nausea no vomiting. He received cycle 2 on  12/15/2021. The dose of oxaliplatin was reduced from 130 mg/m² to 100 mg/m². The dose of Xeloda was reduced to 1000 mg p.o. twice daily for 14 days. He tolerated cycle #2 much better. Minimal nausea no vomiting no diarrhea no abdominal pain. Developed Palmar and plantar erythema with some skin peeling. Cycle #3 and 4 of XELOX, the dose of Oxaliplatin was increased to 110 mg/m², he continued Xeloda 3 tablets p.o. twice daily. Cycle #4 was reasonably well-tolerated,no diarrhea. The patient has very mild palmar plantar erythema, no skin peeling. He developed grade 1 peripheral neuropathy in his feet. Therefore with cycle #5 the dose of oxaliplatin was reduced to 90 mg/m². .  We will proceed with cycle #7 of XELOX today. He will continue the same dose of Xeloda 1000 mg p.o. twice a day for 14 days. Chemotherapy orders were reviewed and signed. The patient was instructed to take Zofran half an hour before the evening and morning dose of Xeloda. For breakthrough nausea he received prescription for Phenergan  Patient was instructed to call us if he develops any diarrhea. For mild diarrhea he was instructed to start taking Lomotil. This is his last cycle of chemotherapy  3. Hypokalemia. His potassium is 3.2 today. He was instructed to continue oral potassium supplementation. .  4. Gum and teeth pain. Might be related to oxaliplatin. Well controlled with Percocet          Diagnosis Orders   1. Adenocarcinoma, colon ([facility] [address]  CBC with Auto Differential    POC PANEL BMP W/IOCA    Hepatic Function Panel   2. Encounter for chemotherapy management     3. S/P right colectomy     4. Palmar erythema     5. Hypokalemia     6. Diarrhea due to drug          Plan:   Return in about 4 weeks (around 5/11/2022).      Orders Placed:   Orders Placed This Encounter   Procedures    CBC with Auto Differential     Standing Status:   Future     Number of Occurrences:   1     Standing Expiration Date:   4/13/2023    POC PANEL BMP W/IOCA     Standing Status:   Future     Number of Occurrences:   1     Standing Expiration Date:   4/13/2023    Hepatic Function Panel     Standing Status:   Future     Number of Occurrences:   1     Standing Expiration Date:   4/13/2023        Medications Prescribed:   Orders Placed This Encounter   Medications    potassium chloride (KLOR-CON M) 20 MEQ extended release tablet     Sig: Take 1 tablet by mouth daily for 5 days Dispense:  5 tablet     Refill:  1

## 2022-04-13 NOTE — PLAN OF CARE
Problem: Infection - Central Venous Catheter-Associated Bloodstream Infection:  Goal: Will show no infection signs and symptoms  Description: Will show no infection signs and symptoms  Outcome: Met This Shift  Note: Mediport site with no redness or warmth. Skin over port site intact with no signs of breakdown noted. Patient verbalizes signs/symptoms of port infection and when to notify the physician. Intervention: Central line needs assessment  Note: Discuss port maintenance,infection prevention, sign of infection and when to call MD.      Problem: Musculor/Skeletal Functional Status  Goal: Absence of falls  Outcome: Met This Shift  Note: No falls occurred with visit today. Intervention: Fall precautions  Note: Discussed the need to use the call light for assistance when getting up to ambulate. Problem: Intellectual/Education/Knowledge Deficit  Goal: Teaching initiated upon admission  Outcome: Met This Shift  Note: Patient verbalizes understanding to verbal information given on oxaliplatin,action and possible side effects. Aware to call MD if develop complications.     Intervention: Verbal/written education provided  Note: Chemotherapy Teaching     What is Chemotherapy   Drug action [x]   Method of Administration [x]   Handouts given []     Side Effects  Nausea/vomiting [x]   Diarrhea [x]   Fatigue [x]   Signs / Symptoms of infection [x]   Neutropenia [x]   Thrombocytopenia [x]   Alopecia [x]   neuropathy [x]   [place] diet &  the importance of fluids [x]       Micellaneous  Importance of nutrition [x]   Importance of oral hygiene [x]   When to call the MD [x]   Monitoring labs [x]   Use of supportive services []     Explanation of Drug Regimen / Frequency  oxaliplatin     Comments  Verbalized understanding to drug,action,side effects and when to call MD         Problem: Discharge Planning  Goal: Knowledge of discharge instructions  Description: Knowledge of discharge instructions  Outcome: Met This Shift  Note: Verbalize understanding of discharge instructions, follow up appointments, and when to call Physician. Intervention: Interaction with patient/family and care team  Note: Discuss understanding of discharge instructions, follow up appointments and when to call Physician. Care plan reviewed with patient. Patient verbalizes understanding of the plan of care and contributes to goal setting.

## 2022-04-13 NOTE — PATIENT INSTRUCTIONS
1. Proceed with the last dose of XELOX today  2. Instructed to take potassium 1 tablet daily for 5 days  3.   RTC to see me for labs: CBC, BMP, LFTs, CEA in 4 weeks

## 2022-04-13 NOTE — PROGRESS NOTES
Patient assessed for the following post chemotherapy:    Dizziness   No  Lightheadedness  No      Acute nausea/vomiting No  Headache   No  Chest pain/pressure  No  Rash/itching   No  Shortness of breath  No    Patient kept for 20 minutes observation post infusion chemotherapy. Patient tolerated chemotherapy treatment Eloxatin without any complications. Last vital signs:   BP (!) 145/84   Pulse 67   Temp 97.8 °F (36.6 °C) (Oral)   Resp 16   Ht 6' (1.829 m)   Wt 157 lb 12.8 oz (71.6 kg)   SpO2 97%   BMI 21.40 kg/m²         Patient instructed if experience any of the above symptoms following today's infusion,he/she is to notify MD immediately or go to the emergency department. Discharge instructions given to patient. Verbalizes understanding. Ambulated off unit per self with belongings.

## 2022-04-18 ENCOUNTER — TELEPHONE (OUTPATIENT)
Dept: ONCOLOGY | Age: 52
End: 2022-04-18

## 2022-04-18 NOTE — TELEPHONE ENCOUNTER
Annalise Walker called and left message that he wants to go back to work-he feels like he can. Is this OK with you, and if so when can he return? Please advise patient [phone].

## 2022-04-20 NOTE — TELEPHONE ENCOUNTER
Letter typed up for patient to return to work 4/25/22 per Dr Mary Anne Diaz. Patient notified and will  letter.

## 2022-04-21 RX ORDER — CAPECITABINE 500 MG/1
TABLET, FILM COATED ORAL
Qty: 112 TABLET | Refills: 1 | Status: ACTIVE | OUTPATIENT
Start: 2022-04-21 | End: 2022-06-01 | Stop reason: ALTCHOICE

## 2022-04-21 NOTE — TELEPHONE ENCOUNTER
Does patient need another course of this medicine? Please refer to Dr. Yadiel Lea last progress note.

## 2022-04-22 NOTE — PROGRESS NOTES
[address] [facility] Update    Date: 04/22/22    Patient's prescription benefits are being verified for coverage. Status update to follow as soon as possible. Please call us with any questions at [phone] opt.  2.

## 2022-04-22 NOTE — PROGRESS NOTES
[facility]    Date: 04/22/22    Medication is currently being filled at [facility] and has been routed there. Prior authorization effective through 11/4/22. Please call us with any questions at [phone] opt.  2.

## 2022-05-10 ASSESSMENT — ENCOUNTER SYMPTOMS
NAUSEA: 0
BACK PAIN: 0
VOMITING: 0
CHEST TIGHTNESS: 0
RECTAL PAIN: 0
EYE DISCHARGE: 0
SHORTNESS OF BREATH: 0
COUGH: 0
ABDOMINAL DISTENTION: 0
FACIAL SWELLING: 0
DIARRHEA: 0
WHEEZING: 0
COLOR CHANGE: 0
ABDOMINAL PAIN: 0
CONSTIPATION: 0
SORE THROAT: 0
TROUBLE SWALLOWING: 0
BLOOD IN STOOL: 0

## 2022-05-11 ENCOUNTER — HOSPITAL ENCOUNTER (OUTPATIENT)
Dept: INFUSION THERAPY | Age: 52
Discharge: HOME | End: 2022-05-11
Payer: COMMERCIAL

## 2022-05-11 ENCOUNTER — OFFICE VISIT (OUTPATIENT)
Dept: ONCOLOGY | Age: 52
End: 2022-05-11
Payer: COMMERCIAL

## 2022-05-11 VITALS
TEMPERATURE: 97.8 F | SYSTOLIC BLOOD PRESSURE: 123 MMHG | RESPIRATION RATE: 16 BRPM | HEART RATE: 54 BPM | OXYGEN SATURATION: 96 % | DIASTOLIC BLOOD PRESSURE: 68 MMHG

## 2022-05-11 VITALS
TEMPERATURE: 97.8 F | HEART RATE: 54 BPM | RESPIRATION RATE: 16 BRPM | OXYGEN SATURATION: 96 % | DIASTOLIC BLOOD PRESSURE: 68 MMHG | BODY MASS INDEX: 21.13 KG/M2 | SYSTOLIC BLOOD PRESSURE: 123 MMHG | HEIGHT: 72 IN | WEIGHT: 156 LBS

## 2022-05-11 DIAGNOSIS — Z90.49 S/P RIGHT COLECTOMY: ICD-10-CM

## 2022-05-11 DIAGNOSIS — Z92.21 STATUS POST CHEMOTHERAPY, TIME SINCE 4-12 WEEKS: ICD-10-CM

## 2022-05-11 DIAGNOSIS — Z87.891 PERSONAL HISTORY OF TOBACCO USE: ICD-10-CM

## 2022-05-11 DIAGNOSIS — C18.9 ADENOCARCINOMA, COLON (HCC): Primary | ICD-10-CM

## 2022-05-11 DIAGNOSIS — E87.6 HYPOKALEMIA: ICD-10-CM

## 2022-05-11 DIAGNOSIS — Z80.9 FAMILY HISTORY OF CANCER: ICD-10-CM

## 2022-05-11 DIAGNOSIS — F10.21 HISTORY OF ALCOHOLISM (HCC): ICD-10-CM

## 2022-05-11 DIAGNOSIS — C18.4 CANCER OF TRANSVERSE COLON (HCC): ICD-10-CM

## 2022-05-11 LAB
ABSOLUTE IMMATURE GRANULOCYTE: 0.01 THOU/MM3 (ref 0–0.07)
ALBUMIN SERPL-MCNC: 4.3 G/DL (ref 3.5–5.1)
ALP BLD-CCNC: 175 U/L (ref 38–126)
ALT SERPL-CCNC: 78 U/L (ref 11–66)
AST SERPL-CCNC: 47 U/L (ref 5–40)
BASINOPHIL, AUTOMATED: 1 % (ref 0–3)
BASOPHILS ABSOLUTE: 0 THOU/MM3 (ref 0–0.1)
BILIRUB SERPL-MCNC: 0.6 MG/DL (ref 0.3–1.2)
BILIRUBIN DIRECT: < 0.2 MG/DL (ref 0–0.3)
BUN, WHOLE BLOOD: 26 MG/DL (ref 8–26)
CHLORIDE, WHOLE BLOOD: 105 MEQ/L (ref 98–109)
CREATININE, WHOLE BLOOD: 1.2 MG/DL (ref 0.5–1.2)
EOSINOPHILS ABSOLUTE: 0.1 THOU/MM3 (ref 0–0.4)
EOSINOPHILS RELATIVE PERCENT: 2 % (ref 0–4)
GFR, ESTIMATED: 67 ML/MIN/1.73M2
GLUCOSE, WHOLE BLOOD: 96 MG/DL (ref 70–108)
HCT VFR BLD CALC: 36.1 % (ref 42–52)
HEMOGLOBIN: 12.2 GM/DL (ref 14–18)
IMMATURE GRANULOCYTES: 0 %
IONIZED CALCIUM, WHOLE BLOOD: 1.17 MMOL/L (ref 1.12–1.32)
LYMPHOCYTES # BLD: 29 % (ref 15–47)
LYMPHOCYTES ABSOLUTE: 1.6 THOU/MM3 (ref 1–4.8)
MCH RBC QN AUTO: 34.3 PG (ref 26–33)
MCHC RBC AUTO-ENTMCNC: 33.8 GM/DL (ref 32.2–35.5)
MCV RBC AUTO: 101 FL (ref 80–94)
MONOCYTES ABSOLUTE: 0.9 THOU/MM3 (ref 0.4–1.3)
MONOCYTES: 16 % (ref 0–12)
PDW BLD-RTO: 16.5 % (ref 11.5–14.5)
PLATELET # BLD: 154 THOU/MM3 (ref 130–400)
PMV BLD AUTO: 10 FL (ref 9.4–12.4)
POTASSIUM, WHOLE BLOOD: 3.5 MEQ/L (ref 3.5–4.9)
RBC # BLD: 3.56 MILL/MM3 (ref 4.7–6.1)
SEG NEUTROPHILS: 52 % (ref 43–75)
SEGMENTED NEUTROPHILS ABSOLUTE COUNT: 2.8 THOU/MM3 (ref 1.8–7.7)
SODIUM, WHOLE BLOOD: 141 MEQ/L (ref 138–146)
TOTAL CO2, WHOLE BLOOD: 26 MEQ/L (ref 23–33)
TOTAL PROTEIN: 6.1 G/DL (ref 6.1–8)
WBC # BLD: 5.4 THOU/MM3 (ref 4.8–10.8)

## 2022-05-11 PROCEDURE — 80047 BASIC METABLC PNL IONIZED CA: CPT

## 2022-05-11 PROCEDURE — 99214 OFFICE O/P EST MOD 30 MIN: CPT | Performed by: INTERNAL MEDICINE

## 2022-05-11 PROCEDURE — 6360000002 HC RX W HCPCS: Performed by: INTERNAL MEDICINE

## 2022-05-11 PROCEDURE — 80076 HEPATIC FUNCTION PANEL: CPT

## 2022-05-11 PROCEDURE — 36591 DRAW BLOOD OFF VENOUS DEVICE: CPT

## 2022-05-11 PROCEDURE — 2580000003 HC RX 258: Performed by: INTERNAL MEDICINE

## 2022-05-11 PROCEDURE — 85025 COMPLETE CBC W/AUTO DIFF WBC: CPT

## 2022-05-11 PROCEDURE — 99211 OFF/OP EST MAY X REQ PHY/QHP: CPT

## 2022-05-11 RX ORDER — SODIUM CHLORIDE 0.9 % (FLUSH) 0.9 %
5-40 SYRINGE (ML) INJECTION PRN
Status: CANCELLED | OUTPATIENT
Start: 2022-05-11

## 2022-05-11 RX ORDER — HEPARIN SODIUM (PORCINE) LOCK FLUSH IV SOLN 100 UNIT/ML 100 UNIT/ML
500 SOLUTION INTRAVENOUS PRN
Status: CANCELLED | OUTPATIENT
Start: 2022-05-11

## 2022-05-11 RX ORDER — SODIUM CHLORIDE 0.9 % (FLUSH) 0.9 %
5-40 SYRINGE (ML) INJECTION PRN
Status: DISCONTINUED | OUTPATIENT
Start: 2022-05-11 | End: 2022-05-12 | Stop reason: HOSPADM

## 2022-05-11 RX ORDER — HEPARIN SODIUM (PORCINE) LOCK FLUSH IV SOLN 100 UNIT/ML 100 UNIT/ML
500 SOLUTION INTRAVENOUS PRN
Status: DISCONTINUED | OUTPATIENT
Start: 2022-05-11 | End: 2022-05-12 | Stop reason: HOSPADM

## 2022-05-11 RX ORDER — SODIUM CHLORIDE 9 MG/ML
25 INJECTION, SOLUTION INTRAVENOUS PRN
Status: CANCELLED | OUTPATIENT
Start: 2022-05-11

## 2022-05-11 RX ADMIN — SODIUM CHLORIDE, PRESERVATIVE FREE 10 ML: 5 INJECTION INTRAVENOUS at 09:40

## 2022-05-11 RX ADMIN — SODIUM CHLORIDE, PRESERVATIVE FREE 10 ML: 5 INJECTION INTRAVENOUS at 08:32

## 2022-05-11 RX ADMIN — SODIUM CHLORIDE, PRESERVATIVE FREE 20 ML: 5 INJECTION INTRAVENOUS at 08:33

## 2022-05-11 RX ADMIN — HEPARIN 500 UNITS: 100 SYRINGE at 09:40

## 2022-05-11 NOTE — PLAN OF CARE
Problem: Infection - Central Venous Catheter-Associated Bloodstream Infection:  Goal: Will show no infection signs and symptoms  Description: Will show no infection signs and symptoms  Outcome: Adequate for Discharge  Note: Discuss port maintenance,infection prevention, sign of infection and when to call MD.   Intervention: Central line needs assessment  Note: Mediport site with no redness or warmth. Skin over port site intact with no signs of breakdown noted. Patient verbalizes signs/symptoms of port infection and when to notify the physician. Problem: Discharge Planning  Goal: Knowledge of discharge instructions  Description: Knowledge of discharge instructions  Outcome: Adequate for Discharge  Note: Verbalize understanding of discharge instructions, follow up appointments, and when to call Physician. Intervention: Interaction with patient/family and care team  Note: Discuss understanding of discharge instructions, follow up appointments and when to call Physician. Care plan reviewed with patient and family. Patient and family verbalize understanding of the plan of care and contribute to goal setting.

## 2022-05-11 NOTE — PROGRESS NOTES
[facility] [facility] [address]  [place] [address]  Dept: [phone]  Dept Fax: [phone]: [phone]    Visit Date:5/11/2022     Daysi Hernández is a 45 y.o. male who presents today for:   Chief Complaint   Patient presents with    Follow-up     Adenocarcinoma, colon ([address]        HPI:     Mr. Miracle Barraza is a 65-year-old patient with  stage III colon cancer. He is currently undergoing adjuvant chemotherapy. His history of colon cancer goes back to September 2021 when he was seen by  Dr. Tracy Perez due to unexplained weight loss of 10-15 lbs and rectal bleeding. Colonoscopy performed on September 1, 2021 demonstrated multiple polyps but a larger mass at the proximal transverse colon demonstrated the adenocarcinoma. He did not have signs of obstruction, no abdominal pain, no significant nausea or vomiting. Some previous episodes of nausea, GERD and epigastric discomfort. He had CT of the chest abdomen and pelvis on September 2, 2021,no evidence of metastatic disease. The patient met with Dr. Nomi Garcia and after discussing his surgical treatment options he proceeded with right hemicolectomy on September 16, 2021. Final pathology report showed:  A. Right colon mass, hemicolectomy:    Invasive moderately differentiated colonic adenocarcinoma, pT2. Margins are free of neoplasia. Pericolonic lymph nodes (17): 2 out of 17 are positive for   metastatic colonic adenocarcinoma. Appendix-no pathologic abnormality. B. Ileocolonic base lymph nodes (2), resection:             Negative for malignancy (0/2). pT2 pN1b    He had uneventful post surgical course. Extensive history of colorectal disease on paternal side.     On November 9, 2021 the patient received first cycle of adjuvant chemotherapy with Xeloda  On November 24, 2021 he was admitted to the hospital with a 1 week history of intractable periumbilical abdominal pain of 9/10 intensity. Associated nausea vomiting diarrhea and fever last few days. Diagnosed with acute enterocolitis, possible chemotherapy induced. IV Cipro & Flagyl during hospitalization. SBO vs ileus:General Surgery consulted. KUB showed possible SBO vs ileus. CT A/P showed worsening since previous. Managed medically with NGT. Advance diet slowly. Possible fluid overload: Lasix have been given for swelling. On discharge from the hospital on December 4, 2021 the patient was placed on the following medications:  Bentyl 3 times daily,-Magic mouthwash 4 times daily as needed, -ferrous sulfate daily, Zofran every 8 hours as needed,-omeprazole,Compazine every 6 hours as needed, Reglan 2 times daily. After being discharged from the hospital the patient presented to chemotherapy suite on few occasions for IV hydration and potassium supplementation. He also had outpatient dietary consult. Current history on 05/10/2022:  Patient presents to the medical oncology clinic for follow up visit after cycle # 7 of oxaliplatin and Xeloda. Continues having stable dental pain/cold intolerance. He developed mild skin redness and peeling. No skin cracks. Mainly involving his hands. No neuropathy. Denies fever, chills, nausea, vomiting, diarrhea, rashes, chest pain, or cough.      HPI   Past Medical History:   Diagnosis Date    Anemia     Colon cancer ([address] 09/2021      Past Surgical History:   Procedure Laterality Date    CHOLECYSTECTOMY, LAPAROSCOPIC N/A 3/8/2022    ROBOTIC LAPAROSCOPIC CHOLECYSTECTOMY performed by Bartolome Whittington MD at [place] [address]  09/01/2021    Dr. Benjiman Councilman ERCP N/A 02/08/2022    ERCP DIAGNOSTIC performed by Ely Gregorio MD at [facility]    ERCP  02/24/2022    with stent placement Dr. James Jonas Right 09/16/2021    ROBOT EXTENDED RIGHT COLECTOMY performed by Bartolome Whittington MD at [address] [place] [address] Dr Susannah Russ    [facility] with mesh inguinal    PORT SURGERY N/A 2021    SINGLE LUMEN SMART PORT INSERTION performed by Fior Kaplan MD at [facility] History   Problem Relation Age of Onset   [facility] Other Mother         Brain aneurysm    COPD Mother    [facility] Stroke Mother     Cancer Father         lung mets brain and bone    Stroke Father     Cancer Maternal Grandmother         colon    High Blood Pressure Maternal Grandmother     Cancer Maternal Grandfather         colon     High Blood Pressure Maternal Grandfather     Kidney Disease Paternal Grandmother     Cancer Paternal Grandmother         breast     Diabetes Paternal Grandmother     High Blood Pressure Paternal Grandmother     Cancer Paternal Grandfather         lung-52    Heart Attack Paternal Grandfather     High Blood Pressure Paternal Grandfather     Cancer Paternal Aunt         colon    Heart Attack Paternal Aunt     Cancer Paternal Uncle         lung    Cancer Maternal Aunt         colon    Cancer Maternal Uncle         colon      Social History     Tobacco Use    Smoking status: Former Smoker     Packs/day: 1.00     Years: 35.00     Pack years: 35.00     Types: Cigarettes     Quit date: 2020     Years since quittin.3    Smokeless tobacco: Never Used   Substance Use Topics    Alcohol use: Not Currently      Current Outpatient Medications   Medication Sig Dispense Refill    Handicap Placard MISC by Does not apply route Diagnosis: No primary diagnosis found. Expiration Date: 2022 1 each 0    Handicap Placard MISC by Does not apply route Diagnosis: colon cancer, stage IIIA. Currently completing chemotherapy   Expiration: 6 months 1 each 0    ferrous sulfate (IRON 325) 325 (65 Fe) MG tablet Take 1 tablet by mouth daily (with breakfast) 90 tablet 1    capecitabine (XELODA) 500 MG chemo tablet 4 tablets p.o. twice daily days 1 through 14.  (Patient not taking: Reported on 2022) 112 tablet 1    potassium chloride (KLOR-CON M) 20 MEQ extended release tablet Take 1 tablet by mouth daily for 5 days 5 tablet 1    prochlorperazine (COMPAZINE) 5 MG tablet Take 1 tablet by mouth every 12 hours for 14 days Take 30 minutes before morning and afternoon dose of Xeloda 28 tablet 2    ondansetron (ZOFRAN) 4 MG tablet Take 1 tablet by mouth every 6 hours as needed for Nausea or Vomiting (Patient not taking: Reported on 3/28/2022) 30 tablet 1    naloxegol (MOVANTIK) 12.5 MG TABS tablet Take 1 tablet by mouth every morning (Patient not taking: Reported on 4/13/2022) 30 tablet 0    dicyclomine (BENTYL) 10 MG capsule Take 2 capsules by mouth 3 times daily (Patient not taking: Reported on 2/16/2022) 120 capsule 0    Magic Mouthwash (MIRACLE MOUTHWASH) Swish and spit 5 mLs 4 times daily as needed for Irritation Mixture of 30mL diphenhydramine, 60mL maalox, 4g carafate as ratio (Patient not taking: Reported on 12/15/2021) 300 mL 0    sucralfate (CARAFATE) 1 GM tablet take 1 tablet by mouth twice a day FIVE TO 60 MINS BEFORE LUNCH AND DINNER (Patient not taking: Reported on 5/11/2022)      omeprazole (PRILOSEC OTC) 20 MG tablet Take 20 mg by mouth daily  (Patient not taking: Reported on 5/11/2022)       No current facility-administered medications for this visit.       No Known Allergies   Health Maintenance   Topic Date Due    Shingles vaccine (2 of 2) 11/18/2021    COVID-19 Vaccine (3 - Hesham risk 3-dose series) 02/07/2022    Depression Screen  07/23/2022    Pneumococcal 0-64 years Vaccine (2 - PCV) 07/23/2022    Low dose CT lung screening  08/07/2022    Colorectal Cancer Screen  09/01/2022    Lipids  07/23/2026    DTaP/Tdap/Td vaccine (2 - Td or Tdap) 07/23/2031    Flu vaccine  Completed    Hepatitis A vaccine  Aged Out    Hepatitis B vaccine  Aged Out    Hib vaccine  Aged Out    Meningococcal (ACWY) vaccine  Aged Out    Hepatitis C screen  Discontinued    HIV screen  Discontinued        Subjective:   Review of Systems   Constitutional: Negative for activity change, appetite change, fatigue and fever. HENT: Positive for dental problem. Negative for congestion, facial swelling, hearing loss, mouth sores, nosebleeds, sore throat, tinnitus and trouble swallowing. Eyes: Negative for discharge and visual disturbance. Respiratory: Negative for cough, chest tightness, shortness of breath and wheezing. Cardiovascular: Negative for chest pain, palpitations and leg swelling. Gastrointestinal: Negative for abdominal distention, abdominal pain, blood in stool, constipation, diarrhea, nausea, rectal pain and vomiting. Endocrine: Positive for cold intolerance. Negative for polydipsia and polyuria. Genitourinary: Negative for decreased urine volume, difficulty urinating, dysuria, flank pain, hematuria and urgency. Musculoskeletal: Negative for arthralgias, back pain, gait problem, joint swelling, myalgias and neck stiffness. Skin: Negative for color change, rash and wound. Neurological: Negative for dizziness, tremors, seizures, speech difficulty, weakness, light-headedness, numbness and headaches. Hematological: Negative for adenopathy. Does not bruise/bleed easily. Psychiatric/Behavioral: Negative for confusion and sleep disturbance. The patient is not nervous/anxious. Objective:   Physical Exam  Vitals reviewed. Constitutional:       General: He is not in acute distress. Appearance: He is well-developed. HENT:      Head: Normocephalic. Mouth/Throat:      Pharynx: No oropharyngeal exudate. Eyes:      General: No scleral icterus. Right eye: No discharge. Left eye: No discharge. Pupils: Pupils are equal, round, and reactive to light. Neck:      Thyroid: No thyromegaly. Vascular: No JVD. Trachea: No tracheal deviation. Cardiovascular:      Rate and Rhythm: Normal rate. Heart sounds: Normal heart sounds. No murmur heard. No friction rub. No gallop.     Pulmonary:      Effort: and sagittal reconstructions were obtained. All CT scans at this facility use dose modulation, iterative reconstruction, and/or weight-based dosing when appropriate to reduce radiation dose to as low as reasonably achievable. FINDINGS: Lung bases: Atelectasis is noted at the lung bases. The heart size is normal. Liver/gallbladder/bilary tree: Thickening of the wall of the gallbladder near the fundus could represent adenomyomatosis. No radiopaque gallstones or biliary ductal dilatation is observed. No liver lesions are identified. Pancreas/spleen/adrenal glands: Normal size and attenuation. Kidneys/ureters/bladder: No renal calculus, hydronephrosis, or hydroureter is present. The urinary bladder is unremarkable. Gastrointestinal:  Postoperative changes related to right hemicolectomy with a surgical anastomosis in the right lower quadrant appears intact. There is question area of intussusception of small bowel loops within the transverse colon at the site of the anastomosis (series 2, image 45). No bowel obstruction, free fluid, fluid collection, or free air is observed. Moderate retained fecal material is seen throughout the colon. Retroperitoneum/lymph nodes: The aorta is not dilated. No lymphadenopathy is present. Pelvis: The prostate gland is not enlarged. Musculoskeletal: The visualized skeletal structures appear intact. 1. Postoperative changes related to right hemicolectomy appear intact. There may be intussusception of the small bowel within the colon at the site of the surgical anastomosis (series 2, image 45) however there is no associated bowel obstruction, free fluid, fluid collection, or free air identified. Moderate retained fecal material is seen throughout the colon suggesting fecal stasis. Assessment for bowel wall thickening is limited without oral contrast. 2. 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 No radiopaque gallstones or biliary ductal dilatation identified.  Correlate with liver function tests. Chronic findings are discussed. **This report has been created using voice recognition software. It may contain minor errors which are inherent in voice recognition technology. ** Final report electronically signed by Dr Emeterio Prieto on 10/21/2021 10:02 AM      Lab Results   Component Value Date    WBC 5.4 05/11/2022    HGB 12.2 (L) 05/11/2022    HCT 36.1 (L) 05/11/2022     (H) 05/11/2022     05/11/2022       Chemistry        Component Value Date/Time     05/11/2022 0837     12/04/2021 0600    K 3.5 05/11/2022 0837    K 4.0 12/04/2021 0600    K 3.4 (L) 11/30/2021 0600     12/04/2021 0600    CO2 29 12/04/2021 0600    BUN 8 12/04/2021 0600    CREATININE 1.2 05/11/2022 0837    CREATININE 0.7 12/04/2021 0600        Component Value Date/Time    CALCIUM 7.7 (L) 12/04/2021 0600    ALKPHOS 175 (H) 05/11/2022 0837    AST 47 (H) 05/11/2022 0837    ALT 78 (H) 05/11/2022 0837    BILITOT 0.6 05/11/2022 0837          Assessment/Plan:   1. Stage III A colon cancer. This is a 21-year-old patient with newly diagnosed stage IIIa colon cancer. He is status post right hemicolectomy colectomy with lymph node dissection. I had a lengthy discussion with the patient and his wife about post surgical management. Adjuvant chemotherapy is indicated for the patients with lymph node involvement. For patients who have undergone potentially curative resection of a colon cancer, the goal of postoperative chemotherapy is to eradicate micrometastases, thereby reducing the likelihood of disease recurrence and increasing the cure rate. The benefits of adjuvant chemotherapy for patients with node-positive disease are in the range of 30 % reduction in the risk of disease recurrence and a 20-30% reduction in mortality with modern chemotherapy.  Adjuvant therapy for resected stage III colon cancer involves a six-month course of a combination of several chemotherapy drugs, which are given intravenously, in a specific order on specific days. However, based on the [facility] and NSABP C-07 trials and an analysis of the International Duration Evaluation of Adjuvant Chemotherapy (IDEA) collaboration (six separate randomized trials of six versus three months of adjuvant oxaliplatin-based therapy), six months of therapy for individuals with high-risk cancers (T4N2) is preferred. However, adjuvant chemotherapy can be limited to three months in patients with low-risk disease (T1-3N1). CAPOX is the preferred regimen if a 3-month course of adjuvant therapy is chosen. 2. Adjuvant chemotherapy with CAPOX. Received  cycle #1 of CAPOX on 11/09/2021. Cycle was complicated by hospitalization for diffuse acute enterocolitis. Patient required almost 1 week hospitalization. He required electrolytes correction antibiotic treatment and hydration. He also developed small bowel obstruction that was managed medically with NG tube placement. Patient reports that today he is significantly improved. He denies having any abdominal pain, no nausea no vomiting. He received cycle 2 on  12/15/2021. The dose of oxaliplatin was reduced from 130 mg/m² to 100 mg/m². The dose of Xeloda was reduced to 1000 mg p.o. twice daily for 14 days. He tolerated cycle #2 much better. Minimal nausea no vomiting no diarrhea no abdominal pain. Developed Palmar and plantar erythema with some skin peeling. Cycle #3 and 4 of XELOX, the dose of Oxaliplatin was increased to 110 mg/m², he continued Xeloda 3 tablets p.o. twice daily. Cycle #4 was reasonably well-tolerated,no diarrhea. The patient has very mild palmar plantar erythema, no skin peeling. He developed grade 1 peripheral neuropathy in his feet. Therefore with cycle #5 the dose of oxaliplatin was reduced to 90 mg/m². He completed 7 cycles of chemo on April 13, 2022 . Denies any lingering side effects from chemotherapy with the exception of teeth sensitivity to cold. Diarrhea is resolved.   He wants his Mediport to be removed referral to Dr. Ally Eastman office is made. CT of the chest abdomen and pelvis within the next week or 2 weeks. RTC in 3 months to see Ghazala Ureña for labs: CBC, BMP, LFTs, CEA  3. Hypokalemia. His potassium is 3.5 today. He was instructed to continue oral potassium supplementation. .  4. Gum and teeth pain. Related to to oxaliplatin. Well controlled with Percocet  5. Colon cancer surveillance. he will have clinical evaluation every three to six months for the first three years, and every six months during years 4 and 5. Most colon cancer recurrences develop within the first three years, failure beyond three years is not uncommon, particularly in patients treated for locally advanced rectal cancer. During follow-up visit he will have labs including: CBC, BMP, LFTs and CEA. All patients with colon or rectal cancer should undergo a repeat colonoscopy one year after primary resection of colon or rectal cancer to exclude new lesions, and if normal, subsequent follow-up intervals of three to five years depending on the results of the prior colonoscopy. Annual CT scans of the chest and abdomen will be performed for at least three years. Diagnosis Orders   1. Adenocarcinoma, colon ([facility] [address]  CBC with Auto Differential    POC PANEL BMP W/IOCA    Hepatic Function Panel    CT ABDOMEN PELVIS W IV CONTRAST Additional Contrast? None    CT CHEST W CONTRAST   2. S/P right colectomy     3. Hypokalemia     4.  Status post chemotherapy, time since 4-12 weeks          Plan:        Orders Placed:   Orders Placed This Encounter   Procedures    CT ABDOMEN PELVIS W IV CONTRAST Additional Contrast? None     Standing Status:   Future     Standing Expiration Date:   5/11/2023     Order Specific Question:   Additional Contrast?     Answer:   None     Order Specific Question:   STAT Creatinine as needed:     Answer:   No    CT CHEST W CONTRAST     Standing Status:   Future     Standing Expiration Date: 5/11/2023     Order Specific Question:   STAT Creatinine as needed:     Answer:   No    CBC with Auto Differential     Standing Status:   Future     Number of Occurrences:   1     Standing Expiration Date:   5/10/2023    POC PANEL BMP W/IOCA     Standing Status:   Future     Number of Occurrences:   1     Standing Expiration Date:   5/10/2023    Hepatic Function Panel     Standing Status:   Future     Number of Occurrences:   1     Standing Expiration Date:   5/10/2023        Medications Prescribed:   No orders of the defined types were placed in this encounter.

## 2022-05-11 NOTE — PATIENT INSTRUCTIONS
1.  Contact Dr. Cohen  office to remove the port  2. CT of the chest abdomen and pelvis within the next week or 2 weeks  3.   RTC in 3 months to see Bobbe Gowers for labs: CBC, BMP, LFTs, CEA

## 2022-05-27 ENCOUNTER — HOSPITAL ENCOUNTER (OUTPATIENT)
Dept: CT IMAGING | Age: 52
Discharge: HOME | End: 2022-05-27
Payer: COMMERCIAL

## 2022-05-27 DIAGNOSIS — C18.9 ADENOCARCINOMA, COLON (HCC): ICD-10-CM

## 2022-05-27 PROCEDURE — 6360000004 HC RX CONTRAST MEDICATION: Performed by: INTERNAL MEDICINE

## 2022-05-27 PROCEDURE — 71260 CT THORAX DX C+: CPT

## 2022-05-27 PROCEDURE — 74177 CT ABD & PELVIS W/CONTRAST: CPT

## 2022-05-27 PROCEDURE — 6360000002 HC RX W HCPCS: Performed by: RADIOLOGY

## 2022-05-27 RX ORDER — HEPARIN SODIUM (PORCINE) LOCK FLUSH IV SOLN 100 UNIT/ML 100 UNIT/ML
500 SOLUTION INTRAVENOUS ONCE
Status: COMPLETED | OUTPATIENT
Start: 2022-05-27 | End: 2022-05-27

## 2022-05-27 RX ADMIN — IOPAMIDOL 80 ML: 755 INJECTION, SOLUTION INTRAVENOUS at 14:50

## 2022-05-27 RX ADMIN — HEPARIN SODIUM (PORCINE) LOCK FLUSH IV SOLN 100 UNIT/ML 500 UNITS: 100 SOLUTION at 15:09

## 2022-06-01 ENCOUNTER — PROCEDURE VISIT (OUTPATIENT)
Dept: SURGERY | Age: 52
End: 2022-06-01
Payer: COMMERCIAL

## 2022-06-01 VITALS
RESPIRATION RATE: 18 BRPM | HEIGHT: 72 IN | SYSTOLIC BLOOD PRESSURE: 118 MMHG | TEMPERATURE: 97.7 F | OXYGEN SATURATION: 97 % | HEART RATE: 62 BPM | DIASTOLIC BLOOD PRESSURE: 62 MMHG | BODY MASS INDEX: 21.16 KG/M2

## 2022-06-01 DIAGNOSIS — C18.4 CANCER OF TRANSVERSE COLON (HCC): Primary | ICD-10-CM

## 2022-06-01 PROCEDURE — 36589 REMOVAL TUNNELED CV CATH: CPT | Performed by: SURGERY

## 2022-06-02 ASSESSMENT — ENCOUNTER SYMPTOMS
EYE ITCHING: 0
CONSTIPATION: 0
COLOR CHANGE: 0
SINUS PRESSURE: 0
ABDOMINAL DISTENTION: 0
ALLERGIC/IMMUNOLOGIC NEGATIVE: 1
ANAL BLEEDING: 0
APNEA: 0
RHINORRHEA: 0
ABDOMINAL PAIN: 0
SORE THROAT: 0
WHEEZING: 0
CHOKING: 0
CHEST TIGHTNESS: 0
STRIDOR: 0
BACK PAIN: 0
RECTAL PAIN: 0
DIARRHEA: 0
VOICE CHANGE: 0
PHOTOPHOBIA: 0
SHORTNESS OF BREATH: 0
NAUSEA: 0
BLOOD IN STOOL: 0
COUGH: 0
EYE DISCHARGE: 0
EYE REDNESS: 0
EYE PAIN: 0
VOMITING: 0
FACIAL SWELLING: 0
TROUBLE SWALLOWING: 0

## 2022-06-02 NOTE — PROGRESS NOTES
Soren Dove (:  1970)     ASSESSMENT:  1. 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 Status post robotic cholecystectomy (2022)   3. Status post right tunneled subclavian vein single-lumen port insertion (2021)     PLAN:  1. Removal tunneled right subclavian vein single-lumen port in procedure room today. See procedure note below for details. 2.  Incisional/wound care  3. Pain control  4. Restrictions discussed  5. Follow-up with oncology as directed  6. Follow-up in 10-14 days  7. Signs and symptoms reviewed with patient that would be concerning and need him to return to office for re-evaluation. Patient states He will call if He has questions or concerns. SUBJECTIVE/OBJECTIVE:    Chief Complaint   Patient presents with    Procedure     port removal-ok per Dr Gia GREGG  Siva Watt is a 58-year-old male who presents for removal of port. This was placed 2021 right tunneled subclavian vein single-lumen port secondary to colon adenocarcinoma. He underwent robotic extended right colectomy 2021. Has completed adjuvant chemotherapy. Is now in need of port removal.  Denies any pain or discomfort with the port. No significant abdominal pain. Normal bowel function. Some neuropathy and cold intolerance. No urinary complaints. Feeling a lot better since the gallbladder has been removed. No fever, chills or sweats. No lightheadedness or dizziness. Getting his energy back. No lightheadedness or dizziness. Following with oncology but at this time is cancer free. Final pathology report showed:  A.  Right colon mass, hemicolectomy:    Invasive moderately differentiated colonic adenocarcinoma, pT2.    Margins are free of neoplasia.              Pericolonic lymph nodes (17): 2 out of 17 are positive for   metastatic colonic adenocarcinoma.             Appendix-no pathologic abnormality. D[place] base lymph nodes (2), resection:             Negative for malignancy (0/2). pT2 pN1b    Review of Systems   Constitutional: Negative for activity change, appetite change, chills, diaphoresis, fatigue, fever and unexpected weight change. HENT: Negative for congestion, dental problem, drooling, ear discharge, ear pain, facial swelling, hearing loss, mouth sores, nosebleeds, postnasal drip, rhinorrhea, sinus pressure, sneezing, sore throat, tinnitus, trouble swallowing and voice change. Eyes: Negative for photophobia, pain, discharge, redness, itching and visual disturbance. Respiratory: Negative for apnea, cough, choking, chest tightness, shortness of breath, wheezing and stridor. Cardiovascular: Negative for chest pain, palpitations and leg swelling. Gastrointestinal: Negative for abdominal distention, abdominal pain, anal bleeding, blood in stool, constipation, diarrhea, nausea, rectal pain and vomiting. Endocrine: Negative. Genitourinary: Negative for decreased urine volume, difficulty urinating, dysuria, enuresis, flank pain, frequency, genital sores, hematuria, penile discharge, penile pain, penile swelling, scrotal swelling, testicular pain and urgency. Musculoskeletal: Negative for arthralgias, back pain, gait problem, joint swelling, myalgias, neck pain and neck stiffness. Skin: Negative for color change, pallor, rash and wound. Allergic/Immunologic: Negative. Neurological: Negative for dizziness, tremors, seizures, syncope, facial asymmetry, speech difficulty, weakness, light-headedness, numbness and headaches. Hematological: Negative for adenopathy. Does not bruise/bleed easily. Psychiatric/Behavioral: Negative for agitation, behavioral problems, confusion, decreased concentration, dysphoric mood, hallucinations, self-injury, sleep disturbance and suicidal ideas. The patient is not nervous/anxious and is not hyperactive.         Past Medical History:   Diagnosis Date    Anemia     Colon cancer ([address] 09/2021       Past Surgical History:   Procedure Laterality Date    CHOLECYSTECTOMY, LAPAROSCOPIC N/A 3/8/2022    ROBOTIC LAPAROSCOPIC CHOLECYSTECTOMY performed by Jessica Dubose MD at [address] [place] [address]  09/01/2021    Dr. Roxi Sexton ERCP N/A 02/08/2022    ERCP DIAGNOSTIC performed by Erik Iqbal MD at [facility]    ERCP  02/24/2022    with stent placement Dr. Ruiz Perez Right 09/16/2021    ROBOT EXTENDED RIGHT [facility]LECT[facility]Y [facility] [address] [facility]    [facility] with mesh inguinal    PORT SURGERY N/A 11/08/2021    SINGLE LUMEN SMART PORT INSERTION performed by Jessica Dubose MD at [facility]       Current Outpatient Medications   Medication Sig Dispense Refill    Handicap Placard [facility] by Does not apply route Diagnosis: colon cancer, stage IIIA. Currently completing chemotherapy   Expiration: 6 months 1 each 0     No current facility-administered medications for this visit.        No Known Allergies    Family History   Problem Relation Age of Onset    Other Mother         Brain aneurysm    COPD Mother    Franca Regino Stroke Mother    Franca Regino Cancer Father         lung mets brain and bone    Stroke Father     Cancer Maternal Grandmother         colon    High Blood Pressure Maternal Grandmother     Cancer Maternal Grandfather         colon     High Blood Pressure Maternal Grandfather     Kidney Disease Paternal Grandmother     Cancer Paternal Grandmother         breast     Diabetes Paternal Grandmother     High Blood Pressure Paternal Grandmother     Cancer Paternal Grandfather         lung-52    Heart Attack Paternal Grandfather     High Blood Pressure Paternal Grandfather     Cancer Paternal Aunt         colon    Heart Attack Paternal Aunt     Cancer Paternal Uncle         lung    Cancer Maternal Aunt         colon    Cancer Maternal Uncle  Unstable Housing in the Last Year: Not on file     Vitals:    06/01/22 1115   BP: 118/62   Site: Left Upper Arm   Position: Sitting   Cuff Size: Medium Adult   Pulse: 62   Resp: 18   Temp: 97.7 °F (36.5 °C)   TempSrc: Temporal   SpO2: 97%   Height: 6' (1.829 m)     Body mass index is 21.16 kg/m². Wt Readings from Last 3 Encounters:   05/11/22 156 lb (70.8 kg)   04/13/22 157 lb 12.8 oz (71.6 kg)   04/13/22 157 lb 12.8 oz (71.6 kg)     Physical Exam  Vitals reviewed. Constitutional:       General: He is not in acute distress. Appearance: He is well-developed. He is not diaphoretic. HENT:      Head: Normocephalic and atraumatic. Right Ear: External ear normal.      Left Ear: External ear normal.      Nose: Nose normal.   Eyes:      General: No scleral icterus. Right eye: No discharge. Left eye: No discharge. Conjunctiva/sclera: Conjunctivae normal.   Cardiovascular:      Rate and Rhythm: Normal rate and regular rhythm. Heart sounds: Normal heart sounds. Pulmonary:      Effort: Pulmonary effort is normal. No respiratory distress. Breath sounds: Normal breath sounds. No wheezing or rales. Chest:      Chest wall: No tenderness. Abdominal:      General: Bowel sounds are normal. There is no distension. Palpations: Abdomen is soft. There is no mass. Tenderness: There is no abdominal tenderness. There is no guarding or rebound. Musculoskeletal:         General: No tenderness. Normal range of motion. Cervical back: Normal range of motion and neck supple. Skin:     General: Skin is warm and dry. Coloration: Skin is not pale. Findings: No erythema or rash. Neurological:      Mental Status: He is alert and oriented to person, place, and time. Cranial Nerves: No cranial nerve deficit. Psychiatric:         Behavior: Behavior normal.         Thought Content:  Thought content normal.         Judgment: Judgment normal.       Lab Results Component Value Date    WBC 5.4 05/11/2022    HGB 12.2 (L) 05/11/2022    HCT 36.1 (L) 05/11/2022     (H) 05/11/2022     05/11/2022     Lab Results   Component Value Date     05/11/2022    K 3.5 05/11/2022     12/04/2021    CO2 29 12/04/2021     Lab Results   Component Value Date    CREATININE 1.2 05/11/2022     Lab Results   Component Value Date    ALT 78 (H) 05/11/2022    AST 47 (H) 05/11/2022    ALKPHOS 175 (H) 05/11/2022    BILITOT 0.6 05/11/2022     Lab Results   Component Value Date    LIPASE 23.3 11/29/2021       Patient Active Problem List   Diagnosis    Personal history of tobacco use    History of [address]ism [facility]    Weight loss    Bradycardia    Heartburn    Varicose veins of both lower extremities    Family history of cancer    Adenocarcinoma, colon ([facility]    Cancer of transverse colon ([facility]    S/P right colectomy    Hypokalemia    Diarrhea due to drug    Enterocolitis    Severe protein-calorie malnutrition (HCC)    Chronic cholecystitis due to gallbladder calculus with obstruction     Procedure Note:    Preoperative Diagnosis: 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 TALLEY [facility]  Anesthesia: Local -lidocaine with epinephrine and sodium bicarb  Complications: none  Indication of Procedure: As above  Procedure: Yvrose Hodges was taken back to the procedure room. Consent obtained. Timeout occurred. He wished to proceed with port removal.  Placed supine on procedure room table. Area of concern prepped and draped in usual sterile standard fashion. Tolerated local anesthetic well without side effects or complaints. Transverse incision made over the previous scar with 15 blade scalpel. Sharp dissection down to the port itself. Dissected out in its entirety. Removed. Discarded.   Pressure held for 5 minutes. No backbleeding. No other abnormality seen in the wound bed. Deep dermal tissues reapproximated with intermittent 3-0 Vicryl suture. Skin reapproximated with a running 3-0 Vicryl suture in a subcuticular fashion. Close incision was then cleaned dried and Steri-Strips applied. Dry sterile dressings applied. Sponge needle and instrumentation counts correct at the end the procedure. Patient tolerated the procedure well no apparent complications and only about 3 cc blood loss. Left procedure room in stable condition. Narrative   PROCEDURE: CT ABDOMEN PELVIS W IV CONTRAST, CT CHEST W CONTRAST       CLINICAL INFORMATION: Adenocarcinoma colon.       COMPARISON: CT abdomen and pelvis 11/28/2021. CT chest 9/2/2021.       TECHNIQUE: Axial 5 mm CT images were obtained through the chest, abdomen and pelvis after the administration of 80  cc Isovue 370 intravenous contrast. Coronal and sagittal reconstructions were obtained.       All CT scans at this facility use dose modulation, iterative reconstruction, and/or weight-based dosing when appropriate to reduce radiation dose to as low as reasonably achievable.       FINDINGS:        Heart/mediastinum: The heart size is normal. No pericardial effusion is observed. The aorta is not dilated. No aortic aneurysm or dissection is present. A prominent right hilar lymph node (series 3, image 29) measures 17 mm in short axis (previously    measured 16 mm). No additional mediastinal, hilar, or axillary lymphadenopathy is observed. The thyroid gland is unremarkable. A right chest Port-A-Cath is observed.       Lungs: Centrilobular emphysema and scarring at the lung apices is unchanged. No focal consolidation, pleural effusion, or pneumothorax is visualized. No pulmonary mass or nodules are observed. Dependent atelectasis at the lung bases is unchanged.       Liver/gallbladder/bilary tree: No liver lesions are identified. No biliary ductal dilatation is observed.  The gallbladder is absent.       Pancreas: Normal.   Spleen : Normal.   Adrenal glands: Thickening of the left adrenal gland is stable. The right adrenal gland is unremarkable.       Kidneys/ ureters/ bladder: No renal calculus, hydronephrosis, or hydroureter is present. The urinary bladder is partially distended and grossly unremarkable.       Gastrointestinal:  Postoperative changes related to right hemicolectomy with a surgical anastomosis in the transverse colon appears intact. Mild residual fecal material is seen throughout the colon. No bowel obstruction, free fluid, fluid collection, or    free air is observed. A small sliding-type hiatal hernia is unchanged.       Retroperitoneum / lymph nodes: The aorta is not dilated. No lymphadenopathy is visualized.       Pelvis: The prostate gland is nonenlarged.       Musculoskeletal: The visualized skeletal structures appear unremarkable. No bony lesions are observed.           Impression   1. No new or suspicious pulmonary mass or nodule. A stable prominent right hilar lymph node measures 17 mm in short axis (previously measured 16 mm). No acute intrathoracic process is observed.       2. No acute findings are noted in the abdomen/pelvis. Postoperative changes related to right hemicolectomy are stable. No metastatic disease is identified. Stable chronic findings are discussed.               **This report has been created using voice recognition software.  It may contain minor errors which are inherent in voice recognition technology. **       Final report electronically signed by Dr Alex Payne on 5/27/2022 4:41 PM     Narrative   PROCEDURE: CT ABDOMEN PELVIS W IV CONTRAST, CT CHEST W CONTRAST       CLINICAL INFORMATION: Adenocarcinoma colon.       COMPARISON: CT abdomen and pelvis 11/28/2021.  CT chest 9/2/2021.       TECHNIQUE: Axial 5 mm CT images were obtained through the chest, abdomen and pelvis after the administration of 80  cc Isovue 370 intravenous contrast. Coronal and sagittal reconstructions were obtained.       All CT scans at this facility use dose modulation, iterative reconstruction, and/or weight-based dosing when appropriate to reduce radiation dose to as low as reasonably achievable.       FINDINGS:        Heart/mediastinum: The heart size is normal. No pericardial effusion is observed. The aorta is not dilated. No aortic aneurysm or dissection is present. A prominent right hilar lymph node (series 3, image 29) measures 17 mm in short axis (previously    measured 16 mm). No additional mediastinal, hilar, or axillary lymphadenopathy is observed. The thyroid gland is unremarkable. A right chest Port-A-Cath is observed.       Lungs: Centrilobular emphysema and scarring at the lung apices is unchanged. No focal consolidation, pleural effusion, or pneumothorax is visualized. No pulmonary mass or nodules are observed. Dependent atelectasis at the lung bases is unchanged.       Liver/gallbladder/bilary tree: No liver lesions are identified. No biliary ductal dilatation is observed. The gallbladder is absent.       Pancreas: Normal.   Spleen : Normal.   Adrenal glands: Thickening of the left adrenal gland is stable. The right adrenal gland is unremarkable.       Kidneys/ ureters/ bladder: No renal calculus, hydronephrosis, or hydroureter is present. The urinary bladder is partially distended and grossly unremarkable.       Gastrointestinal:  Postoperative changes related to right hemicolectomy with a surgical anastomosis in the transverse colon appears intact. Mild residual fecal material is seen throughout the colon. No bowel obstruction, free fluid, fluid collection, or    free air is observed. A small sliding-type hiatal hernia is unchanged.       Retroperitoneum / lymph nodes: The aorta is not dilated. No lymphadenopathy is visualized.       Pelvis: The prostate gland is nonenlarged.       Musculoskeletal: The visualized skeletal structures appear unremarkable.  No bony lesions are observed.           Impression   1. No new or suspicious pulmonary mass or nodule. A stable prominent right hilar lymph node measures 17 mm in short axis (previously measured 16 mm). No acute intrathoracic process is observed.       2. No acute findings are noted in the abdomen/pelvis. Postoperative changes related to right hemicolectomy are stable. No metastatic disease is identified. Stable chronic findings are discussed.               **This report has been created using voice recognition software.  It may contain minor errors which are inherent in voice recognition technology. **       Final report electronically signed by Dr Rupert Wright on 5/27/2022 4:41 PM       An electronic signature was used to authenticate this note.     --Manuel Cisneros MD

## 2022-06-23 ENCOUNTER — TELEPHONE (OUTPATIENT)
Dept: FAMILY MEDICINE CLINIC | Age: 52
End: 2022-06-23

## 2022-06-23 DIAGNOSIS — C18.9 ADENOCARCINOMA, COLON (HCC): Primary | ICD-10-CM

## 2022-06-23 NOTE — TELEPHONE ENCOUNTER
----- Message from Latanya Romero sent at 6/23/2022  8:46 AM EDT -----  Subject: Message to Provider    QUESTIONS  Information for Provider? Pt states that he is wondering what his next   step should be and that his health has changed since his last visit. Pt   states that his  moved back to [place] and he is currently   seeking an different . Please call pt back  ---------------------------------------------------------------------------  --------------  CALL BACK INFO  What is the best way for the office to contact you? OK to leave message on   voicemail  Preferred Call Back Phone Number? [phone]  ---------------------------------------------------------------------------  --------------  SCRIPT ANSWERS  Relationship to Patient?  Self

## 2022-06-27 NOTE — TELEPHONE ENCOUNTER
Chatted with patient when here for wife's appointment. Referral placed. Encouraged follow-up for well check. Please schedule. Reporting in remission.

## 2022-07-14 ENCOUNTER — TELEPHONE (OUTPATIENT)
Dept: FAMILY MEDICINE CLINIC | Age: 52
End: 2022-07-14

## 2022-07-14 ENCOUNTER — OFFICE VISIT (OUTPATIENT)
Dept: FAMILY MEDICINE CLINIC | Age: 52
End: 2022-07-14
Payer: COMMERCIAL

## 2022-07-14 VITALS
SYSTOLIC BLOOD PRESSURE: 112 MMHG | DIASTOLIC BLOOD PRESSURE: 60 MMHG | WEIGHT: 156 LBS | BODY MASS INDEX: 21.13 KG/M2 | OXYGEN SATURATION: 97 % | RESPIRATION RATE: 16 BRPM | HEART RATE: 60 BPM | HEIGHT: 72 IN

## 2022-07-14 DIAGNOSIS — Z00.00 ENCOUNTER FOR WELL ADULT EXAM WITHOUT ABNORMAL FINDINGS: Primary | ICD-10-CM

## 2022-07-14 DIAGNOSIS — Z20.5 EXPOSURE TO HEPATITIS C: ICD-10-CM

## 2022-07-14 PROCEDURE — 99396 PREV VISIT EST AGE 40-64: CPT | Performed by: FAMILY MEDICINE

## 2022-07-14 ASSESSMENT — ENCOUNTER SYMPTOMS
NAUSEA: 0
EYE PAIN: 0
COUGH: 0
BACK PAIN: 0
SHORTNESS OF BREATH: 0
CONSTIPATION: 0

## 2022-07-14 ASSESSMENT — PATIENT HEALTH QUESTIONNAIRE - PHQ9
SUM OF ALL RESPONSES TO PHQ QUESTIONS 1-9: 0
SUM OF ALL RESPONSES TO PHQ QUESTIONS 1-9: 0
1. LITTLE INTEREST OR PLEASURE IN DOING THINGS: 0
SUM OF ALL RESPONSES TO PHQ9 QUESTIONS 1 & 2: 0
SUM OF ALL RESPONSES TO PHQ QUESTIONS 1-9: 0
SUM OF ALL RESPONSES TO PHQ QUESTIONS 1-9: 0
2. FEELING DOWN, DEPRESSED OR HOPELESS: 0

## 2022-07-14 NOTE — PROGRESS NOTES
Well Adult Note  Name: Remi Connelly Date: 2022   MRN: [id number] Sex: Male   Age: 45 y.o. Ethnicity: Non- / Non    : 1970 Race: White (non-)      Karuna Smith is here for well adult exam.  Here with his wife   History:    Di Jaramillo is a 44 y/o M with a PMHx of gallbladder removal, anemia and colon cancer status post right colectomy and chemotherapy(via infusion and pills) comes to the clinic for follow up. He is 3 months out of cancer treatment, was seen at the [address] [place] [address] and states he is doing well. Still having some side effects from therapy such as dental sensitivity with warm foods. Mentioned he is looking for a dentist. But states that other symptoms from therapy, such as peripheral neuropathy have subsided. Today he states he has some varicose veins, and has a family history of varicose veins as well. States that he previously had a procedure done on his left leg, and is now experiencing varicose veins in his right leg. He has had many jobs (car sales, concrete industry, and now industry ) which keeps him physically active and busy. He states that in his current job he stands for long periods of time and climbs many stairs, in which he experiences leg discomfort and fatigue after a long shift. States he will follow up with a vein clinic. He also mentioned some ocular symptoms such as seeing halos, and blurriness during night driving, driving a car with a tint helps him drive. Will start seeing an optometrist.     He was previously also taking supplements that he would cycle through: glucosamine, L- aminos, creatine, etc. Thinking of restarting them as he becomes more active, and is further out from his cancer treatment. Review of Systems   Constitutional: Negative for chills, diaphoresis, fatigue and fever. HENT: Negative for ear pain. Eyes: Negative for pain.    Respiratory: Negative for cough and shortness of breath. Cardiovascular: Negative for chest pain and leg swelling. Gastrointestinal: Negative for constipation and nausea. Some bloating   Genitourinary: Negative for dysuria, flank pain and testicular pain. Musculoskeletal: Negative for back pain. Neurological: Negative for syncope. Psychiatric/Behavioral: Negative.         No Known Allergies      Prior to Visit Medications    Not on File         Past Medical History:   Diagnosis Date    Anemia     Colon cancer ([address] 09/2021       Past Surgical History:   Procedure Laterality Date    CHOLECYSTECTOMY, LAPAROSCOPIC N/A 3/8/2022    ROBOTIC LAPAROSCOPIC CHOLECYSTECTOMY performed by Lore Morrissey MD at [place] [address]  09/01/2021    Dr. Patel Romero ERCP N/A 02/08/2022    ERCP DIAGNOSTIC performed by Silvia Henderson MD at [facility] End[facility]    ERCP  02/24/2022    with stent placement Dr. Adrián Hunter Right 09/16/2021    ROBOT EXTENDED RIGHT COLECTOMY performed by Lore Morrissey MD at [address] Left 2020    [facility] with mesh inguinal    PORT SURGERY N/A 11/08/2021    SINGLE LUMEN SMART PORT INSERTION performed by Lore Morrissey MD at [facility] History   Problem Relation Age of Onset    Other Mother         Brain aneurysm    COPD Mother     Stroke Mother     Cancer Father         lung mets brain and bone    Stroke Father     Cancer Maternal Grandmother         colon    High Blood Pressure Maternal Grandmother     Cancer Maternal Grandfather         colon     High Blood Pressure Maternal Grandfather     Kidney Disease Paternal Grandmother     Cancer Paternal Grandmother         breast     Diabetes Paternal Grandmother     High Blood Pressure Paternal Grandmother     Cancer Paternal Grandfather         lung-52    Heart Attack Paternal Grandfather     High Blood Pressure Paternal Grandfather     Cancer Paternal Aunt         colon    Heart Attack Paternal Aunt     Cancer Paternal Uncle         lung    Cancer Maternal Aunt         colon    Cancer Maternal Uncle         colon       Social History     Tobacco Use    Smoking status: Former Smoker     Packs/day: 1.00     Years: 35.00     Pack years: 35.00     Types: Cigarettes     Quit date: 2020     Years since quittin.4    Smokeless tobacco: Never Used   Vaping Use    Vaping Use: Never used   Substance Use Topics    Alcohol use: Not Currently    Drug use: No       Objective   /60 (Site: Left Upper Arm, Position: Sitting)   Pulse 60   Resp 16   Ht 6' (1.829 m)   Wt 156 lb (70.8 kg)   SpO2 97%   BMI 21.16 kg/m²   Wt Readings from Last 3 Encounters:   22 156 lb (70.8 kg)   22 156 lb (70.8 kg)   22 157 lb 12.8 oz (71.6 kg)     There were no vitals filed for this visit. Physical Exam  Constitutional:       Appearance: Normal appearance. HENT:      Head: Normocephalic. Cardiovascular:      Rate and Rhythm: Regular rhythm. Heart sounds: No murmur heard. No gallop. Pulmonary:      Effort: Pulmonary effort is normal.      Breath sounds: Normal breath sounds. No wheezing, rhonchi or rales. Abdominal:      General: There is no distension. Tenderness: There is no abdominal tenderness. Musculoskeletal:      Comments: Varicosities in the Left calf region. Previously had a varicose vein treated in his right calf. Neurological:      Mental Status: He is alert. Psychiatric:         Attention and Perception: Attention normal.         Mood and Affect: Mood normal.         Speech: Speech normal.           Assessment   Plan   1. Encounter for well adult exam without abnormal findings  Patient is doing well after right colectomy and chemotherapy, was here to reestablish a baseline of care.  States he will start following up with vision and dental, as he is back to work and has insurance coverage that will help him with all visits and screenings. Will recheck lipid panel in future. -     Lipid, Fasting; Future  2. Exposure to hepatitis C  Last HCV screen was negative, will recheck/ trend because of exposure to + HCV partner.  -     Hepatitis C Antibody; Future         Personalized Preventive Plan   Current Health Maintenance Status  Immunization History   Administered Date(s) Administered    COVID-19, J&J, (age 18y+), IM, 0.5 mL 05/17/2021, 12/13/2021    Influenza, MDCK Quadv, IM, PF (Flucelvax 2 yrs and older) 09/23/2021    Pneumococcal Polysaccharide ([id number]) 07/23/2021    Tdap (Boostrix, Adacel) 07/23/2021    Zoster Recombinant (Shingrix) 09/23/2021        Health Maintenance   Topic Date Due    Shingles vaccine (2 of 2) 11/18/2021    COVID-19 Vaccine (3 - Hesham risk 3-dose series) 02/07/2022    Depression Screen  07/23/2022    Pneumococcal 0-64 years Vaccine (2 - PCV) 07/23/2022    Low dose CT lung screening  08/07/2022    Colorectal Cancer Screen  09/01/2022    Flu vaccine (1) 09/01/2022    Lipids  07/23/2026    DTaP/Tdap/Td vaccine (2 - Td or Tdap) 07/23/2031    Hepatitis A vaccine  Aged Out    Hepatitis B vaccine  Aged Out    Hib vaccine  Aged Out    Meningococcal (ACWY) vaccine  Aged Out    Hepatitis C screen  Discontinued    HIV screen  Discontinued     Recommendations for [facility] Due: see orders and patient instructions/AVS.    No follow-ups on file.

## 2022-07-14 NOTE — PATIENT INSTRUCTIONS
Well Visit, Men 48 to 72: Care Instructions  Overview     Well visits can help you stay healthy. Your doctor has checked your overall health and may have suggested ways to take good care of yourself. Your doctor also may have recommended tests. At home, you can help prevent illness withhealthy eating, regular exercise, and other steps. Follow-up care is a key part of your treatment and safety. Be sure to make and go to all appointments, and call your doctor if you are having problems. It's also a good idea to know your test results and keep alist of the medicines you take. How can you care for yourself at home?  Get screening tests that you and your doctor decide on. Screening helps find diseases before any symptoms appear.  Eat healthy foods. Choose fruits, vegetables, whole grains, protein, and low-fat dairy foods. Limit fat, especially saturated fat. Reduce salt in your diet.  Limit alcohol. Have no more than 2 drinks a day or 14 drinks a week.  Get at least 30 minutes of exercise on most days of the week. Walking is a good choice. You also may want to do other activities, such as running, swimming, cycling, or playing tennis or team sports.  Reach and stay at a healthy weight. This will lower your risk for many problems, such as obesity, diabetes, heart disease, and high blood pressure.  Do not smoke. Smoking can make health problems worse. If you need help quitting, talk to your doctor about stop-smoking programs and medicines. These can increase your chances of quitting for good.  Care for your mental health. It is easy to get weighed down by worry and stress. Learn strategies to manage stress, like deep breathing and mindfulness, and stay connected with your family and community. If you find you often feel sad or hopeless, talk with your doctor. Treatment can help.  Talk to your doctor about whether you have any risk factors for sexually transmitted infections (STIs).  You can help prevent STIs if you wait to have sex with a new partner (or partners) until you've each been tested for STIs. It also helps if you use condoms (male or female condoms) and if you limit your sex partners to one person who only has sex with you. Vaccines are available for some STIs.  If it's important to you to prevent pregnancy with your partner, talk with your doctor about birth control options that might be best for you.  If you think you may have a problem with alcohol or drug use, talk to your doctor. This includes prescription medicines (such as amphetamines and opioids) and illegal drugs (such as cocaine and methamphetamine). Your doctor can help you figure out what type of treatment is best for you.  Protect your skin from too much sun. When you're outdoors from 10 a.m. to 4 p.m., stay in the shade or cover up with clothing and a hat with a wide brim. Wear sunglasses that block UV rays. Even when it's cloudy, put broad-spectrum sunscreen (SPF 30 or higher) on any exposed skin.  See a dentist one or two times a year for checkups and to have your teeth cleaned.  Wear a seat belt in the car. When should you call for help? Watch closely for changes in your health, and be sure to contact your doctor if you have any problems or symptoms that concern you. Where can you learn more? Go to https://chisakeb.[employer]. org and sign in to your [employer] account. Enter L977 in the [facility] box to learn more about \"Well Visit, Men 48 to 72: Care Instructions. \"     If you do not have an account, please click on the \"Sign Up Now\" link. Current as of: October 6, 2021               Content Version: 13.3  © 1130-8859 Healthwise, [employer]. Care instructions adapted under license by [facility] ([facility]).  If you have questions about a medical condition or this instruction, always ask your healthcare professional. [address]  any warranty or liability for your use of this information.

## 2022-07-14 NOTE — PROGRESS NOTES
Attending attestation:  I personally performed and participated key or critical portions of the evaluation and management including personally performing the exam and medical decision making. I verify the accuracy of the documentation by the resident with the following addition or changes: Here for well check. Reports doing well with colon cancer. Scans are clear. Back to work after cancer diagnosis. Noticing some varicose veins. 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 Years ago had vein work on legs in [place]. Mainly just a bit of fatigue in the leg, this is periodic. Will look into vein clinic. Mild anemia. Mildly elevated liver function testing. Following with oncology. Will repeat Hep C and lipids on next lab draw since partner positive. Discussed shingles and COVID-19 vaccine. Will get at pharmacy. No red flags re: vision.           Electronically signed by Vandana Stuart MD on 7/14/2022 at 11:14 AM

## 2022-08-01 DIAGNOSIS — C18.9 ADENOCARCINOMA, COLON (HCC): Primary | ICD-10-CM

## 2022-08-01 DIAGNOSIS — Z90.49 S/P RIGHT COLECTOMY: ICD-10-CM

## 2022-08-02 ENCOUNTER — HOSPITAL ENCOUNTER (OUTPATIENT)
Dept: INFUSION THERAPY | Age: 52
Discharge: HOME | End: 2022-08-02
Payer: COMMERCIAL

## 2022-08-02 ENCOUNTER — OFFICE VISIT (OUTPATIENT)
Dept: ONCOLOGY | Age: 52
End: 2022-08-02
Payer: COMMERCIAL

## 2022-08-02 VITALS
OXYGEN SATURATION: 99 % | SYSTOLIC BLOOD PRESSURE: 118 MMHG | BODY MASS INDEX: 21.29 KG/M2 | HEIGHT: 72 IN | TEMPERATURE: 97.8 F | DIASTOLIC BLOOD PRESSURE: 69 MMHG | WEIGHT: 157.2 LBS | RESPIRATION RATE: 18 BRPM | HEART RATE: 57 BPM

## 2022-08-02 VITALS
RESPIRATION RATE: 18 BRPM | WEIGHT: 157.2 LBS | HEART RATE: 57 BPM | TEMPERATURE: 97.8 F | OXYGEN SATURATION: 99 % | DIASTOLIC BLOOD PRESSURE: 69 MMHG | BODY MASS INDEX: 21.29 KG/M2 | HEIGHT: 72 IN | SYSTOLIC BLOOD PRESSURE: 118 MMHG

## 2022-08-02 DIAGNOSIS — Z90.49 S/P RIGHT COLECTOMY: ICD-10-CM

## 2022-08-02 DIAGNOSIS — Z85.038 ENCOUNTER FOR FOLLOW-UP SURVEILLANCE OF COLON CANCER: ICD-10-CM

## 2022-08-02 DIAGNOSIS — C18.9 ADENOCARCINOMA, COLON (HCC): ICD-10-CM

## 2022-08-02 DIAGNOSIS — Z08 ENCOUNTER FOR FOLLOW-UP SURVEILLANCE OF COLON CANCER: ICD-10-CM

## 2022-08-02 DIAGNOSIS — C18.9 ADENOCARCINOMA, COLON (HCC): Primary | ICD-10-CM

## 2022-08-02 LAB
ABSOLUTE IMMATURE GRANULOCYTE: 0 THOU/MM3 (ref 0–0.07)
ALBUMIN SERPL-MCNC: 4.3 G/DL (ref 3.5–5.1)
ALP BLD-CCNC: 91 U/L (ref 38–126)
ALT SERPL-CCNC: 34 U/L (ref 11–66)
AST SERPL-CCNC: 28 U/L (ref 5–40)
BASINOPHIL, AUTOMATED: 0 % (ref 0–3)
BASOPHILS ABSOLUTE: 0 THOU/MM3 (ref 0–0.1)
BILIRUB SERPL-MCNC: 0.6 MG/DL (ref 0.3–1.2)
BILIRUBIN DIRECT: < 0.2 MG/DL (ref 0–0.3)
BUN, WHOLE BLOOD: 14 MG/DL (ref 8–26)
CEA: 2.7 NG/ML (ref 0–5)
CHLORIDE, WHOLE BLOOD: 107 MEQ/L (ref 98–109)
CREATININE, WHOLE BLOOD: 1 MG/DL (ref 0.5–1.2)
EOSINOPHILS ABSOLUTE: 0.1 THOU/MM3 (ref 0–0.4)
EOSINOPHILS RELATIVE PERCENT: 1 % (ref 0–4)
GFR, ESTIMATED: 83 ML/MIN/1.73M2
GLUCOSE, WHOLE BLOOD: 80 MG/DL (ref 70–108)
HCT VFR BLD CALC: 41.2 % (ref 42–52)
HEMOGLOBIN: 13.8 GM/DL (ref 14–18)
IMMATURE GRANULOCYTES: 0 %
IONIZED CALCIUM, WHOLE BLOOD: 1.19 MMOL/L (ref 1.12–1.32)
LYMPHOCYTES # BLD: 25 % (ref 15–47)
LYMPHOCYTES ABSOLUTE: 1.7 THOU/MM3 (ref 1–4.8)
MCH RBC QN AUTO: 31.1 PG (ref 26–33)
MCHC RBC AUTO-ENTMCNC: 33.5 GM/DL (ref 32.2–35.5)
MCV RBC AUTO: 93 FL (ref 80–94)
MONOCYTES ABSOLUTE: 0.6 THOU/MM3 (ref 0.4–1.3)
MONOCYTES: 9 % (ref 0–12)
PDW BLD-RTO: 12.3 % (ref 11.5–14.5)
PLATELET # BLD: 161 THOU/MM3 (ref 130–400)
PMV BLD AUTO: 9.8 FL (ref 9.4–12.4)
POTASSIUM, WHOLE BLOOD: 3.4 MEQ/L (ref 3.5–4.9)
RBC # BLD: 4.44 MILL/MM3 (ref 4.7–6.1)
SEG NEUTROPHILS: 64 % (ref 43–75)
SEGMENTED NEUTROPHILS ABSOLUTE COUNT: 4.3 THOU/MM3 (ref 1.8–7.7)
SODIUM, WHOLE BLOOD: 144 MEQ/L (ref 138–146)
TOTAL CO2, WHOLE BLOOD: 30 MEQ/L (ref 23–33)
TOTAL PROTEIN: 6.3 G/DL (ref 6.1–8)
WBC # BLD: 6.8 THOU/MM3 (ref 4.8–10.8)

## 2022-08-02 PROCEDURE — 85025 COMPLETE CBC W/AUTO DIFF WBC: CPT

## 2022-08-02 PROCEDURE — 82378 CARCINOEMBRYONIC ANTIGEN: CPT

## 2022-08-02 PROCEDURE — 80076 HEPATIC FUNCTION PANEL: CPT

## 2022-08-02 PROCEDURE — 36415 COLL VENOUS BLD VENIPUNCTURE: CPT

## 2022-08-02 PROCEDURE — 80047 BASIC METABLC PNL IONIZED CA: CPT

## 2022-08-02 PROCEDURE — 99211 OFF/OP EST MAY X REQ PHY/QHP: CPT

## 2022-08-02 PROCEDURE — 99213 OFFICE O/P EST LOW 20 MIN: CPT | Performed by: PHYSICIAN ASSISTANT

## 2022-08-02 RX ORDER — POTASSIUM CHLORIDE 20 MEQ/1
20 TABLET, EXTENDED RELEASE ORAL DAILY
Qty: 90 TABLET | Refills: 1 | Status: SHIPPED | OUTPATIENT
Start: 2022-08-02

## 2022-08-02 NOTE — PROGRESS NOTES
[facility], [address], [address] [facility] [address]  [address] [address]  Dept: [phone]  Dept Fax: [phone]: [phone]      Visit Date:8/2/2022     Ruth Wiley is a 45 y.o. male who presents today for:   Chief Complaint   Patient presents with    Follow-up     Adenocarcinoma, colon ([address]        HPI:   Ruth Wiley is a 45 y.o. male previously followed by Dr. Madi Ulloa for stage III colon cancer. Per Dr. Clara Choi note on 5/11/2022: His history of colon cancer goes back to September 2021 when he was seen by  Dr. Mi Horne due to unexplained weight loss of 10-15 lbs and rectal bleeding. Colonoscopy performed on September 1, 2021 demonstrated multiple polyps but a larger mass at the proximal transverse colon demonstrated the adenocarcinoma. He did not have signs of obstruction, no abdominal pain, no significant nausea or vomiting. Some previous episodes of nausea, GERD and epigastric discomfort. He had CT of the chest abdomen and pelvis on September 2, 2021,no evidence of metastatic disease. The patient met with Dr. Nicho Kiser and after discussing his surgical treatment options he proceeded with right hemicolectomy on September 16, 2021. Final pathology report showed:  A. Right colon mass, hemicolectomy:    Invasive moderately differentiated colonic adenocarcinoma, pT2. Margins are free of neoplasia. Pericolonic lymph nodes (17): 2 out of 17 are positive for   metastatic colonic adenocarcinoma. Appendix-no pathologic abnormality. B. Ileocolonic base lymph nodes (2), resection:             Negative for malignancy (0/2). pT2 pN1b     Extensive history of colorectal disease on paternal side. On November 9, 2021 the patient received first cycle of adjuvant chemotherapy with Xeloda. On November 24, 2021 he was admitted to the hospital with a 1 week history of intractable periumbilical abdominal pain of 9/10 intensity.   Associated nausea vomiting diarrhea and fever last few days. Diagnosed with acute enterocolitis, possible chemotherapy induced. IV Cipro & Flagyl during hospitalization. SBO vs ileus:General Surgery consulted. KUB showed possible SBO vs ileus. CT A/P showed worsening since previous. Managed medically with NGT. Advance diet slowly. Possible fluid overload: Lasix have been given for swelling. On discharge from the hospital on December 4, 2021 the patient was placed on the following medications:  Bentyl 3 times daily,-Magic mouthwash 4 times daily as needed, -ferrous sulfate daily, Zofran every 8 hours as needed,-omeprazole,Compazine every 6 hours as needed, Reglan 2 times daily. After being discharged from the hospital the patient presented to chemotherapy suite on few occasions for IV hydration and potassium supplementation. He also had outpatient dietary consult. Interval History 8/2/2022:   The patient is here for follow up evaluation. He was evaluated by Dr. Carlos Ulrich on 5/10/22 after completion of 7 cycles of oxaliplatin and Xeloda. He was recommended surveillance. He had MediPort removal per Dr. Cassandra Velasco. The patient states overall he has been feeling well since his last visit. He denies fever, chills, or recent infection. Denies chest pain, shortness of breath, cough, abdominal pain, nausea, vomiting, or urinary changes. He affirms chronic loose stools since his surgery but denies any change over the last few months. Denies any abnormal bleeding. Denies peripheral neuropathy or edema. He affirms continued cold/hot sensitivity in his mouth. PMH, SH, and FH:  I reviewed the patients medication list and allergy list as noted on the electronic medical record. The PMH, SH and FH were also reviewed as noted on the EMR. Review of Systems:   Review of Systems   Pertinent review of systems noted in HPI, all other ROS negative.    Objective:   Physical Exam   /69 (Site: Left Upper Arm, Position: Sitting, Cuff Size: Medium Adult)   Pulse 57   Temp 97.8 °F (36.6 °C) (Oral)   Resp 18   Ht 6' (1.829 m)   Wt 157 lb 3.2 oz (71.3 kg)   SpO2 99%   BMI 21.32 kg/m²    General appearance: No apparent distress, well developed, and cooperative. HEENT: Pupils equal, round, and reactive to light. Conjunctivae/corneas clear. Neck: Supple, with full range of motion. Trachea midline. Respiratory:  Normal respiratory effort. Clear to auscultation, bilaterally without Rales/Wheezes/Rhonchi. Cardiovascular: Regular rate and rhythm with normal S1/S2  Abdomen: Soft, active bowel sounds. Musculoskeletal: No clubbing, cyanosis or edema bilaterally. Ambulates in the office. Skin: Skin color, texture, turgor normal.  No visible rashes or lesions. Neurologic:  Neurovascularly intact without any focal sensory/motor deficits. Psychiatric: Alert and oriented      Imaging Studies and Labs:   CBC:   Lab Results   Component Value Date    WBC 6.8 08/02/2022    HGB 13.8 (L) 08/02/2022    HCT 41.2 (L) 08/02/2022    MCV 93 08/02/2022     08/02/2022     BMP:   Lab Results   Component Value Date/Time     08/02/2022 08:59 AM     12/04/2021 06:00 AM    K 3.4 08/02/2022 08:59 AM    K 4.0 12/04/2021 06:00 AM    K 3.4 11/30/2021 06:00 AM     12/04/2021 06:00 AM    CO2 29 12/04/2021 06:00 AM    BUN 8 12/04/2021 06:00 AM    CREATININE 1.0 08/02/2022 08:59 AM    CREATININE 0.7 12/04/2021 06:00 AM    GLUCOSE 87 12/04/2021 06:00 AM    CALCIUM 7.7 12/04/2021 06:00 AM      LFT:   Lab Results   Component Value Date    ALT 78 (H) 05/11/2022    AST 47 (H) 05/11/2022    ALKPHOS 175 (H) 05/11/2022    BILITOT 0.6 05/11/2022         Assessment and Plan:   1. Stage III A colon cancer. He is status post right hemicolectomy colectomy with lymph node dissection. CAPOX is the preferred regimen if a 3-month course of adjuvant therapy is chosen. Patient began adjuvant chemotherapy on November 9, 2021. He completed 7 cycles on 4/13/22. 2. Adjuvant chemotherapy with CAPOX. Received  cycle #1 of CAPOX on 11/09/2021. Cycle was complicated by hospitalization for diffuse acute enterocolitis. Patient required almost 1 week hospitalization. He required electrolytes correction antibiotic treatment and hydration. He also developed small bowel obstruction that was managed medically with NG tube placement. He received cycle 2 on  12/15/2021. The dose of oxaliplatin was reduced from 130 mg/m² to 100 mg/m². The dose of Xeloda was reduced to 1000 mg p.o. twice daily for 14 days. He tolerated cycle #2 much better. Minimal nausea no vomiting no diarrhea no abdominal pain. Developed Palmar and plantar erythema with some skin peeling. Cycle #3 and 4 of XELOX, the dose of Oxaliplatin was increased to 110 mg/m², he continued Xeloda 3 tablets p.o. twice daily. Cycle #4 was reasonably well-tolerated,no diarrhea. The patient has very mild palmar plantar erythema, no skin peeling. He developed grade 1 peripheral neuropathy in his feet. Therefore with cycle #5 the dose of oxaliplatin was reduced to 90 mg/m². He completed 7 cycles of chemo on April 13, 2022 . Denies any lingering side effects from chemotherapy with the exception of teeth sensitivity to cold. CT of chest was completed on 5/27/2022 showed no acute intrathoracic process observed. No new or suspicious pulmonary mass or nodule. Stable prominent right hilar lymph node measures 17 mm in short axis. CT of abdomen/pelvis obtained on 5/27/22 showed no acute findings are noted in the abdomen/pelvis. Postoperative changes related to the right hemicolectomy are stable. No metastatic disease is identified. 3.  Hypokalemia  His potassium is 3.4 today. Prescription for Klor-Con 20 mEq daily sent to pharmacy.   Reviewed foods high in potassium list.  4. Colon cancer surveillance  Reviewed colon cancer surveillance with patient including clinical evaluation every three to six months for the first three years, and every six months during years 4 and 5. All patients with colon or rectal cancer should undergo a repeat colonoscopy one year after primary resection of colon or rectal cancer to exclude new lesions, and if normal, subsequent follow-up intervals of three to five years depending on the results of the prior colonoscopy. He is due for colonoscopy. -Will make follow up with GI, Dr. Westley Phoenix, for colonoscopy     Return to clinic in 3 months    All patient questions answered. Pt voiced understanding. Patient agreed with treatment plan. Follow up as directed. Patient instructed to call for questions or concerns.          Electronically signed by   Linda Ruano PA-C

## 2022-08-02 NOTE — PATIENT INSTRUCTIONS
Return to clinic in 3 months with Dr. Kody Issa  CBC, BMP, LFT, CEA on RTC  Please make follow up with Dr. Karina Orozco for screening colonoscopy  Please call for questions or concerns.

## 2022-10-04 ENCOUNTER — TELEPHONE (OUTPATIENT)
Dept: FAMILY MEDICINE CLINIC | Age: 52
End: 2022-10-04

## 2022-11-02 DIAGNOSIS — C18.9 ADENOCARCINOMA, COLON (HCC): Primary | ICD-10-CM

## 2022-11-03 ENCOUNTER — HOSPITAL ENCOUNTER (OUTPATIENT)
Dept: INFUSION THERAPY | Age: 52
Discharge: HOME | End: 2022-11-03
Payer: COMMERCIAL

## 2022-11-03 ENCOUNTER — OFFICE VISIT (OUTPATIENT)
Dept: ONCOLOGY | Age: 52
End: 2022-11-03
Payer: COMMERCIAL

## 2022-11-03 VITALS
RESPIRATION RATE: 12 BRPM | HEART RATE: 54 BPM | TEMPERATURE: 97.8 F | SYSTOLIC BLOOD PRESSURE: 124 MMHG | DIASTOLIC BLOOD PRESSURE: 68 MMHG

## 2022-11-03 VITALS
SYSTOLIC BLOOD PRESSURE: 124 MMHG | RESPIRATION RATE: 12 BRPM | HEART RATE: 54 BPM | HEIGHT: 72 IN | OXYGEN SATURATION: 98 % | DIASTOLIC BLOOD PRESSURE: 68 MMHG | BODY MASS INDEX: 21.26 KG/M2 | WEIGHT: 157 LBS

## 2022-11-03 DIAGNOSIS — D50.0 IRON DEFICIENCY ANEMIA DUE TO CHRONIC BLOOD LOSS: ICD-10-CM

## 2022-11-03 DIAGNOSIS — Z85.038 ENCOUNTER FOR FOLLOW-UP SURVEILLANCE OF COLON CANCER: ICD-10-CM

## 2022-11-03 DIAGNOSIS — C18.9 ADENOCARCINOMA, COLON (HCC): ICD-10-CM

## 2022-11-03 DIAGNOSIS — Z08 ENCOUNTER FOR FOLLOW-UP SURVEILLANCE OF COLON CANCER: ICD-10-CM

## 2022-11-03 DIAGNOSIS — C18.9 ADENOCARCINOMA, COLON (HCC): Primary | ICD-10-CM

## 2022-11-03 LAB
ABSOLUTE IMMATURE GRANULOCYTE: 0.03 THOU/MM3 (ref 0–0.07)
ALBUMIN SERPL-MCNC: 4.3 G/DL (ref 3.5–5.1)
ALP BLD-CCNC: 351 U/L (ref 38–126)
ALT SERPL-CCNC: 178 U/L (ref 11–66)
AST SERPL-CCNC: 60 U/L (ref 5–40)
BASINOPHIL, AUTOMATED: 1 % (ref 0–3)
BASOPHILS ABSOLUTE: 0.1 THOU/MM3 (ref 0–0.1)
BILIRUB SERPL-MCNC: 0.5 MG/DL (ref 0.3–1.2)
BILIRUBIN DIRECT: < 0.2 MG/DL (ref 0–0.3)
BUN, WHOLE BLOOD: 24 MG/DL (ref 8–26)
CEA: 2.8 NG/ML (ref 0–5)
CHLORIDE, WHOLE BLOOD: 105 MEQ/L (ref 98–109)
CREATININE, WHOLE BLOOD: 1 MG/DL (ref 0.5–1.2)
EOSINOPHILS ABSOLUTE: 0.1 THOU/MM3 (ref 0–0.4)
EOSINOPHILS RELATIVE PERCENT: 2 % (ref 0–4)
GFR SERPL CREATININE-BSD FRML MDRD: > 60 ML/MIN/1.73M2
GLUCOSE, WHOLE BLOOD: 106 MG/DL (ref 70–108)
HCT VFR BLD CALC: 41.1 % (ref 42–52)
HEMOGLOBIN: 13.6 GM/DL (ref 14–18)
IMMATURE GRANULOCYTES: 0 %
IONIZED CALCIUM, WHOLE BLOOD: 1.1 MMOL/L (ref 1.12–1.32)
LYMPHOCYTES # BLD: 27 % (ref 15–47)
LYMPHOCYTES ABSOLUTE: 2.2 THOU/MM3 (ref 1–4.8)
MCH RBC QN AUTO: 29.9 PG (ref 26–33)
MCHC RBC AUTO-ENTMCNC: 33.1 GM/DL (ref 32.2–35.5)
MCV RBC AUTO: 90 FL (ref 80–94)
MONOCYTES ABSOLUTE: 0.7 THOU/MM3 (ref 0.4–1.3)
MONOCYTES: 8 % (ref 0–12)
PDW BLD-RTO: 13.1 % (ref 11.5–14.5)
PLATELET # BLD: 226 THOU/MM3 (ref 130–400)
PMV BLD AUTO: 9.7 FL (ref 9.4–12.4)
POTASSIUM, WHOLE BLOOD: 3.6 MEQ/L (ref 3.5–4.9)
RBC # BLD: 4.55 MILL/MM3 (ref 4.7–6.1)
SEG NEUTROPHILS: 62 % (ref 43–75)
SEGMENTED NEUTROPHILS ABSOLUTE COUNT: 5.1 THOU/MM3 (ref 1.8–7.7)
SODIUM, WHOLE BLOOD: 138 MEQ/L (ref 138–146)
TOTAL CO2, WHOLE BLOOD: 24 MEQ/L (ref 23–33)
TOTAL PROTEIN: 7 G/DL (ref 6.1–8)
WBC # BLD: 8.1 THOU/MM3 (ref 4.8–10.8)

## 2022-11-03 PROCEDURE — 80047 BASIC METABLC PNL IONIZED CA: CPT

## 2022-11-03 PROCEDURE — 99213 OFFICE O/P EST LOW 20 MIN: CPT | Performed by: NURSE PRACTITIONER

## 2022-11-03 PROCEDURE — 82378 CARCINOEMBRYONIC ANTIGEN: CPT

## 2022-11-03 PROCEDURE — 80076 HEPATIC FUNCTION PANEL: CPT

## 2022-11-03 PROCEDURE — 36415 COLL VENOUS BLD VENIPUNCTURE: CPT

## 2022-11-03 PROCEDURE — 85025 COMPLETE CBC W/AUTO DIFF WBC: CPT

## 2022-11-03 PROCEDURE — 99211 OFF/OP EST MAY X REQ PHY/QHP: CPT

## 2022-11-03 RX ORDER — M-VIT,TX,IRON,MINS/CALC/FOLIC 27MG-0.4MG
1 TABLET ORAL DAILY
COMMUNITY

## 2022-11-03 RX ORDER — ASPIRIN 81 MG/1
81 TABLET, CHEWABLE ORAL DAILY
COMMUNITY

## 2022-11-03 NOTE — PATIENT INSTRUCTIONS
CT scans prior to next visit, schedule in January  Return to clinic in 3 month for follow up and labs  Notify office of any new or worsening symptoms

## 2022-11-03 NOTE — PROGRESS NOTES
[address] [place] [address]  Dept: [phone]  Loc: [phone]       Visit Date:11/3/2022     Yanet Oconnell is a 47 y.o. male who presents today for:   Chief Complaint   Patient presents with    Follow-up     Adenocarcinoma, colon         HPI:   Yanet Oconnell is a 47 y.o. male with a history of stage III colon cancer. The patient initially presented September 2021 when he was seen by  Dr. Zac Pascual due to unexplained weight loss of 10-15 lbs and rectal bleeding. Colonoscopy performed on September 1, 2021 demonstrated multiple polyps but a larger mass at the proximal transverse colon demonstrated the adenocarcinoma. He did not have signs of obstruction, no abdominal pain, no significant nausea or vomiting. Some previous episodes of nausea, GERD and epigastric discomfort. He had CT of the chest abdomen and pelvis on September 2, 2021,no evidence of metastatic disease. The patient met with Dr. Angeline Quijano and after discussing his surgical treatment options he proceeded with right hemicolectomy on September 16, 2021. Final pathology report showed:  A. Right colon mass, hemicolectomy:    Invasive moderately differentiated colonic adenocarcinoma, pT2. Margins are free of neoplasia. Pericolonic lymph nodes (17): 2 out of 17 are positive for   metastatic colonic adenocarcinoma. Appendix-no pathologic abnormality. B. Ileocolonic base lymph nodes (2), resection:             Negative for malignancy (0/2). pT2 pN1b     Extensive history of colorectal disease on paternal side. On November 9, 2021 the patient received first cycle of adjuvant chemotherapy with Xeloda. On November 24, 2021 he was admitted to the hospital with a 1 week history of intractable periumbilical abdominal pain of 9/10 intensity. Associated nausea vomiting diarrhea and fever last few days. Diagnosed with acute enterocolitis, possible chemotherapy induced. IV Cipro & Flagyl during hospitalization. SBO vs ileus:General Surgery consulted. KUB showed possible SBO vs ileus. CT A/P showed worsening since previous. Managed medically with NGT. Advance diet slowly. Possible fluid overload: Lasix have been given for swelling. On discharge from the hospital on December 4, 2021 the patient was placed on the following medications:  Bentyl 3 times daily,-Magic mouthwash 4 times daily as needed, -ferrous sulfate daily, Zofran every 8 hours as needed,-omeprazole,Compazine every 6 hours as needed, Reglan 2 times daily. The patient completed 7 cycles of oxaliplatin and Xeloda. He was recommended surveillance. He had MediPort removal per Dr. Rich Sarmiento. Interval History 11/3/2022: Patient returns for follow-up on his colon cancer. He completed chemotherapy 0413 2022. He has since returned to work and works out regularly. He is maintaining a good appetite and stable weight. He complains of occasional diarrhea or constipation. He denies any headaches or vision changes. No chest pain or SOB. Denies any abdominal pain or fever. PMH, SH, and FH:  I reviewed the patients medication list and allergy list as noted on the electronic medical record. The PMH, SH and FH were also reviewed as noted on the EMR. Review of Systems:   Review of Systems   Pertinent review of systems noted in HPI, all other ROS negative. Objective:   Physical Exam   /68 (Site: Right Upper Arm, Position: Sitting, Cuff Size: Medium Adult)   Pulse 54   Resp 12   Ht 6' (1.829 m)   Wt 157 lb (71.2 kg)   SpO2 98%   BMI 21.29 kg/m²    General appearance: No apparent distress, calm and cooperative. HEENT: Pupils equal, round, and reactive to light. Conjunctivae/corneas clear. Oral mucosa intact  Neck: Supple, with full range of motion. Trachea midline. Respiratory:  Normal respiratory effort.  Clear to auscultation, bilaterally without Rales/Wheezes/Rhonchi. Cardiovascular: Regular rate and rhythm with normal S1/S2 without murmurs, rubs or gallops. Abdomen: Soft, non-tender, non-distended with active bowel sounds. Musculoskeletal: No clubbing, cyanosis or edema bilaterally. Skin: Skin color, texture, turgor normal.  No visible rashes or lesions. Neurologic:  Neurovascularly intact without any focal sensory/motor deficits. Psychiatric: Alert and oriented, thought content appropriate, normal insight  Capillary Refill: Brisk,< 3 seconds   Peripheral Pulses: +2 palpable, equal bilaterally       Imaging Studies and Labs:   CBC:   Lab Results   Component Value Date    WBC 8.1 11/03/2022    HGB 13.6 (L) 11/03/2022    HCT 41.1 (L) 11/03/2022    MCV 90 11/03/2022     11/03/2022     BMP:   Lab Results   Component Value Date/Time     11/03/2022 09:00 AM     12/04/2021 06:00 AM    K 3.6 11/03/2022 09:00 AM    K 4.0 12/04/2021 06:00 AM    K 3.4 11/30/2021 06:00 AM     12/04/2021 06:00 AM    CO2 29 12/04/2021 06:00 AM    BUN 8 12/04/2021 06:00 AM    CREATININE 1.0 11/03/2022 09:00 AM    CREATININE 0.7 12/04/2021 06:00 AM    GLUCOSE 87 12/04/2021 06:00 AM    CALCIUM 7.7 12/04/2021 06:00 AM      LFT:   Lab Results   Component Value Date     (H) 11/03/2022    AST 60 (H) 11/03/2022    ALKPHOS 351 (H) 11/03/2022    BILITOT 0.5 11/03/2022         Assessment and Plan:   1. Adenocarcinoma, colon ([address]  - CT ABDOMEN PELVIS W IV CONTRAST Additional Contrast? Oral; Future  - CT CHEST W CONTRAST; Future  - CEA; Future    2. Encounter for follow-up surveillance of colon cancer  - CT ABDOMEN PELVIS W IV CONTRAST Additional Contrast? Oral; Future  - CT CHEST W CONTRAST; Future    3. Iron deficiency anemia due to chronic blood loss  - Advised patient to resume treatment with oral iron and take with vitamin C to improve absorption  - CBC with Auto Differential; Future  - Hepatic Function Panel;  Future  - POC PANEL BMP W/IOCA; Future  - Ferritin; Future  - Iron; Future  - Iron Binding Capacity; Future  - IRON SATURATION; Future  - Vitamin B12; Future  - Folate; Future    Return in about 3 months (around 2/3/2023). All patient questions answered. Pt voiced understanding. Patient agreed with treatment plan. Follow up as directed. Patient instructed to call for questions or concerns.        Electronically signed by   LUCY Yadav CNP

## 2022-11-07 ENCOUNTER — TELEPHONE (OUTPATIENT)
Dept: ONCOLOGY | Age: 52
End: 2022-11-07

## 2022-11-07 DIAGNOSIS — C18.9 ADENOCARCINOMA, COLON (HCC): Primary | ICD-10-CM

## 2022-11-07 NOTE — TELEPHONE ENCOUNTER
Spoke with patient regarding recent Hepatic panel results. The patient is currently on various supplements and amino acids. Advised patient to return to clinic for repeat labs. He verbalized an understanding. The results will be followed.

## 2022-11-08 ENCOUNTER — HOSPITAL ENCOUNTER (OUTPATIENT)
Age: 52
Discharge: HOME | End: 2022-11-08
Payer: COMMERCIAL

## 2022-11-08 DIAGNOSIS — C18.9 ADENOCARCINOMA, COLON (HCC): ICD-10-CM

## 2022-11-08 DIAGNOSIS — D50.0 IRON DEFICIENCY ANEMIA DUE TO CHRONIC BLOOD LOSS: ICD-10-CM

## 2022-11-08 LAB
ALBUMIN SERPL-MCNC: 4.5 G/DL (ref 3.5–5.1)
ALP BLD-CCNC: 231 U/L (ref 38–126)
ALT SERPL-CCNC: 83 U/L (ref 11–66)
AST SERPL-CCNC: 38 U/L (ref 5–40)
BILIRUB SERPL-MCNC: 0.5 MG/DL (ref 0.3–1.2)
BILIRUBIN DIRECT: < 0.2 MG/DL (ref 0–0.3)
TOTAL PROTEIN: 6.7 G/DL (ref 6.1–8)

## 2022-11-08 PROCEDURE — 36415 COLL VENOUS BLD VENIPUNCTURE: CPT

## 2022-11-08 PROCEDURE — 80076 HEPATIC FUNCTION PANEL: CPT

## 2022-11-09 ENCOUNTER — TELEPHONE (OUTPATIENT)
Dept: ONCOLOGY | Age: 52
End: 2022-11-09

## 2023-02-16 ENCOUNTER — HOSPITAL ENCOUNTER (OUTPATIENT)
Dept: CT IMAGING | Age: 53
Discharge: HOME | End: 2023-02-16
Payer: COMMERCIAL

## 2023-02-16 DIAGNOSIS — Z85.038 ENCOUNTER FOR FOLLOW-UP SURVEILLANCE OF COLON CANCER: ICD-10-CM

## 2023-02-16 DIAGNOSIS — Z08 ENCOUNTER FOR FOLLOW-UP SURVEILLANCE OF COLON CANCER: ICD-10-CM

## 2023-02-16 DIAGNOSIS — C18.9 ADENOCARCINOMA, COLON (HCC): ICD-10-CM

## 2023-02-16 PROCEDURE — 74177 CT ABD & PELVIS W/CONTRAST: CPT

## 2023-02-16 PROCEDURE — 6360000004 HC RX CONTRAST MEDICATION: Performed by: NURSE PRACTITIONER

## 2023-02-16 PROCEDURE — 71260 CT THORAX DX C+: CPT

## 2023-02-16 RX ADMIN — IOHEXOL 50 ML: 240 INJECTION, SOLUTION INTRATHECAL; INTRAVASCULAR; INTRAVENOUS; ORAL at 09:26

## 2023-02-16 RX ADMIN — IOPAMIDOL 85 ML: 755 INJECTION, SOLUTION INTRAVENOUS at 09:26

## 2023-02-23 ENCOUNTER — OFFICE VISIT (OUTPATIENT)
Dept: ONCOLOGY | Age: 53
End: 2023-02-23
Payer: COMMERCIAL

## 2023-02-23 ENCOUNTER — HOSPITAL ENCOUNTER (OUTPATIENT)
Dept: INFUSION THERAPY | Age: 53
Discharge: HOME | End: 2023-02-23
Payer: COMMERCIAL

## 2023-02-23 VITALS
TEMPERATURE: 97.4 F | DIASTOLIC BLOOD PRESSURE: 78 MMHG | BODY MASS INDEX: 22.37 KG/M2 | SYSTOLIC BLOOD PRESSURE: 135 MMHG | HEART RATE: 57 BPM | OXYGEN SATURATION: 97 % | WEIGHT: 165.2 LBS | RESPIRATION RATE: 20 BRPM | HEIGHT: 72 IN

## 2023-02-23 VITALS
DIASTOLIC BLOOD PRESSURE: 78 MMHG | WEIGHT: 165.2 LBS | HEIGHT: 72 IN | TEMPERATURE: 97.4 F | BODY MASS INDEX: 22.37 KG/M2 | OXYGEN SATURATION: 97 % | SYSTOLIC BLOOD PRESSURE: 135 MMHG | HEART RATE: 57 BPM | RESPIRATION RATE: 20 BRPM

## 2023-02-23 DIAGNOSIS — D50.0 IRON DEFICIENCY ANEMIA DUE TO CHRONIC BLOOD LOSS: ICD-10-CM

## 2023-02-23 DIAGNOSIS — Z85.038 ENCOUNTER FOR FOLLOW-UP SURVEILLANCE OF COLON CANCER: ICD-10-CM

## 2023-02-23 DIAGNOSIS — C18.9 ADENOCARCINOMA, COLON (HCC): Primary | ICD-10-CM

## 2023-02-23 DIAGNOSIS — C18.9 ADENOCARCINOMA, COLON (HCC): ICD-10-CM

## 2023-02-23 DIAGNOSIS — Z08 ENCOUNTER FOR FOLLOW-UP SURVEILLANCE OF COLON CANCER: ICD-10-CM

## 2023-02-23 LAB
ABSOLUTE IMMATURE GRANULOCYTE: 0 THOU/MM3 (ref 0–0.07)
BASOPHILS ABSOLUTE: 0 THOU/MM3 (ref 0–0.1)
BASOPHILS NFR BLD AUTO: 1 % (ref 0–3)
BUN BLDP-MCNC: 21 MG/DL (ref 8–26)
CEA SERPL-MCNC: 2.6 NG/ML (ref 0–5)
CHLORIDE BLD-SCNC: 104 MEQ/L (ref 98–109)
CREAT BLD-MCNC: 0.9 MG/DL (ref 0.5–1.2)
EOSINOPHIL NFR BLD AUTO: 2 % (ref 0–4)
EOSINOPHILS ABSOLUTE: 0.1 THOU/MM3 (ref 0–0.4)
ERYTHROCYTE [DISTWIDTH] IN BLOOD BY AUTOMATED COUNT: 13.1 % (ref 11.5–14.5)
FERRITIN SERPL IA-MCNC: 119 NG/ML (ref 22–322)
FOLATE SERPL-MCNC: 18.3 NG/ML (ref 4.8–24.2)
GFR SERPL CREATININE-BSD FRML MDRD: > 60 ML/MIN/1.73M2
GLUCOSE BLD-MCNC: 101 MG/DL (ref 70–108)
HCT VFR BLD AUTO: 40.9 % (ref 42–52)
HGB BLD-MCNC: 13.9 GM/DL (ref 14–18)
IMMATURE GRANULOCYTES: 0 %
IONIZED CALCIUM, WHOLE BLOOD: 1.22 MMOL/L (ref 1.12–1.32)
IRON SATN MFR SERPL: 21 % (ref 20–50)
IRON SERPL-MCNC: 65 UG/DL (ref 65–195)
LYMPHOCYTES ABSOLUTE: 1.9 THOU/MM3 (ref 1–4.8)
LYMPHOCYTES NFR BLD AUTO: 26 % (ref 15–47)
MCH RBC QN AUTO: 29.7 PG (ref 26–33)
MCHC RBC AUTO-ENTMCNC: 34 GM/DL (ref 32.2–35.5)
MCV RBC AUTO: 87 FL (ref 80–94)
MONOCYTES ABSOLUTE: 0.7 THOU/MM3 (ref 0.4–1.3)
MONOCYTES NFR BLD AUTO: 10 % (ref 0–12)
NEUTROPHILS NFR BLD AUTO: 62 % (ref 43–75)
PLATELET # BLD AUTO: 166 THOU/MM3 (ref 130–400)
PMV BLD AUTO: 9.8 FL (ref 9.4–12.4)
POTASSIUM BLD-SCNC: 3.9 MEQ/L (ref 3.5–4.9)
RBC # BLD AUTO: 4.68 MILL/MM3 (ref 4.7–6.1)
SEGMENTED NEUTROPHILS ABSOLUTE COUNT: 4.5 THOU/MM3 (ref 1.8–7.7)
SODIUM BLD-SCNC: 141 MEQ/L (ref 138–146)
TIBC SERPL-MCNC: 303 UG/DL (ref 171–450)
TOTAL CO2, WHOLE BLOOD: 27 MEQ/L (ref 23–33)
VIT B12 SERPL-MCNC: 565 PG/ML (ref 211–911)
WBC # BLD AUTO: 7.2 THOU/MM3 (ref 4.8–10.8)

## 2023-02-23 PROCEDURE — 82607 VITAMIN B-12: CPT

## 2023-02-23 PROCEDURE — 99211 OFF/OP EST MAY X REQ PHY/QHP: CPT

## 2023-02-23 PROCEDURE — 82746 ASSAY OF FOLIC ACID SERUM: CPT

## 2023-02-23 PROCEDURE — 83540 ASSAY OF IRON: CPT

## 2023-02-23 PROCEDURE — 83550 IRON BINDING TEST: CPT

## 2023-02-23 PROCEDURE — 82378 CARCINOEMBRYONIC ANTIGEN: CPT

## 2023-02-23 PROCEDURE — 82728 ASSAY OF FERRITIN: CPT

## 2023-02-23 PROCEDURE — 85025 COMPLETE CBC W/AUTO DIFF WBC: CPT

## 2023-02-23 PROCEDURE — 36415 COLL VENOUS BLD VENIPUNCTURE: CPT

## 2023-02-23 PROCEDURE — 80047 BASIC METABLC PNL IONIZED CA: CPT

## 2023-02-23 PROCEDURE — 99213 OFFICE O/P EST LOW 20 MIN: CPT | Performed by: NURSE PRACTITIONER

## 2023-02-23 NOTE — PATIENT INSTRUCTIONS
Return to clinic for follow up in 6 months with Dr. Shorty garnett prior   Notify the office of any new or worsening symptoms

## 2023-02-23 NOTE — PROGRESS NOTES
[address] [place] [address]  Dept: [phone]  Loc: [phone]       Visit Date:2/23/2023     Cuong Ma is a 47 y.o. male who presents today for:   Chief Complaint   Patient presents with    Follow-up     Adenocarcinoma, colon ([address]        HPI:   Cuong Ma is a 47 y.o. male  with a history of stage III colon cancer. The patient initially presented September 2021 when he was seen by  Dr. Bull Zapien due to unexplained weight loss of 10-15 lbs and rectal bleeding. Colonoscopy performed on September 1, 2021 demonstrated multiple polyps but a larger mass at the proximal transverse colon demonstrated the adenocarcinoma. He did not have signs of obstruction, no abdominal pain, no significant nausea or vomiting. Some previous episodes of nausea, GERD and epigastric discomfort. He had CT of the chest abdomen and pelvis on September 2, 2021,no evidence of metastatic disease. The patient met with Dr. Andres Pérez and after discussing his surgical treatment options he proceeded with right hemicolectomy on September 16, 2021. Final pathology report showed:  A. Right colon mass, hemicolectomy:    Invasive moderately differentiated colonic adenocarcinoma, pT2. Margins are free of neoplasia. Pericolonic lymph nodes (17): 2 out of 17 are positive for   metastatic colonic adenocarcinoma. Appendix-no pathologic abnormality. B. Ileocolonic base lymph nodes (2), resection:             Negative for malignancy (0/2). pT2 pN1b     Extensive history of colorectal disease on paternal side. On November 9, 2021 the patient received first cycle of adjuvant chemotherapy with Xeloda. On November 24, 2021 he was admitted to the hospital with a 1 week history of intractable periumbilical abdominal pain of 9/10 intensity. Associated nausea vomiting diarrhea and fever last few days. Diagnosed with acute enterocolitis, possible chemotherapy induced. IV Cipro & Flagyl during hospitalization. SBO vs ileus:General Surgery consulted. KUB showed possible SBO vs ileus. CT A/P showed worsening since previous. Managed medically with NGT. Advance diet slowly. Possible fluid overload: Lasix have been given for swelling. On discharge from the hospital on December 4, 2021 the patient was placed on the following medications:  Bentyl 3 times daily,-Magic mouthwash 4 times daily as needed, -ferrous sulfate daily, Zofran every 8 hours as needed,-omeprazole,Compazine every 6 hours as needed, Reglan 2 times daily. The patient completed 7 cycles of oxaliplatin and Xeloda. He was recommended surveillance. He had MediPort removal per Dr. Howie Pak. 09/01/2022 the patient underwent colonoscopy with Dr. Jareth Tripp. 2 rectal polyps removed measuring 5 mm and 6 mm. Status post right hemicolectomy, healthy-appearing anastomosis. 02/16/2023 CT scan of the chest/abdomen/pelvis revealed prominent right hilar lymph node has decreased in size, now measuring 13 mm (previously measured 17 mm). No pulmonary mass or nodule visualized, no acute intrathoracic process, no acute findings in the abdomen/pelvis. No metastatic disease visualized. Interval History 2/23/2023: The patient returns for follow-up on his colon cancer. He completed chemotherapy 04/13/2022. He is currently on treatment with slow release iron tablets. Iron studies are pending, the results to be followed. The patient complains of occasional fatigue. He is currently working night shift and has not been working out. The patient reports he has been informed that he snores at night but refuses a sleep study. GFR and BMP results are stable. CBC results reveal WBC 7.2, Hgb 13.9, HCT 40.9%, MCV 87, RDW 13.1% and platelet count 141,656. He denies any chest pain or SOB. No abdominal pain or fever. He complains of occasional diarrhea. No fevers or night sweats. PMH, SH, and FH:  I reviewed the patients medication list and allergy list as noted on the electronic medical record. The PMH, SH and FH were also reviewed as noted on the EMR. Review of Systems:   Review of Systems   Pertinent review of systems noted in HPI, all other ROS negative. Objective:   Physical Exam   /78 (Site: Left Upper Arm, Position: Sitting, Cuff Size: Medium Adult)   Pulse 57   Temp 97.4 °F (36.3 °C) (Oral)   Resp 20   Ht 6' (1.829 m)   Wt 165 lb 3.2 oz (74.9 kg)   SpO2 97%   BMI 22.41 kg/m²    General appearance: No apparent distress, calm and cooperative. HEENT: Pupils equal, round, and reactive to light. Conjunctivae/corneas clear. Oral mucosa intact  Neck: Supple, with full range of motion. Trachea midline. Respiratory:  Normal respiratory effort. Clear to auscultation, bilaterally without Rales/Wheezes/Rhonchi. Cardiovascular: Regular rate and rhythm with normal S1/S2 without murmurs, rubs or gallops. Abdomen: Soft, non-tender, non-distended with active bowel sounds. Musculoskeletal: No clubbing, cyanosis or edema bilaterally. Skin: Skin color, texture, turgor normal.  No visible rashes or lesions. Neurologic:  Neurovascularly intact without any focal sensory/motor deficits.    Psychiatric: Alert and oriented, thought content appropriate, normal insight  Capillary Refill: Brisk,< 3 seconds   Peripheral Pulses: +2 palpable, equal bilaterally       Imaging Studies and Labs:   CBC:   Lab Results   Component Value Date    WBC 7.2 02/23/2023    HGB 13.9 (L) 02/23/2023    HCT 40.9 (L) 02/23/2023    MCV 87 02/23/2023     02/23/2023     BMP:   Lab Results   Component Value Date/Time     02/23/2023 08:10 AM     12/04/2021 06:00 AM    K 3.9 02/23/2023 08:10 AM    K 4.0 12/04/2021 06:00 AM    K 3.4 11/30/2021 06:00 AM     12/04/2021 06:00 AM    CO2 29 12/04/2021 06:00 AM    BUN 8 12/04/2021 06:00 AM    CREATININE 0.9 02/23/2023 08:10 AM    CREATININE 0.7 12/04/2021 06:00 AM    GLUCOSE 87 12/04/2021 06:00 AM    CALCIUM 7.7 12/04/2021 06:00 AM      LFT:   Lab Results   Component Value Date    ALT 83 (H) 11/08/2022    AST 38 11/08/2022    ALKPHOS 231 (H) 11/08/2022    BILITOT 0.5 11/08/2022         Assessment and Plan:   1. Adenocarcinoma, colon ([facility]  - CBC with Auto Differential; Future  - Hepatic Function Panel; Future  - POC PANEL BMP W/IOCA; Future  - CT ABDOMEN PELVIS W IV CONTRAST Additional Contrast? None; Future  - CT CHEST W CONTRAST; Future    2. Encounter for follow-up surveillance of colon cancer  clinical evaluation every three to six months for the first three years, and every six months during years 4 and 5.     3. Iron deficiency anemia due to chronic blood loss  - Ferritin; Future  - Iron; Future  - Iron Binding Capacity; Future  - IRON SATURATION; Future    Return in about 6 months (around 8/23/2023). All patient questions answered. Pt voiced understanding. Patient agreed with treatment plan. Follow up as directed. Patient instructed to call for questions or concerns.        Electronically signed by   LUCY Szymanski - AIDE

## 2023-03-28 RX ORDER — POTASSIUM CHLORIDE 20 MEQ/1
TABLET, EXTENDED RELEASE ORAL
Qty: 90 TABLET | Refills: 1 | Status: SHIPPED | OUTPATIENT
Start: 2023-03-28

## 2023-05-18 ENCOUNTER — APPOINTMENT (OUTPATIENT)
Dept: CT IMAGING | Age: 53
End: 2023-05-18
Payer: COMMERCIAL

## 2023-05-18 ENCOUNTER — HOSPITAL ENCOUNTER (EMERGENCY)
Age: 53
Discharge: HOME | End: 2023-05-18
Payer: COMMERCIAL

## 2023-05-18 VITALS
TEMPERATURE: 97.8 F | DIASTOLIC BLOOD PRESSURE: 87 MMHG | HEART RATE: 53 BPM | SYSTOLIC BLOOD PRESSURE: 133 MMHG | OXYGEN SATURATION: 95 % | HEIGHT: 72 IN | RESPIRATION RATE: 17 BRPM | WEIGHT: 178 LBS | BODY MASS INDEX: 24.11 KG/M2

## 2023-05-18 DIAGNOSIS — R04.2 HEMOPTYSIS: ICD-10-CM

## 2023-05-18 DIAGNOSIS — J18.9 PNEUMONIA OF RIGHT UPPER LOBE DUE TO INFECTIOUS ORGANISM: Primary | ICD-10-CM

## 2023-05-18 LAB
ANION GAP SERPL CALC-SCNC: 15 MEQ/L (ref 8–16)
BASOPHILS ABSOLUTE: 0.1 THOU/MM3 (ref 0–0.1)
BASOPHILS NFR BLD AUTO: 0.8 %
BUN SERPL-MCNC: 30 MG/DL (ref 7–22)
CALCIUM SERPL-MCNC: 9.6 MG/DL (ref 8.5–10.5)
CHLORIDE SERPL-SCNC: 107 MEQ/L (ref 98–111)
CO2 SERPL-SCNC: 19 MEQ/L (ref 23–33)
CREAT SERPL-MCNC: 0.8 MG/DL (ref 0.4–1.2)
DEPRECATED RDW RBC AUTO: 45.5 FL (ref 35–45)
EOSINOPHIL NFR BLD AUTO: 1.7 %
EOSINOPHILS ABSOLUTE: 0.2 THOU/MM3 (ref 0–0.4)
ERYTHROCYTE [DISTWIDTH] IN BLOOD BY AUTOMATED COUNT: 13.4 % (ref 11.5–14.5)
FLUAV RNA RESP QL NAA+PROBE: NOT DETECTED
FLUBV RNA RESP QL NAA+PROBE: NOT DETECTED
GFR SERPL CREATININE-BSD FRML MDRD: > 60 ML/MIN/1.73M2
GLUCOSE SERPL-MCNC: 110 MG/DL (ref 70–108)
HCT VFR BLD AUTO: 43.8 % (ref 42–52)
HGB BLD-MCNC: 14.4 GM/DL (ref 14–18)
IMM GRANULOCYTES # BLD AUTO: 0.03 THOU/MM3 (ref 0–0.07)
IMM GRANULOCYTES NFR BLD AUTO: 0.3 %
LYMPHOCYTES ABSOLUTE: 2.3 THOU/MM3 (ref 1–4.8)
LYMPHOCYTES NFR BLD AUTO: 25.1 %
MAGNESIUM SERPL-MCNC: 2 MG/DL (ref 1.6–2.4)
MCH RBC QN AUTO: 30 PG (ref 26–33)
MCHC RBC AUTO-ENTMCNC: 32.9 GM/DL (ref 32.2–35.5)
MCV RBC AUTO: 91.3 FL (ref 80–94)
MONOCYTES ABSOLUTE: 0.9 THOU/MM3 (ref 0.4–1.3)
MONOCYTES NFR BLD AUTO: 10.1 %
NEUTROPHILS NFR BLD AUTO: 62 %
NRBC BLD AUTO-RTO: 0 /100 WBC
OSMOLALITY SERPL CALC.SUM OF ELEC: 288.1 MOSMOL/KG (ref 275–300)
PLATELET # BLD AUTO: 201 THOU/MM3 (ref 130–400)
PMV BLD AUTO: 10.7 FL (ref 9.4–12.4)
POTASSIUM SERPL-SCNC: 4 MEQ/L (ref 3.5–5.2)
PROCALCITONIN SERPL IA-MCNC: 0.03 NG/ML (ref 0.01–0.09)
RBC # BLD AUTO: 4.8 MILL/MM3 (ref 4.7–6.1)
SARS-COV-2 RNA RESP QL NAA+PROBE: NOT DETECTED
SEGMENTED NEUTROPHILS ABSOLUTE COUNT: 5.6 THOU/MM3 (ref 1.8–7.7)
SODIUM SERPL-SCNC: 141 MEQ/L (ref 135–145)
TROPONIN T: < 0.01 NG/ML
WBC # BLD AUTO: 9 THOU/MM3 (ref 4.8–10.8)

## 2023-05-18 PROCEDURE — 94640 AIRWAY INHALATION TREATMENT: CPT

## 2023-05-18 PROCEDURE — 80048 BASIC METABOLIC PNL TOTAL CA: CPT

## 2023-05-18 PROCEDURE — 6360000004 HC RX CONTRAST MEDICATION: Performed by: PHYSICIAN ASSISTANT

## 2023-05-18 PROCEDURE — 71275 CT ANGIOGRAPHY CHEST: CPT

## 2023-05-18 PROCEDURE — 87636 SARSCOV2 & INF A&B AMP PRB: CPT

## 2023-05-18 PROCEDURE — 83735 ASSAY OF MAGNESIUM: CPT

## 2023-05-18 PROCEDURE — 6370000000 HC RX 637 (ALT 250 FOR IP): Performed by: PHYSICIAN ASSISTANT

## 2023-05-18 PROCEDURE — 84145 PROCALCITONIN (PCT): CPT

## 2023-05-18 PROCEDURE — 36415 COLL VENOUS BLD VENIPUNCTURE: CPT

## 2023-05-18 PROCEDURE — 84484 ASSAY OF TROPONIN QUANT: CPT

## 2023-05-18 PROCEDURE — 93005 ELECTROCARDIOGRAM TRACING: CPT | Performed by: PHYSICIAN ASSISTANT

## 2023-05-18 PROCEDURE — 99285 EMERGENCY DEPT VISIT HI MDM: CPT

## 2023-05-18 PROCEDURE — 85025 COMPLETE CBC W/AUTO DIFF WBC: CPT

## 2023-05-18 RX ORDER — DOXYCYCLINE HYCLATE 100 MG
100 TABLET ORAL 2 TIMES DAILY
Qty: 20 TABLET | Refills: 0 | Status: SHIPPED | OUTPATIENT
Start: 2023-05-18 | End: 2023-05-28

## 2023-05-18 RX ORDER — IPRATROPIUM BROMIDE AND ALBUTEROL SULFATE 2.5; .5 MG/3ML; MG/3ML
1 SOLUTION RESPIRATORY (INHALATION) ONCE
Status: COMPLETED | OUTPATIENT
Start: 2023-05-18 | End: 2023-05-18

## 2023-05-18 RX ORDER — ALBUTEROL SULFATE 90 UG/1
2 AEROSOL, METERED RESPIRATORY (INHALATION) 4 TIMES DAILY PRN
Qty: 18 G | Refills: 0 | Status: SHIPPED | OUTPATIENT
Start: 2023-05-18

## 2023-05-18 RX ADMIN — IPRATROPIUM BROMIDE AND ALBUTEROL SULFATE 1 AMPULE: .5; 3 SOLUTION RESPIRATORY (INHALATION) at 08:47

## 2023-05-18 RX ADMIN — IOPAMIDOL 80 ML: 755 INJECTION, SOLUTION INTRAVENOUS at 08:18

## 2023-05-18 ASSESSMENT — ENCOUNTER SYMPTOMS
COUGH: 1
NAUSEA: 0
SHORTNESS OF BREATH: 1
VOMITING: 0
SORE THROAT: 0
DIARRHEA: 0

## 2023-05-18 NOTE — ED NOTES
First contact with patient, no needs expressed. Patient and family updated on POC. Will continue to monitor.      Juani Orellana RN  05/18/23 1451

## 2023-05-18 NOTE — ED TRIAGE NOTES
PT to the ED with complaint of coughing up blood. PT states this started yesterday at work and has happened continually over the night. . PT states he is coughing up bright red blood. PT states that he started feelings some congestion and tightness in his chest when he started coughing the blood. PT states \"what ever is happening is happening fast\". PT denies abdominal pain or blood in his stool. PT states he has a history of colon cancer. Respirations even and un unlabored.

## 2023-05-18 NOTE — ED PROVIDER NOTES
Presentation:      MDM     Amount and/or Complexity of Data Reviewed  Discussion of test results with the performing providers: no  Decide to obtain previous medical records or to obtain history from someone other than the patient: yes  Obtain history from someone other than the patient: no  Review and summarize past medical records: yes  Discuss the patient with other providers: no  Independent visualization of images, tracings, or specimens: no    Risk of Complications, Morbidity, and/or Mortality  Presenting problems: moderate  Diagnostic procedures: moderate  Management options: moderate    Patient Progress  Patient progress: improved  /   Vitals Reviewed:    Vitals:    05/18/23 0537 05/18/23 0616 05/18/23 0722   BP: (!) 149/99 128/87 133/87   Pulse: 58 62 53   Resp: 18 22 17   Temp: 97.8 °F (36.6 °C)     TempSrc: Oral     SpO2: 96% 94% 95%   Weight: 178 lb (80.7 kg)     Height: 6' (1.829 m)         The patient was seen and examined. Appropriate diagnostic testing was performed and results reviewed with the patient. The results of pertinent diagnostic studies and exam findings were discussed.      ED Medications administered this visit:  (None if blank)  Medications   iopamidol (ISOVUE-370) 76 % injection 80 mL (80 mLs IntraVENous Given 5/18/23 0818)   ipratropium 0.5 mg-albuterol 2.5 mg (DUONEB) nebulizer solution 1 ampule (1 ampule Inhalation Given 5/18/23 0847)         PROCEDURES: (None if blank)      CRITICAL CARE: (None if blank)      DISCHARGE PRESCRIPTIONS: (None if blank)  Discharge Medication List as of 5/18/2023  9:28 AM        START taking these medications    Details   doxycycline hyclate (VIBRA-TABS) 100 MG tablet Take 1 tablet by mouth 2 times daily for 10 days, Disp-20 tablet, R-0Normal      albuterol sulfate HFA (VENTOLIN HFA) 108 (90 Base) MCG/ACT inhaler Inhale 2 puffs into the lungs 4 times daily as needed for Wheezing, Disp-18 g, R-0Normal      Spacer/Aero-Holding Chambers WAYNE DAILY PRN

## 2023-05-19 PROCEDURE — 93010 ELECTROCARDIOGRAM REPORT: CPT | Performed by: INTERNAL MEDICINE

## 2023-05-20 LAB
EKG ATRIAL RATE: 65 BPM
EKG P AXIS: 84 DEGREES
EKG P-R INTERVAL: 150 MS
EKG Q-T INTERVAL: 398 MS
EKG QRS DURATION: 80 MS
EKG QTC CALCULATION (BAZETT): 413 MS
EKG R AXIS: 60 DEGREES
EKG T AXIS: 61 DEGREES
EKG VENTRICULAR RATE: 65 BPM

## 2023-05-22 ENCOUNTER — TELEPHONE (OUTPATIENT)
Dept: FAMILY MEDICINE CLINIC | Age: 53
End: 2023-05-22

## 2023-05-26 ENCOUNTER — CARE COORDINATION (OUTPATIENT)
Dept: CARE COORDINATION | Age: 53
End: 2023-05-26

## 2023-05-26 SDOH — ECONOMIC STABILITY: FOOD INSECURITY: WITHIN THE PAST 12 MONTHS, THE FOOD YOU BOUGHT JUST DIDN'T LAST AND YOU DIDN'T HAVE MONEY TO GET MORE.: NEVER TRUE

## 2023-05-26 SDOH — ECONOMIC STABILITY: TRANSPORTATION INSECURITY
IN THE PAST 12 MONTHS, HAS THE LACK OF TRANSPORTATION KEPT YOU FROM MEDICAL APPOINTMENTS OR FROM GETTING MEDICATIONS?: NO

## 2023-05-26 SDOH — SOCIAL STABILITY: SOCIAL NETWORK: HOW OFTEN DO YOU GET TOGETHER WITH FRIENDS OR RELATIVES?: MORE THAN THREE TIMES A WEEK

## 2023-05-26 SDOH — SOCIAL STABILITY: SOCIAL NETWORK
IN A TYPICAL WEEK, HOW MANY TIMES DO YOU TALK ON THE PHONE WITH FAMILY, FRIENDS, OR NEIGHBORS?: MORE THAN THREE TIMES A WEEK

## 2023-05-26 SDOH — SOCIAL STABILITY: SOCIAL NETWORK: ARE YOU MARRIED, WIDOWED, DIVORCED, SEPARATED, NEVER MARRIED, OR LIVING WITH A PARTNER?: MARRIED

## 2023-05-26 SDOH — SOCIAL STABILITY: SOCIAL NETWORK: HOW OFTEN DO YOU ATTENT MEETINGS OF THE CLUB OR ORGANIZATION YOU BELONG TO?: MORE THAN 4 TIMES PER YEAR

## 2023-05-26 SDOH — HEALTH STABILITY: MENTAL HEALTH: HOW OFTEN DO YOU HAVE A DRINK CONTAINING ALCOHOL?: NEVER

## 2023-05-26 SDOH — SOCIAL STABILITY: SOCIAL NETWORK
DO YOU BELONG TO ANY CLUBS OR ORGANIZATIONS SUCH AS CHURCH GROUPS UNIONS, FRATERNAL OR ATHLETIC GROUPS, OR SCHOOL GROUPS?: YES

## 2023-05-26 SDOH — HEALTH STABILITY: MENTAL HEALTH
STRESS IS WHEN SOMEONE FEELS TENSE, NERVOUS, ANXIOUS, OR CAN'T SLEEP AT NIGHT BECAUSE THEIR MIND IS TROUBLED. HOW STRESSED ARE YOU?: ONLY A LITTLE

## 2023-05-26 SDOH — ECONOMIC STABILITY: TRANSPORTATION INSECURITY
IN THE PAST 12 MONTHS, HAS LACK OF TRANSPORTATION KEPT YOU FROM MEETINGS, WORK, OR FROM GETTING THINGS NEEDED FOR DAILY LIVING?: NO

## 2023-05-26 SDOH — SOCIAL STABILITY: SOCIAL NETWORK: HOW OFTEN DO YOU ATTEND CHURCH OR RELIGIOUS SERVICES?: NEVER

## 2023-05-26 SDOH — ECONOMIC STABILITY: HOUSING INSECURITY: IN THE LAST 12 MONTHS, HOW MANY PLACES HAVE YOU LIVED?: 1

## 2023-05-26 SDOH — ECONOMIC STABILITY: FOOD INSECURITY: WITHIN THE PAST 12 MONTHS, YOU WORRIED THAT YOUR FOOD WOULD RUN OUT BEFORE YOU GOT MONEY TO BUY MORE.: NEVER TRUE

## 2023-05-26 SDOH — ECONOMIC STABILITY: INCOME INSECURITY: IN THE LAST 12 MONTHS, WAS THERE A TIME WHEN YOU WERE NOT ABLE TO PAY THE MORTGAGE OR RENT ON TIME?: NO

## 2023-05-26 SDOH — HEALTH STABILITY: MENTAL HEALTH: HOW MANY STANDARD DRINKS CONTAINING ALCOHOL DO YOU HAVE ON A TYPICAL DAY?: PATIENT DOES NOT DRINK

## 2023-05-26 SDOH — ECONOMIC STABILITY: INCOME INSECURITY: HOW HARD IS IT FOR YOU TO PAY FOR THE VERY BASICS LIKE FOOD, HOUSING, MEDICAL CARE, AND HEATING?: NOT HARD AT ALL

## 2023-05-26 SDOH — ECONOMIC STABILITY: HOUSING INSECURITY
IN THE LAST 12 MONTHS, WAS THERE A TIME WHEN YOU DID NOT HAVE A STEADY PLACE TO SLEEP OR SLEPT IN A SHELTER (INCLUDING NOW)?: NO

## 2023-06-01 ENCOUNTER — CARE COORDINATION (OUTPATIENT)
Dept: CARE COORDINATION | Age: 53
End: 2023-06-01

## 2023-06-01 NOTE — CARE COORDINATION
Ambulatory Care Coordination Note  2023    Patient Current Location: [facility]     ACM contacted the patient by telephone. Verified name and  with patient as identifiers. Provided introduction to self, and explanation of the ACM role. Challenges to be reviewed by the provider   Additional needs identified to be addressed with provider: No  none         Method of communication with provider: none. ACM: Valeriano Correa RN    Patient was called for continued Care Coordination follow up and education re: the management of his healthcare needs and in f/u to recent pneumonia. Patient reported he is feeling well and recent pneumonia symptoms have resolved. Patient denied any further c/o increased SOB, cough, or fever being present. Patient shared he has been able to return to his normal activities. ACM educated patient on signs/symptoms to report and the importance of early symptom recognition and follow up. Patient acknowledged understanding. ACM reviewed upcoming PCP appointment information and educated patient on the importance of following up as scheduled. Patient verbalized understanding. Patient was educated on the availability of same day appointments, urgent care/walk in clinic options, and after hours on call resources along with the importance of early symptom recognition and follow up. Patient verbalized understanding. Patient denied any other questions, concerns, or needs and he was encouraged to call with any that may develop.            Actions:   - Reviewed signs/symptoms to report   - Reviewed importance of early symptom recognition and follow up   - Reviewed upcoming appointment information   - Educated on the importance of following up as scheduled and directed   - Educated on the availability of same day appointments urgent care/walk in clinic options, and after hours on call resources   - Monitored for improvement of recent symptoms   - Monitored for additional needs          Plan of

## 2023-06-01 NOTE — CARE COORDINATION
Attempted to reach patient for continued Care Coordination follow up and education in f/u to recent pneumonia and healthcare needs. Patient was unavailable at the time of my call, and generic voicemail message was left asking patient to please return call to my direct number. Will continue to work to f/u with patient in the future.

## 2023-06-09 ENCOUNTER — CARE COORDINATION (OUTPATIENT)
Dept: CARE COORDINATION | Age: 53
End: 2023-06-09

## 2023-06-09 NOTE — CARE COORDINATION
Ambulatory Care Coordination Note  2023    Patient Current Location: [facility]     ACM contacted the patient by telephone. Verified name and  with patient as identifiers. Provided introduction to self, and explanation of the ACM role. Challenges to be reviewed by the provider   Additional needs identified to be addressed with provider: No  none         Method of communication with provider: none. ACM: Oumou Fernando RN    Patient was called for continued Care Coordination follow up and education re: the management of his healthcare needs. Patient shared he continues to do well and he denied any current complaints or concerns. Patient denied any c/o ongoing SOB, cough, or fever being present. Patient shared he did have some increased fatigue recently that he is unsure if is r/t increased working or not taking his inhaler recently. ACM reviewed signs/symtpoms to report and importance of early symptom recognition and follow up with patient as well as the need to take his medications as directed. Patient is scheduled to see PCP on Monday and appointment information was reviewed. Patient was instructed to write down any questions/concerns and bring with him to his appointment as well as an updated medication list.  Patient verbalized understanding. Patient denied any other questions, concerns, ,or needs and he was encouraged to call with any that may develop.           Actions:   - Monitored for any new/worsening symptoms   - Reviewed signs/symptoms to report   - Reviewed importance of early symptom recognition and follow up   - Encouraged patient to discuss medications with PCP   - Reviewed upcoming appointment information   - Encouraged patient to write down his questions/concerns and bring to appointment   - Instructed patient to bring an updated med list to appointment   - Monitored for additional needs          Plan of Care:   - Continue with Care Coordination f/u calls for assistance with the

## 2023-06-12 PROBLEM — C77.2 SECONDARY AND UNSPECIFIED MALIGNANT NEOPLASM OF INTRA-ABDOMINAL LYMPH NODES (HCC): Status: ACTIVE | Noted: 2023-06-12

## 2023-06-12 PROBLEM — E43 SEVERE PROTEIN-CALORIE MALNUTRITION (HCC): Status: RESOLVED | Noted: 2021-11-25 | Resolved: 2023-06-12

## 2023-06-23 ENCOUNTER — CARE COORDINATION (OUTPATIENT)
Dept: CARE COORDINATION | Age: 53
End: 2023-06-23

## 2023-06-23 NOTE — CARE COORDINATION
Attempted to reach patient for continued Care Coordination follow up and education. Patient was unavailable at the time of my call, and a generic voicemail message was left asking patient to return my call at [phone].

## 2023-06-28 ENCOUNTER — CARE COORDINATION (OUTPATIENT)
Dept: CARE COORDINATION | Age: 53
End: 2023-06-28

## 2023-07-05 NOTE — CARE COORDINATION
Patient has ultrasound scheduled for 7-7 Detail Level: Generalized Detail Level: Detailed Detail Level: Zone

## 2023-07-07 ENCOUNTER — HOSPITAL ENCOUNTER (OUTPATIENT)
Dept: INTERVENTIONAL RADIOLOGY/VASCULAR | Age: 53
Discharge: HOME | End: 2023-07-07
Attending: FAMILY MEDICINE
Payer: COMMERCIAL

## 2023-07-07 DIAGNOSIS — I83.813 VARICOSE VEINS OF BILATERAL LOWER EXTREMITIES WITH PAIN: ICD-10-CM

## 2023-07-07 DIAGNOSIS — R25.2 BILATERAL LEG CRAMPS: ICD-10-CM

## 2023-07-07 PROCEDURE — 93970 EXTREMITY STUDY: CPT

## 2023-07-07 PROCEDURE — 93925 LOWER EXTREMITY STUDY: CPT

## 2023-07-12 ENCOUNTER — CARE COORDINATION (OUTPATIENT)
Dept: CARE COORDINATION | Age: 53
End: 2023-07-12

## 2023-07-21 ENCOUNTER — CARE COORDINATION (OUTPATIENT)
Dept: CARE COORDINATION | Age: 53
End: 2023-07-21

## 2023-07-21 NOTE — CARE COORDINATION
Attempted to reach patient for continued Care Coordination follow up and education. Patient was unavailable at the time of my call, and a generic voicemail message was left asking patient to return my call at [phone].

## 2023-07-27 ENCOUNTER — CARE COORDINATION (OUTPATIENT)
Dept: CARE COORDINATION | Age: 53
End: 2023-07-27

## 2023-07-27 NOTE — CARE COORDINATION
Ambulatory Care Coordination Note  2023    Patient Current Location: [place]     ACM contacted the patient by telephone. Verified name and  with patient as identifiers. Provided introduction to self, and explanation of the ACM role. Challenges to be reviewed by the provider   Additional needs identified to be addressed with provider: No  none         Method of communication with provider: none. ACM: Lian Barfield RN    Patient was called for continued Care Coordination follow up and education re: the management of his healthcare needs. Patient shared he was able to f/u with Dr. Sb Milton for ongoing LE vein concerns and he has been scheduled for treatment. Patient reported he continues with LE pain/discomfort, especially when working as he is required to do multiple flights of stairs at times. Patient denied any significant change of his symptoms. ACM educated patient on signs/symptoms he should report as well as the need to call for earlier appointment if symptoms change or worsen. Patient verbalized understanding. Patient denied any other questions, concerns, or needs and he was encouraged to call with any that may develop.            Actions:   - Reviewed signs/symptoms to report   - Reviewed importance of early symptom recognition and follow up   - Reviewed need to call for earlier appointment with any change/worsening of symptoms   - Monitored for questions re: recent appointments  - Monitored for additional needs          Plan of Care:   - Continue with Care Coordination f/u calls for assistance with the management of his healthcare needs   - Educate on signs/symptoms to report - Ongoing   - Educate on the importance of early symptom recognition and follow up - Ongoing  - Educate on the availability of same day appointments, urgent care/walk in clinic options, and after hours on call resources - Completed   - Review Remote Patient Monitoring (RPM) program - N/A - patient stable and recovered

## 2023-08-14 ENCOUNTER — CARE COORDINATION (OUTPATIENT)
Dept: CARE COORDINATION | Age: 53
End: 2023-08-14

## 2023-08-14 NOTE — CARE COORDINATION
Attempted to reach patient for continued Care Coordination follow up and education. Patient was unavailable at the time of my call, and a generic voicemail message was left asking patient to return my call at [phone].
no

## 2023-08-16 ENCOUNTER — HOSPITAL ENCOUNTER (OUTPATIENT)
Dept: CT IMAGING | Age: 53
Discharge: HOME | End: 2023-08-16
Payer: COMMERCIAL

## 2023-08-16 DIAGNOSIS — C18.9 ADENOCARCINOMA, COLON (HCC): ICD-10-CM

## 2023-08-16 PROCEDURE — 71260 CT THORAX DX C+: CPT

## 2023-08-16 PROCEDURE — 74177 CT ABD & PELVIS W/CONTRAST: CPT

## 2023-08-16 PROCEDURE — 6360000004 HC RX CONTRAST MEDICATION: Performed by: NURSE PRACTITIONER

## 2023-08-16 RX ADMIN — IOPAMIDOL 80 ML: 755 INJECTION, SOLUTION INTRAVENOUS at 09:17

## 2023-08-21 ENCOUNTER — CARE COORDINATION (OUTPATIENT)
Dept: CARE COORDINATION | Age: 53
End: 2023-08-21

## 2023-08-21 NOTE — ACP (ADVANCE CARE PLANNING)
Advance Care Planning   Healthcare Decision Maker:    Primary Decision Maker: Johanny Reynoso - [phone]    Secondary Decision Maker: Melissa Guerra - [phone]    Today we documented Decision Maker(s) consistent with ACP documents on file. Reviewed and verified Healthcare Decision Maker information.

## 2023-08-21 NOTE — CARE COORDINATION
Ambulatory Care Coordination Note  2023    Patient Current Location:  Home: [address] [address]     ACM contacted the patient by telephone. Verified name and  with patient as identifiers. Provided introduction to self, and explanation of the ACM role. Challenges to be reviewed by the provider   Additional needs identified to be addressed with provider: No    none         Method of communication with provider: none. ACM: Henry Campa RN    Patient was called for continued Care Coordination follow up and education re: the management of his healthcare needs. Patient shared he is doing well and he denied any current complaints or concerns. Patient reported he was able to have procedure on his leg earlier today and he denied any questions re: his discharge instructions. ACM educated patient on the importance of following directions/activity instructions as directed and reviewed signs/symptoms to report and the importance of early symptom recognition and follow up. Patient verbalized understanding. Healthcare Decision Maker information was reviewed and verified with patient. ACP note completed. Patient denied any other questions, concerns, or needs and he was encouraged to call with any that may develop. Patient has remained stable and he is aware of the resources available to him so will plan to discharge from Care Coordination at this time. Patient verbalized understanding and agreement to this plan.            Actions:   - Monitored for questions re: recent procedure   - Reviewed signs/symptoms to report and importance of early symptom recognition and follow up   - Educated on importance of following discharge instructions as provided   - Educated on the need to follow up as directed   - [address] information   - Completed ACP note  - Monitored for additional needs          Plan of Care:   - Discharge/Graduation from Care Coordination      Offered patient

## 2023-08-22 DIAGNOSIS — Z90.49 S/P RIGHT COLECTOMY: Primary | ICD-10-CM

## 2023-08-23 ENCOUNTER — CLINICAL DOCUMENTATION (OUTPATIENT)
Dept: CASE MANAGEMENT | Age: 53
End: 2023-08-23

## 2023-08-23 ENCOUNTER — OFFICE VISIT (OUTPATIENT)
Dept: ONCOLOGY | Age: 53
End: 2023-08-23
Payer: COMMERCIAL

## 2023-08-23 ENCOUNTER — HOSPITAL ENCOUNTER (OUTPATIENT)
Dept: INFUSION THERAPY | Age: 53
Discharge: HOME | End: 2023-08-23
Payer: COMMERCIAL

## 2023-08-23 VITALS
TEMPERATURE: 97.8 F | HEIGHT: 72 IN | SYSTOLIC BLOOD PRESSURE: 119 MMHG | BODY MASS INDEX: 22.13 KG/M2 | OXYGEN SATURATION: 99 % | DIASTOLIC BLOOD PRESSURE: 73 MMHG | RESPIRATION RATE: 16 BRPM | WEIGHT: 163.4 LBS | HEART RATE: 48 BPM

## 2023-08-23 VITALS
OXYGEN SATURATION: 99 % | SYSTOLIC BLOOD PRESSURE: 119 MMHG | DIASTOLIC BLOOD PRESSURE: 73 MMHG | TEMPERATURE: 97.8 F | RESPIRATION RATE: 16 BRPM | HEART RATE: 48 BPM

## 2023-08-23 DIAGNOSIS — D50.0 IRON DEFICIENCY ANEMIA DUE TO CHRONIC BLOOD LOSS: ICD-10-CM

## 2023-08-23 DIAGNOSIS — C18.9 COLON CANCER (HCC): ICD-10-CM

## 2023-08-23 DIAGNOSIS — C18.9 ADENOCARCINOMA, COLON (HCC): Primary | ICD-10-CM

## 2023-08-23 DIAGNOSIS — C18.9 ADENOCARCINOMA, COLON (HCC): ICD-10-CM

## 2023-08-23 LAB
ABSOLUTE IMMATURE GRANULOCYTE: 0.01 THOU/MM3 (ref 0–0.07)
ALBUMIN SERPL BCG-MCNC: 4.4 G/DL (ref 3.5–5.1)
ALP SERPL-CCNC: 76 U/L (ref 38–126)
ALT SERPL W/O P-5'-P-CCNC: 30 U/L (ref 11–66)
AST SERPL-CCNC: 25 U/L (ref 5–40)
BASOPHILS ABSOLUTE: 0 THOU/MM3 (ref 0–0.1)
BASOPHILS NFR BLD AUTO: 1 % (ref 0–3)
BILIRUB CONJ SERPL-MCNC: < 0.2 MG/DL (ref 0–0.3)
BILIRUB SERPL-MCNC: 0.5 MG/DL (ref 0.3–1.2)
BUN BLDP-MCNC: 16 MG/DL (ref 8–26)
CHLORIDE BLD-SCNC: 103 MEQ/L (ref 98–109)
CREAT BLD-MCNC: 1 MG/DL (ref 0.5–1.2)
EOSINOPHIL NFR BLD AUTO: 3 % (ref 0–4)
EOSINOPHILS ABSOLUTE: 0.2 THOU/MM3 (ref 0–0.4)
ERYTHROCYTE [DISTWIDTH] IN BLOOD BY AUTOMATED COUNT: 12.8 % (ref 11.5–14.5)
FERRITIN SERPL IA-MCNC: 160 NG/ML (ref 22–322)
GFR SERPL CREATININE-BSD FRML MDRD: > 60 ML/MIN/1.73M2
GLUCOSE BLD-MCNC: 89 MG/DL (ref 70–108)
HCT VFR BLD AUTO: 44.9 % (ref 42–52)
HGB BLD-MCNC: 15.1 GM/DL (ref 14–18)
IMMATURE GRANULOCYTES: 0 %
IONIZED CALCIUM, WHOLE BLOOD: 1.18 MMOL/L (ref 1.12–1.32)
IRON SATN MFR SERPL: 25 % (ref 20–50)
IRON SERPL-MCNC: 77 UG/DL (ref 65–195)
LYMPHOCYTES ABSOLUTE: 1.7 THOU/MM3 (ref 1–4.8)
LYMPHOCYTES NFR BLD AUTO: 28 % (ref 15–47)
MCH RBC QN AUTO: 29.6 PG (ref 26–33)
MCHC RBC AUTO-ENTMCNC: 33.6 GM/DL (ref 32.2–35.5)
MCV RBC AUTO: 88 FL (ref 80–94)
MONOCYTES ABSOLUTE: 0.6 THOU/MM3 (ref 0.4–1.3)
MONOCYTES NFR BLD AUTO: 10 % (ref 0–12)
NEUTROPHILS NFR BLD AUTO: 58 % (ref 43–75)
PLATELET # BLD AUTO: 148 THOU/MM3 (ref 130–400)
PMV BLD AUTO: 10.1 FL (ref 9.4–12.4)
POTASSIUM BLD-SCNC: 3.7 MEQ/L (ref 3.5–4.9)
PROT SERPL-MCNC: 7 G/DL (ref 6.1–8)
RBC # BLD AUTO: 5.1 MILL/MM3 (ref 4.7–6.1)
SEGMENTED NEUTROPHILS ABSOLUTE COUNT: 3.5 THOU/MM3 (ref 1.8–7.7)
SODIUM BLD-SCNC: 142 MEQ/L (ref 138–146)
TIBC SERPL-MCNC: 303 UG/DL (ref 171–450)
TOTAL CO2, WHOLE BLOOD: 29 MEQ/L (ref 23–33)
WBC # BLD AUTO: 6.1 THOU/MM3 (ref 4.8–10.8)

## 2023-08-23 PROCEDURE — 85025 COMPLETE CBC W/AUTO DIFF WBC: CPT

## 2023-08-23 PROCEDURE — 80076 HEPATIC FUNCTION PANEL: CPT

## 2023-08-23 PROCEDURE — 99211 OFF/OP EST MAY X REQ PHY/QHP: CPT

## 2023-08-23 PROCEDURE — 80047 BASIC METABLC PNL IONIZED CA: CPT

## 2023-08-23 PROCEDURE — 82728 ASSAY OF FERRITIN: CPT

## 2023-08-23 PROCEDURE — 83540 ASSAY OF IRON: CPT

## 2023-08-23 PROCEDURE — 36415 COLL VENOUS BLD VENIPUNCTURE: CPT

## 2023-08-23 PROCEDURE — 99214 OFFICE O/P EST MOD 30 MIN: CPT | Performed by: INTERNAL MEDICINE

## 2023-08-23 PROCEDURE — 83550 IRON BINDING TEST: CPT

## 2023-08-23 NOTE — PROGRESS NOTES
Adrian notified that ONC requesting germline genetic testing. Adrian explained genetic testing & need for counseling information session. Counseling session coordination reviewed with pt. Pt agreeable to genetic blood draw. [employer]-- blood drawn, packaged, requisition, demographic & pedigree printed. [employer]  confirmation/label scanned under media tab.

## 2023-08-23 NOTE — PATIENT INSTRUCTIONS
CT chest / abdomen/ pelvis ordered for 6 mos. Scans prior to clinic visit. Follow up in 6 mos for MD visit. Labs ordered for next visit, CBC, CMP, CEA. 5. Genetic testing today  6. Follow up in 6 weeks to review the results of genetic testing.

## 2023-09-07 LAB
Lab: NORMAL
Lab: NORMAL

## 2023-09-28 RX ORDER — POTASSIUM CHLORIDE 20 MEQ/1
TABLET, EXTENDED RELEASE ORAL
Qty: 30 TABLET | Refills: 0 | Status: SHIPPED | OUTPATIENT
Start: 2023-09-28

## 2023-09-28 NOTE — TELEPHONE ENCOUNTER
30 day supply sent to pharmacy. Further refills can be provided on 10/4/23 at follow up appointment.

## 2023-10-02 ENCOUNTER — TELEPHONE (OUTPATIENT)
Dept: ONCOLOGY | Age: 53
End: 2023-10-02

## 2023-10-02 NOTE — TELEPHONE ENCOUNTER
Patient called in requesting appointment with Dr Doc Burns be canceled. Patient states he has recently lost his insurance. He will return call when he receives new coverage.

## 2023-11-06 RX ORDER — POTASSIUM CHLORIDE 20 MEQ/1
TABLET, EXTENDED RELEASE ORAL
Qty: 30 TABLET | Refills: 0 | OUTPATIENT
Start: 2023-11-06

## 2024-02-12 NOTE — PROGRESS NOTES
Lab draw completed from [address] [facility] [address] [facility] on arrival to unit. Patient off the unit at this time to office appointment, accompanied by office staff, per self.
Patient assessed for the following post chemotherapy:    Dizziness   No  Lightheadedness  No      Acute nausea/vomiting No  Headache   No  Chest pain/pressure  No  Rash/itching   No  Shortness of breath  No    Patient kept for 20 minutes observation post infusion chemotherapy. Patient tolerated chemotherapy treatment Oxaliplatin without any complications. Last vital signs:   /85   Pulse 68   Temp 98.4 °F (36.9 °C) (Oral)   Resp 16   Ht 6' (1.829 m)   Wt 151 lb (68.5 kg)   SpO2 97%   BMI 20.48 kg/m²     Physician instructed patient:  1. Proceed with first cycle of Xeloda and oxaliplatin today. 2.  He will start taking Xeloda tonight. He will be taking 4 tablets ( 2000 mg ) twice a day for 14 days. 30 minutes before morning and evening dose of Xeloda he will take 1 tablet of  Compazine. 3.  Patient was instructed to take dexamethasone 8 mg daily for 3 days starting tomorrow for acute oxaliplatin induced nausea. 4.  He was instructed to call us immediately with mouth sores, redness involving the inner part of her hands and bottom of feet and diarrhea  5. RTC to see me for labs: CBC, BMP, LFTs and a next dose of Xeloda and oxaliplatin in 3 weeks    Patient reminded of potential cold sensitivity related to oxaliplatin over next few days. Patient instructed if experience any of the above symptoms following today's infusion, he is to notify MD immediately or go to the emergency department. Discharge instructions given to patient. Verbalizes understanding. Ambulated off unit per self, with belongings.
Patient reports a slight sinus like headache. Report has noted some tingling over cheaks/side of face. Reports stomach was feeling a little off, but admits he has not taken any of his morning medications or really eaten anything. Patient is going to try eating something to see if it helps.
Patient returned to the unit at this time from office appointment, accompanied by office staff, per self to receive 1st treatment.
54.4

## 2024-02-23 ENCOUNTER — HOSPITAL ENCOUNTER (OUTPATIENT)
Dept: CT IMAGING | Age: 54
Discharge: HOME | End: 2024-02-23
Attending: INTERNAL MEDICINE
Payer: COMMERCIAL

## 2024-02-23 DIAGNOSIS — C18.9 ADENOCARCINOMA, COLON (HCC): ICD-10-CM

## 2024-02-23 PROCEDURE — 71260 CT THORAX DX C+: CPT

## 2024-02-23 PROCEDURE — 74177 CT ABD & PELVIS W/CONTRAST: CPT

## 2024-02-23 PROCEDURE — 6360000004 HC RX CONTRAST MEDICATION: Performed by: INTERNAL MEDICINE

## 2024-02-23 RX ADMIN — IOPAMIDOL 80 ML: 755 INJECTION, SOLUTION INTRAVENOUS at 09:17

## 2024-02-23 RX ADMIN — IOHEXOL 50 ML: 240 INJECTION, SOLUTION INTRATHECAL; INTRAVASCULAR; INTRAVENOUS; ORAL at 09:18

## 2024-04-15 ENCOUNTER — TELEPHONE (OUTPATIENT)
Dept: FAMILY MEDICINE CLINIC | Age: 54
End: 2024-04-15

## 2024-04-15 NOTE — TELEPHONE ENCOUNTER
Left voicemail regarding need to reschedule 6/17/24 appointment as provider will be unavailable.  Patient can reschedule through [facility] using the Visits tab or by calling the office.

## 2024-06-06 ENCOUNTER — OFFICE VISIT (OUTPATIENT)
Dept: FAMILY MEDICINE CLINIC | Age: 54
End: 2024-06-06

## 2024-06-06 VITALS
SYSTOLIC BLOOD PRESSURE: 128 MMHG | HEIGHT: 72 IN | WEIGHT: 164.6 LBS | DIASTOLIC BLOOD PRESSURE: 82 MMHG | RESPIRATION RATE: 18 BRPM | OXYGEN SATURATION: 98 % | HEART RATE: 55 BPM | BODY MASS INDEX: 22.29 KG/M2

## 2024-06-06 DIAGNOSIS — Z13.220 SCREENING, LIPID: ICD-10-CM

## 2024-06-06 DIAGNOSIS — C77.2 SECONDARY AND UNSPECIFIED MALIGNANT NEOPLASM OF INTRA-ABDOMINAL LYMPH NODES (HCC): ICD-10-CM

## 2024-06-06 DIAGNOSIS — R79.0 LOW IRON STORES: Primary | ICD-10-CM

## 2024-06-06 DIAGNOSIS — Z00.00 ENCOUNTER FOR WELL ADULT EXAM WITHOUT ABNORMAL FINDINGS: Primary | ICD-10-CM

## 2024-06-06 DIAGNOSIS — C18.9 ADENOCARCINOMA, COLON (HCC): ICD-10-CM

## 2024-06-06 DIAGNOSIS — F10.21 HISTORY OF ALCOHOLISM (HCC): ICD-10-CM

## 2024-06-06 DIAGNOSIS — E87.1 HYPONATREMIA: ICD-10-CM

## 2024-06-06 DIAGNOSIS — Z11.59 NEED FOR HEPATITIS C SCREENING TEST: ICD-10-CM

## 2024-06-06 DIAGNOSIS — Z90.49 S/P RIGHT COLECTOMY: ICD-10-CM

## 2024-06-06 DIAGNOSIS — E83.51 HYPOCALCEMIA: Primary | ICD-10-CM

## 2024-06-06 DIAGNOSIS — E87.6 HYPOKALEMIA: ICD-10-CM

## 2024-06-06 DIAGNOSIS — Z23 NEED FOR PNEUMOCOCCAL 20-VALENT CONJUGATE VACCINATION: ICD-10-CM

## 2024-06-06 LAB
ALBUMIN SERPL BCG-MCNC: 3.9 G/DL (ref 3.5–5.1)
ALP SERPL-CCNC: 60 U/L (ref 38–126)
ALT SERPL W/O P-5'-P-CCNC: 15 U/L (ref 11–66)
ANION GAP SERPL CALC-SCNC: 11 MEQ/L (ref 8–16)
AST SERPL-CCNC: 18 U/L (ref 5–40)
BASOPHILS ABSOLUTE: 0 THOU/MM3 (ref 0–0.1)
BASOPHILS ABSOLUTE: 0.1 THOU/MM3 (ref 0–0.1)
BASOPHILS NFR BLD AUTO: 0.8 %
BASOPHILS NFR BLD AUTO: 1 %
BILIRUB SERPL-MCNC: 0.4 MG/DL (ref 0.3–1.2)
BUN SERPL-MCNC: 18 MG/DL (ref 7–22)
CALCIUM SERPL-MCNC: 8.3 MG/DL (ref 8.5–10.5)
CEA SERPL-MCNC: 2.2 NG/ML (ref 0–5)
CHLORIDE SERPL-SCNC: 97 MEQ/L (ref 98–111)
CHOLEST SERPL-MCNC: 118 MG/DL (ref 100–199)
CO2 SERPL-SCNC: 22 MEQ/L (ref 23–33)
CREAT SERPL-MCNC: 0.8 MG/DL (ref 0.4–1.2)
DEPRECATED RDW RBC AUTO: 40.9 FL (ref 35–45)
DEPRECATED RDW RBC AUTO: 41.5 FL (ref 35–45)
EOSINOPHIL NFR BLD AUTO: 2 %
EOSINOPHIL NFR BLD AUTO: 2.6 %
EOSINOPHILS ABSOLUTE: 0.1 THOU/MM3 (ref 0–0.4)
EOSINOPHILS ABSOLUTE: 0.2 THOU/MM3 (ref 0–0.4)
ERYTHROCYTE [DISTWIDTH] IN BLOOD BY AUTOMATED COUNT: 12.6 % (ref 11.5–14.5)
ERYTHROCYTE [DISTWIDTH] IN BLOOD BY AUTOMATED COUNT: 12.7 % (ref 11.5–14.5)
FERRITIN SERPL IA-MCNC: 142 NG/ML (ref 22–322)
GFR SERPL CREATININE-BSD FRML MDRD: > 90 ML/MIN/1.73M2
GLUCOSE SERPL-MCNC: 84 MG/DL (ref 70–108)
HCT VFR BLD AUTO: 43.2 % (ref 42–52)
HCT VFR BLD AUTO: 44 % (ref 42–52)
HCV IGG SERPL QL IA: NEGATIVE
HDLC SERPL-MCNC: 59 MG/DL
HGB BLD-MCNC: 14.4 GM/DL (ref 14–18)
HGB BLD-MCNC: 14.9 GM/DL (ref 14–18)
IMM GRANULOCYTES # BLD AUTO: 0.01 THOU/MM3 (ref 0–0.07)
IMM GRANULOCYTES # BLD AUTO: 0.01 THOU/MM3 (ref 0–0.07)
IMM GRANULOCYTES NFR BLD AUTO: 0.2 %
IMM GRANULOCYTES NFR BLD AUTO: 0.2 %
IRON SATN MFR SERPL: 20 % (ref 20–50)
IRON SERPL-MCNC: 49 UG/DL (ref 65–195)
LDLC SERPL CALC-MCNC: 51 MG/DL
LYMPHOCYTES ABSOLUTE: 1.4 THOU/MM3 (ref 1–4.8)
LYMPHOCYTES ABSOLUTE: 1.5 THOU/MM3 (ref 1–4.8)
LYMPHOCYTES NFR BLD AUTO: 22.9 %
LYMPHOCYTES NFR BLD AUTO: 24.1 %
MCH RBC QN AUTO: 29.7 PG (ref 26–33)
MCH RBC QN AUTO: 30.2 PG (ref 26–33)
MCHC RBC AUTO-ENTMCNC: 33.3 GM/DL (ref 32.2–35.5)
MCHC RBC AUTO-ENTMCNC: 33.9 GM/DL (ref 32.2–35.5)
MCV RBC AUTO: 89.1 FL (ref 80–94)
MCV RBC AUTO: 89.2 FL (ref 80–94)
MONOCYTES ABSOLUTE: 0.7 THOU/MM3 (ref 0.4–1.3)
MONOCYTES ABSOLUTE: 0.7 THOU/MM3 (ref 0.4–1.3)
MONOCYTES NFR BLD AUTO: 10.8 %
MONOCYTES NFR BLD AUTO: 10.8 %
NEUTROPHILS ABSOLUTE: 3.8 THOU/MM3 (ref 1.8–7.7)
NEUTROPHILS ABSOLUTE: 3.8 THOU/MM3 (ref 1.8–7.7)
NEUTROPHILS NFR BLD AUTO: 61.5 %
NEUTROPHILS NFR BLD AUTO: 63.1 %
NRBC BLD AUTO-RTO: 0 /100 WBC
NRBC BLD AUTO-RTO: 0 /100 WBC
PLATELET # BLD AUTO: 164 THOU/MM3 (ref 130–400)
PLATELET # BLD AUTO: 174 THOU/MM3 (ref 130–400)
PMV BLD AUTO: 10.5 FL (ref 9.4–12.4)
PMV BLD AUTO: 10.6 FL (ref 9.4–12.4)
POTASSIUM SERPL-SCNC: 3.4 MEQ/L (ref 3.5–5.2)
PROT SERPL-MCNC: 5.6 G/DL (ref 6.1–8)
RBC # BLD AUTO: 4.85 MILL/MM3 (ref 4.7–6.1)
RBC # BLD AUTO: 4.93 MILL/MM3 (ref 4.7–6.1)
SODIUM SERPL-SCNC: 130 MEQ/L (ref 135–145)
TIBC SERPL-MCNC: 251 UG/DL (ref 171–450)
TRIGL SERPL-MCNC: 38 MG/DL (ref 0–199)
WBC # BLD AUTO: 6.1 THOU/MM3 (ref 4.8–10.8)
WBC # BLD AUTO: 6.2 THOU/MM3 (ref 4.8–10.8)

## 2024-06-06 RX ORDER — FERROUS SULFATE 325(65) MG
325 TABLET ORAL
Qty: 90 TABLET | Refills: 1 | Status: SHIPPED | OUTPATIENT
Start: 2024-06-06

## 2024-06-06 ASSESSMENT — PATIENT HEALTH QUESTIONNAIRE - PHQ9
SUM OF ALL RESPONSES TO PHQ QUESTIONS 1-9: 0
SUM OF ALL RESPONSES TO PHQ9 QUESTIONS 1 & 2: 0
2. FEELING DOWN, DEPRESSED OR HOPELESS: NOT AT ALL
SUM OF ALL RESPONSES TO PHQ QUESTIONS 1-9: 0
1. LITTLE INTEREST OR PLEASURE IN DOING THINGS: NOT AT ALL

## 2024-06-06 ASSESSMENT — ENCOUNTER SYMPTOMS
DIARRHEA: 0
COUGH: 0
ABDOMINAL PAIN: 0
VOMITING: 0
NAUSEA: 0

## 2024-06-06 NOTE — PROGRESS NOTES
[facility]  [address]  [facility] [address]  Dept: [phone]  Dept Fax: [phone]  Loc: [phone]    HPI:     Agapito Juarez is a 53 y.o. male who presents today for:  Chief Complaint   Patient presents with    Annual Exam     No concerns today      Patient presents for routine annual. States he has no issues. States he has stage 3 colon cancer - in remission for 2 years. Patient seeing oncology intermittently for stage 3 colon cancer s/p right hemicolectomy 9/16/2021. Completed chemotherapy 4/13/2022. Under surveillance currently.     Health Maintenance Topics with due status: Overdue       Topic Date Due    Hepatitis B vaccine Never done    Pneumococcal 0-64 years Vaccine 07/23/2022    COVID-19 Vaccine 09/01/2023     Review of Systems   Constitutional:  Negative for chills and fever.   Respiratory:  Negative for cough.    Cardiovascular:  Negative for chest pain and palpitations.   Gastrointestinal:  Negative for abdominal pain, diarrhea, nausea and vomiting.   Genitourinary:  Negative for dysuria and hematuria.   Skin:  Negative for rash.   Neurological:  Negative for dizziness, light-headedness and headaches.   All other systems reviewed and are negative.      Past Medical History:          Diagnosis Date    Anemia     Colon cancer (HCC) 09/2021       Past Surgical History:          Procedure Laterality Date    CHOLECYSTECTOMY, LAPAROSCOPIC N/A 3/8/2022    ROBOTIC LAPAROSCOPIC CHOLECYSTECTOMY performed by Carloz Banks MD at [facility] OR    COLONOSCOPY  09/01/2021    Dr. Turner    ERCP N/A 02/08/2022    ERCP DIAGNOSTIC performed by Mathieu Vogel MD at [facility] Endoscopy    ERCP  02/24/2022    with stent placement Dr. Zacarias    HEMICOLECTOMY Right 09/16/2021    ROBOT EXTENDED RIGHT COLECTOMY performed by Carloz Banks MD at [facility] OR    INGUINAL HERNIA REPAIR Left 2020    [facility] with mesh

## 2024-06-06 NOTE — PROGRESS NOTES
Attending attestation:  I personally performed and participated key or critical portions of the evaluation and management including personally performing the exam and medical decision making.  I verify the accuracy of the documentation by the resident with the following addition or changes: Here for annual well check. Colon cancer in remission; following with adenocarcinoma.  Reports getting colonoscopy annually; will get records; note shows it was 8/16/23.  Reminded to follow-up with oncologist.  Due for repeat scope in 2025 per their note.  Continue aspirin for colon cancer risk reduction.  Will repeat Hep C since partner positive and untreated. Encourage treatment for partner unless viral load undetectable.  Encourage Hep B and Pneumococcal 20 vaccines.       Multiple abnormal labs noted: Await call back from patient to discuss.    Electronically signed by Sharda Potts MD on 6/6/2024 at 9:04 AM

## 2024-06-06 NOTE — PROGRESS NOTES
Health Maintenance Due   Topic Date Due    Hepatitis B vaccine (1 of 3 - 3-dose series) Never done    Pneumococcal 0-64 years Vaccine (2 of 2 - PCV) 07/23/2022    COVID-19 Vaccine (3 - 2023-24 season) 09/01/2023

## 2024-06-07 ENCOUNTER — NURSE ONLY (OUTPATIENT)
Dept: FAMILY MEDICINE CLINIC | Age: 54
End: 2024-06-07
Payer: COMMERCIAL

## 2024-06-07 ENCOUNTER — TELEPHONE (OUTPATIENT)
Dept: FAMILY MEDICINE CLINIC | Age: 54
End: 2024-06-07

## 2024-06-07 DIAGNOSIS — C18.9 ADENOCARCINOMA, COLON (HCC): Primary | ICD-10-CM

## 2024-06-07 DIAGNOSIS — E87.1 HYPONATREMIA: ICD-10-CM

## 2024-06-07 DIAGNOSIS — E61.1 LOW IRON: ICD-10-CM

## 2024-06-07 DIAGNOSIS — E87.6 HYPOKALEMIA: ICD-10-CM

## 2024-06-07 DIAGNOSIS — E83.51 HYPOCALCEMIA: ICD-10-CM

## 2024-06-07 LAB
25(OH)D3 SERPL-MCNC: 33 NG/ML (ref 30–100)
ALBUMIN SERPL BCG-MCNC: 4.5 G/DL (ref 3.5–5.1)
ALP SERPL-CCNC: 73 U/L (ref 38–126)
AMYLASE SERPL-CCNC: 51 U/L (ref 20–104)
ANION GAP SERPL CALC-SCNC: 11 MEQ/L (ref 8–16)
BILIRUB SERPL-MCNC: 0.7 MG/DL (ref 0.3–1.2)
BUN SERPL-MCNC: 16 MG/DL (ref 7–22)
CALCIUM SERPL-MCNC: 9.4 MG/DL (ref 8.5–10.5)
CHLORIDE SERPL-SCNC: 103 MEQ/L (ref 98–111)
CO2 SERPL-SCNC: 26 MEQ/L (ref 23–33)
CREAT SERPL-MCNC: 0.9 MG/DL (ref 0.4–1.2)
GFR SERPL CREATININE-BSD FRML MDRD: > 90 ML/MIN/1.73M2
GLUCOSE SERPL-MCNC: 90 MG/DL (ref 70–108)
MAGNESIUM SERPL-MCNC: 2 MG/DL (ref 1.6–2.4)
PHOSPHATE SERPL-MCNC: 3.1 MG/DL (ref 2.4–4.7)
POTASSIUM SERPL-SCNC: 3.6 MEQ/L (ref 3.5–5.2)
PTH-INTACT SERPL-MCNC: 48.9 PG/ML (ref 15–65)
SODIUM SERPL-SCNC: 140 MEQ/L (ref 135–145)

## 2024-06-07 PROCEDURE — 36415 COLL VENOUS BLD VENIPUNCTURE: CPT | Performed by: NURSE PRACTITIONER

## 2024-06-07 NOTE — TELEPHONE ENCOUNTER
----- Message from Sharda Potts MD sent at 6/6/2024  4:53 PM EDT -----  Iron level is low again.  Not sure if supplements changed?  Called patient and left message to call back. May need sooner colonoscopy also.  I can be reached at [phone].  We can figure out a plan for oral supplement if tolerated.

## 2024-06-07 NOTE — TELEPHONE ENCOUNTER
Patient presented for lab draw today.  Wants notation in the chart that he does weight training and is trying to tear up muscle tissue and is not supplementing as much as he should.  He would like this taken into consideration when lab results come in.  Thank you.

## 2024-06-09 LAB — 1,25(OH)2D3 SERPL-MCNC: 44.7 PG/ML (ref 19.9–79.3)

## 2025-06-04 ENCOUNTER — OFFICE VISIT (OUTPATIENT)
Dept: FAMILY MEDICINE CLINIC | Age: 55
End: 2025-06-04
Payer: COMMERCIAL

## 2025-06-04 VITALS
BODY MASS INDEX: 22.49 KG/M2 | OXYGEN SATURATION: 97 % | SYSTOLIC BLOOD PRESSURE: 132 MMHG | HEART RATE: 75 BPM | WEIGHT: 165.8 LBS | DIASTOLIC BLOOD PRESSURE: 84 MMHG | RESPIRATION RATE: 18 BRPM

## 2025-06-04 DIAGNOSIS — M79.89 RIGHT LEG SWELLING: Primary | ICD-10-CM

## 2025-06-04 PROCEDURE — 99214 OFFICE O/P EST MOD 30 MIN: CPT | Performed by: STUDENT IN AN ORGANIZED HEALTH CARE EDUCATION/TRAINING PROGRAM

## 2025-06-04 SDOH — ECONOMIC STABILITY: FOOD INSECURITY: WITHIN THE PAST 12 MONTHS, YOU WORRIED THAT YOUR FOOD WOULD RUN OUT BEFORE YOU GOT MONEY TO BUY MORE.: NEVER TRUE

## 2025-06-04 SDOH — ECONOMIC STABILITY: FOOD INSECURITY: WITHIN THE PAST 12 MONTHS, THE FOOD YOU BOUGHT JUST DIDN'T LAST AND YOU DIDN'T HAVE MONEY TO GET MORE.: NEVER TRUE

## 2025-06-04 SDOH — ECONOMIC STABILITY: INCOME INSECURITY: IN THE LAST 12 MONTHS, WAS THERE A TIME WHEN YOU WERE NOT ABLE TO PAY THE MORTGAGE OR RENT ON TIME?: NO

## 2025-06-04 ASSESSMENT — PATIENT HEALTH QUESTIONNAIRE - PHQ9
SUM OF ALL RESPONSES TO PHQ QUESTIONS 1-9: 0
2. FEELING DOWN, DEPRESSED OR HOPELESS: NOT AT ALL
SUM OF ALL RESPONSES TO PHQ QUESTIONS 1-9: 0
1. LITTLE INTEREST OR PLEASURE IN DOING THINGS: NOT AT ALL
SUM OF ALL RESPONSES TO PHQ QUESTIONS 1-9: 0
SUM OF ALL RESPONSES TO PHQ QUESTIONS 1-9: 0
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
## 2025-06-04 NOTE — PROGRESS NOTES
Health Maintenance Due   Topic Date Due    Hepatitis B vaccine (1 of 3 - 19+ 3-dose series) Never done    Pneumococcal 50+ years Vaccine (2 of 2 - PCV) 07/23/2022    COVID-19 Vaccine (3 - 2024-25 season) 09/01/2024

## 2025-06-05 ENCOUNTER — RESULTS FOLLOW-UP (OUTPATIENT)
Dept: FAMILY MEDICINE CLINIC | Age: 55
End: 2025-06-05

## 2025-06-05 ENCOUNTER — HOSPITAL ENCOUNTER (OUTPATIENT)
Dept: INTERVENTIONAL RADIOLOGY/VASCULAR | Age: 55
Discharge: HOME | End: 2025-06-07
Attending: STUDENT IN AN ORGANIZED HEALTH CARE EDUCATION/TRAINING PROGRAM
Payer: COMMERCIAL

## 2025-06-05 DIAGNOSIS — G62.9 NEUROPATHY: Primary | ICD-10-CM

## 2025-06-05 DIAGNOSIS — M79.89 RIGHT LEG SWELLING: ICD-10-CM

## 2025-06-05 PROCEDURE — 93971 EXTREMITY STUDY: CPT

## 2025-06-05 RX ORDER — GABAPENTIN 100 MG/1
100 CAPSULE ORAL NIGHTLY
Qty: 30 CAPSULE | Refills: 1 | Status: SHIPPED | OUTPATIENT
Start: 2025-06-05 | End: 2025-06-15 | Stop reason: SDUPTHER

## 2025-06-05 ASSESSMENT — ENCOUNTER SYMPTOMS
CONSTIPATION: 0
VOMITING: 0
DIARRHEA: 0
SHORTNESS OF BREATH: 0
NAUSEA: 0
COUGH: 0

## 2025-06-15 DIAGNOSIS — G62.9 NEUROPATHY: ICD-10-CM

## 2025-06-16 RX ORDER — GABAPENTIN 300 MG/1
300 CAPSULE ORAL NIGHTLY
Qty: 30 CAPSULE | Refills: 1 | Status: SHIPPED | OUTPATIENT
Start: 2025-06-16 | End: 2025-08-15

## 2025-06-16 NOTE — TELEPHONE ENCOUNTER
Patient's last appointment was : 6/4/2025  Patient's next appointment is : Visit date not found  Last refilled:06/05/25 30 cap, 1 refill     Patient Comment: Does not work as prescribed, but in 3x dose helps. Tried correctly twice, scaled up and hit on 300mg = partial relief. I have continuing to self rehab leg and am making progress. Would like to try another 1-2 weeks before ortho consult if possible.

## 2025-07-03 DIAGNOSIS — G62.9 NEUROPATHY: ICD-10-CM

## 2025-07-03 RX ORDER — GABAPENTIN 300 MG/1
600 CAPSULE ORAL NIGHTLY
Qty: 30 CAPSULE | Refills: 1 | Status: SHIPPED | OUTPATIENT
Start: 2025-07-03 | End: 2025-09-01

## 2025-07-03 NOTE — TELEPHONE ENCOUNTER
Patient's last appointment was : 6/4/2025  Patient's next appointment is : 7/8/2025  Last sent in: 6/16/25 30 caps with 1 refill

## 2025-07-03 NOTE — TELEPHONE ENCOUNTER
Ok for 600 nightly for 60 days. If no improvement or is worsening would recommend another appointment to discuss further imaging- MRI/EMG or referral to pain management.

## 2025-07-08 ENCOUNTER — OFFICE VISIT (OUTPATIENT)
Dept: FAMILY MEDICINE CLINIC | Age: 55
End: 2025-07-08
Payer: COMMERCIAL

## 2025-07-08 VITALS
DIASTOLIC BLOOD PRESSURE: 86 MMHG | SYSTOLIC BLOOD PRESSURE: 124 MMHG | HEART RATE: 60 BPM | RESPIRATION RATE: 18 BRPM | OXYGEN SATURATION: 97 % | WEIGHT: 164 LBS | BODY MASS INDEX: 22.24 KG/M2

## 2025-07-08 DIAGNOSIS — M79.671 RIGHT FOOT PAIN: ICD-10-CM

## 2025-07-08 DIAGNOSIS — G62.9 NEUROPATHY: Primary | ICD-10-CM

## 2025-07-08 PROCEDURE — 99214 OFFICE O/P EST MOD 30 MIN: CPT | Performed by: STUDENT IN AN ORGANIZED HEALTH CARE EDUCATION/TRAINING PROGRAM

## 2025-07-08 ASSESSMENT — ENCOUNTER SYMPTOMS
CONSTIPATION: 0
SHORTNESS OF BREATH: 0
COUGH: 0
VOMITING: 0
NAUSEA: 0
DIARRHEA: 0

## 2025-07-08 NOTE — PROGRESS NOTES
[facility]  [address] [facility] [id number] [phone]     Agapito Juarez is a 54 y.o. male who presents today for:  Chief Complaint   Patient presents with    Follow-up     F/u from last month. No getting better, taking gabapentin.        Assessment/Plan:     Agapito \"Syed\" was seen today for follow-up.    Diagnoses and all orders for this visit:    Neuropathy    Right foot pain      Neuropathy: Acute/uncontrolled, did recommend EMG or MRI at this point however patient declines at this time, would like to do more exercises.  At this time okay for gabapentin 600 mg nightly, if no improvement I would prefer patient see pain management or PMNR.    Right foot pain: Acute/uncontrolled, nerve pain as above, history and physical exam seem more consistent with plantar fasciitis.  Gave handout on plantar fasciitis as well as exercises for plantar fasciitis, Achilles tendinitis, arch pain exercises.  Can consider referral to podiatry.           Return if symptoms worsen or fail to improve.      Medications Prescribed:  No orders of the defined types were placed in this encounter.      No future appointments.    HPI:     HPI  54-year-old male with past medical history significant for adenocarcinoma, bradycardia, chronic cholecystitis, enterocolitis, history of alcoholism, hypokalemia, varicose veins who presents as office visit for follow-up to discuss nerve pain.    Patient states that increases in gabapentin have not resolved his nerve pain.    Is consider formal physical therapy.  Not getting better.    Has been 3 attempts over 13 months to work it. Wants to get back into gym. This third time thought he had it. Nerve pain hasn't existed before. Fully functioning joint in terms of range of motion.     Problem is time off. Stands on his feet at work.  Does not know if he can take time off for surgery/imaging.    Does feel sharp shooting pains

## 2025-07-17 DIAGNOSIS — G62.9 NEUROPATHY: ICD-10-CM

## 2025-07-17 RX ORDER — GABAPENTIN 300 MG/1
600 CAPSULE ORAL NIGHTLY
Qty: 30 CAPSULE | Refills: 0 | Status: SHIPPED | OUTPATIENT
Start: 2025-07-17 | End: 2025-08-01

## 2025-07-17 NOTE — TELEPHONE ENCOUNTER
Patient's last appointment was : 7/8/2025  Patient's next appointment is : Visit date not found  Last sent in: 7/3/25 30 caps with 1 refill

## 2025-07-29 ENCOUNTER — OFFICE VISIT (OUTPATIENT)
Dept: FAMILY MEDICINE CLINIC | Age: 55
End: 2025-07-29
Payer: COMMERCIAL

## 2025-07-29 VITALS
HEART RATE: 61 BPM | OXYGEN SATURATION: 97 % | DIASTOLIC BLOOD PRESSURE: 84 MMHG | WEIGHT: 161.4 LBS | SYSTOLIC BLOOD PRESSURE: 126 MMHG | RESPIRATION RATE: 18 BRPM | BODY MASS INDEX: 21.89 KG/M2

## 2025-07-29 DIAGNOSIS — M79.671 RIGHT FOOT PAIN: Primary | ICD-10-CM

## 2025-07-29 DIAGNOSIS — L82.1 SEBORRHEIC KERATOSIS: ICD-10-CM

## 2025-07-29 DIAGNOSIS — G62.9 NEUROPATHY: ICD-10-CM

## 2025-07-29 PROCEDURE — 17110 DESTRUCTION B9 LES UP TO 14: CPT | Performed by: STUDENT IN AN ORGANIZED HEALTH CARE EDUCATION/TRAINING PROGRAM

## 2025-07-29 PROCEDURE — 99214 OFFICE O/P EST MOD 30 MIN: CPT | Performed by: STUDENT IN AN ORGANIZED HEALTH CARE EDUCATION/TRAINING PROGRAM

## 2025-07-29 RX ORDER — GABAPENTIN 300 MG/1
600 CAPSULE ORAL 3 TIMES DAILY PRN
Qty: 30 CAPSULE | Refills: 0 | Status: SHIPPED | OUTPATIENT
Start: 2025-07-29 | End: 2025-10-27

## 2025-07-29 ASSESSMENT — ENCOUNTER SYMPTOMS
SHORTNESS OF BREATH: 0
COLOR CHANGE: 1
COUGH: 0
DIARRHEA: 0
NAUSEA: 0
VOMITING: 0
CONSTIPATION: 0

## 2025-07-29 NOTE — PROGRESS NOTES
[facility]  [address] [facility] [address]  [phone]     Agapito Juarez is a 54 y.o. male who presents today for:  Chief Complaint   Patient presents with    OTHER     Mole / spot on back     Pain     Wanting to talk about pain / pain management        Assessment/Plan:     Agapito \"Syed\" was seen today for other and pain.    Diagnoses and all orders for this visit:    Right foot pain  -     [facility] - Seun Romero DO, [facility], [place]  -     gabapentin (NEURONTIN) 300 MG capsule; Take 2 capsules by mouth 3 times daily as needed (neuropathic pain) for up to 90 days.    Neuropathy  -     [facility] - Seun Romero DO, [facility], [place]  -     gabapentin (NEURONTIN) 300 MG capsule; Take 2 capsules by mouth 3 times daily as needed (neuropathic pain) for up to 90 days.    Seborrheic keratosis  -     DESTRUC BENIGN LESION, UP TO 14 LESIONS      Right foot pain and neuropathy: Chronic/uncontrolled, patient has tried 4 to 6 weeks of therapy at home.  Okay for referral to PMNR as above.  Okay to increase gabapentin to 600 3 times daily for neuropathy pathic pain.  Counseled on benefits/risk/side effects.  Consider MRI versus EMG depending on what Dr. Romero says.    Seborrheic keratosis: New problem, would like lesion removed today, cryotherapy as below.    Cryotherapy  Procedure freeze SK  Lesions identified (see exam below).   Patient was counseled on risk of procedure including bleeding, infection, blister formation, scarring, lesion recurrence, and possible need for repeat procedure and benefits of procedure including possible lesion resolution. Consent signed.  Each  frozen with liquid nitrogen until ice ball formed x 3. Patient tolerated procedure well without complication. No blood loss.  Counseled on post procedure care.        No follow-ups on file.      Medications Prescribed:  Orders Placed This Encounter

## 2025-07-31 ENCOUNTER — PATIENT MESSAGE (OUTPATIENT)
Dept: FAMILY MEDICINE CLINIC | Age: 55
End: 2025-07-31

## 2025-08-08 DIAGNOSIS — M79.671 RIGHT FOOT PAIN: ICD-10-CM

## 2025-08-08 DIAGNOSIS — G62.9 NEUROPATHY: ICD-10-CM

## 2025-08-08 RX ORDER — GABAPENTIN 300 MG/1
600 CAPSULE ORAL 3 TIMES DAILY PRN
Qty: 30 CAPSULE | Refills: 0 | Status: CANCELLED | OUTPATIENT
Start: 2025-08-08 | End: 2025-11-06

## 2025-08-10 ENCOUNTER — PATIENT MESSAGE (OUTPATIENT)
Dept: FAMILY MEDICINE CLINIC | Age: 55
End: 2025-08-10

## 2025-08-10 DIAGNOSIS — M79.671 RIGHT FOOT PAIN: ICD-10-CM

## 2025-08-10 DIAGNOSIS — G62.9 NEUROPATHY: ICD-10-CM

## 2025-08-11 RX ORDER — GABAPENTIN 300 MG/1
600 CAPSULE ORAL 3 TIMES DAILY
Qty: 180 CAPSULE | Refills: 2 | Status: SHIPPED | OUTPATIENT
Start: 2025-08-11 | End: 2025-11-09

## (undated) DEVICE — GLOVE ORANGE PI 7   MSG9070

## (undated) DEVICE — SYRINGE MED 10ML LUERLOCK TIP W/O SFTY DISP

## (undated) DEVICE — PENCIL ES L3M BTTN SWCH HOLSTER W/ BLDE ELECTRD EDGE

## (undated) DEVICE — WOUND RETRACTOR AND PROTECTOR: Brand: ALEXIS O WOUND PROTECTOR-RETRACTOR

## (undated) DEVICE — INSUFFLATION NEEDLE TO ESTABLISH PNEUMOPERITONEUM.: Brand: INSUFFLATION NEEDLE

## (undated) DEVICE — REDUCER: Brand: ENDOWRIST

## (undated) DEVICE — BASIC SINGLE BASIN BTC-LF: Brand: MEDLINE INDUSTRIES, INC.

## (undated) DEVICE — BANDAGE ADH W1XL3IN NAT FAB WVN FLX DURABLE N ADH PD SEAL

## (undated) DEVICE — CANNULA SEAL

## (undated) DEVICE — SEAL

## (undated) DEVICE — HYPODERMIC SAFETY NEEDLE: Brand: MAGELLAN

## (undated) DEVICE — DRESSING TRNSPAR W5XL4.5IN FLM SHT SEMIPERMEABLE WIND

## (undated) DEVICE — TUBING INSUFFLATOR HEAT HUMIDIFIED SMK EVAC SET PNEUMOCLEAR

## (undated) DEVICE — SUTURE V-LOC 180 SZ 3-0 L9IN ABSRB GRN L26MM V-20 1/2 CIR VLOCL0644

## (undated) DEVICE — PUMP SUC IRR TBNG L10FT W/ HNDPC ASSEMB STRYKEFLOW 2

## (undated) DEVICE — SUTURE VCRL + SZ 3-0 L27IN ABSRB UD L26MM SH 1/2 CIR VCP416H

## (undated) DEVICE — BAG SPEC REM 224ML W4XL6IN DIA10MM 1 HND GYN DISP ENDOPCH

## (undated) DEVICE — GENERAL LAPAROSCOPY PACK-LF: Brand: MEDLINE INDUSTRIES, INC.

## (undated) DEVICE — SUTURE VCRL + SZ 0 L18IN ABSRB TIE VCP106G

## (undated) DEVICE — 35 ML SYRINGE LUER-LOCK TIP: Brand: MONOJECT

## (undated) DEVICE — ELECTRO LUBE IS A SINGLE PATIENT USE DEVICE THAT IS INTENDED TO BE USED ON ELECTROSURGICAL ELECTRODES TO REDUCE STICKING.: Brand: KEY SURGICAL ELECTRO LUBE

## (undated) DEVICE — RELOAD STPL SZ 0 L45MM DIA3.5MM 0DEG STD REG TISS BLU TI

## (undated) DEVICE — SEALANT TISS 10 CC FIBRIN VISTASEAL

## (undated) DEVICE — BLADELESS OBTURATOR: Brand: WECK VISTA

## (undated) DEVICE — SUTURE NOVAFIL SZ 1 L30IN NONABSORBABLE BLU GS-25 1/2 CIR 8886446571

## (undated) DEVICE — GLOVE ORANGE PI 8   MSG9080

## (undated) DEVICE — GLOVE ORTHO 8   MSG9480

## (undated) DEVICE — CLIP INT L POLYMER LOK LIG HEM O LOK

## (undated) DEVICE — ARM DRAPE

## (undated) DEVICE — TROCAR: Brand: KII FIOS FIRST ENTRY

## (undated) DEVICE — SOLUTION ANTIFOG VIS SYS CLEARIFY LAPSCP

## (undated) DEVICE — BREAST HERNIA PACK: Brand: MEDLINE INDUSTRIES, INC.

## (undated) DEVICE — PENCIL SMK EVAC ALL IN 1 DSGN ENH VISIBILITY IMPROVED AIR

## (undated) DEVICE — GOWN,SIRUS,NON REINFRCD,LARGE,SET IN SL: Brand: MEDLINE

## (undated) DEVICE — VESSEL SEALER EXTEND: Brand: ENDOWRIST

## (undated) DEVICE — RELOAD STPL L60MM H1.5-3.6MM REG TISS BLU GRIPPING SURF B

## (undated) DEVICE — COLUMN DRAPE

## (undated) DEVICE — SUTURE VCRL SZ 3-0 L18IN ABSRB VLT L26MM SH 1/2 CIR J774D

## (undated) DEVICE — TIP COVER ACCESSORY

## (undated) DEVICE — SUTURE VCRL SZ 2-0 L27IN ABSRB UD L26MM SH 1/2 CIR J417H

## (undated) DEVICE — SUTURE VCRL + SZ 4-0 L27IN ABSRB WHT FS-2 3/8 CIR REV CUT VCP422H

## (undated) DEVICE — TROCAR: Brand: KII SHIELDED BLADED ACCESS SYSTEM

## (undated) DEVICE — BAG,BANDED,W/RUBBERBAND,STERILE,30X36: Brand: MEDLINE

## (undated) DEVICE — HYDROTOME RX44 30MM X 260CM

## (undated) DEVICE — STAPLER SKIN L440MM 32MM LNG 12 FIRING B FRM PWR + GRIPPING

## (undated) DEVICE — TTL1LYR 16FR10ML 100%SIL TMPST TR: Brand: MEDLINE

## (undated) DEVICE — SPONGE LAP W18XL18IN WHT COT 4 PLY FLD STRUNG RADPQ DISP ST